# Patient Record
Sex: FEMALE | Race: WHITE | NOT HISPANIC OR LATINO | Employment: OTHER | URBAN - METROPOLITAN AREA
[De-identification: names, ages, dates, MRNs, and addresses within clinical notes are randomized per-mention and may not be internally consistent; named-entity substitution may affect disease eponyms.]

---

## 2017-01-30 NOTE — TELEPHONE ENCOUNTER
Was the patient seen in the last year in this department? Yes   12/5/16    Does patient have an active prescription for medications requested? No     Received Request Via: Pharmacy

## 2017-02-02 RX ORDER — LEVOTHYROXINE SODIUM 0.07 MG/1
TABLET ORAL
Refills: 3 | Status: CANCELLED | OUTPATIENT
Start: 2017-02-02

## 2017-02-10 DIAGNOSIS — E07.9 THYROID CONDITION: ICD-10-CM

## 2017-02-10 RX ORDER — LEVOTHYROXINE SODIUM 0.07 MG/1
75 TABLET ORAL
Qty: 90 TAB | Refills: 0 | Status: SHIPPED | OUTPATIENT
Start: 2017-02-10 | End: 2017-09-02 | Stop reason: SDUPTHER

## 2017-02-24 ENCOUNTER — PATIENT MESSAGE (OUTPATIENT)
Dept: MEDICAL GROUP | Facility: MEDICAL CENTER | Age: 77
End: 2017-02-24

## 2017-02-24 NOTE — TELEPHONE ENCOUNTER
From: Jessica Plaza  To: Anant Hills M.D.  Sent: 2/24/2017 12:09 PM PST  Subject: Prescription Question    Good morning, I need to get a refill for Estazolam 2 MG tablet. The last perscription was from Dr. Soliman with 11 refills the pharmacy says no way would they give 11 refills for a controled substance and he should have known better however he is no longer there and it does not matter, however i do need a refill please.    Thank You Jessica Plaza Pharmacy is [facility] [phone]

## 2017-02-26 DIAGNOSIS — F51.01 PRIMARY INSOMNIA: ICD-10-CM

## 2017-02-27 DIAGNOSIS — F51.01 PRIMARY INSOMNIA: ICD-10-CM

## 2017-02-27 DIAGNOSIS — F51.04 CHRONIC INSOMNIA: ICD-10-CM

## 2017-02-27 RX ORDER — ESTAZOLAM 1 MG
1 TABLET ORAL
Qty: 30 TAB | Refills: 0 | Status: SHIPPED | OUTPATIENT
Start: 2017-02-27 | End: 2017-06-06

## 2017-03-06 ENCOUNTER — OFFICE VISIT (OUTPATIENT)
Dept: MEDICAL GROUP | Facility: MEDICAL CENTER | Age: 77
End: 2017-03-06
Payer: MEDICARE

## 2017-03-06 VITALS
SYSTOLIC BLOOD PRESSURE: 144 MMHG | OXYGEN SATURATION: 93 % | TEMPERATURE: 98.4 F | HEART RATE: 76 BPM | HEIGHT: 68 IN | BODY MASS INDEX: 26.64 KG/M2 | DIASTOLIC BLOOD PRESSURE: 78 MMHG | RESPIRATION RATE: 14 BRPM | WEIGHT: 175.8 LBS

## 2017-03-06 DIAGNOSIS — F51.04 CHRONIC INSOMNIA: ICD-10-CM

## 2017-03-06 DIAGNOSIS — E78.5 DYSLIPIDEMIA: Chronic | ICD-10-CM

## 2017-03-06 DIAGNOSIS — E11.9 DIABETES MELLITUS TYPE 2 IN NONOBESE (HCC): ICD-10-CM

## 2017-03-06 DIAGNOSIS — J44.9 CHRONIC OBSTRUCTIVE PULMONARY DISEASE, UNSPECIFIED COPD TYPE (HCC): Chronic | ICD-10-CM

## 2017-03-06 DIAGNOSIS — K22.70 BARRETT'S ESOPHAGUS WITHOUT DYSPLASIA: ICD-10-CM

## 2017-03-06 DIAGNOSIS — I10 ESSENTIAL HYPERTENSION: Chronic | ICD-10-CM

## 2017-03-06 PROCEDURE — G8482 FLU IMMUNIZE ORDER/ADMIN: HCPCS | Performed by: FAMILY MEDICINE

## 2017-03-06 PROCEDURE — 3017F COLORECTAL CA SCREEN DOC REV: CPT | Performed by: FAMILY MEDICINE

## 2017-03-06 PROCEDURE — 3014F SCREEN MAMMO DOC REV: CPT | Mod: 1P | Performed by: FAMILY MEDICINE

## 2017-03-06 PROCEDURE — 1101F PT FALLS ASSESS-DOCD LE1/YR: CPT | Performed by: FAMILY MEDICINE

## 2017-03-06 PROCEDURE — 99214 OFFICE O/P EST MOD 30 MIN: CPT | Mod: 25 | Performed by: FAMILY MEDICINE

## 2017-03-06 PROCEDURE — 1036F TOBACCO NON-USER: CPT | Performed by: FAMILY MEDICINE

## 2017-03-06 PROCEDURE — G8420 CALC BMI NORM PARAMETERS: HCPCS | Performed by: FAMILY MEDICINE

## 2017-03-06 PROCEDURE — 4040F PNEUMOC VAC/ADMIN/RCVD: CPT | Performed by: FAMILY MEDICINE

## 2017-03-06 PROCEDURE — 3044F HG A1C LEVEL LT 7.0%: CPT | Performed by: FAMILY MEDICINE

## 2017-03-06 ASSESSMENT — PATIENT HEALTH QUESTIONNAIRE - PHQ9: CLINICAL INTERPRETATION OF PHQ2 SCORE: 0

## 2017-03-06 NOTE — MR AVS SNAPSHOT
"        Jessica Plaza   3/6/2017 1:00 PM   Office Visit   MRN: [id number]    Department:  [address] [place] [address]   Dept Phone:  [phone]    Description:  Female : 1940   Provider:  Anant Hills M.D.           Reason for Visit     Follow-Up 3 month check up     GI Problem States has a Hx of GI bleeds and Strokes. Using aspirin for stroke, but GI Dr states to DC aspirin      Allergies as of 3/6/2017     Allergen Noted Reactions    Bactrim 2013   Hives    Demerol 2013   Shortness of Breath, Swelling    Pcn [Penicillins] 2013   Hives    Sulfa Drugs 2013   Hives    Ferrous Sulfate 2014   Rash    itching    Tramadol 2016   Itching    rash    Latex 2013   Contact Dermatitis      You were diagnosed with     Diabetes mellitus type 2 in nonobese (CMS-[facility])   [845893]       Essential hypertension   [1486363]       Chronic obstructive pulmonary disease, unspecified COPD type (CMS-HCC)   [3535329]       Dyslipidemia   [189024]       Castro's esophagus without dysplasia   [472825]         Vital Signs     Blood Pressure Pulse Temperature Respirations Height Weight    144/78 mmHg 76 36.9 °C (98.4 °F) 14 1.727 m (5' 7.99\") 79.742 kg (175 lb 12.8 oz)    Body Mass Index Oxygen Saturation Smoking Status             26.74 kg/m2 93% Former Smoker         Basic Information     Date Of Birth Sex Race Ethnicity Preferred Language    1940 Female White Non- English      Your appointments     May 01, 2017  2:00 PM   ANNUAL WELLNESS with Anant Hills M.D., [facility]    [facility] [address]    [address] [facility] [address]  [facility] [address] [phone]              Problem List              ICD-10-CM Priority Class Noted - Resolved    History of CVA (cerebrovascular accident) Z86.73   2013 - Present    GI bleed K92.2   2014 - Present    Type 2 diabetes mellitus (CMS-HCC) (Chronic) E11.9   2014 - Present   " HTN (hypertension) (Chronic) I10   4/30/2014 - Present    COPD (chronic obstructive pulmonary disease) (CMS-HCC) (Chronic) J44.9   4/30/2014 - Present    Dyslipidemia (Chronic) E78.5   4/30/2014 - Present    Exertional dyspnea R06.09   4/30/2014 - Present    Fatigue R53.83   6/9/2015 - Present    Lung nodules R91.8   6/9/2015 - Present    Hypothyroidism due to acquired atrophy of thyroid E03.4   11/29/2015 - Present    Left shoulder pain M25.512   7/1/2016 - Present    Primary insomnia F51.01   7/29/2016 - Present    Castro's esophagus without dysplasia K22.70   8/31/2016 - Present    Iron deficiency anemia, unspecified D50.9   9/14/2016 - Present      Health Maintenance        Date Due Completion Dates    IMM DTaP/Tdap/Td Vaccine (1 - Tdap) 5/15/1961 ---    PAP SMEAR 5/15/1963 ---    IMM ZOSTER VACCINE 5/15/2002 ---    BONE DENSITY 5/15/2007 ---    IMM PNEUMOCOCCAL 65+ (ADULT) LOW/MEDIUM RISK SERIES (2 of 2 - PCV13) 1/14/2015 1/14/2014, 6/1/2009    MAMMOGRAM 7/28/2016 7/28/2015 (Declined), 5/2/2011, 5/2/2011 (N/S)    Override on 7/28/2015: Patient Declined    Override on 5/2/2011: (N/S)    DIABETES MONOFILAMENT / LE EXAM 7/28/2016 7/28/2015 (Done), 4/3/2014 (Prv Comp)    Override on 7/28/2015: Done    Override on 4/3/2014: Previously completed    RETINAL SCREENING 4/13/2017 4/13/2016, 5/21/2015 (Done), 4/1/2015 (Prv Comp)    Override on 5/21/2015: Done    Override on 4/1/2015: Previously completed    A1C SCREENING 6/5/2017 12/5/2016, 7/26/2016, 3/22/2016, 11/18/2015, 11/18/2015, 7/24/2015, 3/27/2015, 3/27/2015 (N/S), 1/13/2014, 6/1/2013, 6/1/2013    Override on 3/27/2015: (N/S)    FASTING LIPID PROFILE 7/26/2017 7/26/2016, 3/22/2016, 11/18/2015, 11/18/2015, 7/24/2015, 3/27/2015, 1/13/2014, 6/2/2013    URINE ACR / MICROALBUMIN 7/26/2017 7/26/2016, 7/24/2015, 4/3/2013 (Prv Comp)    Override on 4/3/2013: Previously completed    SERUM CREATININE 10/6/2017 10/6/2016, 7/26/2016, 3/22/2016, 2/12/2016, 11/18/2015,  11/18/2015, 7/24/2015, 3/27/2015, 1/22/2015, 4/29/2014, 1/13/2014, 6/3/2013, 6/1/2013, 2/14/2013    COLONOSCOPY 8/2/2024 8/2/2014 (Prv Comp)    Override on 8/2/2014: Previously completed            Current Immunizations     Influenza TIV (IM) 10/1/2013, 10/1/2012    Influenza Vaccine Adult HD 10/31/2016, 9/4/2015    Influenza Vaccine Quad Inj (Preserved) 9/28/2011    Pneumococcal polysaccharide vaccine (PPSV-23) 1/14/2014  3:48 PM, 6/1/2009      Below and/or attached are the medications your provider expects you to take. Review all of your home medications and newly ordered medications with your provider and/or pharmacist. Follow medication instructions as directed by your provider and/or pharmacist. Please keep your medication list with you and share with your provider. Update the information when medications are discontinued, doses are changed, or new medications (including over-the-counter products) are added; and carry medication information at all times in the event of emergency situations     Allergies:  BACTRIM - Hives     DEMEROL - Shortness of Breath,Swelling     PCN - Hives     SULFA DRUGS - Hives     FERROUS SULFATE - Rash     TRAMADOL - Itching     LATEX - Contact Dermatitis               Medications  Valid as of: March 06, 2017 -  1:42 PM    Generic Name Brand Name Tablet Size Instructions for use    Albuterol Sulfate (Aero Soln) albuterol 108 (90 BASE) MCG/ACT Inhale 1 Puff by mouth every 6 hours as needed for Shortness of Breath.        Aspirin (Tab)  MG Take 325 mg by mouth every day.        CycloSPORINE   2 Drops by Ophthalmic route 2 Times a Day.        Estazolam (Tab) Estazolam 2 MG Take 1 Tab by mouth every bedtime.        Estazolam (Tab) PROSOM 1 MG Take 1 Tab by mouth every bedtime.        Hydrocodone-Acetaminophen (Tab) NORCO 5-325 MG Take 1-2 Tabs by mouth every 6 hours as needed.        HydrOXYzine Pamoate (Cap) VISTARIL 50 MG Take 1 Cap by mouth 3 times a day as needed for Itching.           Levothyroxine Sodium (Tab) SYNTHROID 75 MCG Take 1 Tab by mouth Every morning on an empty stomach.        Liraglutide (Solution Pen-injector) VICTOZA 18 MG/3ML Inject 0.1 mL as instructed every day.        Lisinopril (Tab) PRINIVIL 10 MG Take 10 mg by mouth every day.        MetFORMIN HCl (Tab) GLUCOPHAGE 1000 MG TAKE 1 TABLET BY MOUTH 2 TIMES A DAY, WITH MEALS.        Omeprazole (CAPSULE DELAYED RELEASE) PRILOSEC 40 MG Take 40 mg by mouth every day.        Simvastatin (Tab) ZOCOR 40 MG Take 40 mg by mouth every day.        .                 Medicines prescribed today were sent to:     [id number] - [place], [place] - [address]    [address] [place] [address]    Phone: [phone] Fax: [phone]    Open 24 Hours?: No      Medication refill instructions:       If your prescription bottle indicates you have medication refills left, it is not necessary to call your provider’s office. Please contact your pharmacy and they will refill your medication.    If your prescription bottle indicates you do not have any refills left, you may request refills at any time through one of the following ways: The online [employer] / [employer] system (except Urgent Care), by calling your provider’s office, or by asking your pharmacy to contact your provider’s office with a refill request. Medication refills are processed only during regular business hours and may not be available until the next business day. Your provider may request additional information or to have a follow-up visit with you prior to refilling your medication.   *Please Note: Medication refills are assigned a new Rx number when refilled electronically. Your pharmacy may indicate that no refills were authorized even though a new prescription for the same medication is available at the pharmacy. Please request the medicine by name with the pharmacy before contacting your provider for a refill.        Your To Do List     Future Labs/Procedures Complete By  Expires    CBC WITH DIFFERENTIAL  As directed 3/6/2018    TSH  As directed 3/7/2018         MyChart Access Code: Activation code not generated  Current MyChart Status: Active

## 2017-03-06 NOTE — PROGRESS NOTES
CC: Follow up/ intermittent epigastric pain    HPI:   Jessica presents today to discuss the following medical issues:    Diabetes type 2, has been adequately controlled. Her last A1C was 6.1.Denies polyuria, polydipsia, and hypoglycemic episodes.She has been on Victoza 0.1 ml daily, and metformin 1000 mg bid.No side effects.    Essential hypertension, BP has been adequately controlled on current medication. Denies headache, chest pain, and SOB.She is currently on Lisinopril 10 mg daily.No side effects.    Dyslipidemia, he has been tolerating the statin. Denies muscle pain LFTs has been normal, he is on Simvastatin 40 mg daily.    Castro's esophagus without dysplasia, patient has been stable, and asymptomatic.Patient has been following up with GI, has been doing periodic EGD.     Discuss the risk of taking Estazolam for insomnia, advised to start either trazodone, or mirtazapine and stop the estazolam gradually.    Patient Active Problem List    Diagnosis Date Noted   • Iron deficiency anemia, unspecified 09/14/2016   • Castro's esophagus without dysplasia 08/31/2016   • Primary insomnia 07/29/2016   • Left shoulder pain 07/01/2016   • Hypothyroidism due to acquired atrophy of thyroid 11/29/2015   • Fatigue 06/09/2015   • Lung nodules 06/09/2015   • Type 2 diabetes mellitus (CMS-HCC) 04/30/2014   • HTN (hypertension) 04/30/2014   • COPD (chronic obstructive pulmonary disease) (CMS-HCC) 04/30/2014   • Dyslipidemia 04/30/2014   • Exertional dyspnea 04/30/2014   • GI bleed 04/29/2014   • History of CVA (cerebrovascular accident) 06/02/2013       Current Outpatient Prescriptions   Medication Sig Dispense Refill   • levothyroxine (SYNTHROID) 75 MCG Tab Take 1 Tab by mouth Every morning on an empty stomach. 90 Tab 0   • liraglutide (VICTOZA) 18 MG/3ML Solution Pen-injector injection Inject 0.1 mL as instructed every day. 6 mL 11   • albuterol 108 (90 BASE) MCG/ACT Aero Soln inhalation aerosol Inhale 1 Puff by mouth every 6  "hours as needed for Shortness of Breath.     • simvastatin (ZOCOR) 40 MG Tab Take 40 mg by mouth every day.     • Estazolam 2 MG Tab Take 1 Tab by mouth every bedtime. 30 Tab 11   • metformin (GLUCOPHAGE) 1000 MG tablet TAKE 1 TABLET BY MOUTH 2 TIMES A DAY, WITH MEALS. 60 Tab 11   • lisinopril (PRINIVIL) 10 MG Tab Take 10 mg by mouth every day.     • aspirin (ASA) 325 MG TABS Take 325 mg by mouth every day.     • CycloSPORINE (RESTASIS OP) 2 Drops by Ophthalmic route 2 Times a Day.     • omeprazole (PRILOSEC) 40 MG capsule Take 40 mg by mouth every day.     • estazolam (PROSOM) 1 MG tablet Take 1 Tab by mouth every bedtime. 30 Tab 0   • hydrocodone-acetaminophen (NORCO) 5-325 MG Tab per tablet Take 1-2 Tabs by mouth every 6 hours as needed. 40 Tab 0   • hydrOXYzine (VISTARIL) 50 MG Cap Take 1 Cap by mouth 3 times a day as needed for Itching. 20 Cap 0     No current facility-administered medications for this visit.         Allergies as of 03/06/2017 - Gumaro as Reviewed 03/06/2017   Allergen Reaction Noted   • Bactrim Hives 02/14/2013   • Demerol Shortness of Breath and Swelling 02/14/2013   • Pcn [penicillins] Hives 02/14/2013   • Sulfa drugs Hives 02/14/2013   • Ferrous sulfate Rash 09/08/2014   • Tramadol Itching 02/11/2016   • Latex Contact Dermatitis 02/14/2013        ROS: Denies any chest pain, Shortness of breath, Changes bowel or bladder, Lower extremity edema.    Physical Exam:  /78 mmHg  Pulse 76  Temp(Src) 36.9 °C (98.4 °F)  Resp 14  Ht 1.727 m (5' 7.99\")  Wt 79.742 kg (175 lb 12.8 oz)  BMI 26.74 kg/m2  SpO2 93%  Gen.: Well-developed, well-nourished, no apparent distress,pleasant and cooperative with the examination  Skin:  Warm and dry with good turgor. No rashes or suspicious lesions in visible areas  HEENT:Sinuses nontender with palpation, TMs clear, nares patent with pink mucosa and clear rhinorrhea,no septal deviation ,polyps or lesions. lips without lesions, oropharynx clear.  Neck: " Trachea midline,no masses or adenopathy. No JVD.  Cor: Regular rate and rhythm without murmur, gallop or rub.  Lungs: Respirations unlabored.Clear to auscultation with equal breath sounds bilaterally. No wheezes, rhonchi.  Extremities: No cyanosis, clubbing or edema.        Monofilament testing with a 10 gram force: sensation intact: 6/6  Visual Inspection: Feet w/omaceration, ulcers, fissures.  Pedal pulses: intact.    Assessment and Plan.   76 y.o. female     1. Diabetes mellitus type 2 in nonobese (CMS-HCC)  Has been adequately controlled. Her last A1C was 6.1.  Continue on Victoza 0.1 ml daily, and metformin 1000 mg bid.No side effects.  Monofilament foot exam showed no sign of neuropathy.    - Diabetic Monofilament Lower Extremity Exam    2. Essential hypertension  BP has been adequately controlled on current medication. Denies headache, chest pain, and SOB.  Continue on Lisinopril 10 mg daily.No side effects.    3. Chronic obstructive pulmonary disease, unspecified COPD type (CMS-HCC)  Stable, mostly asymptomatic.  Has been on albuterol as needed.    4. Dyslipidemia  he has been tolerating the statin. Denies muscle pain LFTs has been normal,   Continue on Simvastatin 40 mg daily.    5. Castro's esophagus without dysplasia  Stable, and asymptomatic.  Continue on up with GI, has been doing periodic EGD.     6. Chronic insomnia  Discuss the risk of taking Estazolam for insomnia,   Will start her on either trazodone, or mirtazapine and stop the estazolam gradually.

## 2017-03-15 LAB
BASOPHILS # BLD AUTO: 0 X10E3/UL (ref 0–0.2)
BASOPHILS NFR BLD AUTO: 0 %
CHOLEST SERPL-MCNC: 181 MG/DL (ref 100–199)
COMMENT 011824: NORMAL
EOSINOPHIL # BLD AUTO: 0.2 X10E3/UL (ref 0–0.4)
EOSINOPHIL NFR BLD AUTO: 2 %
ERYTHROCYTE [DISTWIDTH] IN BLOOD BY AUTOMATED COUNT: 14.7 % (ref 12.3–15.4)
HCT VFR BLD AUTO: 46.4 % (ref 34–46.6)
HDLC SERPL-MCNC: 60 MG/DL
HGB BLD-MCNC: 15.5 G/DL (ref 11.1–15.9)
IMM GRANULOCYTES # BLD: 0 X10E3/UL (ref 0–0.1)
IMM GRANULOCYTES NFR BLD: 0 %
IMMATURE CELLS  115398: ABNORMAL
LDLC SERPL CALC-MCNC: 93 MG/DL (ref 0–99)
LYMPHOCYTES # BLD AUTO: 1.3 X10E3/UL (ref 0.7–3.1)
LYMPHOCYTES NFR BLD AUTO: 16 %
MCH RBC QN AUTO: 33.3 PG (ref 26.6–33)
MCHC RBC AUTO-ENTMCNC: 33.4 G/DL (ref 31.5–35.7)
MCV RBC AUTO: 100 FL (ref 79–97)
MONOCYTES # BLD AUTO: 0.7 X10E3/UL (ref 0.1–0.9)
MONOCYTES NFR BLD AUTO: 9 %
MORPHOLOGY BLD-IMP: ABNORMAL
NEUTROPHILS # BLD AUTO: 6.2 X10E3/UL (ref 1.4–7)
NEUTROPHILS NFR BLD AUTO: 73 %
NRBC BLD AUTO-RTO: ABNORMAL %
PLATELET # BLD AUTO: 225 X10E3/UL (ref 150–379)
RBC # BLD AUTO: 4.65 X10E6/UL (ref 3.77–5.28)
TRIGL SERPL-MCNC: 138 MG/DL (ref 0–149)
TSH SERPL DL<=0.005 MIU/L-ACNC: 1.4 UIU/ML (ref 0.45–4.5)
VLDLC SERPL CALC-MCNC: 28 MG/DL (ref 5–40)
WBC # BLD AUTO: 8.5 X10E3/UL (ref 3.4–10.8)

## 2017-03-31 DIAGNOSIS — G47.00 INSOMNIA, UNSPECIFIED TYPE: ICD-10-CM

## 2017-03-31 DIAGNOSIS — F51.01 PRIMARY INSOMNIA: ICD-10-CM

## 2017-03-31 RX ORDER — ESTAZOLAM 1 MG
1 TABLET ORAL DAILY
Qty: 30 TAB | Refills: 0 | Status: SHIPPED
Start: 2017-03-31 | End: 2017-06-06

## 2017-03-31 RX ORDER — SIMVASTATIN 40 MG
40 TABLET ORAL DAILY
Qty: 30 TAB | Refills: 0 | Status: SHIPPED | OUTPATIENT
Start: 2017-03-31 | End: 2017-07-07 | Stop reason: SDUPTHER

## 2017-03-31 RX ORDER — LISINOPRIL 10 MG/1
10 TABLET ORAL DAILY
Qty: 30 TAB | Refills: 0 | Status: SHIPPED | OUTPATIENT
Start: 2017-03-31 | End: 2017-05-02 | Stop reason: SDUPTHER

## 2017-04-27 ENCOUNTER — TELEPHONE (OUTPATIENT)
Dept: MEDICAL GROUP | Facility: MEDICAL CENTER | Age: 77
End: 2017-04-27

## 2017-04-27 RX ORDER — ASPIRIN 81 MG/1
81 TABLET, CHEWABLE ORAL EVERY MORNING
COMMUNITY
End: 2019-05-15

## 2017-04-27 RX ORDER — LISINOPRIL 10 MG/1
TABLET ORAL
Refills: 8 | OUTPATIENT
Start: 2017-04-27

## 2017-04-27 NOTE — TELEPHONE ENCOUNTER
Future Appointments      Provider Department Center   5/1/2017 2:00 PM Anant Hills M.D.; [facility] [address]      ANNUAL WELLNESS VISIT PRE-VISIT PLANNING     1.  Reviewed last PCP office visit assessment and plan notes: Yes  2.  If any orders were placed last visit do we have Results/Consult Notes?        •  Labs? Yes 03/14/17       •  Imaging? No        •  Referrals? No    3.   Updated immunizations by using [employer]?: yes  · Is patient due for Tdap/Shingles? Yes.  If yes, has patient been explained their pharmacy coverage? yes   · Has the patient had the flu vaccine this season? Yes.  · Has patient had the pneumococcal vaccine? Yes. If yes which vaccine was administered? 23 When was it administered? 01/14/14    4.   Last office visit 03/06/17          Patient is due for these Health Maintenance Topics:   Health Maintenance Due   Topic Date Due   • Annual Wellness Visit  Scheduled 5/01/2017   • IMM DTaP/Tdap/Td Vaccine (1 - Tdap)   05/15/1961   • IMM ZOSTER VACCINE  05/15/2002   • IMM PREVNAR 13 01/14/2015   • PFT / SPIROMETRY  ANNUALLY  DUE   • MAMMOGRAM  DUE   • BONE DENSITY  DUE   • RETINAL SCREENING  Requesting records       5. Reviewed/Updated the following with patient:       •   Preferred Pharmacy? YES       •   Preferred Lab? YES       •   Medications? YES. Was Abstract Encounter opened and chart updated? YES       •   Social History? YES. Was Abstract Encounter opened and chart updated? YES       •   Family History? YES. Was Abstract Encounter opened and chart updated? YES    6.    Updated Care Team with [employer] and all specialists?        •   Gait devices, O2, CPAP, etc: no        •   Other specialists (GYN, cardiology, endo, etc): yes        •   Eye professional: yes When was last eye exam? 1 year scheduled for 1month        •   MAYELA letter will be faxed to:  Eye Dr for Retinal Exam records    7. DPA/Advanced Directive:  Patient does not have an Advanced Directive.  A  packet and workshop information was given on Advanced Directives.    8. Patient has:     COPD  1. Is patient under the care of a pulmonologist? no    2. Has patient ever completed a PFT or spirometry? yes       a. If yes, when? 4 years    3.  If applicable, was CareTeam updated with oxygen/CPAP supplier? no    4. Has patient ever had instruction on inhaler technique by health care professional? No    5. Does patient have an action plan for when they have trouble breathing? no    6. Is patient interested in a referral to respiratory therapy for more information on COPD, inhaler technique, and/or information on establishing an action plan?  no    DIABETES  1. Records requested for overdue HM topics specifically for diabetes? yes      a. If yes, specifically requested: retinal exam    2. Has patient ever had diabetes education? Yes      a. If so, when? 10 years      b. If not, is patient interested? Yes diabetic nurse visit    9.  Is patient in need of any refills prior to office visit? No    10. Patient was informed to arrive 15 min prior to their scheduled appointment and bring in their medication bottles? yes    11. Patient was advised: “This is a free wellness visit. The provider will screen for medical conditions to help you stay healthy. If you have other concerns to address you may be asked to discuss these at a separate visit or there may be an additional fee.”  Yes

## 2017-05-01 ENCOUNTER — OFFICE VISIT (OUTPATIENT)
Dept: MEDICAL GROUP | Facility: MEDICAL CENTER | Age: 77
End: 2017-05-01
Payer: MEDICARE

## 2017-05-01 VITALS
RESPIRATION RATE: 16 BRPM | BODY MASS INDEX: 26.07 KG/M2 | WEIGHT: 172 LBS | TEMPERATURE: 97.8 F | HEIGHT: 68 IN | HEART RATE: 73 BPM | SYSTOLIC BLOOD PRESSURE: 182 MMHG | OXYGEN SATURATION: 92 % | DIASTOLIC BLOOD PRESSURE: 80 MMHG

## 2017-05-01 DIAGNOSIS — E03.4 HYPOTHYROIDISM DUE TO ACQUIRED ATROPHY OF THYROID: ICD-10-CM

## 2017-05-01 DIAGNOSIS — M25.512 CHRONIC LEFT SHOULDER PAIN: ICD-10-CM

## 2017-05-01 DIAGNOSIS — E78.5 DYSLIPIDEMIA: Chronic | ICD-10-CM

## 2017-05-01 DIAGNOSIS — I10 ESSENTIAL HYPERTENSION: Chronic | ICD-10-CM

## 2017-05-01 DIAGNOSIS — Z86.73 HISTORY OF CVA (CEREBROVASCULAR ACCIDENT): ICD-10-CM

## 2017-05-01 DIAGNOSIS — G89.29 CHRONIC LEFT SHOULDER PAIN: ICD-10-CM

## 2017-05-01 DIAGNOSIS — K22.70 BARRETT'S ESOPHAGUS WITHOUT DYSPLASIA: ICD-10-CM

## 2017-05-01 DIAGNOSIS — E11.9 DIABETES MELLITUS TYPE 2 IN NONOBESE (HCC): ICD-10-CM

## 2017-05-01 DIAGNOSIS — J44.9 CHRONIC OBSTRUCTIVE PULMONARY DISEASE, UNSPECIFIED COPD TYPE (HCC): Chronic | ICD-10-CM

## 2017-05-01 DIAGNOSIS — Z23 NEED FOR PNEUMOCOCCAL VACCINATION: ICD-10-CM

## 2017-05-01 DIAGNOSIS — F51.01 PRIMARY INSOMNIA: ICD-10-CM

## 2017-05-01 PROCEDURE — 3046F HEMOGLOBIN A1C LEVEL >9.0%: CPT | Mod: 8P | Performed by: FAMILY MEDICINE

## 2017-05-01 PROCEDURE — G8417 CALC BMI ABV UP PARAM F/U: HCPCS | Performed by: FAMILY MEDICINE

## 2017-05-01 PROCEDURE — G0009 ADMIN PNEUMOCOCCAL VACCINE: HCPCS | Performed by: FAMILY MEDICINE

## 2017-05-01 PROCEDURE — 1101F PT FALLS ASSESS-DOCD LE1/YR: CPT | Performed by: FAMILY MEDICINE

## 2017-05-01 PROCEDURE — 90670 PCV13 VACCINE IM: CPT | Performed by: FAMILY MEDICINE

## 2017-05-01 PROCEDURE — G0439 PPPS, SUBSEQ VISIT: HCPCS | Mod: 25 | Performed by: FAMILY MEDICINE

## 2017-05-01 PROCEDURE — 3017F COLORECTAL CA SCREEN DOC REV: CPT | Performed by: FAMILY MEDICINE

## 2017-05-01 PROCEDURE — 99213 OFFICE O/P EST LOW 20 MIN: CPT | Mod: 25 | Performed by: FAMILY MEDICINE

## 2017-05-01 PROCEDURE — G8432 DEP SCR NOT DOC, RNG: HCPCS | Performed by: FAMILY MEDICINE

## 2017-05-01 PROCEDURE — 4040F PNEUMOC VAC/ADMIN/RCVD: CPT | Performed by: FAMILY MEDICINE

## 2017-05-01 PROCEDURE — 3014F SCREEN MAMMO DOC REV: CPT | Mod: 8P | Performed by: FAMILY MEDICINE

## 2017-05-01 PROCEDURE — 1036F TOBACCO NON-USER: CPT | Performed by: FAMILY MEDICINE

## 2017-05-01 RX ORDER — ESTAZOLAM 1 MG
0.5 TABLET ORAL
Qty: 15 TAB | Refills: 0 | Status: SHIPPED | OUTPATIENT
Start: 2017-05-01 | End: 2017-06-06

## 2017-05-01 ASSESSMENT — PATIENT HEALTH QUESTIONNAIRE - PHQ9
SUM OF ALL RESPONSES TO PHQ QUESTIONS 1-9: 17
CLINICAL INTERPRETATION OF PHQ2 SCORE: 6
5. POOR APPETITE OR OVEREATING: 3 - NEARLY EVERY DAY

## 2017-05-01 ASSESSMENT — PAIN SCALES - GENERAL: PAINLEVEL: 8=MODERATE-SEVERE PAIN

## 2017-05-01 NOTE — PROGRESS NOTES
Chief Complaint   Patient presents with   • Annual Wellness Visit         HPI:  Jessica Plaza is a 76 y.o. female here for Medicare Annual Wellness Visit    Elevated blood pressure, denies headache, chest pain, and SOB, has been on lisinopril 10 mg daily.    Has been having chronic left shoulder pain, underwent surgery in the pain, the pain has not gone away, and lately has been getting worse. Wants a referral to orthopedics.      Patient Active Problem List    Diagnosis Date Noted   • Iron deficiency anemia, unspecified 09/14/2016   • Castro's esophagus without dysplasia 08/31/2016   • Primary insomnia 07/29/2016   • Left shoulder pain 07/01/2016   • Hypothyroidism due to acquired atrophy of thyroid 11/29/2015   • Fatigue 06/09/2015   • Lung nodules 06/09/2015   • Type 2 diabetes mellitus (CMS-HCC) 04/30/2014   • HTN (hypertension) 04/30/2014   • COPD (chronic obstructive pulmonary disease) (CMS-HCC) 04/30/2014   • Dyslipidemia 04/30/2014   • Exertional dyspnea 04/30/2014   • GI bleed 04/29/2014   • History of CVA (cerebrovascular accident) 06/02/2013       Current Outpatient Prescriptions   Medication Sig Dispense Refill   • aspirin (ASA) 81 MG Chew Tab chewable tablet Take 81 mg by mouth every day.     • lisinopril (PRINIVIL) 10 MG Tab Take 1 Tab by mouth every day. 30 Tab 0   • simvastatin (ZOCOR) 40 MG Tab Take 1 Tab by mouth every day. 30 Tab 0   • estazolam (PROSOM) 1 MG tablet Take 1 Tab by mouth every bedtime. 30 Tab 0   • levothyroxine (SYNTHROID) 75 MCG Tab Take 1 Tab by mouth Every morning on an empty stomach. 90 Tab 0   • liraglutide (VICTOZA) 18 MG/3ML Solution Pen-injector injection Inject 0.1 mL as instructed every day. 6 mL 11   • albuterol 108 (90 BASE) MCG/ACT Aero Soln inhalation aerosol Inhale 1 Puff by mouth every 6 hours as needed for Shortness of Breath.     • metformin (GLUCOPHAGE) 1000 MG tablet TAKE 1 TABLET BY MOUTH 2 TIMES A DAY, WITH MEALS. 60 Tab 11   • CycloSPORINE (RESTASIS  OP) 2 Drops by Ophthalmic route 2 Times a Day.     • omeprazole (PRILOSEC) 40 MG capsule Take 40 mg by mouth every day.     • estazolam (PROSOM) 1 MG tablet Take 1 Tab by mouth every day. 30 Tab 0   • hydrocodone-acetaminophen (NORCO) 5-325 MG Tab per tablet Take 1-2 Tabs by mouth every 6 hours as needed. (Patient not taking: Reported on 4/27/2017) 40 Tab 0   • hydrOXYzine (VISTARIL) 50 MG Cap Take 1 Cap by mouth 3 times a day as needed for Itching. (Patient not taking: Reported on 4/27/2017) 20 Cap 0   • Estazolam 2 MG Tab Take 1 Tab by mouth every bedtime. (Patient not taking: Reported on 4/27/2017) 30 Tab 11   • aspirin (ASA) 325 MG TABS Take 325 mg by mouth every day.       No current facility-administered medications for this visit.        Patient is taking medications as noted in medication list.  Current supplements as per medication list.   Chronic narcotic pain medicines: no    Allergies: Bactrim; Demerol; Pcn; Sulfa drugs; Ferrous sulfate; Tramadol; and Latex    Current social contact/activities: visit with family and friends     Is patient current with immunizations?  No, due for PREVNAR (PCV13) , TDAP and ZOSTAVAX (Shingles). Patient is interested in receiving PREVNAR (PCV13)  today.     She  reports that she quit smoking about 4 years ago. Her smoking use included Cigarettes. She has a 26 pack-year smoking history. She has never used smokeless tobacco. She reports that she drinks about 8.4 oz of alcohol per week. She reports that she does not use illicit drugs.  Counseling given: Yes        DPA/Advanced Directive:  Patient does not have an Advanced Directive.  A packet and workshop information was given on Advanced Directives.    ROS:    Gait: Uses no assistive device   Ostomy: no   Other tubes: no   Amputations: no   Chronic oxygen use: no   Last eye exam: 03/2017   Wears hearing aids: no   : Denies incontinence.      Screening:    COPD  1. Is patient under the care of a pulmonologist? no    2. Has  patient ever completed a PFT or spirometry? yes       a. If yes, when? 4 years    3.  If applicable, was CareTeam updated with oxygen/CPAP supplier? no    4. Has patient ever had instruction on inhaler technique by health care professional? No    5. Does patient have an action plan for when they have trouble breathing? no    6. Is patient interested in a referral to respiratory therapy for more information on COPD, inhaler technique, and/or information on establishing an action plan?  no    DIABETES  1. Records requested for overdue  topics specifically for diabetes? yes      a. If yes, specifically requested: retinal exam    2. Has patient ever had diabetes education? Yes      a. If so, when? 10 years      b. If not, is patient interested? Yes diabetic nurse visit     Depression Screening    Little interest or pleasure in doing things?  3 - nearly every day  Feeling down, depressed , or hopeless? 3 - nearly every day  Trouble falling or staying asleep, or sleeping too much?  3 - nearly every day  Feeling tired or having little energy?  3 - nearly every day  Poor appetite or overeating?  3 - nearly every day  Feeling bad about yourself - or that you are a failure or have let yourself or your family down? 0 - not at all  Trouble concentrating on things, such as reading the newspaper or watching television? 0 - not at all  Moving or speaking so slowly that other people could have noticed.  Or the opposite - being so fidgety or restless that you have been moving around a lot more than usual?  2 - more than half the days  Thoughts that you would be better off dead, or of hurting yourself?  0 - not at all  Patient Health Questionnaire Score: 17  If depressive symptoms identified deferred to follow up visit unless specifically addressed in assessment and plan.    Screening for Cognitive Impairment    Three Minute Recall (banana, sunrise, fence)  3/3    Draw clock face with all 12 numbers set to the hand to show 10  minutes past 11 o'clock  1 5/5  If cognitive concerns identified deferred to follow up visit unless specifically addressed in assessment and plan.    Fall Risk Assessment    Has the patient had two or more falls in the last year or any fall with injury in the last year?  No  If Fall Risk identified deferred to follow up visit unless specifically addressed in assessment and plan.    Safety Assessment    Throw rugs on floor.  No  Handrails on all stairs.  Yes  Good lighting in all hallways.  Yes  Difficulty hearing.  No  Patient counseled about all safety risks that were identified.    Functional Assessment ADLs    Are there any barriers preventing you from cooking for yourself or meeting nutritional needs?  No.    Are there any barriers preventing you from driving safely or obtaining transportation?  No.    Are there any barriers preventing you from using a telephone or calling for help?  No.    Are there any barriers preventing you from shopping?  No.    Are there any barriers preventing you from taking care of your own finances?  No.    Are there any barriers preventing you from managing your medications?  No.    Are currently engaging any exercise or physical activity?  Yes.       Health Maintenance Summary                IMM DTaP/Tdap/Td Vaccine Overdue 5/15/1961     IMM ZOSTER VACCINE Overdue 5/15/2002     BONE DENSITY Overdue 5/15/2007     PFT SCREENING-FEV1 AND FEV/FVC RATIO / SPIROMETRY SHOULD BE PERFORMED ANNUALLY Overdue 3/7/2014      Done 3/7/2013 PFT DICTATED RESULTS    IMM PNEUMOCOCCAL 65+ (ADULT) LOW/MEDIUM RISK SERIES Overdue 1/14/2015      Done 1/14/2014 Imm Admin: Pneumococcal polysaccharide vaccine (PPSV-23)     Patient has more history with this topic...    MAMMOGRAM Overdue 7/28/2016      Patient Declined 7/28/2015      Patient has more history with this topic...    Annual Wellness Visit Overdue 7/28/2016      Done 7/28/2015 Visit Dx: Medicare annual wellness visit, subsequent     Patient has more  history with this topic...    RETINAL SCREENING Overdue 4/13/2017      Done 4/13/2016 AMB REFERRAL FOR RETINAL SCREENING EXAM     Patient has more history with this topic...    A1C SCREENING Next Due 6/5/2017      Done 12/5/2016 POCT A1C     Patient has more history with this topic...    URINE ACR / MICROALBUMIN Next Due 7/26/2017      Done 7/26/2016 MICROALB/CREAT RATIO RAND. UR     Patient has more history with this topic...    SERUM CREATININE Next Due 10/6/2017      Done 10/6/2016 BASIC METABOLIC PANEL     Patient has more history with this topic...    DIABETES MONOFILAMENT / LE EXAM Next Due 3/6/2018      Done 3/6/2017 AMB DIABETIC MONOFILAMENT LOWER EXTREMITY EXAM     Patient has more history with this topic...    FASTING LIPID PROFILE Next Due 3/14/2018      Done 3/14/2017 LIPID PANEL     Patient has more history with this topic...    COLONOSCOPY Next Due 8/2/2024      Previously completed 8/2/2014           Patient Care Team:  Anant Hills M.D. as PCP - General (Geriatrics)  Carlo Sanchez D.O. as Consulting Physician (Gastroenterology)  HUDSON Cook as Consulting Physician (Urology)  Carlo Li M.D. as Consulting Physician (Ophthalmology)      Social History   Substance Use Topics   • Smoking status: Former Smoker -- 0.50 packs/day for 52 years     Types: Cigarettes     Quit date: 01/01/2013   • Smokeless tobacco: Never Used      Comment: started smoking at age 21   • Alcohol Use: 8.4 oz/week     7 Shots of liquor, 7 Standard drinks or equivalent per week      Comment: 1/day     Family History   Problem Relation Age of Onset   • Cancer Mother      breast   • Lung Disease Mother      COPD/Emphysema/Smoker   • Stroke Father    • Hypertension Father    • Hyperlipidemia Father    • Heart Attack Father    • Cancer Sister      Pancriatic   • Arrythmia Sister    • Lung Disease Sister      COPD/Emphysema/Smoker   • Bipolar disorder Son    • Bipolar disorder Son    • Bipolar disorder Son  "   • Cancer Maternal Aunt      stomach     She  has a past medical history of High cholesterol; Hypertension; GERD (gastroesophageal reflux disease); Castro esophagus; UTI (urinary tract infection); COPD; Bronchitis; Arthritis; Anemia; Indigestion; Dental disorder; Stroke (CMS-[facility]) (); Disorder of thyroid; Heart burn; Pain; COPD (chronic obstructive pulmonary disease) (CMS-HCC); Emphysema of lung (CMS-HCC); Diabetes; Pneumonia (.); and Hemorrhagic disorder. She also has no past medical history of Breast cancer (CMS-HCC).   Past Surgical History   Procedure Laterality Date   • Gastroscopy-endo  2014     Performed by Yonas Grimes Jr., M.D. at ENDOSCOPY [facility]   • Tonsillectomy     • Pr anesth, delivery only       X 2   • Shoulder decompression arthroscopic Left 2016     Procedure: SHOULDER DECOMPRESSION ARTHROSCOPIC SUBACROMIAL and biceps tenotomy;  Surgeon: Johan Roberts M.D.;  Location: SURGERY [facility];  Service:    • Shoulder decompression arthroscopic Left 10/11/2016     Procedure: SHOULDER DECOMPRESSION ARTHROSCOPIC  with   debridment of the labrum and biceps tendon stump;  Surgeon: Bill Ponce M.D.;  Location: SURGERY [facility];  Service:    • Clavicle resection Left 10/11/2016     Procedure: CLAVICLE RESECTION distal open  ;  Surgeon: Bill Ponce M.D.;  Location: SURGERY [facility];  Service:    • Chondroplasty Left 10/11/2016     Procedure: Arthroscopic CHONDROPLASTY of the joint  ;  Surgeon: Bill Ponce M.D.;  Location: SURGERY [facility];  Service:            Exam:     Blood pressure 182/80, pulse 73, temperature 36.6 °C (97.8 °F), resp. rate 16, height 1.715 m (5' 7.5\"), weight 78.019 kg (172 lb), SpO2 92 %. Body mass index is 26.53 kg/(m^2).    Hearing; good  Dentition good  Alert, oriented in no acute distress.  Eye contact is good, speech goal directed, affect calm      Assessment and Plan. The following treatment and " monitoring plan is recommended:    76 y.o. female     1. Need for pneumococcal vaccination    - Annual Wellness Visit - Includes PPPS Subsequent ()  - Pneumococcal Conjugate Vaccine 13-Valent <6yo IM    2. Diabetes mellitus type 2 in nonobese (CMS-HCC)  Has been adequately controlled. Her last A1C was 6.1.  Continue on Victoza 0.1 ml daily, and metformin 1000 mg bid.No side effects.    - Annual Wellness Visit - Includes PPPS Subsequent ()    3. Essential hypertension  Elevated . Asymptomatic.  Increase Lisinopril from 10 mg to 15 mg daily.    - Annual Wellness Visit - Includes PPPS Subsequent ()    4. Chronic obstructive pulmonary disease, unspecified COPD type (CMS-HCC)  Stable, mostly asymptomatic.  Has been on albuterol as needed    - Annual Wellness Visit - Includes PPPS Subsequent ()    5. Dyslipidemia  he has been tolerating the statin. Denies muscle pain LFTs has been normal,    Continue on Simvastatin 40 mg daily.    - Annual Wellness Visit - Includes PPPS Subsequent ()    6. History of CVA (cerebrovascular accident)  Stable. No residual.  Continue on satin and ASA.    - Annual Wellness Visit - Includes PPPS Subsequent ()    7. Hypothyroidism due to acquired atrophy of thyroid  He has been tolerating Levothyroxine, no palpitation, no cold or heat intolerance  Continue levothyroxine 75 mcg daily.    - Annual Wellness Visit - Includes PPPS Subsequent ()    8. Primary insomnia  Discuss the risk of taking Estazolam for insomnia,    Continue tapering  estazolam gradually.Cirrently tapered to 0.5 mg qhs as needed.    - Annual Wellness Visit - Includes PPPS Subsequent ()    9. Castro's esophagus without dysplasia  Stable, and asymptomatic.  Continue on up with GI, has been doing periodic EGD.     - Annual Wellness Visit - Includes PPPS Subsequent ()    10. Left shoulder Pain  Chronic,   Referred to orthopedic ( as requested by the patient)      Health Care Screening  recommendations as per orders if indicated.  Referrals offered: PT/OT/Nutrition counseling/Behavioral Health/Smoking cessation as per orders if indicated.    Discussion today about general wellness and lifestyle habits:    · Prevent falls and reduce trip hazards; Cautioned about securing or removing rugs.  · Have a working fire alarm and carbon monoxide detector;   · Engage in regular physical activity and social activities

## 2017-05-01 NOTE — MR AVS SNAPSHOT
"Jessica Plaza   2017 2:00 PM   Office Visit   MRN: [id number]    Department:  [address] [place] [address]   Dept Phone:  [phone]    Description:  Female : 1940   Provider:  Anant Hills M.D.; HEALTH            Reason for Visit     Annual Wellness Visit           Allergies as of 2017     Allergen Noted Reactions    Bactrim 2013   Hives    Demerol 2013   Shortness of Breath, Swelling    Pcn [Penicillins] 2013   Hives    Sulfa Drugs 2013   Hives    Ferrous Sulfate 2014   Rash    itching    Tramadol 2016   Itching    rash    Latex 2013   Contact Dermatitis      You were diagnosed with     Need for pneumococcal vaccination   [684670]       Diabetes mellitus type 2 in nonobese (CMS-[facility])   [923795]       Essential hypertension   [9846214]       Chronic obstructive pulmonary disease, unspecified COPD type (CMS-HCC)   [2664521]       Dyslipidemia   [317833]       History of CVA (cerebrovascular accident)   [856270]       Hypothyroidism due to acquired atrophy of thyroid   [1279844]       Primary insomnia   [557813]       Castro's esophagus without dysplasia   [919366]       Chronic left shoulder pain   [844420]         Vital Signs     Blood Pressure Pulse Temperature Respirations Height Weight    182/80 mmHg 73 36.6 °C (97.8 °F) 16 1.715 m (5' 7.5\") 78.019 kg (172 lb)    Body Mass Index Oxygen Saturation Smoking Status             26.53 kg/m2 92% Former Smoker         Basic Information     Date Of Birth Sex Race Ethnicity Preferred Language    1940 Female White Non- English      Problem List              ICD-10-CM Priority Class Noted - Resolved    History of CVA (cerebrovascular accident) Z86.73   2013 - Present    GI bleed K92.2   2014 - Present    Type 2 diabetes mellitus (CMS-HCC) (Chronic) E11.9   2014 - Present    HTN (hypertension) (Chronic) I10   2014 - Present    COPD (chronic obstructive pulmonary " disease) (CMS-HCC) (Chronic) J44.9   4/30/2014 - Present    Dyslipidemia (Chronic) E78.5   4/30/2014 - Present    Exertional dyspnea R06.09   4/30/2014 - Present    Fatigue R53.83   6/9/2015 - Present    Lung nodules R91.8   6/9/2015 - Present    Hypothyroidism due to acquired atrophy of thyroid E03.4   11/29/2015 - Present    Left shoulder pain M25.512   7/1/2016 - Present    Primary insomnia F51.01   7/29/2016 - Present    Castro's esophagus without dysplasia K22.70   8/31/2016 - Present    Iron deficiency anemia, unspecified D50.9   9/14/2016 - Present      Health Maintenance        Date Due Completion Dates    IMM DTaP/Tdap/Td Vaccine (1 - Tdap) 5/15/1961 ---    IMM ZOSTER VACCINE 5/15/2002 ---    BONE DENSITY 5/15/2007 ---    IMM PNEUMOCOCCAL 65+ (ADULT) LOW/MEDIUM RISK SERIES (2 of 2 - PCV13) 1/14/2015 1/14/2014, 6/1/2009    MAMMOGRAM 7/28/2016 7/28/2015 (Declined), 5/2/2011, 5/2/2011 (N/S)    Override on 7/28/2015: Patient Declined    Override on 5/2/2011: (N/S)    RETINAL SCREENING 4/13/2017 4/13/2016, 5/21/2015 (Done), 4/1/2015 (Prv Comp)    Override on 5/21/2015: Done    Override on 4/1/2015: Previously completed    A1C SCREENING 6/5/2017 12/5/2016, 7/26/2016, 3/22/2016, 11/18/2015, 11/18/2015, 7/24/2015, 3/27/2015, 3/27/2015 (N/S), 1/13/2014, 6/1/2013, 6/1/2013    Override on 3/27/2015: (N/S)    URINE ACR / MICROALBUMIN 7/26/2017 7/26/2016, 7/24/2015, 4/3/2013 (Prv Comp)    Override on 4/3/2013: Previously completed    SERUM CREATININE 10/6/2017 10/6/2016, 7/26/2016, 3/22/2016, 2/12/2016, 11/18/2015, 11/18/2015, 7/24/2015, 3/27/2015, 1/22/2015, 4/29/2014, 1/13/2014, 6/3/2013, 6/1/2013, 2/14/2013    DIABETES MONOFILAMENT / LE EXAM 3/6/2018 3/6/2017, 7/28/2015 (Done), 4/3/2014 (Prv Comp)    Override on 7/28/2015: Done    Override on 4/3/2014: Previously completed    FASTING LIPID PROFILE 3/14/2018 3/14/2017, 7/26/2016, 3/22/2016, 11/18/2015, 11/18/2015, 7/24/2015, 3/27/2015, 1/13/2014, 6/2/2013     COLONOSCOPY 8/2/2024 8/2/2014 (Prv Comp)    Override on 8/2/2014: Previously completed            Current Immunizations     13-VALENT PCV PREVNAR  Incomplete    Influenza Vaccine Adult HD 10/31/2016, 9/4/2015    Influenza Vaccine Quad Inj (Pf) 9/24/2013, 9/21/2012, 9/28/2011    Pneumococcal polysaccharide vaccine (PPSV-23) 1/14/2014  3:48 PM, 6/1/2009      Below and/or attached are the medications your provider expects you to take. Review all of your home medications and newly ordered medications with your provider and/or pharmacist. Follow medication instructions as directed by your provider and/or pharmacist. Please keep your medication list with you and share with your provider. Update the information when medications are discontinued, doses are changed, or new medications (including over-the-counter products) are added; and carry medication information at all times in the event of emergency situations     Allergies:  BACTRIM - Hives     DEMEROL - Shortness of Breath,Swelling     PCN - Hives     SULFA DRUGS - Hives     FERROUS SULFATE - Rash     TRAMADOL - Itching     LATEX - Contact Dermatitis               Medications  Valid as of: May 01, 2017 -  3:11 PM    Generic Name Brand Name Tablet Size Instructions for use    Albuterol Sulfate (Aero Soln) albuterol 108 (90 BASE) MCG/ACT Inhale 1 Puff by mouth every 6 hours as needed for Shortness of Breath.        Aspirin (Tab)  MG Take 325 mg by mouth every day.        Aspirin (Chew Tab) ASA 81 MG Take 81 mg by mouth every day.        CycloSPORINE   2 Drops by Ophthalmic route 2 Times a Day.        Estazolam (Tab) Estazolam 2 MG Take 1 Tab by mouth every bedtime.        Estazolam (Tab) PROSOM 1 MG Take 1 Tab by mouth every bedtime.        Estazolam (Tab) PROSOM 1 MG Take 1 Tab by mouth every day.        Estazolam (Tab) PROSOM 1 MG Take 0.5 Tabs by mouth at bedtime as needed.        Fluticasone Furoate-Vilanterol (AEROSOL POWDER, BREATH ACTIVATED) Fluticasone  Furoate-Vilanterol 200-25 MCG/INH Inhale 1 Puff by mouth every day.        Hydrocodone-Acetaminophen (Tab) NORCO 5-325 MG Take 1-2 Tabs by mouth every 6 hours as needed.        HydrOXYzine Pamoate (Cap) VISTARIL 50 MG Take 1 Cap by mouth 3 times a day as needed for Itching.        Levothyroxine Sodium (Tab) SYNTHROID 75 MCG Take 1 Tab by mouth Every morning on an empty stomach.        Liraglutide (Solution Pen-injector) VICTOZA 18 MG/3ML Inject 0.1 mL as instructed every day.        Lisinopril (Tab) PRINIVIL 10 MG Take 1 Tab by mouth every day.        MetFORMIN HCl (Tab) GLUCOPHAGE 1000 MG TAKE 1 TABLET BY MOUTH 2 TIMES A DAY, WITH MEALS.        Omeprazole (CAPSULE DELAYED RELEASE) PRILOSEC 40 MG Take 40 mg by mouth every day.        Simvastatin (Tab) ZOCOR 40 MG Take 1 Tab by mouth every day.        .                 Medicines prescribed today were sent to:     [id number] - [place] - [address]    [address] [place] [address]    Phone: [phone] Fax: [phone]    Open 24 Hours?: No      Medication refill instructions:       If your prescription bottle indicates you have medication refills left, it is not necessary to call your provider’s office. Please contact your pharmacy and they will refill your medication.    If your prescription bottle indicates you do not have any refills left, you may request refills at any time through one of the following ways: The online [employer] system (except Urgent Care), by calling your provider’s office, or by asking your pharmacy to contact your provider’s office with a refill request. Medication refills are processed only during regular business hours and may not be available until the next business day. Your provider may request additional information or to have a follow-up visit with you prior to refilling your medication.   *Please Note: Medication refills are assigned a new Rx number when refilled electronically. Your pharmacy may indicate that no  refills were authorized even though a new prescription for the same medication is available at the pharmacy. Please request the medicine by name with the pharmacy before contacting your provider for a refill.        Referral     A referral request has been sent to our patient care coordination department. Please allow 3-5 business days for us to process this request and contact you either by phone or mail. If you do not hear from us by the 5th business day, please call us at [phone].           Personal On Dem[employer] Access Code: Activation code not generated  Current Personal On Demand Status: Active

## 2017-05-02 RX ORDER — LISINOPRIL 10 MG/1
10 TABLET ORAL DAILY
Qty: 90 TAB | Refills: 1 | Status: SHIPPED | OUTPATIENT
Start: 2017-05-02 | End: 2017-10-18 | Stop reason: SDUPTHER

## 2017-05-06 ENCOUNTER — PATIENT OUTREACH (OUTPATIENT)
Dept: HEALTH INFORMATION MANAGEMENT | Facility: OTHER | Age: 77
End: 2017-05-06

## 2017-05-06 NOTE — PROGRESS NOTES
Attempt #:1    WebIZ Checked & Epic Updated: yes  HealthConnect Verified: no  Verify PCP: yes    Communication Preference Obtained: yes     Review Care Team: yes    Annual Wellness Visit Scheduling  1. Scheduling Status:Not Scheduled. Patient states they see PCP regularly          Care Gap Scheduling (Attempt to Schedule EACH Overdue Care Gap!)     Health Maintenance Due   Topic Date Due   • IMM DTaP/Tdap/Td Vaccine (1 - Tdap) 05/15/1961   • IMM ZOSTER VACCINE  05/15/2002   • BONE DENSITY  05/15/2007   • PFT SCREENING-FEV1 AND FEV/FVC RATIO / SPIROMETRY SHOULD BE PERFORMED ANNUALLY  03/07/2014   • MAMMOGRAM  07/28/2016   • Annual Wellness Visit  07/28/2016   • RETINAL SCREENING  04/13/2017         Cubby Activation: already active  Cubby Delaney: no  Virtual Visits: no  Opt In to Text Messages: no

## 2017-05-08 ENCOUNTER — OFFICE VISIT (OUTPATIENT)
Dept: MEDICAL GROUP | Facility: MEDICAL CENTER | Age: 77
End: 2017-05-08
Payer: MEDICARE

## 2017-05-08 VITALS
BODY MASS INDEX: 26.53 KG/M2 | WEIGHT: 169 LBS | OXYGEN SATURATION: 94 % | TEMPERATURE: 97.5 F | SYSTOLIC BLOOD PRESSURE: 136 MMHG | HEIGHT: 67 IN | DIASTOLIC BLOOD PRESSURE: 72 MMHG | HEART RATE: 80 BPM | RESPIRATION RATE: 16 BRPM

## 2017-05-08 DIAGNOSIS — J44.1 COPD EXACERBATION (HCC): ICD-10-CM

## 2017-05-08 DIAGNOSIS — I10 ESSENTIAL HYPERTENSION: Chronic | ICD-10-CM

## 2017-05-08 PROCEDURE — G8432 DEP SCR NOT DOC, RNG: HCPCS | Performed by: NURSE PRACTITIONER

## 2017-05-08 PROCEDURE — 4040F PNEUMOC VAC/ADMIN/RCVD: CPT | Performed by: NURSE PRACTITIONER

## 2017-05-08 PROCEDURE — 99214 OFFICE O/P EST MOD 30 MIN: CPT | Performed by: NURSE PRACTITIONER

## 2017-05-08 PROCEDURE — G8417 CALC BMI ABV UP PARAM F/U: HCPCS | Performed by: NURSE PRACTITIONER

## 2017-05-08 PROCEDURE — 1101F PT FALLS ASSESS-DOCD LE1/YR: CPT | Performed by: NURSE PRACTITIONER

## 2017-05-08 PROCEDURE — 3014F SCREEN MAMMO DOC REV: CPT | Mod: 8P | Performed by: NURSE PRACTITIONER

## 2017-05-08 PROCEDURE — 1036F TOBACCO NON-USER: CPT | Performed by: NURSE PRACTITIONER

## 2017-05-08 PROCEDURE — 3017F COLORECTAL CA SCREEN DOC REV: CPT | Performed by: NURSE PRACTITIONER

## 2017-05-08 RX ORDER — DOXYCYCLINE HYCLATE 100 MG
100 TABLET ORAL 2 TIMES DAILY
Qty: 20 TAB | Refills: 0 | Status: SHIPPED | OUTPATIENT
Start: 2017-05-08 | End: 2017-05-11

## 2017-05-08 RX ORDER — PROMETHAZINE HYDROCHLORIDE AND CODEINE PHOSPHATE 6.25; 1 MG/5ML; MG/5ML
5 SYRUP ORAL 4 TIMES DAILY PRN
Qty: 120 ML | Refills: 0 | Status: SHIPPED | OUTPATIENT
Start: 2017-05-08 | End: 2017-05-11 | Stop reason: SDUPTHER

## 2017-05-08 ASSESSMENT — ENCOUNTER SYMPTOMS
HEADACHES: 1
COUGH: 1
SORE THROAT: 1

## 2017-05-08 NOTE — MR AVS SNAPSHOT
"Jessica Plaza   2017 10:20 AM   Office Visit   MRN: [id number]    Department:  [address] [place] [address]   Dept Phone:  [phone]    Description:  Female : 1940   Provider:  HUMERA Roth           Reason for Visit     Cough x 5 days       Allergies as of 2017     Allergen Noted Reactions    Bactrim 2013   Hives    Demerol 2013   Shortness of Breath, Swelling    Pcn [Penicillins] 2013   Hives    Sulfa Drugs 2013   Hives    Ferrous Sulfate 2014   Rash    itching    Latex 2013   Contact Dermatitis      You were diagnosed with     COPD exacerbation (CMS-HCC)   [046205]         Vital Signs     Blood Pressure Pulse Temperature Respirations Height Weight    136/72 mmHg 80 36.4 °C (97.5 °F) 16 1.702 m (5' 7\") 76.658 kg (169 lb)    Body Mass Index Oxygen Saturation Smoking Status             26.46 kg/m2 94% Former Smoker         Basic Information     Date Of Birth Sex Race Ethnicity Preferred Language    1940 Female White Non- English      Problem List              ICD-10-CM Priority Class Noted - Resolved    History of CVA (cerebrovascular accident) Z86.73   2013 - Present    GI bleed K92.2   2014 - Present    Type 2 diabetes mellitus (CMS-HCC) (Chronic) E11.9   2014 - Present    HTN (hypertension) (Chronic) I10   2014 - Present    COPD (chronic obstructive pulmonary disease) (CMS-HCC) (Chronic) J44.9   2014 - Present    Dyslipidemia (Chronic) E78.5   2014 - Present    Exertional dyspnea R06.09   2014 - Present    Fatigue R53.83   2015 - Present    Lung nodules R91.8   2015 - Present    Hypothyroidism due to acquired atrophy of thyroid E03.4   2015 - Present    Left shoulder pain M25.512   2016 - Present    Primary insomnia F51.01   2016 - Present    Castro's esophagus without dysplasia K22.70   2016 - Present    Iron deficiency anemia, unspecified D50.9   2016 - Present   "      Health Maintenance        Date Due Completion Dates    IMM DTaP/Tdap/Td Vaccine (1 - Tdap) 5/15/1961 ---    IMM ZOSTER VACCINE 5/15/2002 ---    BONE DENSITY 5/15/2007 ---    MAMMOGRAM 7/28/2016 7/28/2015 (Declined), 5/2/2011, 5/2/2011 (N/S)    Override on 7/28/2015: Patient Declined    Override on 5/2/2011: (N/S)    RETINAL SCREENING 4/13/2017 4/13/2016, 5/21/2015 (Done), 4/1/2015 (Prv Comp)    Override on 5/21/2015: Done    Override on 4/1/2015: Previously completed    A1C SCREENING 6/5/2017 12/5/2016, 7/26/2016, 3/22/2016, 11/18/2015, 11/18/2015, 7/24/2015, 3/27/2015, 3/27/2015 (N/S), 1/13/2014, 6/1/2013, 6/1/2013    Override on 3/27/2015: (N/S)    URINE ACR / MICROALBUMIN 7/26/2017 7/26/2016, 7/24/2015, 4/3/2013 (Prv Comp)    Override on 4/3/2013: Previously completed    SERUM CREATININE 10/6/2017 10/6/2016, 7/26/2016, 3/22/2016, 2/12/2016, 11/18/2015, 11/18/2015, 7/24/2015, 3/27/2015, 1/22/2015, 4/29/2014, 1/13/2014, 6/3/2013, 6/1/2013, 2/14/2013    DIABETES MONOFILAMENT / LE EXAM 3/6/2018 3/6/2017, 7/28/2015 (Done), 4/3/2014 (Prv Comp)    Override on 7/28/2015: Done    Override on 4/3/2014: Previously completed    FASTING LIPID PROFILE 3/14/2018 3/14/2017, 7/26/2016, 3/22/2016, 11/18/2015, 11/18/2015, 7/24/2015, 3/27/2015, 1/13/2014, 6/2/2013    COLONOSCOPY 8/2/2024 8/2/2014 (Prv Comp)    Override on 8/2/2014: Previously completed            Current Immunizations     13-VALENT PCV PREVNAR 5/1/2017    Influenza Vaccine Adult HD 10/31/2016, 9/4/2015    Influenza Vaccine Quad Inj (Pf) 9/24/2013, 9/21/2012, 9/28/2011    Pneumococcal polysaccharide vaccine (PPSV-23) 1/14/2014  3:48 PM, 6/1/2009      Below and/or attached are the medications your provider expects you to take. Review all of your home medications and newly ordered medications with your provider and/or pharmacist. Follow medication instructions as directed by your provider and/or pharmacist. Please keep your medication list with you and share with  your provider. Update the information when medications are discontinued, doses are changed, or new medications (including over-the-counter products) are added; and carry medication information at all times in the event of emergency situations     Allergies:  BACTRIM - Hives     DEMEROL - Shortness of Breath,Swelling     PCN - Hives     SULFA DRUGS - Hives     FERROUS SULFATE - Rash     LATEX - Contact Dermatitis               Medications  Valid as of: May 08, 2017 - 10:37 AM    Generic Name Brand Name Tablet Size Instructions for use    Albuterol Sulfate (Aero Soln) albuterol 108 (90 BASE) MCG/ACT Inhale 1 Puff by mouth every 6 hours as needed for Shortness of Breath.        Aspirin (Tab)  MG Take 325 mg by mouth every day.        Aspirin (Chew Tab) ASA 81 MG Take 81 mg by mouth every day.        CycloSPORINE   2 Drops by Ophthalmic route 2 Times a Day.        Doxycycline Hyclate (Tab) VIBRAMYCIN 100 MG Take 1 Tab by mouth 2 times a day for 10 days.        Estazolam (Tab) Estazolam 2 MG Take 1 Tab by mouth every bedtime.        Estazolam (Tab) PROSOM 1 MG Take 1 Tab by mouth every bedtime.        Estazolam (Tab) PROSOM 1 MG Take 1 Tab by mouth every day.        Estazolam (Tab) PROSOM 1 MG Take 0.5 Tabs by mouth at bedtime as needed.        Fluticasone Furoate-Vilanterol (AEROSOL POWDER, BREATH ACTIVATED) Fluticasone Furoate-Vilanterol 200-25 MCG/INH Inhale 1 Puff by mouth every day.        Hydrocodone-Acetaminophen (Tab) NORCO 5-325 MG Take 1-2 Tabs by mouth every 6 hours as needed.        HydrOXYzine Pamoate (Cap) VISTARIL 50 MG Take 1 Cap by mouth 3 times a day as needed for Itching.        Levothyroxine Sodium (Tab) SYNTHROID 75 MCG Take 1 Tab by mouth Every morning on an empty stomach.        Liraglutide (Solution Pen-injector) VICTOZA 18 MG/3ML Inject 0.1 mL as instructed every day.        Lisinopril (Tab) PRINIVIL 10 MG Take 1 Tab by mouth every day.        MetFORMIN HCl (Tab) GLUCOPHAGE 1000 MG TAKE 1  TABLET BY MOUTH 2 TIMES A DAY, WITH MEALS.        Omeprazole (CAPSULE DELAYED RELEASE) PRILOSEC 40 MG Take 40 mg by mouth every day.        Promethazine-Codeine (Syrup) PHENERGAN-CODEINE 6.25-10 MG/5ML Take 5 mL by mouth 4 times a day as needed.        Simvastatin (Tab) ZOCOR 40 MG Take 1 Tab by mouth every day.        .                 Medicines prescribed today were sent to:     [facility]/PHARMACY #[id number] - [place], [place] - [address]    [address] [place] [address]    Phone: [phone] Fax: [phone]    Open 24 Hours?: No      Medication refill instructions:       If your prescription bottle indicates you have medication refills left, it is not necessary to call your provider’s office. Please contact your pharmacy and they will refill your medication.    If your prescription bottle indicates you do not have any refills left, you may request refills at any time through one of the following ways: The online Instantis system (except Urgent Care), by calling your provider’s office, or by asking your pharmacy to contact your provider’s office with a refill request. Medication refills are processed only during regular business hours and may not be available until the next business day. Your provider may request additional information or to have a follow-up visit with you prior to refilling your medication.   *Please Note: Medication refills are assigned a new Rx number when refilled electronically. Your pharmacy may indicate that no refills were authorized even though a new prescription for the same medication is available at the pharmacy. Please request the medicine by name with the pharmacy before contacting your provider for a refill.           Instantis Access Code: Activation code not generated  Current Instantis Status: Active

## 2017-05-08 NOTE — PROGRESS NOTES
Subjective:      Jessica Plaza is a 76 y.o. female who presents with Cough            Cough  Associated symptoms include headaches and a sore throat.   Jessica Plaza is a patient of  here same-day visit for cough and congestion.    Patient reports that both she and her  4 days ago became ill. Her symptoms include frequent moist cough, sore throat, headache and myalgias. Nothing has made her feel better or worse. She denies fever, dizziness, chest pain or shortness of breath. She does have history of COPD for which she takes a preventative inhaler daily and states even when she is feeling well, she uses her albuterol about 3 times a day. Her  is also being seen today at this office for his illness. She states she did get a Kenalog injection in her shoulder through orthopedics 3 days ago as well. Patient reports she is taking her antihypertensive medicine as advised with her last reading elevated a week ago.        Current Outpatient Prescriptions   Medication Sig Dispense Refill   • doxycycline (VIBRAMYCIN) 100 MG Tab Take 1 Tab by mouth 2 times a day for 10 days. 20 Tab 0   • promethazine-codeine (PHENERGAN-CODEINE) 6.25-10 MG/5ML Syrup Take 5 mL by mouth 4 times a day as needed. 120 mL 0   • lisinopril (PRINIVIL) 10 MG Tab Take 1 Tab by mouth every day. 90 Tab 1   • estazolam (PROSOM) 1 MG tablet Take 0.5 Tabs by mouth at bedtime as needed. 15 Tab 0   • Fluticasone Furoate-Vilanterol (BREO ELLIPTA) 200-25 MCG/INH AEROSOL POWDER, BREATH ACTIVATED Inhale 1 Puff by mouth every day. 1 Each 3   • aspirin (ASA) 81 MG Chew Tab chewable tablet Take 81 mg by mouth every day.     • simvastatin (ZOCOR) 40 MG Tab Take 1 Tab by mouth every day. 30 Tab 0   • estazolam (PROSOM) 1 MG tablet Take 1 Tab by mouth every bedtime. 30 Tab 0   • levothyroxine (SYNTHROID) 75 MCG Tab Take 1 Tab by mouth Every morning on an empty stomach. 90 Tab 0   • liraglutide (VICTOZA) 18 MG/3ML Solution  Pen-injector injection Inject 0.1 mL as instructed every day. 6 mL 11   • albuterol 108 (90 BASE) MCG/ACT Aero Soln inhalation aerosol Inhale 1 Puff by mouth every 6 hours as needed for Shortness of Breath.     • metformin (GLUCOPHAGE) 1000 MG tablet TAKE 1 TABLET BY MOUTH 2 TIMES A DAY, WITH MEALS. 60 Tab 11   • CycloSPORINE (RESTASIS OP) 2 Drops by Ophthalmic route 2 Times a Day.     • omeprazole (PRILOSEC) 40 MG capsule Take 40 mg by mouth every day.     • estazolam (PROSOM) 1 MG tablet Take 1 Tab by mouth every day. 30 Tab 0   • hydrocodone-acetaminophen (NORCO) 5-325 MG Tab per tablet Take 1-2 Tabs by mouth every 6 hours as needed. (Patient not taking: Reported on 4/27/2017) 40 Tab 0   • hydrOXYzine (VISTARIL) 50 MG Cap Take 1 Cap by mouth 3 times a day as needed for Itching. (Patient not taking: Reported on 4/27/2017) 20 Cap 0   • Estazolam 2 MG Tab Take 1 Tab by mouth every bedtime. (Patient not taking: Reported on 4/27/2017) 30 Tab 11   • aspirin (ASA) 325 MG TABS Take 325 mg by mouth every day.       No current facility-administered medications for this visit.     Social History   Substance Use Topics   • Smoking status: Former Smoker -- 0.50 packs/day for 52 years     Types: Cigarettes     Quit date: 01/01/2013   • Smokeless tobacco: Never Used      Comment: started smoking at age 21   • Alcohol Use: 8.4 oz/week     7 Shots of liquor, 7 Standard drinks or equivalent per week      Comment: 1/day     Family History   Problem Relation Age of Onset   • Cancer Mother      breast   • Lung Disease Mother      COPD/Emphysema/Smoker   • Stroke Father    • Hypertension Father    • Hyperlipidemia Father    • Heart Attack Father    • Cancer Sister      Pancriatic   • Arrythmia Sister    • Lung Disease Sister      COPD/Emphysema/Smoker   • Bipolar disorder Son    • Bipolar disorder Son    • Bipolar disorder Son    • Cancer Maternal Aunt      stomach     Past Medical History   Diagnosis Date   • High cholesterol    •  "Hypertension    • GERD (gastroesophageal reflux disease)    • Castro esophagus    • UTI (urinary tract infection)    • COPD    • Bronchitis    • Arthritis    • Anemia    • Indigestion    • Dental disorder      dentures upper and lower   • Stroke (CMS-HCC) 2013   • Disorder of thyroid    • Heart burn    • Pain      left shoulder   • COPD (chronic obstructive pulmonary disease) (CMS-HCC)    • Emphysema of lung (CMS-HCC)    • Diabetes      oral meds   • Pneumonia 2013.   • Hemorrhagic disorder      hx internal bleeding       Review of Systems   HENT: Positive for congestion and sore throat.    Respiratory: Positive for cough.    Neurological: Positive for headaches.   All other systems reviewed and are negative.         Objective:     /72 mmHg  Pulse 80  Temp(Src) 36.4 °C (97.5 °F)  Resp 16  Ht 1.702 m (5' 7\")  Wt 76.658 kg (169 lb)  BMI 26.46 kg/m2  SpO2 94%     Physical Exam   Constitutional: She is oriented to person, place, and time. She appears well-developed and well-nourished. No distress.   HENT:   Head: Normocephalic and atraumatic.   Right Ear: External ear normal.   Left Ear: External ear normal.   Nose: Nose normal.   Eyes: Right eye exhibits no discharge. Left eye exhibits no discharge.   Neck: Normal range of motion. Neck supple. No thyromegaly present.   Cardiovascular: Normal rate, regular rhythm and normal heart sounds.  Exam reveals no gallop and no friction rub.    No murmur heard.  Pulmonary/Chest: Effort normal. She has wheezes. She has no rales.   Musculoskeletal: She exhibits no edema or tenderness.   Neurological: She is alert and oriented to person, place, and time. She displays normal reflexes.   Skin: Skin is warm and dry. No rash noted. She is not diaphoretic.   Psychiatric: She has a normal mood and affect. Her behavior is normal. Judgment and thought content normal.   Nursing note and vitals reviewed.              Assessment/Plan:   1. COPD exacerbation (CMS-HCC)  The " patient's vital signs are normal but she does have a moist cough and is not improved despite receiving a Kenalog injection from orthopedics a few days ago. I therefore will not start her on any oral steroids but will place her on doxycycline. She was reminded to use her albuterol as needed. She requested and received a prescription for cough syrup but I advised her only to use this at night when she does not need to be alert and to try to encourage expectoration of phlegm.  - doxycycline (VIBRAMYCIN) 100 MG Tab; Take 1 Tab by mouth 2 times a day for 10 days.  Dispense: 20 Tab; Refill: 0  - promethazine-codeine (PHENERGAN-CODEINE) 6.25-10 MG/5ML Syrup; Take 5 mL by mouth 4 times a day as needed.  Dispense: 120 mL; Refill: 0    2. Essential hypertension  Blood pressure appears improved since she was in the office last visit.

## 2017-05-10 ENCOUNTER — TELEPHONE (OUTPATIENT)
Dept: MEDICAL GROUP | Facility: MEDICAL CENTER | Age: 77
End: 2017-05-10

## 2017-05-10 NOTE — TELEPHONE ENCOUNTER
1. Caller Name: Jessica Plaza                      Call Back Number: [phone] (home)         Patient approves a detailed voicemail message: yes    2. What are the patient's symptoms (location & severity)? Tingling in hands feet and eye hurt bad vomited last night PT is concerned that she is having an allergic reaction.    3. Is this a new symptom Yes    4. When did it start? 05/09    5. Action taken per Active Symptom Guide: Same day appointment scheduled PT Declined ER or Urgent care  Future Appointments      Provider Department Center   5/11/2017 8:20 AM HUMERA Roth [facility] [address]     6. Patient agrees to recommended action per active symptom guide

## 2017-05-10 NOTE — TELEPHONE ENCOUNTER
Doxycycline or Phenergan With Codeine which she was prescribed can cause stomach upset. She should stop the cough syrup and doxycycline. She is having trouble breathing, continued vomiting or any other symptoms she should go to her.

## 2017-05-11 ENCOUNTER — HOSPITAL ENCOUNTER (OUTPATIENT)
Dept: RADIOLOGY | Facility: MEDICAL CENTER | Age: 77
End: 2017-05-11
Attending: NURSE PRACTITIONER
Payer: MEDICARE

## 2017-05-11 ENCOUNTER — OFFICE VISIT (OUTPATIENT)
Dept: MEDICAL GROUP | Facility: MEDICAL CENTER | Age: 77
End: 2017-05-11
Payer: MEDICARE

## 2017-05-11 VITALS
HEART RATE: 77 BPM | BODY MASS INDEX: 26.68 KG/M2 | SYSTOLIC BLOOD PRESSURE: 138 MMHG | RESPIRATION RATE: 16 BRPM | WEIGHT: 170 LBS | HEIGHT: 67 IN | DIASTOLIC BLOOD PRESSURE: 72 MMHG | OXYGEN SATURATION: 91 % | TEMPERATURE: 97.3 F

## 2017-05-11 DIAGNOSIS — J44.1 COPD EXACERBATION (HCC): ICD-10-CM

## 2017-05-11 PROCEDURE — 99214 OFFICE O/P EST MOD 30 MIN: CPT | Performed by: NURSE PRACTITIONER

## 2017-05-11 PROCEDURE — 1036F TOBACCO NON-USER: CPT | Performed by: NURSE PRACTITIONER

## 2017-05-11 PROCEDURE — 1101F PT FALLS ASSESS-DOCD LE1/YR: CPT | Performed by: NURSE PRACTITIONER

## 2017-05-11 PROCEDURE — G8432 DEP SCR NOT DOC, RNG: HCPCS | Performed by: NURSE PRACTITIONER

## 2017-05-11 PROCEDURE — 3017F COLORECTAL CA SCREEN DOC REV: CPT | Performed by: NURSE PRACTITIONER

## 2017-05-11 PROCEDURE — G8417 CALC BMI ABV UP PARAM F/U: HCPCS | Performed by: NURSE PRACTITIONER

## 2017-05-11 PROCEDURE — 3014F SCREEN MAMMO DOC REV: CPT | Mod: 8P | Performed by: NURSE PRACTITIONER

## 2017-05-11 PROCEDURE — 71020 DX-CHEST-2 VIEWS: CPT

## 2017-05-11 PROCEDURE — 4040F PNEUMOC VAC/ADMIN/RCVD: CPT | Performed by: NURSE PRACTITIONER

## 2017-05-11 RX ORDER — PROMETHAZINE HYDROCHLORIDE AND CODEINE PHOSPHATE 6.25; 1 MG/5ML; MG/5ML
5 SYRUP ORAL 4 TIMES DAILY PRN
Qty: 120 ML | Refills: 0 | Status: SHIPPED | OUTPATIENT
Start: 2017-05-11 | End: 2017-06-06

## 2017-05-11 RX ORDER — AZITHROMYCIN 250 MG/1
TABLET, FILM COATED ORAL
Qty: 1 QUANTITY SUFFICIENT | Refills: 0 | Status: SHIPPED | OUTPATIENT
Start: 2017-05-11 | End: 2017-06-06

## 2017-05-11 ASSESSMENT — ENCOUNTER SYMPTOMS
SPUTUM PRODUCTION: 1
WHEEZING: 1
NAUSEA: 1
COUGH: 1
NERVOUS/ANXIOUS: 1

## 2017-05-11 NOTE — MR AVS SNAPSHOT
"        Jessica Chanrosina   2017 8:20 AM   Office Visit   MRN: [id number]    Department:  [address] [place] [address]   Dept Phone:  [phone]    Description:  Female : 1940   Provider:  HUMERA Roth           Reason for Visit     Cough body ache    Medication Reaction           Allergies as of 2017     Allergen Noted Reactions    Bactrim 2013   Hives    Demerol 2013   Shortness of Breath, Swelling    Pcn [Penicillins] 2013   Hives    Sulfa Drugs 2013   Hives    Ferrous Sulfate 2014   Rash    itching    Latex 2013   Contact Dermatitis      You were diagnosed with     COPD exacerbation (CMS-HCC)   [349800]         Vital Signs     Blood Pressure Pulse Temperature Respirations Height Weight    138/72 mmHg 77 36.3 °C (97.3 °F) 16 1.702 m (5' 7.01\") 77.111 kg (170 lb)    Body Mass Index Oxygen Saturation Smoking Status             26.62 kg/m2 91% Former Smoker         Basic Information     Date Of Birth Sex Race Ethnicity Preferred Language    1940 Female White Non- English      Problem List              ICD-10-CM Priority Class Noted - Resolved    History of CVA (cerebrovascular accident) Z86.73   2013 - Present    GI bleed K92.2   2014 - Present    Type 2 diabetes mellitus (CMS-HCC) (Chronic) E11.9   2014 - Present    HTN (hypertension) (Chronic) I10   2014 - Present    COPD (chronic obstructive pulmonary disease) (CMS-HCC) (Chronic) J44.9   2014 - Present    Dyslipidemia (Chronic) E78.5   2014 - Present    Exertional dyspnea R06.09   2014 - Present    Fatigue R53.83   2015 - Present    Lung nodules R91.8   2015 - Present    Hypothyroidism due to acquired atrophy of thyroid E03.4   2015 - Present    Left shoulder pain M25.512   2016 - Present    Primary insomnia F51.01   2016 - Present    Castro's esophagus without dysplasia K22.70   2016 - Present    Iron deficiency anemia, " unspecified D50.9   9/14/2016 - Present      Health Maintenance        Date Due Completion Dates    IMM DTaP/Tdap/Td Vaccine (1 - Tdap) 5/15/1961 ---    IMM ZOSTER VACCINE 5/15/2002 ---    BONE DENSITY 5/15/2007 ---    MAMMOGRAM 7/28/2016 7/28/2015 (Declined), 5/2/2011, 5/2/2011 (N/S)    Override on 7/28/2015: Patient Declined    Override on 5/2/2011: (N/S)    RETINAL SCREENING 4/13/2017 4/13/2016, 5/21/2015 (Done), 4/1/2015 (Prv Comp)    Override on 5/21/2015: Done    Override on 4/1/2015: Previously completed    A1C SCREENING 6/5/2017 12/5/2016, 7/26/2016, 3/22/2016, 11/18/2015, 11/18/2015, 7/24/2015, 3/27/2015, 3/27/2015 (N/S), 1/13/2014, 6/1/2013, 6/1/2013    Override on 3/27/2015: (N/S)    URINE ACR / MICROALBUMIN 7/26/2017 7/26/2016, 7/24/2015, 4/3/2013 (Prv Comp)    Override on 4/3/2013: Previously completed    SERUM CREATININE 10/6/2017 10/6/2016, 7/26/2016, 3/22/2016, 2/12/2016, 11/18/2015, 11/18/2015, 7/24/2015, 3/27/2015, 1/22/2015, 4/29/2014, 1/13/2014, 6/3/2013, 6/1/2013, 2/14/2013    DIABETES MONOFILAMENT / LE EXAM 3/6/2018 3/6/2017, 7/28/2015 (Done), 4/3/2014 (Prv Comp)    Override on 7/28/2015: Done    Override on 4/3/2014: Previously completed    FASTING LIPID PROFILE 3/14/2018 3/14/2017, 7/26/2016, 3/22/2016, 11/18/2015, 11/18/2015, 7/24/2015, 3/27/2015, 1/13/2014, 6/2/2013    COLONOSCOPY 8/2/2024 8/2/2014 (Prv Comp)    Override on 8/2/2014: Previously completed            Current Immunizations     13-VALENT PCV PREVNAR 5/1/2017    Influenza Vaccine Adult HD 10/31/2016, 9/4/2015    Influenza Vaccine Quad Inj (Pf) 9/24/2013, 9/21/2012, 9/28/2011    Pneumococcal polysaccharide vaccine (PPSV-23) 1/14/2014  3:48 PM, 6/1/2009      Below and/or attached are the medications your provider expects you to take. Review all of your home medications and newly ordered medications with your provider and/or pharmacist. Follow medication instructions as directed by your provider and/or pharmacist. Please keep your  medication list with you and share with your provider. Update the information when medications are discontinued, doses are changed, or new medications (including over-the-counter products) are added; and carry medication information at all times in the event of emergency situations     Allergies:  BACTRIM - Hives     DEMEROL - Shortness of Breath,Swelling     PCN - Hives     SULFA DRUGS - Hives     FERROUS SULFATE - Rash     LATEX - Contact Dermatitis               Medications  Valid as of: May 11, 2017 -  9:18 AM    Generic Name Brand Name Tablet Size Instructions for use    Albuterol Sulfate (Aero Soln) albuterol 108 (90 BASE) MCG/ACT Inhale 1 Puff by mouth every 6 hours as needed for Shortness of Breath.        Aspirin (Tab)  MG Take 325 mg by mouth every day.        Aspirin (Chew Tab) ASA 81 MG Take 81 mg by mouth every day.        CycloSPORINE   2 Drops by Ophthalmic route 2 Times a Day.        Estazolam (Tab) Estazolam 2 MG Take 1 Tab by mouth every bedtime.        Estazolam (Tab) PROSOM 1 MG Take 1 Tab by mouth every bedtime.        Estazolam (Tab) PROSOM 1 MG Take 1 Tab by mouth every day.        Estazolam (Tab) PROSOM 1 MG Take 0.5 Tabs by mouth at bedtime as needed.        Fluticasone Furoate-Vilanterol (AEROSOL POWDER, BREATH ACTIVATED) Fluticasone Furoate-Vilanterol 200-25 MCG/INH Inhale 1 Puff by mouth every day.        Hydrocodone-Acetaminophen (Tab) NORCO 5-325 MG Take 1-2 Tabs by mouth every 6 hours as needed.        HydrOXYzine Pamoate (Cap) VISTARIL 50 MG Take 1 Cap by mouth 3 times a day as needed for Itching.        Levothyroxine Sodium (Tab) SYNTHROID 75 MCG Take 1 Tab by mouth Every morning on an empty stomach.        Liraglutide (Solution Pen-injector) VICTOZA 18 MG/3ML Inject 0.1 mL as instructed every day.        Lisinopril (Tab) PRINIVIL 10 MG Take 1 Tab by mouth every day.        MetFORMIN HCl (Tab) GLUCOPHAGE 1000 MG TAKE 1 TABLET BY MOUTH 2 TIMES A DAY, WITH MEALS.         Omeprazole (CAPSULE DELAYED RELEASE) PRILOSEC 40 MG Take 40 mg by mouth every day.        Promethazine-Codeine (Syrup) PHENERGAN-CODEINE 6.25-10 MG/5ML Take 5 mL by mouth 4 times a day as needed.        Simvastatin (Tab) ZOCOR 40 MG Take 1 Tab by mouth every day.        .                 Medicines prescribed today were sent to:     [facility]ARMACY #[id number] - [place], [place] - [address]    [address] [place] [address]    Phone: [phone] Fax: [phone]    Open 24 Hours?: No      Medication refill instructions:       If your prescription bottle indicates you have medication refills left, it is not necessary to call your provider’s office. Please contact your pharmacy and they will refill your medication.    If your prescription bottle indicates you do not have any refills left, you may request refills at any time through one of the following ways: The online Built In system (except Urgent Care), by calling your provider’s office, or by asking your pharmacy to contact your provider’s office with a refill request. Medication refills are processed only during regular business hours and may not be available until the next business day. Your provider may request additional information or to have a follow-up visit with you prior to refilling your medication.   *Please Note: Medication refills are assigned a new Rx number when refilled electronically. Your pharmacy may indicate that no refills were authorized even though a new prescription for the same medication is available at the pharmacy. Please request the medicine by name with the pharmacy before contacting your provider for a refill.        Your To Do List     Future Labs/Procedures Complete By Expires    DX-CHEST-2 VIEWS  As directed 11/11/2017         Built In Access Code: Activation code not generated  Current Built In Status: Active

## 2017-05-11 NOTE — PROGRESS NOTES
Subjective:      Jessica Plaza is a 76 y.o. female who presents with Cough and Medication Reaction            Cough  Associated symptoms include wheezing.   Medication Reaction  Associated symptoms include coughing and nausea.   Jessica Plaza is here today accompanied by her  for concerns that she may have had a drug reaction as well as persistent cough.    I saw patient 3 days ago for complaints of cough and chest congestion. Her vital signs were normal but she did have history of COPD and was wheezing so placed on doxycycline and advised to continue on her preventative and rescue inhalers. We agreed not to use a Medrol Dosepak because she had just received a steroid injection from her orthopedist a few days prior.    Patient apparently developed some nausea and vomiting on the medication. However, she also has been taking Phenergan with codeine. She stopped the medicine thinking it might be a drug reaction but she had no trouble with swallowing or increased trouble with breathing or rash. She already has an allergy to sulfa and penicillin. She is wondering why she is not better after 3 days of treatment and is requesting refills on her cough syrup.          Current Outpatient Prescriptions   Medication Sig Dispense Refill   • promethazine-codeine (PHENERGAN-CODEINE) 6.25-10 MG/5ML Syrup Take 5 mL by mouth 4 times a day as needed. 120 mL 0   • azithromycin (ZITHROMAX Z-ANTONETTE) 250 MG Tab One Pack 1 Quantity Sufficient 0   • lisinopril (PRINIVIL) 10 MG Tab Take 1 Tab by mouth every day. 90 Tab 1   • estazolam (PROSOM) 1 MG tablet Take 0.5 Tabs by mouth at bedtime as needed. 15 Tab 0   • Fluticasone Furoate-Vilanterol (BREO ELLIPTA) 200-25 MCG/INH AEROSOL POWDER, BREATH ACTIVATED Inhale 1 Puff by mouth every day. 1 Each 3   • aspirin (ASA) 81 MG Chew Tab chewable tablet Take 81 mg by mouth every day.     • simvastatin (ZOCOR) 40 MG Tab Take 1 Tab by mouth every day. 30 Tab 0   • estazolam (PROSOM)  1 MG tablet Take 1 Tab by mouth every bedtime. 30 Tab 0   • levothyroxine (SYNTHROID) 75 MCG Tab Take 1 Tab by mouth Every morning on an empty stomach. 90 Tab 0   • liraglutide (VICTOZA) 18 MG/3ML Solution Pen-injector injection Inject 0.1 mL as instructed every day. 6 mL 11   • albuterol 108 (90 BASE) MCG/ACT Aero Soln inhalation aerosol Inhale 1 Puff by mouth every 6 hours as needed for Shortness of Breath.     • metformin (GLUCOPHAGE) 1000 MG tablet TAKE 1 TABLET BY MOUTH 2 TIMES A DAY, WITH MEALS. 60 Tab 11   • CycloSPORINE (RESTASIS OP) 2 Drops by Ophthalmic route 2 Times a Day.     • omeprazole (PRILOSEC) 40 MG capsule Take 40 mg by mouth every day.     • estazolam (PROSOM) 1 MG tablet Take 1 Tab by mouth every day. 30 Tab 0   • hydrocodone-acetaminophen (NORCO) 5-325 MG Tab per tablet Take 1-2 Tabs by mouth every 6 hours as needed. (Patient not taking: Reported on 4/27/2017) 40 Tab 0   • hydrOXYzine (VISTARIL) 50 MG Cap Take 1 Cap by mouth 3 times a day as needed for Itching. (Patient not taking: Reported on 4/27/2017) 20 Cap 0   • Estazolam 2 MG Tab Take 1 Tab by mouth every bedtime. (Patient not taking: Reported on 4/27/2017) 30 Tab 11   • aspirin (ASA) 325 MG TABS Take 325 mg by mouth every day.       No current facility-administered medications for this visit.     Social History   Substance Use Topics   • Smoking status: Former Smoker -- 0.50 packs/day for 52 years     Types: Cigarettes     Quit date: 01/01/2013   • Smokeless tobacco: Never Used      Comment: started smoking at age 21   • Alcohol Use: 8.4 oz/week     7 Shots of liquor, 7 Standard drinks or equivalent per week      Comment: 1/day     Family History   Problem Relation Age of Onset   • Cancer Mother      breast   • Lung Disease Mother      COPD/Emphysema/Smoker   • Stroke Father    • Hypertension Father    • Hyperlipidemia Father    • Heart Attack Father    • Cancer Sister      Pancriatic   • Arrythmia Sister    • Lung Disease Sister       "COPD/Emphysema/Smoker   • Bipolar disorder Son    • Bipolar disorder Son    • Bipolar disorder Son    • Cancer Maternal Aunt      stomach     Past Medical History   Diagnosis Date   • High cholesterol    • Hypertension    • GERD (gastroesophageal reflux disease)    • Castro esophagus    • UTI (urinary tract infection)    • COPD    • Bronchitis    • Arthritis    • Anemia    • Indigestion    • Dental disorder      dentures upper and lower   • Stroke (CMS-HCC) 2013   • Disorder of thyroid    • Heart burn    • Pain      left shoulder   • COPD (chronic obstructive pulmonary disease) (CMS-HCC)    • Emphysema of lung (CMS-HCC)    • Diabetes      oral meds   • Pneumonia 2013.   • Hemorrhagic disorder      hx internal bleeding       Review of Systems   Respiratory: Positive for cough, sputum production and wheezing.    Gastrointestinal: Positive for nausea.   Psychiatric/Behavioral: The patient is nervous/anxious.    All other systems reviewed and are negative.         Objective:     /72 mmHg  Pulse 77  Temp(Src) 36.3 °C (97.3 °F)  Resp 16  Ht 1.702 m (5' 7.01\")  Wt 77.111 kg (170 lb)  BMI 26.62 kg/m2  SpO2 91%     Physical Exam   Constitutional: She is oriented to person, place, and time. She appears well-developed and well-nourished. No distress.   HENT:   Head: Normocephalic and atraumatic.   Right Ear: External ear normal.   Left Ear: External ear normal.   Nose: Nose normal.   Eyes: Right eye exhibits no discharge. Left eye exhibits no discharge.   Neck: Normal range of motion. Neck supple. No thyromegaly present.   Cardiovascular: Normal rate, regular rhythm and normal heart sounds.  Exam reveals no gallop and no friction rub.    No murmur heard.  Pulmonary/Chest: Effort normal. She has wheezes. She has no rhonchi. She has no rales.   Musculoskeletal: She exhibits no edema or tenderness.   Neurological: She is alert and oriented to person, place, and time. She displays normal reflexes.   Skin: Skin is " warm and dry. No rash noted. She is not diaphoretic.   Psychiatric: Her behavior is normal. Judgment and thought content normal. Her mood appears anxious.   Nursing note and vitals reviewed.         Impression        No pulmonary consolidation.         Reading Provider Reading Date     Nathaniel Greenfield M.D. May 11, 2017              Assessment/Plan:     1. COPD exacerbation (CMS-[facility])  Patient's lungs sound clear in the office and chest x-ray did come back later showing normal. She has no chest pain or lower extremity edema or PND to suggest heart failure. Patient was given the option and decided to go on a Z-Antonette. She also requested and received a refill on her Phenergan with codeine.    I advised patient does not sound like she had an allergic reaction to doxycycline but rather intolerance. I also explained it could be related to the codeine and the cough syrup and advised against taking more of it but she insisted feeling it helped with symptoms. If she does not improve I advised she follow back with her PCP or go to the emergency room. She will continue with her regular inhalers. We again decided not to use a 5 day steroid pack since she recently received a steroid injection and also because of her diabetes which she states typically goes up high with steroids.  - promethazine-codeine (PHENERGAN-CODEINE) 6.25-10 MG/5ML Syrup; Take 5 mL by mouth 4 times a day as needed.  Dispense: 120 mL; Refill: 0  - DX-CHEST-2 VIEWS; Future  - azithromycin (ZITHROMAX Z-ANTONETTE) 250 MG Tab; One Pack  Dispense: 1 Quantity Sufficient; Refill: 0

## 2017-05-25 ENCOUNTER — PATIENT MESSAGE (OUTPATIENT)
Dept: MEDICAL GROUP | Facility: MEDICAL CENTER | Age: 77
End: 2017-05-25

## 2017-05-25 DIAGNOSIS — F51.04 CHRONIC INSOMNIA: ICD-10-CM

## 2017-05-25 RX ORDER — MIRTAZAPINE 15 MG/1
15 TABLET, FILM COATED ORAL
Qty: 30 TAB | Refills: 1 | Status: SHIPPED | OUTPATIENT
Start: 2017-05-25 | End: 2017-05-31 | Stop reason: SINTOL

## 2017-05-25 NOTE — TELEPHONE ENCOUNTER
From: Jessica Plaza  To: Anant Hills M.D.  Sent: 5/25/2017 1:41 PM PDT  Subject: Prescription Question    , The half of 1 mg Prosom is not helping me sleep, could you please fill the perscription for new medication i believe is Mirtazapine. Hopefully it will work.    Thank You   Mrs. Plaza

## 2017-05-30 ENCOUNTER — TELEPHONE (OUTPATIENT)
Dept: MEDICAL GROUP | Facility: MEDICAL CENTER | Age: 77
End: 2017-05-30

## 2017-05-30 ENCOUNTER — PATIENT MESSAGE (OUTPATIENT)
Dept: MEDICAL GROUP | Facility: MEDICAL CENTER | Age: 77
End: 2017-05-30

## 2017-05-30 DIAGNOSIS — G47.00 INSOMNIA, UNSPECIFIED TYPE: ICD-10-CM

## 2017-05-30 NOTE — TELEPHONE ENCOUNTER
1. Caller Name: Jessica                      Call Back Number: [phone] (home)       2. Message: Patient wants to speak with you regarding the mirtazapine, she is having some side effects and wants to discuss the medication with you.    3. Patient approves office to leave a detailed voicemail/MyChart message: N\A

## 2017-05-31 ENCOUNTER — TELEPHONE (OUTPATIENT)
Dept: MEDICAL GROUP | Facility: MEDICAL CENTER | Age: 77
End: 2017-05-31

## 2017-05-31 ENCOUNTER — PATIENT MESSAGE (OUTPATIENT)
Dept: MEDICAL GROUP | Facility: MEDICAL CENTER | Age: 77
End: 2017-05-31

## 2017-05-31 DIAGNOSIS — F51.04 CHRONIC INSOMNIA: ICD-10-CM

## 2017-05-31 RX ORDER — TRAZODONE HYDROCHLORIDE 50 MG/1
50 TABLET ORAL
Qty: 30 TAB | Refills: 0 | Status: SHIPPED | OUTPATIENT
Start: 2017-05-31 | End: 2017-06-15 | Stop reason: SINTOL

## 2017-05-31 RX ORDER — TRAZODONE HYDROCHLORIDE 50 MG/1
50 TABLET ORAL
Qty: 30 TAB | Refills: 0 | Status: SHIPPED | OUTPATIENT
Start: 2017-05-31 | End: 2017-08-11 | Stop reason: SDUPTHER

## 2017-05-31 NOTE — TELEPHONE ENCOUNTER
From: Jessica Plaza  To: Anant Hills M.D.  Sent: 5/30/2017 2:28 PM PDT  Subject: Prescription Question    I tried my first Mirtazapine 15mg tablet last night, today is the worst day ever sick to my stomach, shakey like i had to much coffee my feet are numb and i can't breath i called your office this morning and left a message with Arnaldo as he wasn't in the office. Want to know what to do should i try cutting the medication in half? Actually feels like an allergic reaction like when i used Bactrim.    Thank you  Mrs Plaza

## 2017-06-01 ENCOUNTER — PATIENT MESSAGE (OUTPATIENT)
Dept: MEDICAL GROUP | Facility: MEDICAL CENTER | Age: 77
End: 2017-06-01

## 2017-06-02 DIAGNOSIS — E11.9 DIABETES MELLITUS TYPE 2 IN NONOBESE (HCC): ICD-10-CM

## 2017-06-02 NOTE — TELEPHONE ENCOUNTER
From: Jessica Plaza  To: Anant Hills M.D.  Sent: 6/1/2017 5:08 PM PDT  Subject: Prescription Question    I need a perscription refill for BD ULTRA-FINE PEN NDL [id number] for use with Victoza. Last refill was over 100 days ago by Dr. Soliman, I contacted [facility] at [address] [place] [address]. their phone number [phone] last week for a refill, they are now this morning telling me they haven't refilled it because they were trying to send it to Dr. Mclean office everything else for me goes to Dr. Hills's office i guess except that one.   Thank You  Jessica Plaza  [phone] [address]  [id number]

## 2017-06-06 ENCOUNTER — APPOINTMENT (OUTPATIENT)
Dept: RADIOLOGY | Facility: MEDICAL CENTER | Age: 77
End: 2017-06-06
Attending: HOSPITALIST
Payer: MEDICARE

## 2017-06-06 ENCOUNTER — RESOLUTE PROFESSIONAL BILLING HOSPITAL PROF FEE (OUTPATIENT)
Dept: HOSPITALIST | Facility: MEDICAL CENTER | Age: 77
End: 2017-06-06
Payer: MEDICARE

## 2017-06-06 ENCOUNTER — HOSPITAL ENCOUNTER (OUTPATIENT)
Facility: MEDICAL CENTER | Age: 77
End: 2017-06-07
Attending: EMERGENCY MEDICINE | Admitting: HOSPITALIST
Payer: MEDICARE

## 2017-06-06 ENCOUNTER — APPOINTMENT (OUTPATIENT)
Dept: RADIOLOGY | Facility: MEDICAL CENTER | Age: 77
End: 2017-06-06
Attending: EMERGENCY MEDICINE
Payer: MEDICARE

## 2017-06-06 DIAGNOSIS — I63.9 CEREBROVASCULAR ACCIDENT (CVA), UNSPECIFIED MECHANISM (HCC): ICD-10-CM

## 2017-06-06 DIAGNOSIS — R29.898 WEAKNESS OF RIGHT FOOT: ICD-10-CM

## 2017-06-06 DIAGNOSIS — R20.0 SENSORY LOSS: ICD-10-CM

## 2017-06-06 LAB
ALBUMIN SERPL BCP-MCNC: 4 G/DL (ref 3.2–4.9)
ALBUMIN/GLOB SERPL: 1.5 G/DL
ALP SERPL-CCNC: 50 U/L (ref 30–99)
ALT SERPL-CCNC: 15 U/L (ref 2–50)
ANION GAP SERPL CALC-SCNC: 9 MMOL/L (ref 0–11.9)
APTT PPP: 22.9 SEC (ref 24.7–36)
AST SERPL-CCNC: 17 U/L (ref 12–45)
BASOPHILS # BLD AUTO: 0.5 % (ref 0–1.8)
BASOPHILS # BLD: 0.04 K/UL (ref 0–0.12)
BILIRUB SERPL-MCNC: 0.9 MG/DL (ref 0.1–1.5)
BUN SERPL-MCNC: 16 MG/DL (ref 8–22)
CALCIUM SERPL-MCNC: 9.8 MG/DL (ref 8.5–10.5)
CHLORIDE SERPL-SCNC: 102 MMOL/L (ref 96–112)
CO2 SERPL-SCNC: 24 MMOL/L (ref 20–33)
CREAT SERPL-MCNC: 0.86 MG/DL (ref 0.5–1.4)
EOSINOPHIL # BLD AUTO: 0.12 K/UL (ref 0–0.51)
EOSINOPHIL NFR BLD: 1.5 % (ref 0–6.9)
ERYTHROCYTE [DISTWIDTH] IN BLOOD BY AUTOMATED COUNT: 46.9 FL (ref 35.9–50)
EST. AVERAGE GLUCOSE BLD GHB EST-MCNC: 134 MG/DL
GFR SERPL CREATININE-BSD FRML MDRD: >60 ML/MIN/1.73 M 2
GLOBULIN SER CALC-MCNC: 2.7 G/DL (ref 1.9–3.5)
GLUCOSE BLD-MCNC: 162 MG/DL (ref 65–99)
GLUCOSE SERPL-MCNC: 97 MG/DL (ref 65–99)
HBA1C MFR BLD: 6.3 % (ref 0–5.6)
HCT VFR BLD AUTO: 46.6 % (ref 37–47)
HGB BLD-MCNC: 15.7 G/DL (ref 12–16)
IMM GRANULOCYTES # BLD AUTO: 0.04 K/UL (ref 0–0.11)
IMM GRANULOCYTES NFR BLD AUTO: 0.5 % (ref 0–0.9)
INR PPP: 0.92 (ref 0.87–1.13)
LYMPHOCYTES # BLD AUTO: 1.46 K/UL (ref 1–4.8)
LYMPHOCYTES NFR BLD: 18.2 % (ref 22–41)
MAGNESIUM SERPL-MCNC: 1.5 MG/DL (ref 1.5–2.5)
MCH RBC QN AUTO: 32.6 PG (ref 27–33)
MCHC RBC AUTO-ENTMCNC: 33.7 G/DL (ref 33.6–35)
MCV RBC AUTO: 96.7 FL (ref 81.4–97.8)
MONOCYTES # BLD AUTO: 0.54 K/UL (ref 0–0.85)
MONOCYTES NFR BLD AUTO: 6.7 % (ref 0–13.4)
NEUTROPHILS # BLD AUTO: 5.83 K/UL (ref 2–7.15)
NEUTROPHILS NFR BLD: 72.6 % (ref 44–72)
NRBC # BLD AUTO: 0 K/UL
NRBC BLD AUTO-RTO: 0 /100 WBC
PLATELET # BLD AUTO: 184 K/UL (ref 164–446)
PMV BLD AUTO: 10.9 FL (ref 9–12.9)
POTASSIUM SERPL-SCNC: 4.1 MMOL/L (ref 3.6–5.5)
PROT SERPL-MCNC: 6.7 G/DL (ref 6–8.2)
PROTHROMBIN TIME: 12.6 SEC (ref 12–14.6)
RBC # BLD AUTO: 4.82 M/UL (ref 4.2–5.4)
SODIUM SERPL-SCNC: 135 MMOL/L (ref 135–145)
TROPONIN I SERPL-MCNC: 0.01 NG/ML (ref 0–0.04)
TSH SERPL DL<=0.005 MIU/L-ACNC: 1.68 UIU/ML (ref 0.3–3.7)
WBC # BLD AUTO: 8 K/UL (ref 4.8–10.8)

## 2017-06-06 PROCEDURE — 700102 HCHG RX REV CODE 250 W/ 637 OVERRIDE(OP): Performed by: HOSPITALIST

## 2017-06-06 PROCEDURE — 83036 HEMOGLOBIN GLYCOSYLATED A1C: CPT

## 2017-06-06 PROCEDURE — 99285 EMERGENCY DEPT VISIT HI MDM: CPT

## 2017-06-06 PROCEDURE — 94760 N-INVAS EAR/PLS OXIMETRY 1: CPT

## 2017-06-06 PROCEDURE — 36415 COLL VENOUS BLD VENIPUNCTURE: CPT

## 2017-06-06 PROCEDURE — 70450 CT HEAD/BRAIN W/O DYE: CPT

## 2017-06-06 PROCEDURE — 85610 PROTHROMBIN TIME: CPT

## 2017-06-06 PROCEDURE — 84443 ASSAY THYROID STIM HORMONE: CPT

## 2017-06-06 PROCEDURE — 700117 HCHG RX CONTRAST REV CODE 255: Performed by: PSYCHIATRY & NEUROLOGY

## 2017-06-06 PROCEDURE — 700105 HCHG RX REV CODE 258: Performed by: EMERGENCY MEDICINE

## 2017-06-06 PROCEDURE — 70551 MRI BRAIN STEM W/O DYE: CPT

## 2017-06-06 PROCEDURE — 83735 ASSAY OF MAGNESIUM: CPT

## 2017-06-06 PROCEDURE — 93010 ELECTROCARDIOGRAM REPORT: CPT | Performed by: INTERNAL MEDICINE

## 2017-06-06 PROCEDURE — G0378 HOSPITAL OBSERVATION PER HR: HCPCS

## 2017-06-06 PROCEDURE — 51798 US URINE CAPACITY MEASURE: CPT

## 2017-06-06 PROCEDURE — 85025 COMPLETE CBC W/AUTO DIFF WBC: CPT

## 2017-06-06 PROCEDURE — 700111 HCHG RX REV CODE 636 W/ 250 OVERRIDE (IP): Performed by: HOSPITALIST

## 2017-06-06 PROCEDURE — 80053 COMPREHEN METABOLIC PANEL: CPT

## 2017-06-06 PROCEDURE — 82962 GLUCOSE BLOOD TEST: CPT

## 2017-06-06 PROCEDURE — 70496 CT ANGIOGRAPHY HEAD: CPT

## 2017-06-06 PROCEDURE — 85730 THROMBOPLASTIN TIME PARTIAL: CPT

## 2017-06-06 PROCEDURE — 84484 ASSAY OF TROPONIN QUANT: CPT

## 2017-06-06 PROCEDURE — 93005 ELECTROCARDIOGRAM TRACING: CPT | Performed by: HOSPITALIST

## 2017-06-06 PROCEDURE — A9270 NON-COVERED ITEM OR SERVICE: HCPCS | Performed by: HOSPITALIST

## 2017-06-06 PROCEDURE — 99220 PR INITIAL OBSERVATION CARE,LEVL III: CPT | Performed by: HOSPITALIST

## 2017-06-06 PROCEDURE — 96360 HYDRATION IV INFUSION INIT: CPT | Mod: XU

## 2017-06-06 RX ORDER — OXYCODONE HYDROCHLORIDE 5 MG/1
2.5 TABLET ORAL
Status: DISCONTINUED | OUTPATIENT
Start: 2017-06-06 | End: 2017-06-07 | Stop reason: HOSPADM

## 2017-06-06 RX ORDER — LORAZEPAM 2 MG/ML
0.5 INJECTION INTRAMUSCULAR EVERY 6 HOURS PRN
Status: DISCONTINUED | OUTPATIENT
Start: 2017-06-06 | End: 2017-06-07 | Stop reason: HOSPADM

## 2017-06-06 RX ORDER — TRAZODONE HYDROCHLORIDE 50 MG/1
50 TABLET ORAL
Status: DISCONTINUED | OUTPATIENT
Start: 2017-06-06 | End: 2017-06-07 | Stop reason: HOSPADM

## 2017-06-06 RX ORDER — SIMVASTATIN 40 MG
40 TABLET ORAL DAILY
Status: DISCONTINUED | OUTPATIENT
Start: 2017-06-06 | End: 2017-06-06

## 2017-06-06 RX ORDER — DEXTROSE MONOHYDRATE 25 G/50ML
25 INJECTION, SOLUTION INTRAVENOUS
Status: DISCONTINUED | OUTPATIENT
Start: 2017-06-06 | End: 2017-06-07 | Stop reason: HOSPADM

## 2017-06-06 RX ORDER — OXYCODONE HYDROCHLORIDE 5 MG/1
5 TABLET ORAL
Status: DISCONTINUED | OUTPATIENT
Start: 2017-06-06 | End: 2017-06-07 | Stop reason: HOSPADM

## 2017-06-06 RX ORDER — AMOXICILLIN 250 MG
2 CAPSULE ORAL 2 TIMES DAILY
Status: DISCONTINUED | OUTPATIENT
Start: 2017-06-06 | End: 2017-06-07 | Stop reason: HOSPADM

## 2017-06-06 RX ORDER — ONDANSETRON 4 MG/1
4 TABLET, ORALLY DISINTEGRATING ORAL EVERY 4 HOURS PRN
Status: DISCONTINUED | OUTPATIENT
Start: 2017-06-06 | End: 2017-06-07 | Stop reason: HOSPADM

## 2017-06-06 RX ORDER — ASPIRIN 300 MG/1
300 SUPPOSITORY RECTAL DAILY
Status: DISCONTINUED | OUTPATIENT
Start: 2017-06-06 | End: 2017-06-07 | Stop reason: HOSPADM

## 2017-06-06 RX ORDER — ACETAMINOPHEN 325 MG/1
650 TABLET ORAL EVERY 6 HOURS PRN
Status: DISCONTINUED | OUTPATIENT
Start: 2017-06-06 | End: 2017-06-07 | Stop reason: HOSPADM

## 2017-06-06 RX ORDER — CYCLOSPORINE 0.5 MG/ML
1 EMULSION OPHTHALMIC 2 TIMES DAILY
COMMUNITY
End: 2019-04-29

## 2017-06-06 RX ORDER — BISACODYL 10 MG
10 SUPPOSITORY, RECTAL RECTAL
Status: DISCONTINUED | OUTPATIENT
Start: 2017-06-06 | End: 2017-06-07 | Stop reason: HOSPADM

## 2017-06-06 RX ORDER — MAGNESIUM SULFATE HEPTAHYDRATE 40 MG/ML
4 INJECTION, SOLUTION INTRAVENOUS ONCE
Status: COMPLETED | OUTPATIENT
Start: 2017-06-06 | End: 2017-06-07

## 2017-06-06 RX ORDER — POLYETHYLENE GLYCOL 3350 17 G/17G
1 POWDER, FOR SOLUTION ORAL
Status: DISCONTINUED | OUTPATIENT
Start: 2017-06-06 | End: 2017-06-07 | Stop reason: HOSPADM

## 2017-06-06 RX ORDER — LISINOPRIL 10 MG/1
10 TABLET ORAL DAILY
Status: DISCONTINUED | OUTPATIENT
Start: 2017-06-06 | End: 2017-06-07 | Stop reason: HOSPADM

## 2017-06-06 RX ORDER — SODIUM CHLORIDE 9 MG/ML
1000 INJECTION, SOLUTION INTRAVENOUS ONCE
Status: COMPLETED | OUTPATIENT
Start: 2017-06-06 | End: 2017-06-06

## 2017-06-06 RX ORDER — OMEPRAZOLE 20 MG/1
40 CAPSULE, DELAYED RELEASE ORAL DAILY
Status: DISCONTINUED | OUTPATIENT
Start: 2017-06-06 | End: 2017-06-07 | Stop reason: HOSPADM

## 2017-06-06 RX ORDER — LORAZEPAM 1 MG/1
2 TABLET ORAL EVERY 8 HOURS PRN
Status: DISCONTINUED | OUTPATIENT
Start: 2017-06-06 | End: 2017-06-07 | Stop reason: HOSPADM

## 2017-06-06 RX ORDER — SIMVASTATIN 20 MG
40 TABLET ORAL EVERY EVENING
Status: DISCONTINUED | OUTPATIENT
Start: 2017-06-06 | End: 2017-06-07 | Stop reason: HOSPADM

## 2017-06-06 RX ORDER — ASPIRIN 81 MG/1
324 TABLET, CHEWABLE ORAL DAILY
Status: DISCONTINUED | OUTPATIENT
Start: 2017-06-06 | End: 2017-06-07 | Stop reason: HOSPADM

## 2017-06-06 RX ORDER — CYCLOSPORINE 0.5 MG/ML
1 EMULSION OPHTHALMIC 2 TIMES DAILY
Status: DISCONTINUED | OUTPATIENT
Start: 2017-06-06 | End: 2017-06-06

## 2017-06-06 RX ORDER — ONDANSETRON 2 MG/ML
4 INJECTION INTRAMUSCULAR; INTRAVENOUS EVERY 4 HOURS PRN
Status: DISCONTINUED | OUTPATIENT
Start: 2017-06-06 | End: 2017-06-07 | Stop reason: HOSPADM

## 2017-06-06 RX ORDER — LEVOTHYROXINE SODIUM 0.07 MG/1
75 TABLET ORAL
Status: DISCONTINUED | OUTPATIENT
Start: 2017-06-06 | End: 2017-06-07 | Stop reason: HOSPADM

## 2017-06-06 RX ORDER — HEPARIN SODIUM 5000 [USP'U]/ML
5000 INJECTION, SOLUTION INTRAVENOUS; SUBCUTANEOUS EVERY 8 HOURS
Status: DISCONTINUED | OUTPATIENT
Start: 2017-06-06 | End: 2017-06-07 | Stop reason: HOSPADM

## 2017-06-06 RX ORDER — ASPIRIN 325 MG
325 TABLET ORAL DAILY
Status: DISCONTINUED | OUTPATIENT
Start: 2017-06-06 | End: 2017-06-07 | Stop reason: HOSPADM

## 2017-06-06 RX ORDER — POLYVINYL ALCOHOL 14 MG/ML
1-2 SOLUTION/ DROPS OPHTHALMIC
Status: DISCONTINUED | OUTPATIENT
Start: 2017-06-06 | End: 2017-06-07 | Stop reason: HOSPADM

## 2017-06-06 RX ADMIN — IOHEXOL 100 ML: 350 INJECTION, SOLUTION INTRAVENOUS at 20:30

## 2017-06-06 RX ADMIN — MAGNESIUM SULFATE HEPTAHYDRATE 4 G: 40 INJECTION, SOLUTION INTRAVENOUS at 22:19

## 2017-06-06 RX ADMIN — LORAZEPAM 2 MG: 1 TABLET ORAL at 20:13

## 2017-06-06 RX ADMIN — TRAZODONE HYDROCHLORIDE 50 MG: 50 TABLET ORAL at 22:22

## 2017-06-06 RX ADMIN — SODIUM CHLORIDE 1000 ML: 9 INJECTION, SOLUTION INTRAVENOUS at 15:51

## 2017-06-06 RX ADMIN — SIMVASTATIN 40 MG: 20 TABLET, FILM COATED ORAL at 20:14

## 2017-06-06 RX ADMIN — ASPIRIN 325 MG: 325 TABLET, COATED ORAL at 18:13

## 2017-06-06 RX ADMIN — HEPARIN SODIUM 5000 UNITS: 5000 INJECTION, SOLUTION INTRAVENOUS; SUBCUTANEOUS at 18:13

## 2017-06-06 RX ADMIN — INSULIN LISPRO 2 UNITS: 100 INJECTION, SOLUTION INTRAVENOUS; SUBCUTANEOUS at 20:22

## 2017-06-06 ASSESSMENT — COPD QUESTIONNAIRES
HAVE YOU SMOKED AT LEAST 100 CIGARETTES IN YOUR ENTIRE LIFE: YES
DO YOU EVER COUGH UP ANY MUCUS OR PHLEGM?: NO/ONLY WITH OCCASIONAL COLDS OR INFECTIONS
COPD SCREENING SCORE: 4
DURING THE PAST 4 WEEKS HOW MUCH DID YOU FEEL SHORT OF BREATH: NONE/LITTLE OF THE TIME

## 2017-06-06 ASSESSMENT — LIFESTYLE VARIABLES
EVER_SMOKED: YES
ON A TYPICAL DAY WHEN YOU DRINK ALCOHOL HOW MANY DRINKS DO YOU HAVE: 1
EVER FELT BAD OR GUILTY ABOUT YOUR DRINKING: NO
TOTAL SCORE: 0
AVERAGE NUMBER OF DAYS PER WEEK YOU HAVE A DRINK CONTAINING ALCOHOL: 3
HOW MANY TIMES IN THE PAST YEAR HAVE YOU HAD 5 OR MORE DRINKS IN A DAY: 0
HAVE YOU EVER FELT YOU SHOULD CUT DOWN ON YOUR DRINKING: NO
CONSUMPTION TOTAL: NEGATIVE
DO YOU DRINK ALCOHOL: YES
HAVE PEOPLE ANNOYED YOU BY CRITICIZING YOUR DRINKING: NO
EVER HAD A DRINK FIRST THING IN THE MORNING TO STEADY YOUR NERVES TO GET RID OF A HANGOVER: NO

## 2017-06-06 ASSESSMENT — PAIN SCALES - GENERAL: PAINLEVEL_OUTOF10: 0

## 2017-06-06 NOTE — ED NOTES
76 y/o female ambulatory to triage with stroke symptoms that began 4 days ago. Pt states she has numbness in her right leg, arm, face and mouth. NIHSS of 2.

## 2017-06-06 NOTE — IP AVS SNAPSHOT
Home Care Instructions                                                                                                                  Name:Jessica Plaza  Medical Record Number:[id number]  CSN: [id number]    YOB: 1940   Age: 77 y.o.  Sex: female  HT:  WT: 79.6 kg (175 lb 7.8 oz)          Admit Date: 6/6/2017     Discharge Date:   Today's Date: 6/7/2017  Attending Doctor:  Kassandra Rabago M.D.                  Allergies:  Bactrim; Demerol; Pcn; Sulfa drugs; Ferrous sulfate; and Latex            Discharge Instructions       Discharge Instructions        Activity: As tolerated.   Diet: cardiac   Followup:   -PCP 1 week   -Home health for continued PT/OT   Instructions:   -Given instructions to return to the ER if any new or worsening symptoms, worsening condition, or failure to improve   -Call PCP for followup   -No smoking, no alcohol, no caffeine   -Encourage risk factor reduction with tobacco and alcohol abstinence, diet changes, weight loss, and exercise.    Discharged to home by car with relative. Discharged via walking, hospital escort: Refused.  Special equipment needed: Not Applicable    Be sure to schedule a follow-up appointment with your primary care doctor or any specialists as instructed.     Discharge Plan:   Influenza Vaccine Indication: Not indicated: Previously immunized this influenza season and > 8 years of age    I understand that a diet low in cholesterol, fat, and sodium is recommended for good health. Unless I have been given specific instructions below for another diet, I accept this instruction as my diet prescription.   Other diet: Diabetic diet    Special Instructions:     Stroke/CVA/TIA/Hemorrhagic Ischemia Discharge Instructions  You have had a stroke. Your risk factors have been identified as follows:  Age - Over 55  High blood pressure  Diabetes  It is important that you reduce your risk factors to avoid another stroke in the future. Here are some general guidelines to  follow:  · Eat healthy - avoid food high in fat.  · Get regular exercise.  · Maintain a healthy weight.  · Avoid smoking.  · Avoid alcohol and illegal drug use.  · Take your medications as directed.  For more information regarding risk factors, refer to pages 17-19 in your Stroke Patient Education Guide. Stroke Education Guide was given to patient.    Warning signs of a stroke include (which can also be found on page 3 of your Stroke Patient Education Guide):  · Sudden numbness of weakness of the face, arm or leg (especially on one side of the body).  · Sudden confusion, trouble speaking or understanding.  · Sudden trouble seeing in one or both eyes.  · Sudden trouble walking, dizziness, loss of balance or coordination.  · Sudden severe headache with no known cause.  It is very important to get treatment quickly when a stroke occurs. If you experience any of the above warning signs, call [phone] immediately.     Some patients who have had a stroke will be going home on a blood thinner medication called Warfarin (Coumadin).  This medication requires very close monitoring and follow up.  This follow up can be provided by either your Primary Care Physician or by [facility]'s Outpatient Anticoagulation Service.  The Outpatient Anticoagulation Service is located at the [facility] ([facility]).  If you do not know when your follow up appointment is scheduled, call [phone] to verify your appointment time.      · Is patient discharged on Warfarin / Coumadin?   No     · Is patient Post Blood Transfusion?  No          Follow-up Information     1. Follow up with Anant Hills M.D.. Schedule an appointment as soon as possible for a visit in 1 week.    Specialty:  Geriatrics    Contact information    [address]  [facility] [address]  [place] [address] [phone]           Discharge Medication Instructions:    Below are the medications your physician expects  you to take upon discharge:    Review all your home medications and newly ordered medications with your doctor and/or pharmacist. Follow medication instructions as directed by your doctor and/or pharmacist.    Please keep your medication list with you and share with your physician.               Medication List      CONTINUE taking these medications        Instructions    Morning Afternoon Evening Bedtime    albuterol 108 (90 BASE) MCG/ACT Aers inhalation aerosol        Inhale 1 Puff by mouth every 6 hours as needed for Shortness of Breath.   Dose:  1 Puff                        aspirin 81 MG Chew chewable tablet   Commonly known as:  ASA        Take 81 mg by mouth every day.   Dose:  81 mg                        levothyroxine 75 MCG Tabs   Last time this was given:  75 mcg on 6/7/2017  6:15 AM   Commonly known as:  SYNTHROID        Take 1 Tab by mouth Every morning on an empty stomach.   Dose:  75 mcg                        liraglutide 18 MG/3ML Sopn injection   Commonly known as:  VICTOZA        Inject 0.1 mL as instructed every day.   Dose:  0.6 mg                        lisinopril 10 MG Tabs   Commonly known as:  PRINIVIL        Take 1 Tab by mouth every day.   Dose:  10 mg                        metformin 1000 MG tablet   Commonly known as:  GLUCOPHAGE        TAKE 1 TABLET BY MOUTH 2 TIMES A DAY, WITH MEALS.                        omeprazole 40 MG delayed-release capsule   Last time this was given:  40 mg on 6/7/2017  7:52 AM   Commonly known as:  PRILOSEC        Take 40 mg by mouth every day.   Dose:  40 mg                        RESTASIS 0.05 % ophthalmic emulsion   Generic drug:  cyclosporin        Place 1 Drop in both eyes 2 times a day.   Dose:  1 Drop                        simvastatin 40 MG Tabs   Last time this was given:  40 mg on 6/6/2017  8:14 PM   Commonly known as:  ZOCOR        Take 1 Tab by mouth every day.   Dose:  40 mg                        trazodone 50 MG Tabs   Last time this was given:  50  mg on 6/6/2017 10:22 PM   Commonly known as:  DESYREL        Take 1 Tab by mouth every bedtime.   Dose:  50 mg                                Orders for after discharge     REFERRAL TO HOME HEALTH    Complete by:  As directed    Home health will create and establish a plan of care             Instructions           Diet / Nutrition:    Follow any diet instructions given to you by your doctor or the dietician, including how much salt (sodium) you are allowed each day.    If you are overweight, talk to your doctor about a weight reduction plan.    Activity:    Remain physically active following your doctor's instructions about exercise and activity.    Rest often.     Any time you become even a little tired or short of breath, SIT DOWN and rest.    Worsening Symptoms:    Report any of the following signs and symptoms to the doctor's office immediately:    *Pain of jaw, arm, or neck  *Chest pain not relieved by medication                               *Dizziness or loss of consciousness  *Difficulty breathing even when at rest   *More tired than usual                                       *Bleeding drainage or swelling of surgical site  *Swelling of feet, ankles, legs or stomach                 *Fever (>100ºF)  *Pink or blood tinged sputum  *Weight gain (3lbs/day or 5lbs /week)           *Shock from internal defibrillator (if applicable)  *Palpitations or irregular heartbeats                *Cool and/or numb extremities    Stroke Awareness    Common Risk Factors for Stroke include:    Age  Atrial Fibrillation  Carotid Artery Stenosis  Diabetes Mellitus  Excessive alcohol consumption  High blood pressure  Overweight   Physical inactivity  Smoking    Warning signs and symptoms of a stroke include:    *Sudden numbness or weakness of the face, arm or leg (especially on one side of the body).  *Sudden confusion, trouble speaking or understanding.  *Sudden trouble seeing in one or both eyes.  *Sudden trouble walking,  dizziness, loss of balance or coordination.Sudden severe headache with no known cause.    It is very important to get treatment quickly when a stroke occurs. If you experience any of the above warning signs, call 911 immediately.                   Disclaimer         Quit Smoking / Tobacco Use:    I understand the use of any tobacco products increases my chance of suffering from future heart disease or stroke and could cause other illnesses which may shorten my life. Quitting the use of tobacco products is the single most important thing I can do to improve my health. For further information on smoking / tobacco cessation call a Toll Free Quit Line at 1-387.370.9573 (*[employer] [place]) or 1-560.111.8449 ([employer]) or you can access the web based program at www.[employer] [employer].org.    [place] Tobacco Users Help Line:  (529) 512-9215       Toll Free: 1-276.414.1683  Quit Tobacco Program [facility] (300)352-4794    Crisis Hotline:    [place] Crisis Hotline:  6-787-JXDYLWL or 1-773.540.2725    [place] Crisis Hotline:    1-473.571.1761 or 920-920-9706    Discharge Survey:   Thank you for choosing [facility]. We hope we did everything we could to make your hospital stay a pleasant one. You may be receiving a phone survey and we would appreciate your time and participation in answering the questions. Your input is very valuable to us in our efforts to improve our service to our patients and their families.        My signature on this form indicates that:    1. I have reviewed and understand the above information.  2. My questions regarding this information have been answered to my satisfaction.  3. I have formulated a plan with my discharge nurse to obtain my prescribed medications for home.                  Disclaimer         __________________________________                     __________       ________                       Patient Signature                                                  Date                    Time

## 2017-06-06 NOTE — ED PROVIDER NOTES
ED Provider Note    Scribed for Lionel Cervantes M.D. by Raffy Espana. 2017  2:22 PM    Primary care provider: Anant Hills M.D.  Means of arrival: walk in  History obtained from: Patient  History limited by: None    CHIEF COMPLAINT  Chief Complaint   Patient presents with   • Possible Stroke     symptoms began 4 days ago   • Numbness     right sided       HPI  Jessica Plaza is a 77 y.o. female who presents to the Emergency Department right hand, foot and leg numbness starting 2 days ago. Patient has associated intermittent bitemporal headache for 3 days, nausea, right face numbness, and epigastric tenderness.  reports associated expressive aphasia. Patient was abruptly taken off her chrnoic benzodiazepine 4 days ago by her primary doctor and placed on Trazodone. She noted that she started to feel nauseated. Patient has a past medical history of TIA in . She denies weakness, vomiting.     REVIEW OF SYSTEMS  Pertinent negatives include no weakness, vomiting. As above, all other systems reviewed and are negative.   See HPI for further details.   C.    PAST MEDICAL HISTORY   has a past medical history of High cholesterol; Hypertension; GERD (gastroesophageal reflux disease); Castro esophagus; UTI (urinary tract infection); COPD; Bronchitis; Arthritis; Anemia; Indigestion; Dental disorder; Stroke (CMS-[facility]) (); Disorder of thyroid; Heart burn; Pain; COPD (chronic obstructive pulmonary disease) (CMS-HCC); Emphysema of lung (CMS-HCC); Diabetes; Pneumonia (.); and Hemorrhagic disorder.    SURGICAL HISTORY   has past surgical history that includes gastroscopy-endo (2014); tonsillectomy (); anesth, delivery only; shoulder decompression arthroscopic (Left, 2016); shoulder decompression arthroscopic (Left, 10/11/2016); clavicle resection (Left, 10/11/2016); and chondroplasty (Left, 10/11/2016).    SOCIAL HISTORY  Social History   Substance Use Topics   • Smoking  status: Former Smoker -- 0.50 packs/day for 52 years     Types: Cigarettes     Quit date: 01/01/2013   • Smokeless tobacco: Never Used      Comment: started smoking at age 21   • Alcohol Use: 8.4 oz/week     7 Shots of liquor, 7 Standard drinks or equivalent per week      Comment: 1/day      History   Drug Use No       FAMILY HISTORY  Family History   Problem Relation Age of Onset   • Cancer Mother      breast   • Lung Disease Mother      COPD/Emphysema/Smoker   • Stroke Father    • Hypertension Father    • Hyperlipidemia Father    • Heart Attack Father    • Cancer Sister      Pancriatic   • Arrythmia Sister    • Lung Disease Sister      COPD/Emphysema/Smoker   • Bipolar disorder Son    • Bipolar disorder Son    • Bipolar disorder Son    • Cancer Maternal Aunt      stomach       CURRENT MEDICATIONS    Current facility-administered medications:   •  NS infusion 1,000 mL, 1,000 mL, Intravenous, Once, Lionel Cervantes M.D., Stopped at 06/06/17 4535    Current outpatient prescriptions:   •  Insulin Pen Needle (BD PEN NEEDLE DAKOTA U/F) 32G X 4 MM Misc, Use with victoza to inject daily as directed, Disp: 100 Each, Rfl: 3  •  trazodone (DESYREL) 50 MG Tab, Take 1 Tab by mouth every bedtime., Disp: 30 Tab, Rfl: 0  •  promethazine-codeine (PHENERGAN-CODEINE) 6.25-10 MG/5ML Syrup, Take 5 mL by mouth 4 times a day as needed., Disp: 120 mL, Rfl: 0  •  lisinopril (PRINIVIL) 10 MG Tab, Take 1 Tab by mouth every day., Disp: 90 Tab, Rfl: 1  •  estazolam (PROSOM) 1 MG tablet, Take 0.5 Tabs by mouth at bedtime as needed., Disp: 15 Tab, Rfl: 0  •  Fluticasone Furoate-Vilanterol (BREO ELLIPTA) 200-25 MCG/INH AEROSOL POWDER, BREATH ACTIVATED, Inhale 1 Puff by mouth every day., Disp: 1 Each, Rfl: 3  •  aspirin (ASA) 81 MG Chew Tab chewable tablet, Take 81 mg by mouth every day., Disp: , Rfl:   •  simvastatin (ZOCOR) 40 MG Tab, Take 1 Tab by mouth every day., Disp: 30 Tab, Rfl: 0  •  estazolam (PROSOM) 1 MG tablet, Take 1 Tab by mouth every  day., Disp: 30 Tab, Rfl: 0  •  estazolam (PROSOM) 1 MG tablet, Take 1 Tab by mouth every bedtime., Disp: 30 Tab, Rfl: 0  •  levothyroxine (SYNTHROID) 75 MCG Tab, Take 1 Tab by mouth Every morning on an empty stomach., Disp: 90 Tab, Rfl: 0  •  liraglutide (VICTOZA) 18 MG/3ML Solution Pen-injector injection, Inject 0.1 mL as instructed every day., Disp: 6 mL, Rfl: 11  •  albuterol 108 (90 BASE) MCG/ACT Aero Soln inhalation aerosol, Inhale 1 Puff by mouth every 6 hours as needed for Shortness of Breath., Disp: , Rfl:   •  hydrocodone-acetaminophen (NORCO) 5-325 MG Tab per tablet, Take 1-2 Tabs by mouth every 6 hours as needed. (Patient not taking: Reported on 4/27/2017), Disp: 40 Tab, Rfl: 0  •  hydrOXYzine (VISTARIL) 50 MG Cap, Take 1 Cap by mouth 3 times a day as needed for Itching. (Patient not taking: Reported on 4/27/2017), Disp: 20 Cap, Rfl: 0  •  Estazolam 2 MG Tab, Take 1 Tab by mouth every bedtime. (Patient not taking: Reported on 4/27/2017), Disp: 30 Tab, Rfl: 11  •  metformin (GLUCOPHAGE) 1000 MG tablet, TAKE 1 TABLET BY MOUTH 2 TIMES A DAY, WITH MEALS., Disp: 60 Tab, Rfl: 11  •  aspirin (ASA) 325 MG TABS, Take 325 mg by mouth every day., Disp: , Rfl:   •  CycloSPORINE (RESTASIS OP), 2 Drops by Ophthalmic route 2 Times a Day., Disp: , Rfl:   •  omeprazole (PRILOSEC) 40 MG capsule, Take 40 mg by mouth every day., Disp: , Rfl:     ALLERGIES  Allergies   Allergen Reactions   • Bactrim Hives   • Demerol Shortness of Breath and Swelling   • Pcn [Penicillins] Hives   • Sulfa Drugs Hives   • Ferrous Sulfate Rash     itching   • Latex Contact Dermatitis       PHYSICAL EXAM  VITAL SIGNS: /81 mmHg  Pulse 89  Temp(Src) 37 °C (98.6 °F)  Resp 17  Wt 77 kg (169 lb 12.1 oz)  SpO2 94%    Constitutional: Well developed, Well nourished, No acute distress, Non-toxic appearance.   HENT: Normocephalic, Atraumatic, Bilateral external ears normal, Oropharynx is clear mucous membranes are moist. No oral exudates or nasal  discharge. No facial droop.   Eyes: Pupils are equal round and reactive, EOMI, Conjunctiva normal, No discharge.   Neck: Normal range of motion, No tenderness, Supple, No stridor. No meningismus.  Lymphatic: No lymphadenopathy noted.   Cardiovascular: Regular rate and rhythm without murmur rub or gallop.  Thorax & Lungs: Clear breath sounds bilaterally without wheezes, rhonchi or rales. There is no chest wall tenderness.   Abdomen: Soft non-tender non-distended. There is no rebound or guarding. No organomegaly is appreciated. Bowel sounds are normal.  Skin: Normal without rash.   Back: No CVA or spinal tenderness.   Extremities: Intact distal pulses, No edema, No tenderness, No cyanosis, No clubbing. Capillary refill is less than 2 seconds.  Musculoskeletal: Good range of motion in all major joints. No tenderness to palpation or major deformities noted.   Neurologic: Alert & oriented x 3, No focal deficits noted. Reflexes are normal. Diminished sensation right upper extremity and right lower extremity, but no loss of strength. Difficulty with dorsiflexion.   Psychiatric: Affect normal, Judgment normal, Mood normal. There is no suicidal ideation or patient reported hallucinations.     DIAGNOSTIC STUDIES / PROCEDURES    LABS  Labs Reviewed   CBC WITH DIFFERENTIAL - Abnormal; Notable for the following:     Neutrophils-Polys 72.60 (*)     Lymphocytes 18.20 (*)     All other components within normal limits    Narrative:     Indicate which anticoagulants the patient is on:->NONE   APTT - Abnormal; Notable for the following:     APTT 22.9 (*)     All other components within normal limits    Narrative:     Indicate which anticoagulants the patient is on:->NONE   COMP METABOLIC PANEL    Narrative:     Indicate which anticoagulants the patient is on:->NONE   TROPONIN    Narrative:     Indicate which anticoagulants the patient is on:->NONE   PROTHROMBIN TIME    Narrative:     Indicate which anticoagulants the patient is  on:->NONE   ESTIMATED GFR    Narrative:     Indicate which anticoagulants the patient is on:->NONE   All labs reviewed by me.    RADIOLOGY  CT-HEAD W/O   Final Result      No acute intracranial abnormality is identified.      Focal hypodensities in the basal ganglia on the right are likely related to prior lacunar infarcts or prominent Virchow-José Antonio spaces.      MRI would be more sensitive for further evaluation.      The radiologist's interpretation of all radiological studies have been reviewed by me.    COURSE & MEDICAL DECISION MAKING  Nursing notes, VS, PMSFHx reviewed in chart.    2:22 PM Patient seen and examined at bedside. Informed the patient that she likely had a sesory CVA. Discussed admission to the hospital. Patient and  verbalizes understanding and agree. Ordered for CT head and Labs to evaluate. Patient was treated with NS 1000 ml for fluid resuscitation for her symptoms. Differential diagnoses include, but are not limited to, sensory CVA, TIA    She is not a candidate for Alteplace as she presents to days after symptoms with a stroke that is completed.    Laboratory evaluation reveals a normal troponin, normal serum electrolytes, normal CBC. CT of the head shows focal hypodensities in the basal ganglia looking like prior lacunar infarcts. She did have a history of past TIA. I think today she has a sensory CVA with a small motor component in the right foot.    4:18 PM - I discussed the patient's case and the above findings with Dr. Marinelli (hospitalist) who agrees to admit the patient. Patient is up-to-date on this plan of care and understands her need to be in the hospital.    DISPOSITION:  Patient will be admitted to hospital in guarded condition. She is admitted to the neuro floor and was kept nothing by mouth pending swallow evaluation    FINAL IMPRESSION  1. Cerebrovascular accident (CVA), unspecified mechanism (CMS-[facility])          Raffy HUNT (Scribe), am scribing for, and in the  presence of, Lionel Cervantes M.D..    Electronically signed by: Raffy Espana (Scribe), 6/6/2017    I, Lionel Cervantes M.D. personally performed the services described in this documentation, as scribed by Raffy Espana in my presence, and it is both accurate and complete.    The note accurately reflects work and decisions made by me.  Lionel Cervantes  6/6/2017  4:39 PM

## 2017-06-06 NOTE — IP AVS SNAPSHOT
Storrz Access Code: Activation code not generated  Current Storrz Status: Active    Medical Compression Systemshart  A secure, online tool to manage your health information     [employer]’s Storrz® is a secure, online tool that connects you to your personalized health information from the privacy of your home -- day or night - making it very easy for you to manage your healthcare. Once the activation process is completed, you can even access your medical information using the Storrz delaney, which is available for free in the Apple Delaney store or Google Play store.     Storrz provides the following levels of access (as shown below):   My Chart Features   [facility] Primary Care Doctor [facility]  Specialists [facility]  Urgent  Care Non-[facility]  Primary Care  Doctor   Email your healthcare team securely and privately 24/7 X X X X   Manage appointments: schedule your next appointment; view details of past/upcoming appointments X      Request prescription refills. X      View recent personal medical records, including lab and immunizations X X X X   View health record, including health history, allergies, medications X X X X   Read reports about your outpatient visits, procedures, consult and ER notes X X X X   See your discharge summary, which is a recap of your hospital and/or ER visit that includes your diagnosis, lab results, and care plan. X X       How to register for Storrz:  1. Go to  [email or web address].  2. Click on the Sign Up Now box, which takes you to the New Member Sign Up page. You will need to provide the following information:  a. Enter your Storrz Access Code exactly as it appears at the top of this page. (You will not need to use this code after you’ve completed the sign-up process. If you do not sign up before the expiration date, you must request a new code.)   b. Enter your date of birth.   c. Enter your home email address.   d. Click Submit, and follow the next screen’s instructions.  3. Create a Storrz ID. This will  be your virocyt login ID and cannot be changed, so think of one that is secure and easy to remember.  4. Create a virocyt password. You can change your password at any time.  5. Enter your Password Reset Question and Answer. This can be used at a later time if you forget your password.   6. Enter your e-mail address. This allows you to receive e-mail notifications when new information is available in virocyt.  7. Click Sign Up. You can now view your health information.    For assistance activating your virocyt account, call [phone]

## 2017-06-06 NOTE — IP AVS SNAPSHOT
" <p align=\"LEFT\"><IMG SRC=\"//[email or web address]\" alt=\"Image\" WIDTH=\"50%\" HEIGHT=\"200\" BORDER=\"\"></p>                   Name:Jessica Plaza  Medical Record Number:[id number]  CSN: [id number]    YOB: 1940   Age: 77 y.o.  Sex: female  HT:  WT: 79.6 kg (175 lb 7.8 oz)          Admit Date: 6/6/2017     Discharge Date:   Today's Date: 6/7/2017  Attending Doctor:  Kassandra Rabago M.D.                  Allergies:  Bactrim; Demerol; Pcn; Sulfa drugs; Ferrous sulfate; and Latex          Follow-up Information     1. Follow up with Anant Hills M.D.. Schedule an appointment as soon as possible for a visit in 1 week.    Specialty:  Geriatrics    Contact information    [address]  [facility] [place] [address] [phone]           Medication List      Take these Medications        Instructions    albuterol 108 (90 BASE) MCG/ACT Aers inhalation aerosol    Inhale 1 Puff by mouth every 6 hours as needed for Shortness of Breath.   Dose:  1 Puff       aspirin 81 MG Chew chewable tablet   Commonly known as:  ASA    Take 81 mg by mouth every day.   Dose:  81 mg       levothyroxine 75 MCG Tabs   Commonly known as:  SYNTHROID    Take 1 Tab by mouth Every morning on an empty stomach.   Dose:  75 mcg       liraglutide 18 MG/3ML Sopn injection   Commonly known as:  VICTOZA    Inject 0.1 mL as instructed every day.   Dose:  0.6 mg       lisinopril 10 MG Tabs   Commonly known as:  PRINIVIL    Take 1 Tab by mouth every day.   Dose:  10 mg       metformin 1000 MG tablet   Commonly known as:  GLUCOPHAGE    TAKE 1 TABLET BY MOUTH 2 TIMES A DAY, WITH MEALS.       omeprazole 40 MG delayed-release capsule   Commonly known as:  PRILOSEC    Take 40 mg by mouth every day.   Dose:  40 mg       RESTASIS 0.05 % ophthalmic emulsion   Generic drug:  cyclosporin    Place 1 Drop in both eyes 2 times a day.   Dose:  1 Drop       simvastatin 40 MG Tabs   Commonly known as:  ZOCOR    Take 1 Tab by mouth every day.   Dose:  " 40 mg       trazodone 50 MG Tabs   Commonly known as:  DESYREL    Take 1 Tab by mouth every bedtime.   Dose:  50 mg

## 2017-06-06 NOTE — IP AVS SNAPSHOT
6/7/2017    Jessica Plaza  [address] [place] [address]    Dear Jessica:    [facility] wants to ensure your discharge home is safe and you or your loved ones have had all of your questions answered regarding your care after you leave the hospital.    Below is a list of resources and contact information should you have any questions regarding your hospital stay, follow-up instructions, or active medical symptoms.    Questions or Concerns Regarding… Contact   Medical Questions Related to Your Discharge  (7 days a week, 8am-5pm) Contact a Nurse Care Coordinator   [phone]   Medical Questions Not Related to Your Discharge  (24 hours a day / 7 days a week)  Contact the Nurse Health Line   [phone]    Medications or Discharge Instructions Refer to your discharge packet   or contact your [facility] Primary Care Provider   [phone]   Follow-up Appointment(s) Schedule your appointment via Caperfly   or contact Scheduling [phone]   Billing Review your statement via Caperfly  or contact Billing [phone]   Medical Records Review your records via Caperfly   or contact Medical Records [phone]     You may receive a telephone call within two days of discharge. This call is to make certain you understand your discharge instructions and have the opportunity to have any questions answered. You can also easily access your medical information, test results and upcoming appointments via the Caperfly free online health management tool. You can learn more and sign up at [employer]/Caperfly. For assistance setting up your Caperfly account, please call [phone].    Once again, we want to ensure your discharge home is safe and that you have a clear understanding of any next steps in your care. If you have any questions or concerns, please do not hesitate to contact us, we are here for you. Thank you for choosing [facility] for your healthcare needs.    Sincerely,    Your [facility] Healthcare Team

## 2017-06-06 NOTE — ED NOTES
Med rec updated and complete.   allergies reviewed.  Met with pt at bedside and dicussed current   Medications and last doses taken.  Pt denies taking any antibiotics in last 30 days.

## 2017-06-07 VITALS
SYSTOLIC BLOOD PRESSURE: 145 MMHG | DIASTOLIC BLOOD PRESSURE: 62 MMHG | RESPIRATION RATE: 18 BRPM | OXYGEN SATURATION: 91 % | BODY MASS INDEX: 27.48 KG/M2 | TEMPERATURE: 98.6 F | WEIGHT: 175.49 LBS | HEART RATE: 59 BPM

## 2017-06-07 PROBLEM — E83.42 HYPOMAGNESEMIA: Status: ACTIVE | Noted: 2017-06-07

## 2017-06-07 PROBLEM — I63.9 CVA (CEREBRAL VASCULAR ACCIDENT) (HCC): Status: RESOLVED | Noted: 2017-06-06 | Resolved: 2017-06-07

## 2017-06-07 PROBLEM — E83.42 HYPOMAGNESEMIA: Status: RESOLVED | Noted: 2017-06-07 | Resolved: 2017-06-07

## 2017-06-07 LAB
ANION GAP SERPL CALC-SCNC: 7 MMOL/L (ref 0–11.9)
BUN SERPL-MCNC: 20 MG/DL (ref 8–22)
CALCIUM SERPL-MCNC: 8.5 MG/DL (ref 8.5–10.5)
CHLORIDE SERPL-SCNC: 105 MMOL/L (ref 96–112)
CHOLEST SERPL-MCNC: 135 MG/DL (ref 100–199)
CO2 SERPL-SCNC: 24 MMOL/L (ref 20–33)
CREAT SERPL-MCNC: 1.1 MG/DL (ref 0.5–1.4)
EKG IMPRESSION: NORMAL
GFR SERPL CREATININE-BSD FRML MDRD: 48 ML/MIN/1.73 M 2
GLUCOSE BLD-MCNC: 153 MG/DL (ref 65–99)
GLUCOSE BLD-MCNC: 183 MG/DL (ref 65–99)
GLUCOSE SERPL-MCNC: 207 MG/DL (ref 65–99)
HDLC SERPL-MCNC: 53 MG/DL
LDLC SERPL CALC-MCNC: 53 MG/DL
LV EJECT FRACT  99904: 65
LV EJECT FRACT MOD 2C 99903: 71.38
LV EJECT FRACT MOD 4C 99902: 65.66
LV EJECT FRACT MOD BP 99901: 67.96
MAGNESIUM SERPL-MCNC: 2.7 MG/DL (ref 1.5–2.5)
POTASSIUM SERPL-SCNC: 3.6 MMOL/L (ref 3.6–5.5)
SODIUM SERPL-SCNC: 136 MMOL/L (ref 135–145)
TRIGL SERPL-MCNC: 146 MG/DL (ref 0–149)

## 2017-06-07 PROCEDURE — 80061 LIPID PANEL: CPT

## 2017-06-07 PROCEDURE — 97166 OT EVAL MOD COMPLEX 45 MIN: CPT | Mod: XE

## 2017-06-07 PROCEDURE — G8978 MOBILITY CURRENT STATUS: HCPCS | Mod: CJ

## 2017-06-07 PROCEDURE — G8987 SELF CARE CURRENT STATUS: HCPCS | Mod: CJ

## 2017-06-07 PROCEDURE — 80048 BASIC METABOLIC PNL TOTAL CA: CPT

## 2017-06-07 PROCEDURE — A9270 NON-COVERED ITEM OR SERVICE: HCPCS | Performed by: HOSPITALIST

## 2017-06-07 PROCEDURE — 93306 TTE W/DOPPLER COMPLETE: CPT | Mod: 26 | Performed by: INTERNAL MEDICINE

## 2017-06-07 PROCEDURE — 700111 HCHG RX REV CODE 636 W/ 250 OVERRIDE (IP): Performed by: HOSPITALIST

## 2017-06-07 PROCEDURE — G8988 SELF CARE GOAL STATUS: HCPCS | Mod: CI

## 2017-06-07 PROCEDURE — 82962 GLUCOSE BLOOD TEST: CPT

## 2017-06-07 PROCEDURE — 93306 TTE W/DOPPLER COMPLETE: CPT

## 2017-06-07 PROCEDURE — 93880 EXTRACRANIAL BILAT STUDY: CPT

## 2017-06-07 PROCEDURE — 83735 ASSAY OF MAGNESIUM: CPT

## 2017-06-07 PROCEDURE — 99217 PR OBSERVATION CARE DISCHARGE: CPT | Performed by: INTERNAL MEDICINE

## 2017-06-07 PROCEDURE — G8979 MOBILITY GOAL STATUS: HCPCS | Mod: CI

## 2017-06-07 PROCEDURE — 97162 PT EVAL MOD COMPLEX 30 MIN: CPT

## 2017-06-07 PROCEDURE — G0378 HOSPITAL OBSERVATION PER HR: HCPCS

## 2017-06-07 PROCEDURE — 36415 COLL VENOUS BLD VENIPUNCTURE: CPT

## 2017-06-07 PROCEDURE — 700102 HCHG RX REV CODE 250 W/ 637 OVERRIDE(OP): Performed by: HOSPITALIST

## 2017-06-07 RX ADMIN — INSULIN LISPRO 2 UNITS: 100 INJECTION, SOLUTION INTRAVENOUS; SUBCUTANEOUS at 11:53

## 2017-06-07 RX ADMIN — LEVOTHYROXINE SODIUM 75 MCG: 75 TABLET ORAL at 06:15

## 2017-06-07 RX ADMIN — ASPIRIN 325 MG: 325 TABLET, COATED ORAL at 07:52

## 2017-06-07 RX ADMIN — ACETAMINOPHEN 650 MG: 325 TABLET, FILM COATED ORAL at 10:48

## 2017-06-07 RX ADMIN — OMEPRAZOLE 40 MG: 20 CAPSULE, DELAYED RELEASE ORAL at 07:52

## 2017-06-07 RX ADMIN — INSULIN LISPRO 2 UNITS: 100 INJECTION, SOLUTION INTRAVENOUS; SUBCUTANEOUS at 06:17

## 2017-06-07 RX ADMIN — HEPARIN SODIUM 5000 UNITS: 5000 INJECTION, SOLUTION INTRAVENOUS; SUBCUTANEOUS at 06:17

## 2017-06-07 ASSESSMENT — COGNITIVE AND FUNCTIONAL STATUS - GENERAL
DRESSING REGULAR LOWER BODY CLOTHING: A LITTLE
PERSONAL GROOMING: A LITTLE
CLIMB 3 TO 5 STEPS WITH RAILING: A LITTLE
WALKING IN HOSPITAL ROOM: A LITTLE
MOVING FROM LYING ON BACK TO SITTING ON SIDE OF FLAT BED: A LITTLE
EATING MEALS: A LITTLE
DAILY ACTIVITIY SCORE: 19
SUGGESTED CMS G CODE MODIFIER MOBILITY: CJ
STANDING UP FROM CHAIR USING ARMS: A LITTLE
TOILETING: A LITTLE
SUGGESTED CMS G CODE MODIFIER DAILY ACTIVITY: CK
MOBILITY SCORE: 20
HELP NEEDED FOR BATHING: A LITTLE

## 2017-06-07 ASSESSMENT — PAIN SCALES - GENERAL: PAINLEVEL_OUTOF10: 5

## 2017-06-07 ASSESSMENT — GAIT ASSESSMENTS
DEVIATION: INCREASED BASE OF SUPPORT
DISTANCE (FEET): 150
ASSISTIVE DEVICE: FRONT WHEEL WALKER
GAIT LEVEL OF ASSIST: CONTACT GUARD ASSIST

## 2017-06-07 ASSESSMENT — ACTIVITIES OF DAILY LIVING (ADL): TOILETING: INDEPENDENT

## 2017-06-07 NOTE — CARE PLAN
Problem: Communication  Goal: The ability to communicate needs accurately and effectively will improve  Intervention: [place] patient and significant other/support system to call light to alert staff of needs  A/Ox4, able to make needs known       Problem: Venous Thromboembolism (VTW)/Deep Vein Thrombosis (DVT) Prevention:  Goal: Patient will participate in Venous Thrombosis (VTE)/Deep Vein Thrombosis (DVT)Prevention Measures  Intervention: Assess and monitor for anticoagulation complications  No complications noted from heparin injections       Problem: Pain  Goal: Alleviation of Pain or a reduction in pain to the patient’s comfort goal  Intervention: Pain Management--Medications  Complained of headache, medicated per MAR with good results

## 2017-06-07 NOTE — PROGRESS NOTES
Monitor Summary: SB-SR 58-71, IN .20, QRS .08, QT .42 with rare PVC and rare couplet per strip from monitor room

## 2017-06-07 NOTE — PROGRESS NOTES
Consent for contrast signed, in chart. Patient cannot take metformin for 48 hours after CAT scan with contrast.

## 2017-06-07 NOTE — DISCHARGE INSTRUCTIONS
Discharge Instructions        Activity: As tolerated.   Diet: cardiac   Followup:   -PCP 1 week   -Home health for continued PT/OT   Instructions:   -Given instructions to return to the ER if any new or worsening symptoms, worsening condition, or failure to improve   -Call PCP for followup   -No smoking, no alcohol, no caffeine   -Encourage risk factor reduction with tobacco and alcohol abstinence, diet changes, weight loss, and exercise.    Discharged to home by car with relative. Discharged via walking, hospital escort: Refused.  Special equipment needed: Not Applicable    Be sure to schedule a follow-up appointment with your primary care doctor or any specialists as instructed.     Discharge Plan:   Influenza Vaccine Indication: Not indicated: Previously immunized this influenza season and > 8 years of age    I understand that a diet low in cholesterol, fat, and sodium is recommended for good health. Unless I have been given specific instructions below for another diet, I accept this instruction as my diet prescription.   Other diet: Diabetic diet    Special Instructions:     Stroke/CVA/TIA/Hemorrhagic Ischemia Discharge Instructions  You have had a stroke. Your risk factors have been identified as follows:  Age - Over 55  High blood pressure  Diabetes  It is important that you reduce your risk factors to avoid another stroke in the future. Here are some general guidelines to follow:  · Eat healthy - avoid food high in fat.  · Get regular exercise.  · Maintain a healthy weight.  · Avoid smoking.  · Avoid alcohol and illegal drug use.  · Take your medications as directed.  For more information regarding risk factors, refer to pages 17-19 in your Stroke Patient Education Guide. Stroke Education Guide was given to patient.    Warning signs of a stroke include (which can also be found on page 3 of your Stroke Patient Education Guide):  · Sudden numbness of weakness of the face, arm or leg (especially on one side of  the body).  · Sudden confusion, trouble speaking or understanding.  · Sudden trouble seeing in one or both eyes.  · Sudden trouble walking, dizziness, loss of balance or coordination.  · Sudden severe headache with no known cause.  It is very important to get treatment quickly when a stroke occurs. If you experience any of the above warning signs, call 911 immediately.     Some patients who have had a stroke will be going home on a blood thinner medication called Warfarin (Coumadin).  This medication requires very close monitoring and follow up.  This follow up can be provided by either your Primary Care Physician or by [facility]'s Outpatient Anticoagulation Service.  The Outpatient Anticoagulation Service is located at the [place] [facility] at [facility]  If you do not know when your follow up appointment is scheduled, call [phone] to verify your appointment time.      · Is patient discharged on Warfarin / Coumadin?   No     · Is patient Post Blood Transfusion?  No

## 2017-06-07 NOTE — FACE TO FACE
Face to Face Supporting Documentation - Home Health    The encounter with this patient was in whole or in part the primary reason for home health admission.    Date of encounter:   Patient:                    MRN:                       YOB: 2015  Jessica Plaza  [id number]  1940     Home health to see patient for:  Skilled Nursing care for assessment, interventions & education, Physical Therapy evaluation and treatment and Occupational therapy evaluation and treatment    Skilled need for:  New Onset Medical Diagnosis numbness and tingling, weakness of right upper and lower extremity    Skilled nursing interventions to include:  Comment: assessment and education    Homebound status evidenced by:  Need the aid of supportive devices such as crutches, canes, wheelchairs or walkers. Leaving home requires a considerable and taxing effort. There is a normal inability to leave the home.    Community Physician to provide follow up care: Anant Hills M.D.     Optional Interventions? No      I certify the face to face encounter for this home health care referral meets the CMS requirements and the encounter/clinical assessment with the patient was, in whole, or in part, for the medical condition(s) listed above, which is the primary reason for home health care. Based on my clinical findings: the service(s) are medically necessary, support the need for home health care, and the homebound criteria are met.  I certify that this patient has had a face to face encounter by myself.  ALEK Zuñiga - NPI: [id number]

## 2017-06-07 NOTE — THERAPY
"Occupational Therapy Evaluation completed.   Functional Status:  spv w/bed mobility, spv w/sit>stand c/o dizziness w/any changes in position, c/o blurred vision in R eye that is constant, SBA w/LB dressing (donned shoes), SBA walking w/fww to bathroom, CBA w/grooming standing at sink ~5 min c/o increasing fatigue in RLE, declined to stay up in chair, refused bed alarm; during assessment of UE coordination, pt demonstrated delay w/finger opposition and rapid alternation, pt was unable to fully complete finger to nose d/t impaired ROM in BUE at baseline, pt reports hx of total shoulder repair of LUE w/ongoing pain, pt appears to have impaired sensation of RUE hand to light touch and pressure, pt also presented w/occ word finding difficulty and memory (per pt report). Concern for high fall risk and limited ability to complete self care tasks safely and independently pt reports she has a supportive spouse who currently works full time. At present pt may benefit from post acute prior to d/c home    Plan of Care: Will benefit from Occupational Therapy 5 times per week  Discharge Recommendations:  Equipment: Will Continue to Assess for Equipment Needs. Post-acute therapy Discharge after home w/HH      77 yr old female admitted d/t R hand numbness and RUE/LE weakness, CT does indicate evidence of previous MI's per MD notes, pt has a hx of hypothyroidism, alcohol use, DM-2, hypertension, and dyslipidemia, pt currently presents w/decreased sensation to R-side body, c/o blurred vision, decreased activity tolerance and strength and coordination w/RUE, all impacting pts ability to complete basic ADL's and mobility, pt reports having supportive spouse who works during the day of which pt is normally home alone, concern for high fall risk and injury currently recommend home w/spv and HH     See \"Rehab Therapy-Acute\" Patient Summary Report for complete documentation.    "

## 2017-06-07 NOTE — THERAPY
Physical Therapy Contact Note    PT chevy attempted x 3, getting US and ECHO; will attempt back when able;    Ania Camara, PT, DPT Pager: [phone]

## 2017-06-07 NOTE — PROGRESS NOTES
Assumed care of pt. Tele monitor in place. A/Ox4. Dysphagia screening passed, diet ordered. Bed alarm in place. Call light in reach, bed in low position, will continue hourly rounding.

## 2017-06-07 NOTE — PROGRESS NOTES
Pt refused bed alarm, despite education and risks. Steady gait. Instructed to call for assistance.

## 2017-06-07 NOTE — DISCHARGE PLANNING
Received order for Home Health. Met with the pt at the bedside to discuss Home Health choices. Per pt her  is at home with her and she feels that she does not want or require any other assistance. Pt currently refusing HH.

## 2017-06-07 NOTE — DIETARY
Nutrition Services: WT loss consult received. Attempted interview, staff in working with pt and pt unavailable. Diet= Diabetic. Per MD notes: pt with right-sided weakness and hemianesthesia, presentation is most consistent  with acute CVA with hx of possible ETOH abuse, Type II DM, COPD, GERD, HTN, Dyslipidemia, and GIB.     Pertinent Labs: am Glu 207, FSBS: 153-183 (6/6-6/7). No HbA1c to assess.   Pertinent Meds: SSI, Metformin, Prilosec, Zofran PRN, Bowel protocol.   Wt is 79.6 kg/ 175 lbs and BMI of 27.48.   Skin: No breakdown documented.     Plan/Rec:  Nutrition Representative to see pt daily for snacks/meal preferences as appropriate with diet order. Adjust insulin PRN for tight glucose control. RD to monitor PO intake per department policy and follow for interview PRN.

## 2017-06-07 NOTE — CARE PLAN
"Problem: Emergency Care of the Stroke Patient  Goal: Nutrition  Outcome: PROGRESSING AS EXPECTED  Pt passed swallow eval and placed on diet    Dysphagia Screening Tool  Identifying Dysphagia: Admitted with Neurological Event (Stroke, TIA, Seizure, Dementia, TBI, Spinal Cord Injury)  Maintains Adequate Arousal & Attention for Feeding? : Yes  Has Slurred Speech, Absent Speech, or Facial Weakness? : No  Able to Manage Oral Secretions: Yes  Check for Strength of Cough: Strong  Give 5mL of Tap Water 3x Via Spoon. Ask Patient to Say \"Aahh\" After Each Swallow: No Problems Detected  Give 90 mls of Water; Instruct Patient to Take Consecutive Swallows: No Problems Detected  Passed Dysphagia Screening, Do the following:: Passed Screening: Notify MD of Screening Results and Obtain Diet Order        Problem: Elimination  Goal: Regular urinary elimination  Outcome: PROGRESSING AS EXPECTED  Pt able to void per BR        "

## 2017-06-07 NOTE — PROGRESS NOTES
Pt AAOx4, TALAMANTES. PT c/o of right sided numbness and tingling. Some weakness in right leg.  equal bilateral. Ambulates with SBA. Tele monitor in use. Call light in reach.

## 2017-06-07 NOTE — PROGRESS NOTES
Assumed care of pt. Able to make needs known. A/Ox4. Diabetic diet, accu checks ACHS. Tele monitor in place. PIV in left AC 20g, saline locked and flushing well. Some numbness on right side, right side weaker then left. Cannot hold right leg for more than 4 seconds. Right side  is weaker. ECHO, carotid, PT/OT evals pending. Call light in reach, bed in low position, will continue hourly rounding.

## 2017-06-07 NOTE — DISCHARGE SUMMARY
"Hospital Medicine Discharge Note     Patient ID:  Jessica Plaza  [id number]  77 y.o.female  1940    Admit Date:  6/6/2017       Discharge Date:   6/7/2017    Primary Care Provider: Anant Hills M.D.    Admitting Physician: Bossman Darden D.O.  Discharging Physician: Kassandra Rabago MD    Chief Complaint: Right upper and lower extremity numbness    Discharge Diagnoses:     Right upper and lower extremity numbness    Hypomagnesemia- resolved    Chronic Medical Problems:  Past Medical History   Diagnosis Date   • High cholesterol    • Hypertension    • GERD (gastroesophageal reflux disease)    • Castro esophagus    • UTI (urinary tract infection)    • COPD    • Bronchitis    • Arthritis    • Anemia    • Indigestion    • Dental disorder      dentures upper and lower   • Stroke (CMS-HCC) 2013   • Disorder of thyroid    • Heart burn    • Pain      left shoulder   • COPD (chronic obstructive pulmonary disease) (CMS-HCC)    • Emphysema of lung (CMS-HCC)    • Diabetes      oral meds   • Pneumonia 2013.   • Hemorrhagic disorder      hx internal bleeding        Code Status: Full Code    Hospital Summary:       Please refer to H&P by Dr Marinelli on 6/6/2017 for full details.      In brief, Jessica Plaza is a 77 y.o. female who was admitted 6/6/2017 for right hand, and right leg weakness and numbness for one week. She feels that her right leg is weaker than her left. She was seen and evaluated in the ER, and placed under observation status for a CVA workup.    The patient underwent workup as below. She has had a benign workup, with a negative MRI. We have discussed that the etiology of her symptoms is unclear, however given her continued numbness of one weeks duration a TIA would be unlikely. She denied difficulty with speech, swallow, facial droop, ambulation, or loss of function of extremities. She does feel as though her right foot occasionally is \"lazy\" and \"gives out on her\". She was " evaluted by PT/OT who has recommended placement prior to returning home for continued PT and OT therapies which she has refused. She was then offered home health for PT/OT but refused this as well. She does not wish for PT/OT services OP. She has been recommended to use a walker at home, she states her  has one and she does not want us to provide one. She is on ASA and statin therapy for home regimen, which will be continued for discharge for CVA prevention.     Upon examination she is non-focal. Her strength is equal bilaterally. Her sensation is reportedly intact to her upper and lower extremities. She does have continued complaints of numbness and tingling of her right upper and lower extremities. We have discussed that she may need further studies by her PCP such as nerve conduction studies. We have discussed that she may be experiencing neuropathy from her diabetes. She should see her PCP for further evaluation.    Therefore, she is discharged in good and stable condition with close outpatient follow-up.    Consultants:      None    Studies:    Imaging/ Testing:      MR-BRAIN-W/O   Final Result      1.  No evidence of acute territorial infarct, intracranial hemorrhage or mass lesion.   2.  Mild diffuse cerebral and cerebellar substance loss.   3.  Mild microangiopathic ischemic change versus demyelination or gliosis.      CT-CTA HEAD WITH & W/O-POST PROCESS   Final Result      1.  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   2.  No focal stenosis or abrupt vessel cut off.   3.  No evidence for intracranial hemorrhage.      CT-HEAD W/O   Final Result      No acute intracranial abnormality is identified.      Focal hypodensities in the basal ganglia on the right are likely related to prior lacunar infarcts or prominent Virchow-José Antonio spaces.      MRI would be more sensitive for further evaluation.      Echocardiogram Comp W/O cont         CONCLUSIONS  Normal left ventricular systolic function.  Left ventricular ejection fraction is visually estimated to be 65%.  Normal diastolic function.  Normal right ventricular systolic function.  No significant valve disease or flow abnormalities.      CAROTID DUPLEX         Right carotid.    Plaque of the bifurcation extending into the internal carotid. Velocities    are consistent with 50-69% stenosis of the internal carotid artery.    External carotid stenosis with elevated velocities consistent with stenosis    > 50%.      Left carotid.    Plaque of the bifurcation extending into the internal carotid. Velocities    are consistent with < 50% stenosis of the internal carotid artery.      Bilateral subclavian and vertebral artery waveforms are antegrade and    waveforms are normal in character and velocity.           Laboratory:   Recent Labs      06/06/17   1512   WBC  8.0   RBC  4.82   HEMOGLOBIN  15.7   HEMATOCRIT  46.6   MCV  96.7   MCH  32.6   MCHC  33.7   RDW  46.9   PLATELETCT  184   MPV  10.9       Recent Labs      06/06/17   1512  06/07/17   0300   SODIUM  135  136   POTASSIUM  4.1  3.6   CHLORIDE  102  105   CO2  24  24   GLUCOSE  97  207*   BUN  16  20   CREATININE  0.86  1.10   CALCIUM  9.8  8.5       Recent Labs      06/06/17   1512  06/07/17   0300   ALTSGPT  15   --    ASTSGOT  17   --    ALKPHOSPHAT  50   --    TBILIRUBIN  0.9   --    GLUCOSE  97  207*              Recent Labs      06/07/17   0300   TRIGLYCERIDE  146   HDL  53   LDL  53       Recent Labs      06/06/17   1512   TROPONINI  0.01       Recent Labs      06/06/17   1512   TSHULTRASEN  1.680     Mg 2.7    Procedures/Surgeries:        None    Disposition:  Discharge home    Condition:  Stable    Instructions:   Activity: As tolerated.  Diet: cardiac  Followup:   -PCP 1 week  Instructions:  -Given instructions to return to the ER if any new or worsening symptoms, worsening condition, or failure to improve  -Call PCP for followup  -No  smoking, no alcohol, no caffeine  -Encourage risk factor reduction with tobacco and alcohol abstinence, diet changes, weight loss, and exercise.       Discharge Medications:           Medication List      CONTINUE taking these medications       Instructions    albuterol 108 (90 BASE) MCG/ACT Aers inhalation aerosol    Inhale 1 Puff by mouth every 6 hours as needed for Shortness of Breath.   Dose:  1 Puff       aspirin 81 MG Chew chewable tablet   Commonly known as:  ASA    Take 81 mg by mouth every day.   Dose:  81 mg       levothyroxine 75 MCG Tabs   Last time this was given:  75 mcg on 6/7/2017  6:15 AM   Commonly known as:  SYNTHROID    Take 1 Tab by mouth Every morning on an empty stomach.   Dose:  75 mcg       liraglutide 18 MG/3ML Sopn injection   Commonly known as:  VICTOZA    Inject 0.1 mL as instructed every day.   Dose:  0.6 mg       lisinopril 10 MG Tabs   Commonly known as:  PRINIVIL    Take 1 Tab by mouth every day.   Dose:  10 mg       metformin 1000 MG tablet   Commonly known as:  GLUCOPHAGE    TAKE 1 TABLET BY MOUTH 2 TIMES A DAY, WITH MEALS.       omeprazole 40 MG delayed-release capsule   Last time this was given:  40 mg on 6/7/2017  7:52 AM   Commonly known as:  PRILOSEC    Take 40 mg by mouth every day.   Dose:  40 mg       RESTASIS 0.05 % ophthalmic emulsion   Generic drug:  cyclosporin    Place 1 Drop in both eyes 2 times a day.   Dose:  1 Drop       simvastatin 40 MG Tabs   Last time this was given:  40 mg on 6/6/2017  8:14 PM   Commonly known as:  ZOCOR    Take 1 Tab by mouth every day.   Dose:  40 mg       trazodone 50 MG Tabs   Last time this was given:  50 mg on 6/6/2017 10:22 PM   Commonly known as:  DESYREL    Take 1 Tab by mouth every bedtime.   Dose:  50 mg           Please CC the above physicians    SANDOVAL [email or web address]  6/7/2017  1:30 PM

## 2017-06-07 NOTE — H&P
"CHIEF COMPLAINT:  Right hand, leg and foot weakness and numbness.    HISTORY OF PRESENT ILLNESS:  This is a 77-year-old female who reports symptoms   that began about 4 days ago.  She states that her hand, leg and foot seemed   \"numb.\"  She also noted some weakness, and her leg has given out on a few   occasions, which has actually gone.  She reports she did not come in 4 days   ago because at that time she thought it was because she had been taken off of   her Xanax.  She has been on Xanax for quite some time multiple years and has   been taking it at bedtime.  Her new primary care doctor felt this was not a   good idea and had stopped it.  She denies any chest pain, any headache, any   nausea or vomiting and has otherwise been feeling in her normal state of   health.  Her  does note that she seems to have difficulty finding words   on occasion, and this is unusual for her.    REVIEW OF SYSTEMS:  Positive as noted above.  Patient also notes problems with   insomnia ever after having stopped her Xanax.    PREVIOUS MEDICAL HISTORY:  1.  Dyslipidemia.  2.  Hypertension.  3.  GERD.  4.  COPD.  5.  Type 2 diabetes.  6.  Hypothyroidism.  7.  Distant history of peptic ulcer disease with GI bleed.  Patient reports   she had an EGD, which was negative.    MEDICATIONS:  1.  P.r.n. albuterol.  2.  Aspirin 81 mg p.o. daily.  3.  Restasis.  4.  Synthroid 75 mcg p.o. daily.  5.  Victoza 0.1 mL as instructed injected in the a.m.  6.  Lisinopril 10 mg p.o. daily.  7.  Metformin 1 g p.o. b.i.d.  8.  Omeprazole 40 mg p.o. daily.  9.  Simvastatin 40 mg p.o. daily.  10.  Trazodone 50 mg at bedtime.    ALLERGIES:  1.  BACTRIM.  2.  DEMEROL.  3.  PENICILLIN.  4.  SULFA.  5.  FERROUS SULFATE.  6.  LATEX.    SOCIAL HISTORY:  Patient quit smoking in 2013.  She has approximately   30-pack-year exposure.  She does admit to drinking alcohol, she reports 1-2   per day.    PAST SURGICAL HISTORY:  1.  EGD.  2.  Clavicle resection.  3.  " Chondroplasty.  4.  Shoulder decompression.  5.   section.  6.  Tonsillectomy and adenoidectomy.    FAMILY HISTORY:  Reviewed but not relevant to presentation.    PHYSICAL EXAMINATION:  VITAL SIGNS:  Temperature 37.3, heart rate 69, respiratory rate 19, /69,   satting in the mid 90s.  GENERAL:  The patient is awake and alert.  She is in no acute distress.  HEENT:  Head is normocephalic and atraumatic.  Mucous membranes are moist.    Sclerae and conjunctivae are benign.  NECK:  Trachea is in the midline.  No JVD.  No bruits.  RESPIRATORY:  Clear to auscultation bilaterally with faintly audible wheezing.  CARDIAC:  Regular rate and rhythm.  No murmurs, rubs or clicks.  ABDOMEN:  Soft.  No guarding or rebound, hepatosplenomegaly or masses.    Nontender to palpation.  SKIN:  Warm and dry.  No rashes are appreciated.  NEUROLOGIC:  Patient is alert and oriented.  Her speech is clear and content   is logical.  There is no evidence of dysarthria, word finding difficulties or   comprehensive difficulties during my examination.  Sensation is subjectively   decreased on the right lower extremity, however, grossly present to light   touch.  Strength appears symmetric bilaterally in the upper and lower   extremities.  Cranial nerves II through XII are intact.  PSYCHIATRIC:  Mood and affect are appropriate.  Hygiene neat and clean.    LABORATORY DATA:  White count 8.0, hemoglobin 15.7, platelet count 184.    Sodium 135, potassium 4.1, BUN 16, creatinine 0.86.  LFTs are within normal   limits.  Mag is 1.5.  Troponin I 0.01.  INR 0.92.  TSH 1.680.    IMAGING STUDIES:  CT of the head done tonight without contrast shows some   hypodensity in the basal ganglia on the right, likely related to prior lacunar   infarct and a prominent Virchow-José Antonio spaces noted.    ASSESSMENT AND PLAN:  This is a 77-year-old female who presents with problem   list as follows:  1.  Right-sided weakness and hemianesthesia:  Presentation is  most consistent   with acute CVA.  CT tonight demonstrates evidence of previous MIs.  We will   admit the patient to neurologic unit, we will complete her workup with an MRI   of the brain as well as CTA of the head.  We will check carotid ultrasounds   and cardiac echo.  We will continue to monitor on telemetry.  She does not   have any evidence of dysarthria or cranial nerve impact, so we will allow her   to eat.  We will consult PT and OT.  She is maintained on baby aspirin, we   will progress this to full strength aspirin.  2.  Question of excessive alcohol intake:  Patient admits to 1-2 a day.  Watch   for signs of withdrawal.  3.  Hypothyroidism:  TSH is within normal limits.  Continue replacement.  4.  Type 2 diabetes:  Continue outpatient regimen, check A1c and cover with   sliding scale, make adjustments as needed.  5.  Chronic obstructive pulmonary disease:  Placed on O2 and respiratory   protocols.  6.  History of gastroesophageal reflux disease:  Continue PPI.  7.  Hypertension:  We will allow some degree of permissive hypertension while   we complete her stroke workup.  Titrate medications as needed.  8.  Dyslipidemia:  Continue statin, check a lipid panel, likely will need to   be discharged on high dose statin.  9.  Distant history of gastrointestinal bleed:  We will need to keep this in   mind in the presence of her aspirin; however, she has tolerated baby aspirin   without any issues.       ____________________________________     DO OLENA Vega / JANEY    DD:  06/06/2017 21:28:38  DT:  06/06/2017 22:57:18    D#:  [id number]  Job#:  [id number]

## 2017-06-08 ENCOUNTER — PATIENT OUTREACH (OUTPATIENT)
Dept: HEALTH INFORMATION MANAGEMENT | Facility: OTHER | Age: 77
End: 2017-06-08

## 2017-06-08 NOTE — PROGRESS NOTES
Stephani Aleman notified of ECHO and Carotid results, also notified about home health refusal; ok for discharge. Pt received instructions, went home with spouse.

## 2017-06-08 NOTE — THERAPY
"Physical Therapy Evaluation completed.   Bed Mobility:  Supine to Sit: Supervised (raised HOB/railing)  Transfers: Sit to Stand: Stand by Assist  Gait: Level Of Assist: Contact Guard Assist with Front-Wheel Walker       Plan of Care: Will benefit from Physical Therapy 5 times per week  Discharge Recommendations: Equipment: No Equipment Needed, questionable, reports having a FWW 'in the rafters'  Pt presents with impaired dynamic balance, safety awarness and right LE motor control associated with current medical status. Pt is most limited by dynamic balance and refusal to use a FWW to correct deficits. Poor internalization of fall risk but also quite worried that her symptoms will 'happen again' though appears they have not resolved. reporting right LE/UE sensation deficits/motor control issues but none noted until gait. Odd affect and ability to internalize education. Anticipate will remain a fall risk in setting of non adherence to advice of use of a walker and refusal of home health. Will continue to follow while in house; would benefit from placement prior to return home, however anticipate personality to be greatest risk for readmission and will not be amenable to placement. Observation status a further barrier to placement needs.   See \"Rehab Therapy-Acute\" Patient Summary Report for complete documentation.     "

## 2017-06-08 NOTE — PROGRESS NOTES
Monitor summary: SR 62-78, OR 0.20, QRS 0.08, QT 0.44, with rare PVCs U waves, per strip from monitor room.

## 2017-06-15 ENCOUNTER — PATIENT MESSAGE (OUTPATIENT)
Dept: MEDICAL GROUP | Facility: MEDICAL CENTER | Age: 77
End: 2017-06-15

## 2017-06-15 ENCOUNTER — TELEPHONE (OUTPATIENT)
Dept: MEDICAL GROUP | Facility: MEDICAL CENTER | Age: 77
End: 2017-06-15

## 2017-06-15 DIAGNOSIS — F51.04 CHRONIC INSOMNIA: ICD-10-CM

## 2017-06-15 RX ORDER — MIRTAZAPINE 15 MG/1
15 TABLET, FILM COATED ORAL
Qty: 30 TAB | Refills: 3 | Status: SHIPPED | OUTPATIENT
Start: 2017-06-15 | End: 2017-06-20

## 2017-06-15 NOTE — TELEPHONE ENCOUNTER
Future Appointments       Provider Department Center    6/20/2017 2:00 PM HUMERA Roth [facility] [address]        ESTABLISHED PATIENT PRE-VISIT PLANNING     Note: Patient will not be contacted if there is no indication to call.     1.  Reviewed note from last office visit with PCP and/or other med group provider: Yes    2.  If any orders were placed at last visit, do we have Results/Consult Notes?        •  Labs - Labs were not ordered at last office visit.       •  Imaging - Imaging was not ordered at last office visit.       •  Referrals - No referrals were ordered at last office visit.    3.  Immunizations were updated in [employer] using WebIZ?: Yes       •  Web Iz Recommendations: HEPATITIS A  TDAP ZOSTAVAX (Shingles)    4.  Patient is due for the following Health Maintenance Topics:   Health Maintenance Due   Topic Date Due   • IMM DTaP/Tdap/Td Vaccine (1 - Tdap) 05/15/1959   • IMM ZOSTER VACCINE  05/15/2002   • BONE DENSITY  05/15/2007   • PFT SCREENING-FEV1 AND FEV/FVC RATIO / SPIROMETRY SHOULD BE PERFORMED ANNUALLY  03/07/2014   • MAMMOGRAM  07/28/2016   • Annual Wellness Visit  07/28/2016   • RETINAL SCREENING  04/13/2017           5.  Patient was not informed to arrive 15 min prior to their scheduled appointment and bring in their medication bottles.

## 2017-06-15 NOTE — TELEPHONE ENCOUNTER
From: Jessica Plaza  To: Anant Hills M.D.  Sent: 6/15/2017 10:19 AM PDT  Subject: Prescription Question    Dr Herrera, hopefully there is some other sleep medication i can take i do believe Trazodone 50 is causing an allergic reaction for me and i am not sleeping more than 4 hours at night. I was in [facility] for what i thought was another TIA on 6-6 6-7 seems it was probably not, when i came home i looked on line and all the symptoms for an allergic reaction to Trazodone were there. I am also having a hard time breathing. Please tell me what i should do?  Thank You  Jessica Plaza

## 2017-06-15 NOTE — TELEPHONE ENCOUNTER
1. Caller Name: Jessica Plaza                                           Call Back Number: [phone] (home)         Patient approves a detailed voicemail message: N\A    I called and spoke to the patient because I saw in her My Chart email she stated that it was hard to breath, Patient said she felt like she had an allergic reaction to the medication Trazodone,  I asked the patient if she would like to make an appointment to be seen and suggested that the patient that if she did not want to be seen here at our office she could go to [facility]  or to the ER but the patient declined and stated that she was not struggling to breath that she believes that it is more like allergies and wanted to know what else she could take ? Please advise

## 2017-06-15 NOTE — TELEPHONE ENCOUNTER
I already communicate with the patient, I changed trazodone, to Mirtazapine. Currently she is asymptomatic.

## 2017-06-20 ENCOUNTER — OFFICE VISIT (OUTPATIENT)
Dept: MEDICAL GROUP | Facility: MEDICAL CENTER | Age: 77
End: 2017-06-20
Payer: MEDICARE

## 2017-06-20 VITALS
BODY MASS INDEX: 26.37 KG/M2 | WEIGHT: 168 LBS | DIASTOLIC BLOOD PRESSURE: 72 MMHG | SYSTOLIC BLOOD PRESSURE: 146 MMHG | HEART RATE: 89 BPM | RESPIRATION RATE: 16 BRPM | HEIGHT: 67 IN | OXYGEN SATURATION: 95 %

## 2017-06-20 DIAGNOSIS — R20.2 PARESTHESIA: ICD-10-CM

## 2017-06-20 DIAGNOSIS — F41.9 ANXIETY: ICD-10-CM

## 2017-06-20 DIAGNOSIS — F13.939 WITHDRAWAL FROM SEDATIVE, HYPNOTIC, OR ANXIOLYTIC DRUG (HCC): ICD-10-CM

## 2017-06-20 PROCEDURE — 99214 OFFICE O/P EST MOD 30 MIN: CPT | Performed by: NURSE PRACTITIONER

## 2017-06-20 RX ORDER — TRAZODONE HYDROCHLORIDE 50 MG/1
50 TABLET ORAL
Qty: 30 TAB | Refills: 3 | Status: SHIPPED | OUTPATIENT
Start: 2017-06-20 | End: 2018-05-21

## 2017-06-20 RX ORDER — ALPRAZOLAM 0.25 MG/1
0.25 TABLET ORAL NIGHTLY PRN
Qty: 30 TAB | Refills: 1 | Status: SHIPPED | OUTPATIENT
Start: 2017-06-20 | End: 2017-09-11 | Stop reason: SDUPTHER

## 2017-06-20 ASSESSMENT — ENCOUNTER SYMPTOMS
TINGLING: 1
NERVOUS/ANXIOUS: 1
INSOMNIA: 1

## 2017-06-20 NOTE — MR AVS SNAPSHOT
"Jessica Plaza   2017 2:00 PM   Office Visit   MRN: [id number]    Department:  [address] [place] [address]   Dept Phone:  [phone]    Description:  Female : 1940   Provider:  HUMERA Roth           Reason for Visit     Medication Management shaking and itching from RX that she is trying to wing off of       Allergies as of 2017     Allergen Noted Reactions    Bactrim 2013   Hives    Demerol 2013   Shortness of Breath, Swelling    Pcn [Penicillins] 2013   Hives    Sulfa Drugs 2013   Hives    Ferrous Sulfate 2014   Rash    itching    Latex 2013   Contact Dermatitis      You were diagnosed with     Anxiety   [996709]       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, hypnotic, or anxiolytic drug   [5469246]       Paresthesia   [781971]         Vital Signs     Blood Pressure Pulse Respirations Height Weight Body Mass Index    146/72 mmHg 89 16 1.702 m (5' 7\") 76.204 kg (168 lb) 26.31 kg/m2    Oxygen Saturation Smoking Status                95% Former Smoker          Basic Information     Date Of Birth Sex Race Ethnicity Preferred Language    1940 Female White Non- English      Problem List              ICD-10-CM Priority Class Noted - Resolved    History of CVA (cerebrovascular accident) Z86.73   2013 - Present    GI bleed K92.2   2014 - Present    Type 2 diabetes mellitus (CMS-HCC) (Chronic) E11.9   2014 - Present    HTN (hypertension) (Chronic) I10   2014 - Present    COPD (chronic obstructive pulmonary disease) (CMS-HCC) (Chronic) J44.9   2014 - Present    Dyslipidemia (Chronic) E78.5   2014 - Present    Exertional dyspnea R06.09   2014 - Present    Fatigue R53.83   2015 - Present    Lung nodules R91.8   2015 - Present    Hypothyroidism due to acquired atrophy of thyroid E03.4   2015 - Present    Left shoulder pain M25.512   2016 - Present    Primary insomnia F51.01   2016 - Present    Castro's " esophagus without dysplasia K22.70   8/31/2016 - Present    Iron deficiency anemia, unspecified D50.9   9/14/2016 - Present      Health Maintenance        Date Due Completion Dates    IMM DTaP/Tdap/Td Vaccine (1 - Tdap) 5/15/1961 ---    IMM ZOSTER VACCINE 5/15/2002 ---    BONE DENSITY 5/15/2007 ---    MAMMOGRAM 7/28/2016 7/28/2015 (Declined), 5/2/2011, 5/2/2011 (N/S)    Override on 7/28/2015: Patient Declined    Override on 5/2/2011: (N/S)    RETINAL SCREENING 4/13/2017 4/13/2016, 5/21/2015 (Done), 4/1/2015 (Prv Comp)    Override on 5/21/2015: Done    Override on 4/1/2015: Previously completed    URINE ACR / MICROALBUMIN 7/26/2017 7/26/2016, 7/24/2015, 4/3/2013 (Prv Comp)    Override on 4/3/2013: Previously completed    A1C SCREENING 12/6/2017 6/6/2017, 12/5/2016, 7/26/2016, 3/22/2016, 11/18/2015, 11/18/2015, 7/24/2015, 3/27/2015, 3/27/2015 (N/S), 1/13/2014, 6/1/2013, 6/1/2013    Override on 3/27/2015: (N/S)    DIABETES MONOFILAMENT / LE EXAM 3/6/2018 3/6/2017, 7/28/2015 (Done), 4/3/2014 (Prv Comp)    Override on 7/28/2015: Done    Override on 4/3/2014: Previously completed    FASTING LIPID PROFILE 6/7/2018 6/7/2017, 3/14/2017, 7/26/2016, 3/22/2016, 11/18/2015, 11/18/2015, 7/24/2015, 3/27/2015, 1/13/2014, 6/2/2013    SERUM CREATININE 6/7/2018 6/7/2017, 6/6/2017, 10/6/2016, 7/26/2016, 3/22/2016, 2/12/2016, 11/18/2015, 11/18/2015, 7/24/2015, 3/27/2015, 1/22/2015, 4/29/2014, 1/13/2014, 6/3/2013, 6/1/2013, 2/14/2013    COLONOSCOPY 8/2/2024 8/2/2014 (Prv Comp)    Override on 8/2/2014: Previously completed            Current Immunizations     13-VALENT PCV PREVNAR 5/1/2017    Influenza Vaccine Adult HD 10/31/2016, 9/4/2015    Influenza Vaccine Quad Inj (Pf) 9/24/2013, 9/21/2012, 9/28/2011    Pneumococcal polysaccharide vaccine (PPSV-23) 1/14/2014  3:48 PM, 6/1/2009      Below and/or attached are the medications your provider expects you to take. Review all of your home medications and newly ordered medications with your  provider and/or pharmacist. Follow medication instructions as directed by your provider and/or pharmacist. Please keep your medication list with you and share with your provider. Update the information when medications are discontinued, doses are changed, or new medications (including over-the-counter products) are added; and carry medication information at all times in the event of emergency situations     Allergies:  BACTRIM - Hives     DEMEROL - Shortness of Breath,Swelling     PCN - Hives     SULFA DRUGS - Hives     FERROUS SULFATE - Rash     LATEX - Contact Dermatitis               Medications  Valid as of: June 20, 2017 -  2:32 PM    Generic Name Brand Name Tablet Size Instructions for use    Albuterol Sulfate (Aero Soln) albuterol 108 (90 BASE) MCG/ACT Inhale 1 Puff by mouth every 6 hours as needed for Shortness of Breath.        ALPRAZolam (Tab) XANAX 0.25 MG Take 1 Tab by mouth at bedtime as needed for Sleep.        Aspirin (Chew Tab) ASA 81 MG Take 81 mg by mouth every day.        CycloSPORINE (Emulsion) RESTASIS 0.05 % Place 1 Drop in both eyes 2 times a day.        Levothyroxine Sodium (Tab) SYNTHROID 75 MCG Take 1 Tab by mouth Every morning on an empty stomach.        Liraglutide (Solution Pen-injector) VICTOZA 18 MG/3ML Inject 0.1 mL as instructed every day.        Lisinopril (Tab) PRINIVIL 10 MG Take 1 Tab by mouth every day.        MetFORMIN HCl (Tab) GLUCOPHAGE 1000 MG TAKE 1 TABLET BY MOUTH 2 TIMES A DAY, WITH MEALS.        Omeprazole (CAPSULE DELAYED RELEASE) PRILOSEC 40 MG Take 40 mg by mouth every day.        Simvastatin (Tab) ZOCOR 40 MG Take 1 Tab by mouth every day.        TraZODone HCl (Tab) DESYREL 50 MG Take 1 Tab by mouth every bedtime.        .                 Medicines prescribed today were sent to:     [facility] #[id number] - [place] - [address]    [address] [address]    Phone: [phone] Fax: [phone]    Open 24 Hours?: No      Medication refill  instructions:       If your prescription bottle indicates you have medication refills left, it is not necessary to call your provider’s office. Please contact your pharmacy and they will refill your medication.    If your prescription bottle indicates you do not have any refills left, you may request refills at any time through one of the following ways: The online Bycler system (except Urgent Care), by calling your provider’s office, or by asking your pharmacy to contact your provider’s office with a refill request. Medication refills are processed only during regular business hours and may not be available until the next business day. Your provider may request additional information or to have a follow-up visit with you prior to refilling your medication.   *Please Note: Medication refills are assigned a new Rx number when refilled electronically. Your pharmacy may indicate that no refills were authorized even though a new prescription for the same medication is available at the pharmacy. Please request the medicine by name with the pharmacy before contacting your provider for a refill.           Bycler Access Code: Activation code not generated  Current Bycler Status: Active

## 2017-06-20 NOTE — PROGRESS NOTES
Subjective:      Jessica Plaza is a 77 y.o. female who presents with Medication Management            HPI Jessica Plaza is here today for anxiety and withdrawal concerns. She is also here for hospital follow-up.        1. Anxiety  Patient was seen at the emergency room on 6/6/17. She came in with complaints of numbness and weakness of the extremities. Initially was felt she might have had a CVA but workup was negative. Her symptoms were too long lasting for TIA. It was felt that her symptoms may have been related to anxiety and concern over having recently stopped prosom which she had been on for many years. She states today she is still having severe anxiety and that Seroquel did not help. Trazodone does help somewhat and she took half of a Xanax, dose unknown, of her sisters and this seemed to help with symptoms. She would like something to help her through the next few weeks.    2. 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 The medicine was appropriately stopped due to her age and long term usage. Despite weaning off the medicine, she felt it was stopped too abruptly. She agrees she does not want to go back on the medicine but would like to have something such as Xanax over the next few weeks to help her through the anxiety she is currently experiencing.    3. Paresthesia  Patient was having paresthesia complaints in the hospital and states it is still present in her feet and hands. She does have diabetes and the possibility of neuropathy was suggested. Her anxiety may also be part of this. She denies pain or burning.    Social History   Substance Use Topics   • Smoking status: Former Smoker -- 0.50 packs/day for 52 years     Types: Cigarettes     Quit date: 01/01/2013   • Smokeless tobacco: Never Used      Comment: started smoking at age 21   • Alcohol Use: 8.4 oz/week     7 Shots of liquor, 7 Standard drinks or equivalent  per week      Comment: 1/day     Current Outpatient Prescriptions   Medication Sig Dispense Refill   • trazodone (DESYREL) 50 MG Tab Take 1 Tab by mouth every bedtime. 30 Tab 3   • alprazolam (XANAX) 0.25 MG Tab Take 1 Tab by mouth at bedtime as needed for Sleep. 30 Tab 1   • cyclosporin (RESTASIS) 0.05 % ophthalmic emulsion Place 1 Drop in both eyes 2 times a day.     • lisinopril (PRINIVIL) 10 MG Tab Take 1 Tab by mouth every day. 90 Tab 1   • aspirin (ASA) 81 MG Chew Tab chewable tablet Take 81 mg by mouth every day.     • simvastatin (ZOCOR) 40 MG Tab Take 1 Tab by mouth every day. 30 Tab 0   • levothyroxine (SYNTHROID) 75 MCG Tab Take 1 Tab by mouth Every morning on an empty stomach. 90 Tab 0   • liraglutide (VICTOZA) 18 MG/3ML Solution Pen-injector injection Inject 0.1 mL as instructed every day. 6 mL 11   • albuterol 108 (90 BASE) MCG/ACT Aero Soln inhalation aerosol Inhale 1 Puff by mouth every 6 hours as needed for Shortness of Breath.     • metformin (GLUCOPHAGE) 1000 MG tablet TAKE 1 TABLET BY MOUTH 2 TIMES A DAY, WITH MEALS. 60 Tab 11   • omeprazole (PRILOSEC) 40 MG capsule Take 40 mg by mouth every day.       No current facility-administered medications for this visit.     Family History   Problem Relation Age of Onset   • Cancer Mother      breast   • Lung Disease Mother      COPD/Emphysema/Smoker   • Stroke Father    • Hypertension Father    • Hyperlipidemia Father    • Heart Attack Father    • Cancer Sister      Pancriatic   • Arrythmia Sister    • Lung Disease Sister      COPD/Emphysema/Smoker   • Bipolar disorder Son    • Bipolar disorder Son    • Bipolar disorder Son    • Cancer Maternal Aunt      stomach     Past Medical History   Diagnosis Date   • High cholesterol    • Hypertension    • GERD (gastroesophageal reflux disease)    • Castro esophagus    • UTI (urinary tract infection)    • COPD    • Bronchitis    • Arthritis    • Anemia    • Indigestion    • Dental disorder      dentures upper and  "lower   • Stroke (CMS-HCC) 2013   • Disorder of thyroid    • Heart burn    • Pain      left shoulder   • COPD (chronic obstructive pulmonary disease) (CMS-HCC)    • Emphysema of lung (CMS-HCC)    • Diabetes      oral meds   • Pneumonia 2013.   • Hemorrhagic disorder      hx internal bleeding       Review of Systems   Neurological: Positive for tingling.   Psychiatric/Behavioral: The patient is nervous/anxious and has insomnia.    All other systems reviewed and are negative.         Objective:     /72 mmHg  Pulse 89  Resp 16  Ht 1.702 m (5' 7\")  Wt 76.204 kg (168 lb)  BMI 26.31 kg/m2  SpO2 95%     Physical Exam   Constitutional: She is oriented to person, place, and time. She appears well-developed and well-nourished. No distress.   HENT:   Head: Normocephalic and atraumatic.   Right Ear: External ear normal.   Left Ear: External ear normal.   Nose: Nose normal.   Eyes: Right eye exhibits no discharge. Left eye exhibits no discharge.   Neck: Normal range of motion. Neck supple. No thyromegaly present.   Cardiovascular: Normal rate, regular rhythm and normal heart sounds.  Exam reveals no gallop and no friction rub.    No murmur heard.  Pulmonary/Chest: Effort normal and breath sounds normal. She has no wheezes. She has no rales.   Musculoskeletal: She exhibits no edema or tenderness.   Neurological: She is alert and oriented to person, place, and time. She displays normal reflexes.   Skin: Skin is warm and dry. No rash noted. She is not diaphoretic.   Psychiatric: Her behavior is normal. Judgment and thought content normal. Her mood appears anxious.   Nursing note and vitals reviewed.              Assessment/Plan:     1. Anxiety  Patient will continue with trazodone seems to be helping but she has stopped Seroquel because it was not. She would like to go on Xanax and I explained about the risk of habituation with daily use of this medicine as well. We therefore worked out a schedule where she will wean " down off the Xanax over a period of 3 weeks. She does not wish counseling. I told her to consider Prozac or Zoloft in the future.  - trazodone (DESYREL) 50 MG Tab; Take 1 Tab by mouth every bedtime.  Dispense: 30 Tab; Refill: 3  - alprazolam (XANAX) 0.25 MG Tab; Take 1 Tab by mouth at bedtime as needed for Sleep.  Dispense: 30 Tab; Refill: 1    2. 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 I explained that I would not expect her to still be having withdrawal symptoms from her ProSom having been off it for a while but she feels this is the case.    3. Paresthesia  Possibly anxiety related but I explained that if symptoms continue she should consider EMG studies which she currently does not wish to go for. She also did not want to start on gabapentin or Lyrica.

## 2017-07-07 RX ORDER — SIMVASTATIN 40 MG
40 TABLET ORAL DAILY
Qty: 30 TAB | Refills: 11 | Status: SHIPPED | OUTPATIENT
Start: 2017-07-07 | End: 2018-05-04 | Stop reason: SDUPTHER

## 2017-07-07 NOTE — TELEPHONE ENCOUNTER
From: Jessica Plaza  To: Anant Hills M.D.  Sent: 7/7/2017 2:18 PM PDT  Subject: Medication Renewal Request    Original authorizing provider: MARVA Buchanan would like a refill of the following medications:  simvastatin (ZOCOR) 40 MG Tab [Anant Hills M.D.]    Preferred pharmacy: [facility]/PHARMACY #[id number] [place], [place] - [address]    Comment:

## 2017-07-13 DIAGNOSIS — E11.9 TYPE 2 DIABETES MELLITUS WITHOUT COMPLICATION, UNSPECIFIED LONG TERM INSULIN USE STATUS: ICD-10-CM

## 2017-08-11 RX ORDER — TRAZODONE HYDROCHLORIDE 50 MG/1
TABLET ORAL
Qty: 90 TAB | Refills: 3 | Status: SHIPPED | OUTPATIENT
Start: 2017-08-11 | End: 2018-05-21

## 2017-09-02 DIAGNOSIS — E07.9 THYROID CONDITION: ICD-10-CM

## 2017-09-05 RX ORDER — LEVOTHYROXINE SODIUM 0.07 MG/1
75 TABLET ORAL
Qty: 90 TAB | Refills: 0 | Status: SHIPPED | OUTPATIENT
Start: 2017-09-05 | End: 2017-10-24 | Stop reason: SDUPTHER

## 2017-09-05 NOTE — TELEPHONE ENCOUNTER
From: Jessica Plaza  Sent: 9/2/2017 8:07 AM PDT  Subject: Medication Renewal Request    Jessica Plaza would like a refill of the following medications:  levothyroxine (SYNTHROID) 75 MCG Tab [Anant Hills M.D.]    Preferred pharmacy: [facility]/PH[facility]ACY #[id number] - [place], [place] - [address]    Comment:

## 2017-09-11 ENCOUNTER — PATIENT MESSAGE (OUTPATIENT)
Dept: MEDICAL GROUP | Facility: MEDICAL CENTER | Age: 77
End: 2017-09-11

## 2017-09-11 DIAGNOSIS — F41.9 ANXIETY: ICD-10-CM

## 2017-09-11 RX ORDER — ALPRAZOLAM 0.25 MG/1
0.25 TABLET ORAL NIGHTLY PRN
Qty: 30 TAB | Refills: 0 | Status: SHIPPED
Start: 2017-09-11 | End: 2017-10-17 | Stop reason: SDUPTHER

## 2017-09-11 NOTE — TELEPHONE ENCOUNTER
From: Jessica Plaza  To: Anant Hills M.D.  Sent: 9/11/2017 10:59 AM PDT  Subject: Prescription Question    I need a refill for Alprazolam 0.25mg. last refill was on 7/29 there are no refills left.  Thank You  Jessica Plaza    pharmacy is [place] [id number] [facility].  [phone]

## 2017-10-17 DIAGNOSIS — F41.9 ANXIETY: ICD-10-CM

## 2017-10-17 RX ORDER — ALPRAZOLAM 0.25 MG/1
0.25 TABLET ORAL NIGHTLY PRN
Qty: 30 TAB | Refills: 0 | Status: SHIPPED | OUTPATIENT
Start: 2017-10-17 | End: 2017-11-27 | Stop reason: SDUPTHER

## 2017-10-18 RX ORDER — LISINOPRIL 10 MG/1
10 TABLET ORAL DAILY
Qty: 90 TAB | Refills: 1 | Status: SHIPPED | OUTPATIENT
Start: 2017-10-18 | End: 2018-07-15 | Stop reason: SDUPTHER

## 2017-10-24 DIAGNOSIS — E07.9 THYROID CONDITION: ICD-10-CM

## 2017-10-24 DIAGNOSIS — F41.9 ANXIETY: ICD-10-CM

## 2017-10-24 RX ORDER — ALPRAZOLAM 0.25 MG/1
0.25 TABLET ORAL NIGHTLY PRN
Qty: 30 TAB | Refills: 0 | OUTPATIENT
Start: 2017-10-24

## 2017-10-24 RX ORDER — LEVOTHYROXINE SODIUM 0.07 MG/1
75 TABLET ORAL
Qty: 90 TAB | Refills: 0 | Status: SHIPPED | OUTPATIENT
Start: 2017-10-24 | End: 2017-11-27 | Stop reason: SDUPTHER

## 2017-10-24 NOTE — TELEPHONE ENCOUNTER
Was the patient seen in the last year in this department? Yes     Does patient have an active prescription for medications requested? Yes     Received Request Via: Pharmacy     PHARMACY DID NOT GET THE RX FOR ALPRAZOLAM ON 10/17/18  CONFIRMED. PLEASE ADVISE.

## 2017-11-27 DIAGNOSIS — F41.9 ANXIETY: ICD-10-CM

## 2017-11-27 DIAGNOSIS — E07.9 THYROID CONDITION: ICD-10-CM

## 2017-11-27 RX ORDER — LEVOTHYROXINE SODIUM 0.07 MG/1
75 TABLET ORAL
Qty: 90 TAB | Refills: 0 | Status: SHIPPED | OUTPATIENT
Start: 2017-11-27 | End: 2018-03-02 | Stop reason: SDUPTHER

## 2017-11-27 RX ORDER — ALPRAZOLAM 0.25 MG/1
0.25 TABLET ORAL NIGHTLY PRN
Qty: 30 TAB | Refills: 0 | Status: SHIPPED | OUTPATIENT
Start: 2017-11-27 | End: 2018-02-05 | Stop reason: SDUPTHER

## 2017-11-27 NOTE — TELEPHONE ENCOUNTER
From: Jessica Plaza  Sent: 11/27/2017 1:57 PM PST  Subject: Medication Renewal Request    Jessica Plaza would like a refill of the following medications:     alprazolam (XANAX) 0.25 MG Tab [Anant Hills M.D.]     levothyroxine (SYNTHROID) 75 MCG Tab [Anant Hills M.D.]    Preferred pharmacy: [facility]/PHARMACY #[id number] - [place], [place] - [address]    

## 2017-11-28 RX ORDER — ALPRAZOLAM 0.25 MG/1
TABLET ORAL
Qty: 30 TAB | Refills: 0 | Status: SHIPPED | OUTPATIENT
Start: 2017-11-28 | End: 2017-12-26 | Stop reason: SDUPTHER

## 2017-12-26 DIAGNOSIS — F41.9 ANXIETY: ICD-10-CM

## 2017-12-26 RX ORDER — ALPRAZOLAM 0.25 MG/1
0.25 TABLET ORAL NIGHTLY PRN
Qty: 30 TAB | Refills: 0 | Status: SHIPPED | OUTPATIENT
Start: 2017-12-26 | End: 2018-01-25

## 2018-02-05 ENCOUNTER — OFFICE VISIT (OUTPATIENT)
Dept: MEDICAL GROUP | Facility: MEDICAL CENTER | Age: 78
End: 2018-02-05
Payer: MEDICARE

## 2018-02-05 VITALS
TEMPERATURE: 97.8 F | WEIGHT: 172 LBS | OXYGEN SATURATION: 94 % | SYSTOLIC BLOOD PRESSURE: 168 MMHG | HEART RATE: 86 BPM | BODY MASS INDEX: 27 KG/M2 | HEIGHT: 67 IN | DIASTOLIC BLOOD PRESSURE: 92 MMHG | RESPIRATION RATE: 16 BRPM

## 2018-02-05 DIAGNOSIS — Z76.0 ENCOUNTER FOR MEDICATION REFILL: ICD-10-CM

## 2018-02-05 DIAGNOSIS — E03.4 HYPOTHYROIDISM DUE TO ACQUIRED ATROPHY OF THYROID: ICD-10-CM

## 2018-02-05 DIAGNOSIS — E78.5 DYSLIPIDEMIA: Chronic | ICD-10-CM

## 2018-02-05 DIAGNOSIS — E11.9 TYPE 2 DIABETES MELLITUS WITHOUT COMPLICATION, WITHOUT LONG-TERM CURRENT USE OF INSULIN (HCC): ICD-10-CM

## 2018-02-05 DIAGNOSIS — K22.70 BARRETT'S ESOPHAGUS WITHOUT DYSPLASIA: ICD-10-CM

## 2018-02-05 DIAGNOSIS — F41.9 ANXIETY: ICD-10-CM

## 2018-02-05 DIAGNOSIS — I10 ESSENTIAL HYPERTENSION: Chronic | ICD-10-CM

## 2018-02-05 DIAGNOSIS — J44.9 CHRONIC OBSTRUCTIVE PULMONARY DISEASE, UNSPECIFIED COPD TYPE (HCC): Chronic | ICD-10-CM

## 2018-02-05 DIAGNOSIS — F51.01 PRIMARY INSOMNIA: ICD-10-CM

## 2018-02-05 DIAGNOSIS — K26.4 GASTROINTESTINAL HEMORRHAGE ASSOCIATED WITH DUODENAL ULCER: ICD-10-CM

## 2018-02-05 PROCEDURE — 99214 OFFICE O/P EST MOD 30 MIN: CPT | Performed by: INTERNAL MEDICINE

## 2018-02-05 RX ORDER — ALPRAZOLAM 0.25 MG/1
0.25 TABLET ORAL NIGHTLY PRN
Qty: 30 TAB | Refills: 0 | Status: SHIPPED | OUTPATIENT
Start: 2018-02-05 | End: 2018-03-02 | Stop reason: SDUPTHER

## 2018-02-06 NOTE — ASSESSMENT & PLAN NOTE
She has significant GI bleed about 2 years ago. Exact source was never found. She does have Castro's esophagus. She is followed for this by Dr. Sanchez.

## 2018-02-06 NOTE — ASSESSMENT & PLAN NOTE
She has problems with insomnia for which she is on Xanax 0.25 mg daily. She ran out of this medication and wants a refill.

## 2018-02-06 NOTE — ASSESSMENT & PLAN NOTE
She has a history of Castro's esophagus for which she is on Prilosec and is followed by Dr. Sanchez. She had an upper GI bleed, the exact source was never found apparently.

## 2018-02-06 NOTE — PROGRESS NOTES
Subjective:     Chief Complaint   Patient presents with   • Medication Refill     xanax     Jessica Plaza is a 77 y.o. female here today for follow-up of especially hypertension and to get a refill on Xanax.    HTN (hypertension)  She has hypertension that is under fairly poor control. She continues lisinopril without difficulty. She denies lightheadedness. She is not careful about avoiding salt. Blood pressure today is elevated.    COPD (chronic obstructive pulmonary disease) (CMS-HCC)  She has chronic lung disease which she reports limits her ability to exercise. She does not smoke.    Primary insomnia  She has problems with insomnia for which she is on Xanax 0.25 mg daily. She ran out of this medication and wants a refill.    Castro's esophagus without dysplasia  She has a history of Castro's esophagus for which she is on Prilosec and is followed by Dr. Sanchez. She had an upper GI bleed, the exact source was never found apparently.    GI bleed  She has significant GI bleed about 2 years ago. Exact source was never found. She does have Castro's esophagus. She is followed for this by Dr. Sanchez.       Diagnoses of Anxiety, Primary insomnia, Hypothyroidism due to acquired atrophy of thyroid, Dyslipidemia, Essential hypertension, Type 2 diabetes mellitus without complication, without long-term current use of insulin (CMS-HCC), Castro's esophagus without dysplasia, Chronic obstructive pulmonary disease, unspecified COPD type (CMS-HCC), Encounter for medication refill, and Gastrointestinal hemorrhage associated with duodenal ulcer were pertinent to this visit.    Allergies: Bactrim; Demerol; Pcn [penicillins]; Sulfa drugs; Ferrous sulfate; and Latex  Current medicines (including changes today)  Current Outpatient Prescriptions   Medication Sig Dispense Refill   • ALPRAZolam (XANAX) 0.25 MG Tab Take 1 Tab by mouth at bedtime as needed for Sleep for up to 30 days. 30 Tab 0   • levothyroxine  (SYNTHROID) 75 MCG Tab Take 1 Tab by mouth Every morning on an empty stomach. 90 Tab 0   • lisinopril (PRINIVIL) 10 MG Tab TAKE 1 TAB BY MOUTH EVERY DAY. 90 Tab 1   • metformin (GLUCOPHAGE) 1000 MG tablet TAKE 1 TABLET BY MOUTH 2 TIMES A DAY, WITH MEALS. 180 Tab 3   • simvastatin (ZOCOR) 40 MG Tab Take 1 Tab by mouth every day. 30 Tab 11   • trazodone (DESYREL) 50 MG Tab Take 1 Tab by mouth every bedtime. 30 Tab 3   • cyclosporin (RESTASIS) 0.05 % ophthalmic emulsion Place 1 Drop in both eyes 2 times a day.     • aspirin (ASA) 81 MG Chew Tab chewable tablet Take 81 mg by mouth every day.     • liraglutide (VICTOZA) 18 MG/3ML Solution Pen-injector injection Inject 0.1 mL as instructed every day. 6 mL 11   • albuterol 108 (90 BASE) MCG/ACT Aero Soln inhalation aerosol Inhale 1 Puff by mouth every 6 hours as needed for Shortness of Breath.     • omeprazole (PRILOSEC) 40 MG capsule Take 40 mg by mouth every day.     • trazodone (DESYREL) 50 MG Tab TAKE 1 TAB BY MOUTH AT BEDTIME AS NEEDED FOR SLEEP. 90 Tab 3     No current facility-administered medications for this visit.        She  has a past medical history of Anemia; Arthritis; Castro esophagus; Bronchitis; COPD; COPD (chronic obstructive pulmonary disease) (CMS-HCC); Dental disorder; Diabetes; Disorder of thyroid; Emphysema of lung (CMS-HCC); Encounter for medication refill (2/5/2018); GERD (gastroesophageal reflux disease); Heart burn; Hemorrhagic disorder; High cholesterol; Hypertension; Indigestion; Pain; Pneumonia (2013.); Stroke (CMS-HCC) (2013); and UTI (urinary tract infection). She also has no past medical history of Breast cancer (CMS-HCC).    ROS    Patient denies significant change in strength, weight or appetite.  No significant lightheadedness or headaches.  No change in vision, hearing, or swallowing.  No new dyspnea, coughing, chest pain, or palpitations.  No indigestion, abdominal pain, or change in bowel habits.  No change in urinating.  No new  "ankle swelling.       Objective:     PE:  BP (!) 168/92   Pulse 86   Temp 36.6 °C (97.8 °F)   Resp 16   Ht 1.702 m (5' 7\")   Wt 78 kg (172 lb)   SpO2 94%   BMI 26.94 kg/m²    Neck is supple without significant lymphadenopathy or masses.  Lungs are clear with normal breath sounds without wheezes or rales .  Cardiovascular: peripheral circulation is satisfactory, heart sounds are unchanged and unremarkable.  Abdomen is soft, without masses or tenderness, with normal bowel sounds.  Extremities are without significant edema, cyanosis or deformity.      Assessment and Plan:   The following treatment plan was discussed  1. Anxiety      She was encouraged an exercise program that she could tolerate.   2. Primary insomnia  ALPRAZolam (XANAX) 0.25 MG Tab    Continue Xanax 0.25 mg at bedtime. We reviewed potential side effects.   3. Hypothyroidism due to acquired atrophy of thyroid  TSH    Continue levothyroxine, check TSH at least yearly.   4. Dyslipidemia  LIPID PROFILE    Check a lipid panel in the not-too-distant future. Long discussion regarding appropriate diet.   5. Essential hypertension  BASIC METABOLIC PANEL    We reviewed low-salt diet. Continue lisinopril. We will check her pressures at home and report them to us.   6. Type 2 diabetes mellitus without complication, without long-term current use of insulin (CMS-HCC)  HEMOGLOBIN A1C    MICROALBUMIN CREAT RATIO URINE    Brief review of her medications and appropriate diet and importance of exercise.   7. Castro's esophagus without dysplasia      Continue Prilosec.   8. Chronic obstructive pulmonary disease, unspecified COPD type (CMS-HCC)      We briefly reviewed exercises that she could tolerate.   9. Encounter for medication refill      I gave her a 30 tablet refill of Xanax.   10. Gastrointestinal hemorrhage associated with duodenal ulcer      Follow-up with Dr. Sanchez as indicated.       Followup: She has no appointment with Dr. Moreno in the " not-too-distant future and will keep that.

## 2018-02-06 NOTE — ASSESSMENT & PLAN NOTE
She has hypertension that is under fairly poor control. She continues lisinopril without difficulty. She denies lightheadedness. She is not careful about avoiding salt. Blood pressure today is elevated.

## 2018-02-06 NOTE — ASSESSMENT & PLAN NOTE
She has chronic lung disease which she reports limits her ability to exercise. She does not smoke.

## 2018-02-07 RX ORDER — LIRAGLUTIDE 6 MG/ML
0.6 INJECTION SUBCUTANEOUS DAILY
Qty: 6 PEN | Refills: 5 | Status: SHIPPED | OUTPATIENT
Start: 2018-02-07 | End: 2019-02-21 | Stop reason: SDUPTHER

## 2018-02-28 LAB
ALBUMIN/CREAT UR: 14.1 MG/G CREAT (ref 0–30)
BUN SERPL-MCNC: 16 MG/DL (ref 8–27)
BUN/CREAT SERPL: 16 (ref 12–28)
CALCIUM SERPL-MCNC: 9.6 MG/DL (ref 8.7–10.3)
CHLORIDE SERPL-SCNC: 99 MMOL/L (ref 96–106)
CHOLEST SERPL-MCNC: 165 MG/DL (ref 100–199)
CO2 SERPL-SCNC: 26 MMOL/L (ref 18–29)
CREAT SERPL-MCNC: 1.03 MG/DL (ref 0.57–1)
CREAT UR-MCNC: 60.2 MG/DL
GFR SERPLBLD CREATININE-BSD FMLA CKD-EPI: 53 ML/MIN/1.73
GFR SERPLBLD CREATININE-BSD FMLA CKD-EPI: 61 ML/MIN/1.73
GLUCOSE SERPL-MCNC: 137 MG/DL (ref 65–99)
HBA1C MFR BLD: 6.2 % (ref 4.8–5.6)
HDLC SERPL-MCNC: 86 MG/DL
LABORATORY COMMENT REPORT: NORMAL
LDLC SERPL CALC-MCNC: 62 MG/DL (ref 0–99)
MICROALBUMIN UR-MCNC: 8.5 UG/ML
POTASSIUM SERPL-SCNC: 4.3 MMOL/L (ref 3.5–5.2)
SODIUM SERPL-SCNC: 140 MMOL/L (ref 134–144)
TRIGL SERPL-MCNC: 85 MG/DL (ref 0–149)
TSH SERPL DL<=0.005 MIU/L-ACNC: 1.87 UIU/ML (ref 0.45–4.5)
VLDLC SERPL CALC-MCNC: 17 MG/DL (ref 5–40)

## 2018-03-02 DIAGNOSIS — E07.9 THYROID CONDITION: ICD-10-CM

## 2018-03-02 DIAGNOSIS — F51.01 PRIMARY INSOMNIA: ICD-10-CM

## 2018-03-02 RX ORDER — ALPRAZOLAM 0.25 MG/1
0.25 TABLET ORAL NIGHTLY PRN
Qty: 30 TAB | Refills: 4 | Status: SHIPPED
Start: 2018-03-02 | End: 2018-04-01

## 2018-03-02 RX ORDER — LEVOTHYROXINE SODIUM 0.07 MG/1
75 TABLET ORAL
Qty: 90 TAB | Refills: 3 | Status: SHIPPED | OUTPATIENT
Start: 2018-03-02 | End: 2019-04-25 | Stop reason: SDUPTHER

## 2018-04-17 RX ORDER — LIRAGLUTIDE 6 MG/ML
0.6 INJECTION SUBCUTANEOUS DAILY
Qty: 6 PEN | Refills: 1 | Status: SHIPPED | OUTPATIENT
Start: 2018-04-17 | End: 2018-05-21

## 2018-05-04 RX ORDER — SIMVASTATIN 40 MG
40 TABLET ORAL DAILY
Qty: 30 TAB | Refills: 5 | Status: SHIPPED | OUTPATIENT
Start: 2018-05-04 | End: 2020-01-06

## 2018-05-14 RX ORDER — SIMVASTATIN 40 MG
40 TABLET ORAL DAILY
Qty: 30 TAB | Refills: 5 | Status: SHIPPED | OUTPATIENT
Start: 2018-05-14 | End: 2018-05-21

## 2018-05-21 ENCOUNTER — OFFICE VISIT (OUTPATIENT)
Dept: MEDICAL GROUP | Facility: MEDICAL CENTER | Age: 78
End: 2018-05-21
Payer: MEDICARE

## 2018-05-21 VITALS
OXYGEN SATURATION: 89 % | SYSTOLIC BLOOD PRESSURE: 122 MMHG | TEMPERATURE: 98.6 F | WEIGHT: 177 LBS | BODY MASS INDEX: 27.78 KG/M2 | HEIGHT: 67 IN | HEART RATE: 74 BPM | RESPIRATION RATE: 16 BRPM | DIASTOLIC BLOOD PRESSURE: 66 MMHG

## 2018-05-21 DIAGNOSIS — E11.9 TYPE 2 DIABETES MELLITUS WITHOUT COMPLICATION, WITHOUT LONG-TERM CURRENT USE OF INSULIN (HCC): Chronic | ICD-10-CM

## 2018-05-21 DIAGNOSIS — G89.29 CHRONIC RIGHT-SIDED LOW BACK PAIN WITH RIGHT-SIDED SCIATICA: ICD-10-CM

## 2018-05-21 DIAGNOSIS — Z12.83 SKIN CANCER SCREENING: ICD-10-CM

## 2018-05-21 DIAGNOSIS — M54.41 CHRONIC RIGHT-SIDED LOW BACK PAIN WITH RIGHT-SIDED SCIATICA: ICD-10-CM

## 2018-05-21 DIAGNOSIS — F41.9 ANXIETY: ICD-10-CM

## 2018-05-21 DIAGNOSIS — F51.01 PRIMARY INSOMNIA: ICD-10-CM

## 2018-05-21 PROCEDURE — 99214 OFFICE O/P EST MOD 30 MIN: CPT | Performed by: INTERNAL MEDICINE

## 2018-05-21 RX ORDER — GABAPENTIN 300 MG/1
300 CAPSULE ORAL DAILY
COMMUNITY
End: 2019-04-29

## 2018-05-21 RX ORDER — ALPRAZOLAM 0.25 MG/1
TABLET ORAL
COMMUNITY
Start: 2018-05-20 | End: 2019-04-29

## 2018-05-21 RX ORDER — GABAPENTIN 300 MG/1
300 CAPSULE ORAL 2 TIMES DAILY
Qty: 60 CAP | Refills: 6 | Status: SHIPPED | OUTPATIENT
Start: 2018-05-21 | End: 2019-02-21

## 2018-05-21 RX ORDER — PANTOPRAZOLE SODIUM 40 MG/1
TABLET, DELAYED RELEASE ORAL
COMMUNITY
Start: 2018-05-18 | End: 2018-05-21

## 2018-05-21 RX ORDER — TRAZODONE HYDROCHLORIDE 100 MG/1
100 TABLET ORAL
Qty: 30 TAB | Refills: 3 | Status: SHIPPED | OUTPATIENT
Start: 2018-05-21 | End: 2018-11-18 | Stop reason: SDUPTHER

## 2018-05-30 ENCOUNTER — HOSPITAL ENCOUNTER (OUTPATIENT)
Dept: RADIOLOGY | Facility: MEDICAL CENTER | Age: 78
End: 2018-05-30
Attending: INTERNAL MEDICINE
Payer: MEDICARE

## 2018-05-30 DIAGNOSIS — G89.29 CHRONIC RIGHT-SIDED LOW BACK PAIN WITH RIGHT-SIDED SCIATICA: ICD-10-CM

## 2018-05-30 DIAGNOSIS — M54.41 CHRONIC RIGHT-SIDED LOW BACK PAIN WITH RIGHT-SIDED SCIATICA: ICD-10-CM

## 2018-05-30 PROCEDURE — 72100 X-RAY EXAM L-S SPINE 2/3 VWS: CPT

## 2018-06-04 ENCOUNTER — TELEPHONE (OUTPATIENT)
Dept: MEDICAL GROUP | Facility: MEDICAL CENTER | Age: 78
End: 2018-06-04

## 2018-06-04 DIAGNOSIS — G89.29 CHRONIC LOW BACK PAIN, UNSPECIFIED BACK PAIN LATERALITY, WITH SCIATICA PRESENCE UNSPECIFIED: ICD-10-CM

## 2018-06-04 DIAGNOSIS — M54.5 CHRONIC LOW BACK PAIN, UNSPECIFIED BACK PAIN LATERALITY, WITH SCIATICA PRESENCE UNSPECIFIED: ICD-10-CM

## 2018-06-04 PROBLEM — Z76.0 ENCOUNTER FOR MEDICATION REFILL: Status: RESOLVED | Noted: 2018-02-05 | Resolved: 2018-06-04

## 2018-06-04 RX ORDER — PEN NEEDLE, DIABETIC 32GX 5/32"
NEEDLE, DISPOSABLE MISCELLANEOUS
Qty: 100 EACH | Refills: 2 | Status: SHIPPED | OUTPATIENT
Start: 2018-06-04 | End: 2019-04-29

## 2018-06-04 NOTE — TELEPHONE ENCOUNTER
VOICEMAIL  1. Caller Name: Jessica                      Call Back Number: [phone]    2. Message: Patient reviewed her x-ray results on [facility] and was wondering if she can get a referral to see someone concerning the results. She is still having pain and would like a referral to Dr. Grupo Cabrales. Please advise.    3. Patient approves office to leave a detailed voicemail/[employer] message: N\A

## 2018-06-22 ENCOUNTER — TELEPHONE (OUTPATIENT)
Dept: MEDICAL GROUP | Facility: MEDICAL CENTER | Age: 78
End: 2018-06-22

## 2018-06-22 RX ORDER — CIPROFLOXACIN 250 MG/1
250 TABLET, FILM COATED ORAL 2 TIMES DAILY
Qty: 6 TAB | Refills: 0 | Status: SHIPPED | OUTPATIENT
Start: 2018-06-22 | End: 2018-06-25

## 2018-06-22 NOTE — TELEPHONE ENCOUNTER
Have sent abx to pharmacy.  Please let her know to call us on day 1 of symptoms as opposed to day 3 so we can do the urine test.  This is not the gabapentin.

## 2018-06-22 NOTE — TELEPHONE ENCOUNTER
1. Caller Name: Jessica                                         Call Back Number: [phone]      Patient approves a detailed voicemail message: N\A    Patient called and has been having burning and frequent urination x 3 days. She has been taking her gabapentin consistently for 4 days and was wondering if that can cause a UTI. Patient would like an rx sent for cipro to the pharmacy. She has left over cipro from last year and stated it seems to be helping. I let her know she may need to go to the lab and have test done. She does not want to do this since the weekend is coming up. Please advise.

## 2018-07-22 DIAGNOSIS — E11.9 TYPE 2 DIABETES MELLITUS WITHOUT COMPLICATION (HCC): ICD-10-CM

## 2018-07-23 NOTE — TELEPHONE ENCOUNTER
Was the patient seen in the last year in this department?   Yes   Does patient have an active prescription for medications requested? Yes     Received Request Via: Pharmacy

## 2018-08-06 RX ORDER — ALBUTEROL SULFATE 90 UG/1
1 AEROSOL, METERED RESPIRATORY (INHALATION) EVERY 6 HOURS PRN
Qty: 8.5 G | Refills: 5 | Status: SHIPPED | OUTPATIENT
Start: 2018-08-06 | End: 2019-09-23 | Stop reason: SDUPTHER

## 2018-08-07 ENCOUNTER — APPOINTMENT (RX ONLY)
Dept: URBAN - METROPOLITAN AREA CLINIC 4 | Facility: CLINIC | Age: 78
Setting detail: DERMATOLOGY
End: 2018-08-07

## 2018-08-07 DIAGNOSIS — L82.0 INFLAMED SEBORRHEIC KERATOSIS: ICD-10-CM

## 2018-08-07 DIAGNOSIS — L82.1 OTHER SEBORRHEIC KERATOSIS: ICD-10-CM

## 2018-08-07 DIAGNOSIS — L81.4 OTHER MELANIN HYPERPIGMENTATION: ICD-10-CM

## 2018-08-07 PROBLEM — J44.9 CHRONIC OBSTRUCTIVE PULMONARY DISEASE, UNSPECIFIED: Status: ACTIVE | Noted: 2018-08-07

## 2018-08-07 PROBLEM — E03.9 HYPOTHYROIDISM, UNSPECIFIED: Status: ACTIVE | Noted: 2018-08-07

## 2018-08-07 PROBLEM — E13.9 OTHER SPECIFIED DIABETES MELLITUS WITHOUT COMPLICATIONS: Status: ACTIVE | Noted: 2018-08-07

## 2018-08-07 PROBLEM — I10 ESSENTIAL (PRIMARY) HYPERTENSION: Status: ACTIVE | Noted: 2018-08-07

## 2018-08-07 PROBLEM — D23.11 OTHER BENIGN NEOPLASM OF SKIN OF RIGHT EYELID, INCLUDING CANTHUS: Status: ACTIVE | Noted: 2018-08-07

## 2018-08-07 PROBLEM — D23.12 OTHER BENIGN NEOPLASM OF SKIN OF LEFT EYELID, INCLUDING CANTHUS: Status: ACTIVE | Noted: 2018-08-07

## 2018-08-07 PROBLEM — E78.5 HYPERLIPIDEMIA, UNSPECIFIED: Status: ACTIVE | Noted: 2018-08-07

## 2018-08-07 PROCEDURE — ? COUNSELING

## 2018-08-07 PROCEDURE — 99202 OFFICE O/P NEW SF 15 MIN: CPT | Mod: 25

## 2018-08-07 PROCEDURE — 17110 DESTRUCTION B9 LES UP TO 14: CPT

## 2018-08-07 PROCEDURE — ? LIQUID NITROGEN

## 2018-08-07 ASSESSMENT — LOCATION ZONE DERM
LOCATION ZONE: HAND
LOCATION ZONE: LEG
LOCATION ZONE: ARM
LOCATION ZONE: TRUNK

## 2018-08-07 ASSESSMENT — LOCATION DETAILED DESCRIPTION DERM
LOCATION DETAILED: LEFT ANTECUBITAL SKIN
LOCATION DETAILED: SUPERIOR THORACIC SPINE
LOCATION DETAILED: RIGHT SUPERIOR MEDIAL UPPER BACK
LOCATION DETAILED: LEFT LATERAL DORSAL WRIST
LOCATION DETAILED: LEFT ULNAR DORSAL HAND
LOCATION DETAILED: LEFT DISTAL PRETIBIAL REGION

## 2018-08-07 ASSESSMENT — LOCATION SIMPLE DESCRIPTION DERM
LOCATION SIMPLE: LEFT WRIST
LOCATION SIMPLE: LEFT PRETIBIAL REGION
LOCATION SIMPLE: LEFT HAND
LOCATION SIMPLE: RIGHT UPPER BACK
LOCATION SIMPLE: UPPER BACK
LOCATION SIMPLE: LEFT UPPER ARM

## 2018-08-07 NOTE — PROCEDURE: LIQUID NITROGEN
Detail Level: Detailed
Medical Necessity Clause: This procedure was medically necessary because the lesions that were treated were:
Include Z78.9 (Other Specified Conditions Influencing Health Status) As An Associated Diagnosis?: No
Medical Necessity Information: It is in your best interest to select a reason for this procedure from the list below. All of these items fulfill various CMS LCD requirements except the new and changing color options.
Consent: The patient's consent was obtained including but not limited to risks of crusting, scabbing, blistering, scarring, darker or lighter pigmentary change, recurrence, incomplete removal and infection.
Post-Care Instructions: I reviewed with the patient in detail post-care instructions. Patient is to wear sunprotection, and avoid picking at any of the treated lesions. Pt may apply Vaseline to crusted or scabbing areas.

## 2018-08-20 ENCOUNTER — PATIENT OUTREACH (OUTPATIENT)
Dept: HEALTH INFORMATION MANAGEMENT | Facility: OTHER | Age: 78
End: 2018-08-20

## 2018-08-20 NOTE — PROGRESS NOTES
1. Attempt #:final    2. WebIZ Checked & Epic Updated: Yes  3. HealthConnect Verified: no  4. Verify PCP: yes    5. Communication Preference Obtained: yes    6. Diabetes Visit Scheduling  Scheduling Status:Scheduled/already scheduled       7. Care Gap Scheduling (Attempt to Schedule EACH Overdue Care Gap!)    Health Maintenance Due   Topic Date Due   • IMM ZOSTER VACCINES (1 of 2) 05/15/1992   • BONE DENSITY  05/15/2007   • PFT SCREENING-FEV1 AND FEV/FVC RATIO / SPIROMETRY SHOULD BE PERFORMED ANNUALLY  03/07/2014   • DIABETES MONOFILAMENT / LE EXAM  03/06/2018   • RETINAL SCREENING  04/27/2018   • Annual Wellness Visit  05/02/2018   • IMM INFLUENZA (1) 09/01/2018        8. Patient was directed to Health and Wellness Website: no/pt was not interested     - Patient has completed [facility] Exam and HMR Letter(s) faxed to: Dr. Li    9. Screened for [employer] Prescription? yes  10. [employer] Activation: already active  11. [employer] Delaney: no  12. Virtual Visits: no  13. Opt In to Text Messages: no

## 2018-08-21 ENCOUNTER — OFFICE VISIT (OUTPATIENT)
Dept: MEDICAL GROUP | Facility: MEDICAL CENTER | Age: 78
End: 2018-08-21
Payer: MEDICARE

## 2018-08-21 VITALS
OXYGEN SATURATION: 92 % | RESPIRATION RATE: 16 BRPM | DIASTOLIC BLOOD PRESSURE: 66 MMHG | BODY MASS INDEX: 27.31 KG/M2 | WEIGHT: 174 LBS | HEIGHT: 67 IN | HEART RATE: 58 BPM | SYSTOLIC BLOOD PRESSURE: 122 MMHG

## 2018-08-21 DIAGNOSIS — Z86.73 HISTORY OF CVA (CEREBROVASCULAR ACCIDENT): ICD-10-CM

## 2018-08-21 DIAGNOSIS — E78.5 DYSLIPIDEMIA: Chronic | ICD-10-CM

## 2018-08-21 DIAGNOSIS — J44.9 CHRONIC OBSTRUCTIVE PULMONARY DISEASE, UNSPECIFIED COPD TYPE (HCC): Chronic | ICD-10-CM

## 2018-08-21 DIAGNOSIS — Z00.00 MEDICARE ANNUAL WELLNESS VISIT, SUBSEQUENT: ICD-10-CM

## 2018-08-21 DIAGNOSIS — F51.01 PRIMARY INSOMNIA: ICD-10-CM

## 2018-08-21 DIAGNOSIS — E11.9 TYPE 2 DIABETES MELLITUS WITHOUT COMPLICATION, WITHOUT LONG-TERM CURRENT USE OF INSULIN (HCC): Chronic | ICD-10-CM

## 2018-08-21 DIAGNOSIS — E03.4 HYPOTHYROIDISM DUE TO ACQUIRED ATROPHY OF THYROID: ICD-10-CM

## 2018-08-21 DIAGNOSIS — K22.70 BARRETT'S ESOPHAGUS WITHOUT DYSPLASIA: ICD-10-CM

## 2018-08-21 DIAGNOSIS — I10 ESSENTIAL HYPERTENSION: Chronic | ICD-10-CM

## 2018-08-21 LAB
HBA1C MFR BLD: 6.2 % (ref ?–5.8)
INT CON NEG: NEGATIVE
INT CON POS: POSITIVE

## 2018-08-21 PROCEDURE — G0439 PPPS, SUBSEQ VISIT: HCPCS | Performed by: INTERNAL MEDICINE

## 2018-08-21 PROCEDURE — 99213 OFFICE O/P EST LOW 20 MIN: CPT | Mod: 25 | Performed by: INTERNAL MEDICINE

## 2018-08-21 PROCEDURE — 83036 HEMOGLOBIN GLYCOSYLATED A1C: CPT | Performed by: INTERNAL MEDICINE

## 2018-08-21 RX ORDER — BUDESONIDE AND FORMOTEROL FUMARATE DIHYDRATE 160; 4.5 UG/1; UG/1
1 AEROSOL RESPIRATORY (INHALATION) 2 TIMES DAILY
Qty: 3 INHALER | Refills: 3 | Status: SHIPPED | OUTPATIENT
Start: 2018-08-21 | End: 2019-02-21

## 2018-08-21 RX ORDER — METHYLPREDNISOLONE 4 MG/1
TABLET ORAL
Qty: 21 TAB | Refills: 0 | Status: SHIPPED | OUTPATIENT
Start: 2018-08-21 | End: 2019-02-21

## 2018-08-21 ASSESSMENT — ENCOUNTER SYMPTOMS: GENERAL WELL-BEING: FAIR

## 2018-08-21 ASSESSMENT — ACTIVITIES OF DAILY LIVING (ADL): BATHING_REQUIRES_ASSISTANCE: 0

## 2018-08-21 ASSESSMENT — PATIENT HEALTH QUESTIONNAIRE - PHQ9: CLINICAL INTERPRETATION OF PHQ2 SCORE: 0
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
## 2018-11-18 DIAGNOSIS — F51.01 PRIMARY INSOMNIA: ICD-10-CM

## 2018-11-18 RX ORDER — TRAZODONE HYDROCHLORIDE 100 MG/1
TABLET ORAL
Qty: 90 TAB | Refills: 3 | Status: ON HOLD | OUTPATIENT
Start: 2018-11-18 | End: 2019-08-28

## 2018-12-14 ENCOUNTER — HOSPITAL ENCOUNTER (OUTPATIENT)
Dept: LAB | Facility: MEDICAL CENTER | Age: 78
End: 2018-12-14
Attending: INTERNAL MEDICINE
Payer: MEDICARE

## 2018-12-14 DIAGNOSIS — R30.0 DYSURIA: ICD-10-CM

## 2018-12-14 LAB
APPEARANCE UR: ABNORMAL
BACTERIA #/AREA URNS HPF: ABNORMAL /HPF
BILIRUB UR QL STRIP.AUTO: ABNORMAL
COLOR UR: ABNORMAL
EPI CELLS #/AREA URNS HPF: ABNORMAL /HPF
GLUCOSE UR STRIP.AUTO-MCNC: ABNORMAL MG/DL
HYALINE CASTS #/AREA URNS LPF: ABNORMAL /LPF
KETONES UR STRIP.AUTO-MCNC: ABNORMAL MG/DL
LEUKOCYTE ESTERASE UR QL STRIP.AUTO: ABNORMAL
MICRO URNS: ABNORMAL
NITRITE UR QL STRIP.AUTO: ABNORMAL
PH UR STRIP.AUTO: ABNORMAL [PH]
PROT UR QL STRIP: ABNORMAL MG/DL
RBC # URNS HPF: ABNORMAL /HPF
RBC UR QL AUTO: ABNORMAL
RENAL EPI CELLS #/AREA URNS HPF: ABNORMAL /HPF
SP GR UR STRIP.AUTO: ABNORMAL
TRANS CELLS #/AREA URNS HPF: ABNORMAL /HPF
UROBILINOGEN UR STRIP.AUTO-MCNC: ABNORMAL MG/DL
WBC #/AREA URNS HPF: ABNORMAL /HPF

## 2018-12-14 PROCEDURE — 81001 URINALYSIS AUTO W/SCOPE: CPT

## 2018-12-14 PROCEDURE — 87077 CULTURE AEROBIC IDENTIFY: CPT

## 2018-12-14 PROCEDURE — 87186 SC STD MICRODIL/AGAR DIL: CPT

## 2018-12-14 PROCEDURE — 87086 URINE CULTURE/COLONY COUNT: CPT

## 2018-12-16 LAB
BACTERIA UR CULT: ABNORMAL
BACTERIA UR CULT: ABNORMAL
SIGNIFICANT IND 70042: ABNORMAL
SITE SITE: ABNORMAL
SOURCE SOURCE: ABNORMAL

## 2018-12-18 DIAGNOSIS — E11.9 TYPE 2 DIABETES MELLITUS WITHOUT COMPLICATION, UNSPECIFIED WHETHER LONG TERM INSULIN USE (HCC): ICD-10-CM

## 2018-12-26 ENCOUNTER — APPOINTMENT (RX ONLY)
Dept: URBAN - METROPOLITAN AREA CLINIC 4 | Facility: CLINIC | Age: 78
Setting detail: DERMATOLOGY
End: 2018-12-26

## 2018-12-26 DIAGNOSIS — L85.8 OTHER SPECIFIED EPIDERMAL THICKENING: ICD-10-CM

## 2018-12-26 PROCEDURE — 99212 OFFICE O/P EST SF 10 MIN: CPT

## 2018-12-26 PROCEDURE — ? COUNSELING

## 2018-12-26 ASSESSMENT — LOCATION DETAILED DESCRIPTION DERM: LOCATION DETAILED: LEFT DISTAL POSTERIOR UPPER ARM

## 2018-12-26 ASSESSMENT — LOCATION SIMPLE DESCRIPTION DERM: LOCATION SIMPLE: LEFT POSTERIOR UPPER ARM

## 2018-12-26 ASSESSMENT — LOCATION ZONE DERM: LOCATION ZONE: ARM

## 2018-12-26 NOTE — PROCEDURE: COUNSELING
Patient Specific Counseling (Will Not Stick From Patient To Patient): Urea spelling was written down for pt so she can order for herself.
Detail Level: Simple

## 2019-02-16 LAB
ALBUMIN SERPL-MCNC: 4.6 G/DL (ref 3.5–4.8)
ALBUMIN/CREAT UR: 15.9 MG/G CREAT (ref 0–30)
ALBUMIN/GLOB SERPL: 1.8 {RATIO} (ref 1.2–2.2)
ALP SERPL-CCNC: 72 IU/L (ref 39–117)
ALT SERPL-CCNC: 26 IU/L (ref 0–32)
AST SERPL-CCNC: 37 IU/L (ref 0–40)
BILIRUB SERPL-MCNC: 0.3 MG/DL (ref 0–1.2)
BUN SERPL-MCNC: 18 MG/DL (ref 8–27)
BUN/CREAT SERPL: 17 (ref 12–28)
CALCIUM SERPL-MCNC: 10.2 MG/DL (ref 8.7–10.3)
CHLORIDE SERPL-SCNC: 100 MMOL/L (ref 96–106)
CHOLEST SERPL-MCNC: 205 MG/DL (ref 100–199)
CO2 SERPL-SCNC: 25 MMOL/L (ref 20–29)
CREAT SERPL-MCNC: 1.07 MG/DL (ref 0.57–1)
CREAT UR-MCNC: 69.8 MG/DL
GLOBULIN SER CALC-MCNC: 2.6 G/DL (ref 1.5–4.5)
GLUCOSE SERPL-MCNC: 148 MG/DL (ref 65–99)
HBA1C MFR BLD: 6.5 % (ref 4.8–5.6)
HDLC SERPL-MCNC: 109 MG/DL
LABORATORY COMMENT REPORT: ABNORMAL
LDLC SERPL CALC-MCNC: 81 MG/DL (ref 0–99)
MICROALBUMIN UR-MCNC: 11.1 UG/ML
POTASSIUM SERPL-SCNC: 4.5 MMOL/L (ref 3.5–5.2)
PROT SERPL-MCNC: 7.2 G/DL (ref 6–8.5)
SODIUM SERPL-SCNC: 140 MMOL/L (ref 134–144)
TRIGL SERPL-MCNC: 75 MG/DL (ref 0–149)
VLDLC SERPL CALC-MCNC: 15 MG/DL (ref 5–40)

## 2019-02-21 ENCOUNTER — OFFICE VISIT (OUTPATIENT)
Dept: MEDICAL GROUP | Facility: MEDICAL CENTER | Age: 79
End: 2019-02-21
Payer: MEDICARE

## 2019-02-21 VITALS
TEMPERATURE: 97.9 F | SYSTOLIC BLOOD PRESSURE: 120 MMHG | WEIGHT: 190 LBS | HEART RATE: 78 BPM | HEIGHT: 67 IN | BODY MASS INDEX: 29.82 KG/M2 | DIASTOLIC BLOOD PRESSURE: 60 MMHG | OXYGEN SATURATION: 92 %

## 2019-02-21 DIAGNOSIS — E11.9 TYPE 2 DIABETES MELLITUS WITHOUT COMPLICATION, WITHOUT LONG-TERM CURRENT USE OF INSULIN (HCC): Chronic | ICD-10-CM

## 2019-02-21 DIAGNOSIS — J44.9 CHRONIC OBSTRUCTIVE PULMONARY DISEASE, UNSPECIFIED COPD TYPE (HCC): Chronic | ICD-10-CM

## 2019-02-21 DIAGNOSIS — I10 ESSENTIAL HYPERTENSION: Chronic | ICD-10-CM

## 2019-02-21 PROCEDURE — 99214 OFFICE O/P EST MOD 30 MIN: CPT | Performed by: INTERNAL MEDICINE

## 2019-02-21 NOTE — PROGRESS NOTES
CC: Follow-up hypertension, diabetes, COPD.    HPI:   Jessica presents today with the following.    1. Essential hypertension  Presents maintain on medication denying any dizziness reports good blood pressure control at home.    2. Chronic obstructive pulmonary disease, unspecified COPD type ([facility])  History of COPD she is maintained on maintenance inhaler denying any advancing shortness of breath no hemoptysis or productive cough.    3. Type 2 diabetes mellitus without complication, without long-term current use of insulin ([facility])  A1c recently checked has gone up slightly to 6.5.  She is compliant with current medications denying any side effects.  No hypoglycemia no changes to vision or feet.      Patient Active Problem List    Diagnosis Date Noted   • Iron deficiency anemia, unspecified 09/14/2016   • Castro's esophagus without dysplasia 08/31/2016   • Primary insomnia 07/29/2016   • Left shoulder pain 07/01/2016   • Hypothyroidism due to acquired atrophy of thyroid 11/29/2015   • Lung nodules 06/09/2015   • Type 2 diabetes mellitus without complication, without long-term current use of insulin ([facility]) 04/30/2014   • Essential hypertension 04/30/2014   • Chronic obstructive pulmonary disease ([facility]) 04/30/2014   • Dyslipidemia 04/30/2014   • History of CVA (cerebrovascular accident) 06/02/2013       Current Outpatient Prescriptions   Medication Sig Dispense Refill   • liraglutide (VICTOZA) 18 MG/3ML Solution Pen-injector injection Inject 0.3 mL as instructed every day. 9 PEN 5   • metformin (GLUCOPHAGE) 1000 MG tablet Take 1 Tab by mouth 2 times a day, with meals. 180 Tab 2   • traZODone (DESYREL) 100 MG Tab TAKE 1 TABLET BY MOUTH EVERYDAY AT BEDTIME 90 Tab 3   • albuterol 108 (90 Base) MCG/ACT Aero Soln inhalation aerosol Inhale 1 Puff by mouth every 6 hours as needed for Shortness of Breath. 8.5 g 5   • lisinopril (PRINIVIL) 10 MG Tab TAKE 1 TAB BY MOUTH EVERY DAY. 90 Tab 3   • BD PEN NEEDLE DAKOTA U/F 32G X 4 MM Misc USE  "WITH VICTOZA TO INJECT DAILY AS DIRECTED 100 Each 2   • ALPRAZolam (XANAX) 0.25 MG Tab      • gabapentin (NEURONTIN) 300 MG Cap Take 300 mg by mouth every day.     • simvastatin (ZOCOR) 40 MG Tab TAKE 1 TAB BY MOUTH EVERY DAY. 30 Tab 5   • levothyroxine (SYNTHROID) 75 MCG Tab Take 1 Tab by mouth Every morning on an empty stomach. 90 Tab 3   • cyclosporin (RESTASIS) 0.05 % ophthalmic emulsion Place 1 Drop in both eyes 2 times a day.     • aspirin (ASA) 81 MG Chew Tab chewable tablet Take 81 mg by mouth every day.     • omeprazole (PRILOSEC) 40 MG capsule Take 40 mg by mouth every day.       No current facility-administered medications for this visit.          Allergies as of 02/21/2019 - Reviewed 02/21/2019   Allergen Reaction Noted   • Bactrim Hives 02/14/2013   • Demerol Shortness of Breath and Swelling 02/14/2013   • Pcn [penicillins] Hives 02/14/2013   • Sulfa drugs Hives 02/14/2013   • Ferrous sulfate Rash 09/08/2014   • Latex Contact Dermatitis 02/14/2013        ROS: Denies Chest pain, SOB, LE edema.    /60 (BP Location: Right arm, Patient Position: Sitting)   Pulse 78   Temp 36.6 °C (97.9 °F)   Ht 1.702 m (5' 7\")   Wt 86.2 kg (190 lb)   SpO2 92%   BMI 29.76 kg/m²     Physical Exam:  Gen:         Alert and oriented, No apparent distress.  Neck:        No Lymphadenopathy or Bruits.  Lungs:     Clear to auscultation bilaterally  CV:          Regular rate and rhythm. No murmurs, rubs or gallops.               Ext:          No clubbing, cyanosis, edema.      Assessment and Plan.   78 y.o. female with the following issues.    1. Essential hypertension  Currently well controlled, Discuss diet, exercise and salt restriction.  No change to medication therapy.    2. Chronic obstructive pulmonary disease, unspecified COPD type (HCC)  Stable continue current therapy    3. Type 2 diabetes mellitus without complication, without long-term current use of insulin (HCC)  Currently well controlled no changes. " Discussed diet and exercise recheck A1c in 6 months. Reminded about yearly eye exam.

## 2019-04-02 DIAGNOSIS — F41.9 ANXIETY: ICD-10-CM

## 2019-04-02 RX ORDER — GABAPENTIN 300 MG/1
CAPSULE ORAL
Qty: 180 CAP | Refills: 3 | Status: ON HOLD | OUTPATIENT
Start: 2019-04-02 | End: 2019-08-28

## 2019-04-25 DIAGNOSIS — E07.9 THYROID CONDITION: ICD-10-CM

## 2019-04-25 RX ORDER — LEVOTHYROXINE SODIUM 0.07 MG/1
75 TABLET ORAL
Qty: 90 TAB | Refills: 3 | Status: SHIPPED | OUTPATIENT
Start: 2019-04-25 | End: 2020-01-30 | Stop reason: SDUPTHER

## 2019-04-29 ENCOUNTER — APPOINTMENT (OUTPATIENT)
Dept: RADIOLOGY | Facility: MEDICAL CENTER | Age: 79
DRG: 308 | End: 2019-04-29
Attending: EMERGENCY MEDICINE
Payer: MEDICARE

## 2019-04-29 ENCOUNTER — OFFICE VISIT (OUTPATIENT)
Dept: MEDICAL GROUP | Facility: MEDICAL CENTER | Age: 79
End: 2019-04-29
Payer: MEDICARE

## 2019-04-29 ENCOUNTER — HOSPITAL ENCOUNTER (INPATIENT)
Facility: MEDICAL CENTER | Age: 79
LOS: 4 days | DRG: 308 | End: 2019-05-03
Attending: EMERGENCY MEDICINE | Admitting: HOSPITALIST
Payer: MEDICARE

## 2019-04-29 ENCOUNTER — APPOINTMENT (OUTPATIENT)
Dept: RADIOLOGY | Facility: MEDICAL CENTER | Age: 79
DRG: 308 | End: 2019-04-29
Payer: MEDICARE

## 2019-04-29 VITALS
SYSTOLIC BLOOD PRESSURE: 136 MMHG | HEART RATE: 120 BPM | TEMPERATURE: 97.2 F | OXYGEN SATURATION: 97 % | BODY MASS INDEX: 29.35 KG/M2 | DIASTOLIC BLOOD PRESSURE: 84 MMHG | HEIGHT: 67 IN | WEIGHT: 187 LBS

## 2019-04-29 DIAGNOSIS — J44.1 ACUTE EXACERBATION OF CHRONIC OBSTRUCTIVE PULMONARY DISEASE (COPD) (HCC): ICD-10-CM

## 2019-04-29 DIAGNOSIS — J40 BRONCHITIS: ICD-10-CM

## 2019-04-29 DIAGNOSIS — I48.92 ATRIAL FLUTTER, UNSPECIFIED TYPE (HCC): ICD-10-CM

## 2019-04-29 DIAGNOSIS — R00.2 PALPITATIONS: ICD-10-CM

## 2019-04-29 DIAGNOSIS — J44.9 CHRONIC OBSTRUCTIVE PULMONARY DISEASE, UNSPECIFIED COPD TYPE (HCC): Chronic | ICD-10-CM

## 2019-04-29 DIAGNOSIS — I48.3 TYPICAL ATRIAL FLUTTER (HCC): ICD-10-CM

## 2019-04-29 PROBLEM — J96.01 ACUTE HYPOXEMIC RESPIRATORY FAILURE (HCC): Status: ACTIVE | Noted: 2019-04-29

## 2019-04-29 PROBLEM — R91.1 PULMONARY NODULE: Status: ACTIVE | Noted: 2019-04-29

## 2019-04-29 PROBLEM — Z79.01 CHRONIC ANTICOAGULATION: Chronic | Status: ACTIVE | Noted: 2019-04-29

## 2019-04-29 PROBLEM — R79.9 ELEVATED BUN: Status: ACTIVE | Noted: 2019-04-29

## 2019-04-29 PROBLEM — I27.20 PULMONARY HTN (HCC): Status: ACTIVE | Noted: 2019-04-29

## 2019-04-29 PROBLEM — I48.91 AF (ATRIAL FIBRILLATION) (HCC): Status: ACTIVE | Noted: 2019-04-29

## 2019-04-29 LAB
ALBUMIN SERPL BCP-MCNC: 4.2 G/DL (ref 3.2–4.9)
ALBUMIN/GLOB SERPL: 1.4 G/DL
ALP SERPL-CCNC: 82 U/L (ref 30–99)
ALT SERPL-CCNC: 51 U/L (ref 2–50)
ANION GAP SERPL CALC-SCNC: 9 MMOL/L (ref 0–11.9)
APTT PPP: 26.1 SEC (ref 24.7–36)
AST SERPL-CCNC: 38 U/L (ref 12–45)
BASOPHILS # BLD AUTO: 0.4 % (ref 0–1.8)
BASOPHILS # BLD: 0.03 K/UL (ref 0–0.12)
BILIRUB SERPL-MCNC: 0.6 MG/DL (ref 0.1–1.5)
BNP SERPL-MCNC: 111 PG/ML (ref 0–100)
BUN SERPL-MCNC: 23 MG/DL (ref 8–22)
CALCIUM SERPL-MCNC: 9.7 MG/DL (ref 8.5–10.5)
CHLORIDE SERPL-SCNC: 102 MMOL/L (ref 96–112)
CO2 SERPL-SCNC: 26 MMOL/L (ref 20–33)
CREAT SERPL-MCNC: 1.3 MG/DL (ref 0.5–1.4)
D DIMER PPP IA.FEU-MCNC: 1.16 UG/ML (FEU) (ref 0–0.5)
EKG IMPRESSION: NORMAL
EOSINOPHIL # BLD AUTO: 0.15 K/UL (ref 0–0.51)
EOSINOPHIL NFR BLD: 2.1 % (ref 0–6.9)
ERYTHROCYTE [DISTWIDTH] IN BLOOD BY AUTOMATED COUNT: 49.7 FL (ref 35.9–50)
GLOBULIN SER CALC-MCNC: 3.1 G/DL (ref 1.9–3.5)
GLUCOSE SERPL-MCNC: 115 MG/DL (ref 65–99)
HCT VFR BLD AUTO: 43.6 % (ref 37–47)
HGB BLD-MCNC: 13.8 G/DL (ref 12–16)
IMM GRANULOCYTES # BLD AUTO: 0.03 K/UL (ref 0–0.11)
IMM GRANULOCYTES NFR BLD AUTO: 0.4 % (ref 0–0.9)
INR PPP: 1.01 (ref 0.87–1.13)
LACTATE BLD-SCNC: 1.4 MMOL/L (ref 0.5–2)
LYMPHOCYTES # BLD AUTO: 1.1 K/UL (ref 1–4.8)
LYMPHOCYTES NFR BLD: 15.7 % (ref 22–41)
MCH RBC QN AUTO: 30.2 PG (ref 27–33)
MCHC RBC AUTO-ENTMCNC: 31.7 G/DL (ref 33.6–35)
MCV RBC AUTO: 95.4 FL (ref 81.4–97.8)
MONOCYTES # BLD AUTO: 0.48 K/UL (ref 0–0.85)
MONOCYTES NFR BLD AUTO: 6.8 % (ref 0–13.4)
NEUTROPHILS # BLD AUTO: 5.22 K/UL (ref 2–7.15)
NEUTROPHILS NFR BLD: 74.6 % (ref 44–72)
NRBC # BLD AUTO: 0 K/UL
NRBC BLD-RTO: 0 /100 WBC
PLATELET # BLD AUTO: 233 K/UL (ref 164–446)
PMV BLD AUTO: 10.3 FL (ref 9–12.9)
POTASSIUM SERPL-SCNC: 4.3 MMOL/L (ref 3.6–5.5)
PROT SERPL-MCNC: 7.3 G/DL (ref 6–8.2)
PROTHROMBIN TIME: 13.4 SEC (ref 12–14.6)
RBC # BLD AUTO: 4.57 M/UL (ref 4.2–5.4)
SODIUM SERPL-SCNC: 137 MMOL/L (ref 135–145)
TROPONIN I SERPL-MCNC: 0.01 NG/ML (ref 0–0.04)
TSH SERPL DL<=0.005 MIU/L-ACNC: 2.73 UIU/ML (ref 0.38–5.33)
WBC # BLD AUTO: 7 K/UL (ref 4.8–10.8)

## 2019-04-29 PROCEDURE — 94640 AIRWAY INHALATION TREATMENT: CPT

## 2019-04-29 PROCEDURE — 84484 ASSAY OF TROPONIN QUANT: CPT

## 2019-04-29 PROCEDURE — 85025 COMPLETE CBC W/AUTO DIFF WBC: CPT

## 2019-04-29 PROCEDURE — 96374 THER/PROPH/DIAG INJ IV PUSH: CPT

## 2019-04-29 PROCEDURE — 71275 CT ANGIOGRAPHY CHEST: CPT

## 2019-04-29 PROCEDURE — 93005 ELECTROCARDIOGRAM TRACING: CPT

## 2019-04-29 PROCEDURE — 96375 TX/PRO/DX INJ NEW DRUG ADDON: CPT

## 2019-04-29 PROCEDURE — 80053 COMPREHEN METABOLIC PANEL: CPT

## 2019-04-29 PROCEDURE — 99222 1ST HOSP IP/OBS MODERATE 55: CPT | Performed by: INTERNAL MEDICINE

## 2019-04-29 PROCEDURE — 83605 ASSAY OF LACTIC ACID: CPT

## 2019-04-29 PROCEDURE — 87040 BLOOD CULTURE FOR BACTERIA: CPT

## 2019-04-29 PROCEDURE — 85610 PROTHROMBIN TIME: CPT

## 2019-04-29 PROCEDURE — 85379 FIBRIN DEGRADATION QUANT: CPT

## 2019-04-29 PROCEDURE — 700111 HCHG RX REV CODE 636 W/ 250 OVERRIDE (IP): Performed by: EMERGENCY MEDICINE

## 2019-04-29 PROCEDURE — 99214 OFFICE O/P EST MOD 30 MIN: CPT | Performed by: INTERNAL MEDICINE

## 2019-04-29 PROCEDURE — 85730 THROMBOPLASTIN TIME PARTIAL: CPT

## 2019-04-29 PROCEDURE — 99285 EMERGENCY DEPT VISIT HI MDM: CPT

## 2019-04-29 PROCEDURE — 84443 ASSAY THYROID STIM HORMONE: CPT

## 2019-04-29 PROCEDURE — A9270 NON-COVERED ITEM OR SERVICE: HCPCS | Performed by: EMERGENCY MEDICINE

## 2019-04-29 PROCEDURE — 93000 ELECTROCARDIOGRAM COMPLETE: CPT | Performed by: INTERNAL MEDICINE

## 2019-04-29 PROCEDURE — 700102 HCHG RX REV CODE 250 W/ 637 OVERRIDE(OP): Performed by: EMERGENCY MEDICINE

## 2019-04-29 PROCEDURE — 700101 HCHG RX REV CODE 250: Performed by: HOSPITALIST

## 2019-04-29 PROCEDURE — 700117 HCHG RX CONTRAST REV CODE 255: Performed by: EMERGENCY MEDICINE

## 2019-04-29 PROCEDURE — 700101 HCHG RX REV CODE 250: Performed by: EMERGENCY MEDICINE

## 2019-04-29 PROCEDURE — 700111 HCHG RX REV CODE 636 W/ 250 OVERRIDE (IP)

## 2019-04-29 PROCEDURE — 94760 N-INVAS EAR/PLS OXIMETRY 1: CPT

## 2019-04-29 PROCEDURE — 770020 HCHG ROOM/CARE - TELE (206)

## 2019-04-29 PROCEDURE — 99223 1ST HOSP IP/OBS HIGH 75: CPT | Mod: AI | Performed by: HOSPITALIST

## 2019-04-29 PROCEDURE — 93005 ELECTROCARDIOGRAM TRACING: CPT | Performed by: EMERGENCY MEDICINE

## 2019-04-29 PROCEDURE — 71045 X-RAY EXAM CHEST 1 VIEW: CPT

## 2019-04-29 PROCEDURE — 83880 ASSAY OF NATRIURETIC PEPTIDE: CPT

## 2019-04-29 PROCEDURE — 700105 HCHG RX REV CODE 258: Performed by: HOSPITALIST

## 2019-04-29 RX ORDER — SODIUM CHLORIDE 9 MG/ML
INJECTION, SOLUTION INTRAVENOUS CONTINUOUS
Status: DISCONTINUED | OUTPATIENT
Start: 2019-04-29 | End: 2019-04-30

## 2019-04-29 RX ORDER — LORAZEPAM 2 MG/ML
INJECTION INTRAMUSCULAR
Status: COMPLETED
Start: 2019-04-29 | End: 2019-04-29

## 2019-04-29 RX ORDER — METHYLPREDNISOLONE SODIUM SUCCINATE 125 MG/2ML
125 INJECTION, POWDER, LYOPHILIZED, FOR SOLUTION INTRAMUSCULAR; INTRAVENOUS ONCE
Status: COMPLETED | OUTPATIENT
Start: 2019-04-29 | End: 2019-04-29

## 2019-04-29 RX ORDER — OMEPRAZOLE 20 MG/1
40 CAPSULE, DELAYED RELEASE ORAL DAILY
Status: DISCONTINUED | OUTPATIENT
Start: 2019-04-30 | End: 2019-05-03 | Stop reason: HOSPADM

## 2019-04-29 RX ORDER — AZITHROMYCIN 250 MG/1
250 TABLET, FILM COATED ORAL DAILY
Qty: 6 TAB | Refills: 0 | Status: SHIPPED | OUTPATIENT
Start: 2019-04-29 | End: 2019-04-29

## 2019-04-29 RX ORDER — LEVALBUTEROL INHALATION SOLUTION 1.25 MG/3ML
1.25 SOLUTION RESPIRATORY (INHALATION) ONCE
Status: COMPLETED | OUTPATIENT
Start: 2019-04-29 | End: 2019-04-29

## 2019-04-29 RX ORDER — TRAZODONE HYDROCHLORIDE 50 MG/1
100 TABLET ORAL
Status: DISCONTINUED | OUTPATIENT
Start: 2019-04-29 | End: 2019-05-03 | Stop reason: HOSPADM

## 2019-04-29 RX ORDER — POLYETHYLENE GLYCOL 3350 17 G/17G
1 POWDER, FOR SOLUTION ORAL
Status: DISCONTINUED | OUTPATIENT
Start: 2019-04-29 | End: 2019-05-03 | Stop reason: HOSPADM

## 2019-04-29 RX ORDER — METOPROLOL TARTRATE 50 MG/1
50 TABLET, FILM COATED ORAL TWICE DAILY
Status: DISCONTINUED | OUTPATIENT
Start: 2019-04-30 | End: 2019-05-02

## 2019-04-29 RX ORDER — BISACODYL 10 MG
10 SUPPOSITORY, RECTAL RECTAL
Status: DISCONTINUED | OUTPATIENT
Start: 2019-04-29 | End: 2019-05-03 | Stop reason: HOSPADM

## 2019-04-29 RX ORDER — METHYLPREDNISOLONE 4 MG/1
TABLET ORAL
Qty: 21 TAB | Refills: 0 | Status: SHIPPED | OUTPATIENT
Start: 2019-04-29 | End: 2019-04-29

## 2019-04-29 RX ORDER — ASPIRIN 325 MG
325 TABLET ORAL ONCE
Status: COMPLETED | OUTPATIENT
Start: 2019-04-29 | End: 2019-04-29

## 2019-04-29 RX ORDER — BUDESONIDE AND FORMOTEROL FUMARATE DIHYDRATE 80; 4.5 UG/1; UG/1
2 AEROSOL RESPIRATORY (INHALATION)
Status: DISCONTINUED | OUTPATIENT
Start: 2019-04-30 | End: 2019-04-29

## 2019-04-29 RX ORDER — IPRATROPIUM BROMIDE AND ALBUTEROL SULFATE 2.5; .5 MG/3ML; MG/3ML
3 SOLUTION RESPIRATORY (INHALATION)
Status: DISCONTINUED | OUTPATIENT
Start: 2019-04-29 | End: 2019-05-03 | Stop reason: HOSPADM

## 2019-04-29 RX ORDER — ONDANSETRON 2 MG/ML
4 INJECTION INTRAMUSCULAR; INTRAVENOUS EVERY 4 HOURS PRN
Status: DISCONTINUED | OUTPATIENT
Start: 2019-04-29 | End: 2019-05-03 | Stop reason: HOSPADM

## 2019-04-29 RX ORDER — METOPROLOL TARTRATE 1 MG/ML
5 INJECTION, SOLUTION INTRAVENOUS
Status: DISCONTINUED | OUTPATIENT
Start: 2019-04-29 | End: 2019-05-03 | Stop reason: HOSPADM

## 2019-04-29 RX ORDER — LEVOTHYROXINE SODIUM 0.07 MG/1
75 TABLET ORAL
Status: DISCONTINUED | OUTPATIENT
Start: 2019-04-30 | End: 2019-05-03 | Stop reason: HOSPADM

## 2019-04-29 RX ORDER — BENZONATATE 100 MG/1
100 CAPSULE ORAL 3 TIMES DAILY PRN
Qty: 30 CAP | Refills: 0 | Status: SHIPPED | OUTPATIENT
Start: 2019-04-29 | End: 2019-04-29

## 2019-04-29 RX ORDER — AMOXICILLIN 250 MG
2 CAPSULE ORAL 2 TIMES DAILY
Status: DISCONTINUED | OUTPATIENT
Start: 2019-04-29 | End: 2019-05-03 | Stop reason: HOSPADM

## 2019-04-29 RX ORDER — LORAZEPAM 2 MG/ML
0.5 INJECTION INTRAMUSCULAR ONCE
Status: COMPLETED | OUTPATIENT
Start: 2019-04-29 | End: 2019-04-29

## 2019-04-29 RX ORDER — GABAPENTIN 300 MG/1
300 CAPSULE ORAL 3 TIMES DAILY
Status: DISCONTINUED | OUTPATIENT
Start: 2019-04-30 | End: 2019-05-03 | Stop reason: HOSPADM

## 2019-04-29 RX ORDER — BUDESONIDE AND FORMOTEROL FUMARATE DIHYDRATE 80; 4.5 UG/1; UG/1
2 AEROSOL RESPIRATORY (INHALATION) 2 TIMES DAILY
Status: DISCONTINUED | OUTPATIENT
Start: 2019-04-30 | End: 2019-05-03 | Stop reason: HOSPADM

## 2019-04-29 RX ORDER — METOPROLOL TARTRATE 1 MG/ML
2.5 INJECTION, SOLUTION INTRAVENOUS ONCE
Status: DISCONTINUED | OUTPATIENT
Start: 2019-04-29 | End: 2019-04-29

## 2019-04-29 RX ORDER — ONDANSETRON 4 MG/1
4 TABLET, ORALLY DISINTEGRATING ORAL EVERY 4 HOURS PRN
Status: DISCONTINUED | OUTPATIENT
Start: 2019-04-29 | End: 2019-05-03 | Stop reason: HOSPADM

## 2019-04-29 RX ORDER — SIMVASTATIN 40 MG
40 TABLET ORAL DAILY
Status: DISCONTINUED | OUTPATIENT
Start: 2019-04-30 | End: 2019-05-03 | Stop reason: HOSPADM

## 2019-04-29 RX ADMIN — SODIUM CHLORIDE: 9 INJECTION, SOLUTION INTRAVENOUS at 23:56

## 2019-04-29 RX ADMIN — LEVALBUTEROL 1.25 MG: 1.25 SOLUTION RESPIRATORY (INHALATION) at 18:47

## 2019-04-29 RX ADMIN — ASPIRIN 325 MG: 325 TABLET, FILM COATED ORAL at 22:04

## 2019-04-29 RX ADMIN — LORAZEPAM 0.5 MG: 2 INJECTION INTRAMUSCULAR at 21:45

## 2019-04-29 RX ADMIN — METOPROLOL TARTRATE 5 MG: 1 INJECTION, SOLUTION INTRAVENOUS at 22:52

## 2019-04-29 RX ADMIN — LORAZEPAM 0.5 MG: 2 INJECTION INTRAMUSCULAR; INTRAVENOUS at 21:45

## 2019-04-29 RX ADMIN — METHYLPREDNISOLONE SODIUM SUCCINATE 125 MG: 125 INJECTION, POWDER, FOR SOLUTION INTRAMUSCULAR; INTRAVENOUS at 17:56

## 2019-04-29 RX ADMIN — IOHEXOL 100 ML: 350 INJECTION, SOLUTION INTRAVENOUS at 21:47

## 2019-04-29 ASSESSMENT — CHA2DS2 SCORE
CHA2DS2 VASC SCORE: 6
PRIOR STROKE OR TIA OR THROMBOEMBOLISM: YES
AGE 65 TO 74: NO
VASCULAR DISEASE: NO
DIABETES: NO
SEX: FEMALE
CHF OR LEFT VENTRICULAR DYSFUNCTION: NO
HYPERTENSION: YES
AGE 75 OR GREATER: YES

## 2019-04-29 ASSESSMENT — ENCOUNTER SYMPTOMS
BLURRED VISION: 0
FOCAL WEAKNESS: 0
DOUBLE VISION: 0
DEPRESSION: 0
FLANK PAIN: 0
SPEECH CHANGE: 0
MYALGIAS: 0
CHILLS: 0
HEMOPTYSIS: 0
FEVER: 0
WEAKNESS: 0
COUGH: 1
HALLUCINATIONS: 0
NAUSEA: 0
ABDOMINAL PAIN: 0
HEARTBURN: 0
SENSORY CHANGE: 0
PALPITATIONS: 1
VOMITING: 0
SHORTNESS OF BREATH: 1
DIZZINESS: 0
BRUISES/BLEEDS EASILY: 0
EYE DISCHARGE: 0

## 2019-04-29 ASSESSMENT — LIFESTYLE VARIABLES
SUBSTANCE_ABUSE: 0
EVER_SMOKED: YES

## 2019-04-29 ASSESSMENT — COPD QUESTIONNAIRES
DO YOU EVER COUGH UP ANY MUCUS OR PHLEGM?: YES, A FEW DAYS A WEEK OR MONTH
COPD SCREENING SCORE: 7
HAVE YOU SMOKED AT LEAST 100 CIGARETTES IN YOUR ENTIRE LIFE: YES
DURING THE PAST 4 WEEKS HOW MUCH DID YOU FEEL SHORT OF BREATH: SOME OF THE TIME

## 2019-04-29 NOTE — PROGRESS NOTES
CC: Cough, rapid heart rate    HPI:   Jessica presents today with the following.    1.  Cough  Initially was scheduled for preop evaluation and clearance.  States however that she has had a cough for the last 12 days.  She does have underlying COPD maintain on albuterol.  She has no fevers or chills cough is productive of yellowish sputum.  Mild sore throat and sinus congestion but no earache.    2.  Tachycardia  During vitals she was noted to be tachycardic with a rate of 120.  She denies any chest pain or dizziness.  She does have a history of stroke but never had any diagnosed arrhythmia.    Patient Active Problem List    Diagnosis Date Noted   • Iron deficiency anemia, unspecified 09/14/2016   • Castro's esophagus without dysplasia 08/31/2016   • Primary insomnia 07/29/2016   • Left shoulder pain 07/01/2016   • Hypothyroidism due to acquired atrophy of thyroid 11/29/2015   • Lung nodules 06/09/2015   • Type 2 diabetes mellitus without complication, without long-term current use of insulin (HCC) 04/30/2014   • Essential hypertension 04/30/2014   • Chronic obstructive pulmonary disease (HCC) 04/30/2014   • Dyslipidemia 04/30/2014   • History of CVA (cerebrovascular accident) 06/02/2013       Current Outpatient Prescriptions   Medication Sig Dispense Refill   • MethylPREDNISolone (MEDROL DOSEPAK) 4 MG Tablet Therapy Pack Per package insert. 21 Tab 0   • azithromycin (ZITHROMAX Z-ANTONETTE) 250 MG Tab Take 1 Tab by mouth every day for 5 days. Take 2 tabs on day 1 6 Tab 0   • benzonatate (TESSALON) 100 MG Cap Take 1 Cap by mouth 3 times a day as needed for Cough. 30 Cap 0   • levothyroxine (SYNTHROID) 75 MCG Tab TAKE 1 TAB BY MOUTH EVERY MORNING ON AN EMPTY STOMACH. 90 Tab 3   • gabapentin (NEURONTIN) 300 MG Cap TAKE 1 CAPSULE BY MOUTH TWICE A  Cap 3   • liraglutide (VICTOZA) 18 MG/3ML Solution Pen-injector injection Inject 0.3 mL as instructed every day. 9 PEN 5   • metformin (GLUCOPHAGE) 1000 MG tablet Take 1 Tab by  "mouth 2 times a day, with meals. 180 Tab 2   • traZODone (DESYREL) 100 MG Tab TAKE 1 TABLET BY MOUTH EVERYDAY AT BEDTIME 90 Tab 3   • albuterol 108 (90 Base) MCG/ACT Aero Soln inhalation aerosol Inhale 1 Puff by mouth every 6 hours as needed for Shortness of Breath. 8.5 g 5   • lisinopril (PRINIVIL) 10 MG Tab TAKE 1 TAB BY MOUTH EVERY DAY. 90 Tab 3   • BD PEN NEEDLE DAKOTA U/F 32G X 4 MM Misc USE WITH VICTOZA TO INJECT DAILY AS DIRECTED 100 Each 2   • ALPRAZolam (XANAX) 0.25 MG Tab      • gabapentin (NEURONTIN) 300 MG Cap Take 300 mg by mouth every day.     • simvastatin (ZOCOR) 40 MG Tab TAKE 1 TAB BY MOUTH EVERY DAY. 30 Tab 5   • cyclosporin (RESTASIS) 0.05 % ophthalmic emulsion Place 1 Drop in both eyes 2 times a day.     • aspirin (ASA) 81 MG Chew Tab chewable tablet Take 81 mg by mouth every day.     • omeprazole (PRILOSEC) 40 MG capsule Take 40 mg by mouth every day.       No current facility-administered medications for this visit.          Allergies as of 04/29/2019 - Reviewed 04/29/2019   Allergen Reaction Noted   • Bactrim Hives 02/14/2013   • Demerol Shortness of Breath and Swelling 02/14/2013   • Pcn [penicillins] Hives 02/14/2013   • Sulfa drugs Hives 02/14/2013   • Ferrous sulfate Rash 09/08/2014   • Latex Contact Dermatitis 02/14/2013        ROS: Denies Chest pain, changes to urination, LE edema.    /84 (BP Location: Right arm, Patient Position: Sitting)   Pulse 88   Temp 36.2 °C (97.2 °F)   Ht 1.702 m (5' 7\")   Wt 84.8 kg (187 lb)   SpO2 97%   BMI 29.29 kg/m²     Physical Exam:  Gen:         Alert and oriented, No apparent distress.  Neck:        No Lymphadenopathy or Bruits.  Lungs:     Bilateral rhonchi.  CV:          Irregularly irregular rhythm            Ext:          No clubbing, cyanosis, edema.    EKG   atrial flutter with a rate of 118.    Assessment and Plan.   78 y.o. female with the following issues.    1. Chronic obstructive pulmonary disease, with exacerbation  Symptoms most " consistent with COPD exacerbation initially work is going to place on steroids and antibiotic and get a chest x-ray.  Given diagnosis #2 however she will go to the ER for chest x-ray and further evaluation.    2. Atrial flutter, unspecified type (HCC)  New onset atrial flutter fairly symptomatic.  Given the comorbidities sending to ER for evaluation.  Checkout was given to the  of the emergency department.    - EKG - Clinic Performed

## 2019-04-29 NOTE — ED TRIAGE NOTES
Pt amb to triage after EKG.  Chief Complaint   Patient presents with   • Abnormal EKG   • Shortness of Breath   • Difficulty Sleeping   • Cough     Pt reports she was at Dr. Moerno's office for per op appt. Was told she had an abn EKG and needed to come to ED.

## 2019-04-30 ENCOUNTER — APPOINTMENT (OUTPATIENT)
Dept: CARDIOLOGY | Facility: MEDICAL CENTER | Age: 79
DRG: 308 | End: 2019-04-30
Attending: HOSPITALIST
Payer: MEDICARE

## 2019-04-30 LAB
EKG IMPRESSION: NORMAL
GLUCOSE BLD-MCNC: 135 MG/DL (ref 65–99)
GLUCOSE BLD-MCNC: 137 MG/DL (ref 65–99)
GLUCOSE BLD-MCNC: 180 MG/DL (ref 65–99)
GLUCOSE BLD-MCNC: 200 MG/DL (ref 65–99)
LV EJECT FRACT  99904: 45
LV EJECT FRACT MOD 2C 99903: 59.85
LV EJECT FRACT MOD 4C 99902: 48.33
LV EJECT FRACT MOD BP 99901: 55.27
TROPONIN I SERPL-MCNC: 0.01 NG/ML (ref 0–0.04)

## 2019-04-30 PROCEDURE — 93010 ELECTROCARDIOGRAM REPORT: CPT | Performed by: INTERNAL MEDICINE

## 2019-04-30 PROCEDURE — 36415 COLL VENOUS BLD VENIPUNCTURE: CPT

## 2019-04-30 PROCEDURE — 770020 HCHG ROOM/CARE - TELE (206)

## 2019-04-30 PROCEDURE — 84484 ASSAY OF TROPONIN QUANT: CPT

## 2019-04-30 PROCEDURE — 99233 SBSQ HOSP IP/OBS HIGH 50: CPT | Performed by: HOSPITALIST

## 2019-04-30 PROCEDURE — 700102 HCHG RX REV CODE 250 W/ 637 OVERRIDE(OP): Performed by: HOSPITALIST

## 2019-04-30 PROCEDURE — 93005 ELECTROCARDIOGRAM TRACING: CPT | Performed by: INTERNAL MEDICINE

## 2019-04-30 PROCEDURE — 700111 HCHG RX REV CODE 636 W/ 250 OVERRIDE (IP): Performed by: INTERNAL MEDICINE

## 2019-04-30 PROCEDURE — 700102 HCHG RX REV CODE 250 W/ 637 OVERRIDE(OP): Performed by: INTERNAL MEDICINE

## 2019-04-30 PROCEDURE — 82962 GLUCOSE BLOOD TEST: CPT | Mod: 91

## 2019-04-30 PROCEDURE — A9270 NON-COVERED ITEM OR SERVICE: HCPCS | Performed by: HOSPITALIST

## 2019-04-30 PROCEDURE — 700111 HCHG RX REV CODE 636 W/ 250 OVERRIDE (IP): Performed by: NURSE PRACTITIONER

## 2019-04-30 PROCEDURE — 93306 TTE W/DOPPLER COMPLETE: CPT

## 2019-04-30 PROCEDURE — 99232 SBSQ HOSP IP/OBS MODERATE 35: CPT | Performed by: INTERNAL MEDICINE

## 2019-04-30 PROCEDURE — 93306 TTE W/DOPPLER COMPLETE: CPT | Mod: 26 | Performed by: INTERNAL MEDICINE

## 2019-04-30 PROCEDURE — A9270 NON-COVERED ITEM OR SERVICE: HCPCS | Performed by: INTERNAL MEDICINE

## 2019-04-30 RX ORDER — DIGOXIN 0.25 MG/ML
500 INJECTION INTRAMUSCULAR; INTRAVENOUS ONCE
Status: COMPLETED | OUTPATIENT
Start: 2019-04-30 | End: 2019-04-30

## 2019-04-30 RX ORDER — SODIUM CHLORIDE, SODIUM LACTATE, POTASSIUM CHLORIDE, CALCIUM CHLORIDE 600; 310; 30; 20 MG/100ML; MG/100ML; MG/100ML; MG/100ML
INJECTION, SOLUTION INTRAVENOUS CONTINUOUS
Status: DISCONTINUED | OUTPATIENT
Start: 2019-04-30 | End: 2019-04-30

## 2019-04-30 RX ORDER — BENZONATATE 100 MG/1
100 CAPSULE ORAL 3 TIMES DAILY PRN
Status: DISCONTINUED | OUTPATIENT
Start: 2019-04-30 | End: 2019-05-03 | Stop reason: HOSPADM

## 2019-04-30 RX ORDER — FUROSEMIDE 10 MG/ML
20 INJECTION INTRAMUSCULAR; INTRAVENOUS ONCE
Status: COMPLETED | OUTPATIENT
Start: 2019-04-30 | End: 2019-04-30

## 2019-04-30 RX ORDER — DIGOXIN 125 MCG
500 TABLET ORAL ONCE
Status: DISCONTINUED | OUTPATIENT
Start: 2019-04-30 | End: 2019-04-30

## 2019-04-30 RX ADMIN — DIGOXIN 500 MCG: 0.25 INJECTION INTRAMUSCULAR; INTRAVENOUS at 09:53

## 2019-04-30 RX ADMIN — FUROSEMIDE 20 MG: 10 INJECTION, SOLUTION INTRAMUSCULAR; INTRAVENOUS at 11:59

## 2019-04-30 RX ADMIN — GABAPENTIN 300 MG: 300 CAPSULE ORAL at 05:10

## 2019-04-30 RX ADMIN — METOPROLOL TARTRATE 50 MG: 50 TABLET ORAL at 16:49

## 2019-04-30 RX ADMIN — OMEPRAZOLE 40 MG: 20 CAPSULE, DELAYED RELEASE ORAL at 05:10

## 2019-04-30 RX ADMIN — INSULIN HUMAN 1 UNITS: 100 INJECTION, SOLUTION PARENTERAL at 16:53

## 2019-04-30 RX ADMIN — GABAPENTIN 300 MG: 300 CAPSULE ORAL at 11:59

## 2019-04-30 RX ADMIN — RIVAROXABAN 15 MG: 15 TABLET, FILM COATED ORAL at 16:49

## 2019-04-30 RX ADMIN — METOPROLOL TARTRATE 50 MG: 50 TABLET ORAL at 05:10

## 2019-04-30 RX ADMIN — BENZONATATE 100 MG: 100 CAPSULE ORAL at 16:49

## 2019-04-30 RX ADMIN — TRAZODONE HYDROCHLORIDE 100 MG: 50 TABLET ORAL at 00:01

## 2019-04-30 RX ADMIN — TRAZODONE HYDROCHLORIDE 100 MG: 50 TABLET ORAL at 20:33

## 2019-04-30 RX ADMIN — LEVOTHYROXINE SODIUM 75 MCG: 75 TABLET ORAL at 05:11

## 2019-04-30 RX ADMIN — BUDESONIDE AND FORMOTEROL FUMARATE DIHYDRATE 2 PUFF: 80; 4.5 AEROSOL RESPIRATORY (INHALATION) at 16:50

## 2019-04-30 RX ADMIN — INSULIN HUMAN 1 UNITS: 100 INJECTION, SOLUTION PARENTERAL at 06:24

## 2019-04-30 RX ADMIN — GABAPENTIN 300 MG: 300 CAPSULE ORAL at 16:49

## 2019-04-30 RX ADMIN — SIMVASTATIN 40 MG: 40 TABLET, FILM COATED ORAL at 05:10

## 2019-04-30 RX ADMIN — BUDESONIDE AND FORMOTEROL FUMARATE DIHYDRATE 2 PUFF: 80; 4.5 AEROSOL RESPIRATORY (INHALATION) at 05:12

## 2019-04-30 ASSESSMENT — ENCOUNTER SYMPTOMS
PALPITATIONS: 1
STRIDOR: 0
EYE DISCHARGE: 0
LOSS OF CONSCIOUSNESS: 0
HALLUCINATIONS: 0
MYALGIAS: 0
SHORTNESS OF BREATH: 1
SPEECH CHANGE: 0
FLANK PAIN: 0
BLOOD IN STOOL: 0
SORE THROAT: 0
VOMITING: 0
FOCAL WEAKNESS: 0
FEVER: 0
WHEEZING: 1
CHEST TIGHTNESS: 0
PALPITATIONS: 0
BRUISES/BLEEDS EASILY: 0
NERVOUS/ANXIOUS: 0
DIARRHEA: 0
SPUTUM PRODUCTION: 0
COUGH: 1
EYE PAIN: 0
EYE REDNESS: 0
SEIZURES: 0
HEMOPTYSIS: 0
ABDOMINAL PAIN: 0
DIZZINESS: 0
CHILLS: 0

## 2019-04-30 ASSESSMENT — PATIENT HEALTH QUESTIONNAIRE - PHQ9
6. FEELING BAD ABOUT YOURSELF - OR THAT YOU ARE A FAILURE OR HAVE LET YOURSELF OR YOUR FAMILY DOWN: NOT AL ALL
SUM OF ALL RESPONSES TO PHQ9 QUESTIONS 1 AND 2: 2
4. FEELING TIRED OR HAVING LITTLE ENERGY: MORE THAN HALF THE DAYS
8. MOVING OR SPEAKING SO SLOWLY THAT OTHER PEOPLE COULD HAVE NOTICED. OR THE OPPOSITE, BEING SO FIGETY OR RESTLESS THAT YOU HAVE BEEN MOVING AROUND A LOT MORE THAN USUAL: NOT AT ALL
2. FEELING DOWN, DEPRESSED, IRRITABLE, OR HOPELESS: MORE THAN HALF THE DAYS
1. LITTLE INTEREST OR PLEASURE IN DOING THINGS: NOT AT ALL
SUM OF ALL RESPONSES TO PHQ QUESTIONS 1-9: 10
5. POOR APPETITE OR OVEREATING: NOT AT ALL
3. TROUBLE FALLING OR STAYING ASLEEP OR SLEEPING TOO MUCH: NEARLY EVERY DAY
9. THOUGHTS THAT YOU WOULD BE BETTER OFF DEAD, OR OF HURTING YOURSELF: NOT AT ALL
7. TROUBLE CONCENTRATING ON THINGS, SUCH AS READING THE NEWSPAPER OR WATCHING TELEVISION: NEARLY EVERY DAY

## 2019-04-30 ASSESSMENT — LIFESTYLE VARIABLES
HOW MANY TIMES IN THE PAST YEAR HAVE YOU HAD 5 OR MORE DRINKS IN A DAY: 0
ON A TYPICAL DAY WHEN YOU DRINK ALCOHOL HOW MANY DRINKS DO YOU HAVE: 3
TOTAL SCORE: 0
EVER HAD A DRINK FIRST THING IN THE MORNING TO STEADY YOUR NERVES TO GET RID OF A HANGOVER: NO
ALCOHOL_USE: YES
AVERAGE NUMBER OF DAYS PER WEEK YOU HAVE A DRINK CONTAINING ALCOHOL: 4
CONSUMPTION TOTAL: POSITIVE
HAVE PEOPLE ANNOYED YOU BY CRITICIZING YOUR DRINKING: NO
TOTAL SCORE: 0
HAVE YOU EVER FELT YOU SHOULD CUT DOWN ON YOUR DRINKING: NO
EVER FELT BAD OR GUILTY ABOUT YOUR DRINKING: NO
TOTAL SCORE: 0

## 2019-04-30 ASSESSMENT — COGNITIVE AND FUNCTIONAL STATUS - GENERAL
STANDING UP FROM CHAIR USING ARMS: A LITTLE
SUGGESTED CMS G CODE MODIFIER MOBILITY: CJ
SUGGESTED CMS G CODE MODIFIER DAILY ACTIVITY: CJ
CLIMB 3 TO 5 STEPS WITH RAILING: A LITTLE
HELP NEEDED FOR BATHING: A LITTLE
TOILETING: A LITTLE
DAILY ACTIVITIY SCORE: 21
MOBILITY SCORE: 21
WALKING IN HOSPITAL ROOM: A LITTLE
DRESSING REGULAR LOWER BODY CLOTHING: A LITTLE

## 2019-04-30 NOTE — CARE PLAN
Problem: Communication  Goal: The ability to communicate needs accurately and effectively will improve  Outcome: PROGRESSING AS EXPECTED  Pt is able to communicate needs appropriately at this time, pt encouraged to ask RN questions.     Problem: Safety  Goal: Will remain free from falls  Outcome: PROGRESSING AS EXPECTED  Fall risk assessed, fall precautions in place, pt uses call light appropriately and verbalizes understanding of fall risk.

## 2019-04-30 NOTE — PROGRESS NOTES
Patient arrived to unit-Tele 736-1. Pt able to walk to bed with hand held assist. Received report from ED RN, Pt assessed, A&Ox4, on 1.5 L of O2 via NC SOB noted on exertion. Call light within reach, personal belongings within reach.  Bed is locked and in lowest position, Bed Strip alarm is on.Tele monitor on and monitor room notified. Hourly rounding in place.

## 2019-04-30 NOTE — ASSESSMENT & PLAN NOTE
With hyperglycemia  Glycated hemoglobin 6.5, 2 months ago   Long & short acting insulin  Accu-Checks, hypoglycemia protocol

## 2019-04-30 NOTE — ASSESSMENT & PLAN NOTE
Mostly due to dehydration   S/p Intravenous fluids  Avoid nephrotoxins, monitor inputs and outputs

## 2019-04-30 NOTE — H&P
Hospital Medicine History & Physical Note    Date of Service  4/29/2019    Primary Care Physician  Anurag Moreno M.D.    Consultants  cardiology    Code Status  full    Chief Complaint  Shortness of breath     History of Presenting Illness  78 y.o. female who presented 4/29/2019 with past medical history of COPD, diabetes, emphysema, GERD, hypertension and previous stroke who presents with shortness of breath.  This patient was actually seen as an outpatient for worsening shortness of breath.  She had preoperative evaluation and her EKG was consistent with atrial fibrillation.  She was sent to the emergency department for further evaluation.  She has had shortness of breath and palpitations.  Here in the emergency department she is found to be in with RVR.  She has chronic cough worse at nighttime.  With difficulty sleeping.  She will be admitted to the hospital for further management of her atrial fibrillation.    Review of Systems  Review of Systems   Constitutional: Negative for chills and fever.   HENT: Negative for congestion, hearing loss and tinnitus.    Eyes: Negative for blurred vision, double vision and discharge.   Respiratory: Positive for cough and shortness of breath. Negative for hemoptysis.    Cardiovascular: Positive for palpitations. Negative for chest pain and leg swelling.   Gastrointestinal: Negative for abdominal pain, heartburn, nausea and vomiting.   Genitourinary: Negative for dysuria and flank pain.   Musculoskeletal: Negative for joint pain and myalgias.   Skin: Negative for rash.   Neurological: Negative for dizziness, sensory change, speech change, focal weakness and weakness.   Endo/Heme/Allergies: Negative for environmental allergies. Does not bruise/bleed easily.   Psychiatric/Behavioral: Negative for depression, hallucinations and substance abuse.       Past Medical History   has a past medical history of Anemia; Arthritis; Castro esophagus; Bronchitis; COPD; COPD (chronic  obstructive pulmonary disease) ([facility]); Dental disorder; Diabetes; Disorder of thyroid; Emphysema of lung (HCC); Encounter for medication refill (2018); GERD (gastroesophageal reflux disease); Heart burn; Hemorrhagic disorder; High cholesterol; Hypertension; Indigestion; Pain; Pneumonia (.); Stroke (HCC) (); and UTI (urinary tract infection). She also has no past medical history of Breast cancer ([facility]).    Surgical History   has a past surgical history that includes gastroscopy-endo (2014); tonsillectomy (); pr anesth, delivery only; shoulder decompression arthroscopic (Left, 2016); shoulder decompression arthroscopic (Left, 10/11/2016); clavicle resection (Left, 10/11/2016); and chondroplasty (Left, 10/11/2016).     Family History  family history includes Arrythmia in her sister; Bipolar disorder in her son, son, and son; Cancer in her maternal aunt, mother, and sister; Heart Attack in her father; Hyperlipidemia in her father; Hypertension in her father; Lung Disease in her mother and sister; Stroke in her father.     Social History   reports that she quit smoking about 6 years ago. Her smoking use included Cigarettes. She has a 26.00 pack-year smoking history. She has never used smokeless tobacco. She reports that she drinks about 8.4 oz of alcohol per week . She reports that she does not use drugs.    Allergies  Allergies   Allergen Reactions   • Bactrim Hives   • Demerol Shortness of Breath and Swelling   • Pcn [Penicillins] Hives   • Sulfa Drugs Hives   • Ferrous Sulfate Rash     itching   • Latex Contact Dermatitis       Medications  Prior to Admission Medications   Prescriptions Last Dose Informant Patient Reported? Taking?   albuterol 108 (90 Base) MCG/ACT Aero Soln inhalation aerosol 2019 at 1000 Patient No No   Sig: Inhale 1 Puff by mouth every 6 hours as needed for Shortness of Breath.   aspirin (ASA) 81 MG Chew Tab chewable tablet 2019 at 0800 Patient Yes No    Sig: Take 81 mg by mouth every day.   gabapentin (NEURONTIN) 300 MG Cap 4/29/2019 at 0800 Patient No No   Sig: TAKE 1 CAPSULE BY MOUTH TWICE A DAY   levothyroxine (SYNTHROID) 75 MCG Tab 4/29/2019 at 0700 Patient No No   Sig: TAKE 1 TAB BY MOUTH EVERY MORNING ON AN EMPTY STOMACH.   liraglutide (VICTOZA) 18 MG/3ML Solution Pen-injector injection 4/29/2019 at 0800 Patient Yes Yes   Sig: Inject 1.2 mg as instructed every day.   lisinopril (PRINIVIL) 10 MG Tab 4/29/2019 at 0800 Patient No No   Sig: TAKE 1 TAB BY MOUTH EVERY DAY.   metformin (GLUCOPHAGE) 1000 MG tablet 4/29/2019 at 0800 Patient No No   Sig: Take 1 Tab by mouth 2 times a day, with meals.   omeprazole (PRILOSEC) 40 MG capsule 4/29/2019 at 0800 Patient Yes No   Sig: Take 40 mg by mouth every day.   simvastatin (ZOCOR) 40 MG Tab 4/28/2019 at 1700 Patient No No   Sig: TAKE 1 TAB BY MOUTH EVERY DAY.   traZODone (DESYREL) 100 MG Tab 4/28/2019 at 2200 Patient No No   Sig: TAKE 1 TABLET BY MOUTH EVERYDAY AT BEDTIME      Facility-Administered Medications: None       Physical Exam  Temp:  [36.5 °C (97.7 °F)] 36.5 °C (97.7 °F)  Pulse:  [105-120] 117  Resp:  [14-18] 16  BP: (162)/(90) 162/90  SpO2:  [85 %-97 %] 94 %    Physical Exam   Constitutional: She is oriented to person, place, and time. She appears well-developed and well-nourished. She appears distressed.   HENT:   Head: Normocephalic and atraumatic.   Eyes: Pupils are equal, round, and reactive to light. Conjunctivae and EOM are normal.   Neck: Normal range of motion. Neck supple. No JVD present.   Cardiovascular: Regular rhythm, normal heart sounds and intact distal pulses.    No murmur heard.  Tachycardic irregularly irregular   Pulmonary/Chest: Effort normal. No respiratory distress. She has wheezes.   Rales bilaterally    Abdominal: Soft. Bowel sounds are normal. She exhibits no distension. There is no tenderness.   Musculoskeletal: Normal range of motion. She exhibits no edema.   Neurological: She is  alert and oriented to person, place, and time. She exhibits normal muscle tone.   Skin: Skin is warm and dry.   Psychiatric: She has a normal mood and affect. Her behavior is normal. Judgment and thought content normal.   Nursing note and vitals reviewed.      Laboratory:  Recent Labs      04/29/19   1504   WBC  7.0   RBC  4.57   HEMOGLOBIN  13.8   HEMATOCRIT  43.6   MCV  95.4   MCH  30.2   MCHC  31.7*   RDW  49.7   PLATELETCT  233   MPV  10.3     Recent Labs      04/29/19   1504   SODIUM  137   POTASSIUM  4.3   CHLORIDE  102   CO2  26   GLUCOSE  115*   BUN  23*   CREATININE  1.30   CALCIUM  9.7     Recent Labs      04/29/19   1504   ALTSGPT  51*   ASTSGOT  38   ALKPHOSPHAT  82   TBILIRUBIN  0.6   GLUCOSE  115*     Recent Labs      04/29/19   1830   APTT  26.1   INR  1.01     Recent Labs      04/29/19   1830   BNPBTYPENAT  111*         Recent Labs      04/29/19   1504   TROPONINI  0.01       Urinalysis:    No results found     Imaging:  CT-CTA CHEST PULMONARY ARTERY W/ RECONS   Final Result      1.  No CT evidence for pulmonary emboli.   2.  Central pulmonary vessels are prominent suggesting pulmonary hypertension.   3.  Mildly prominent interstitium, concerning for edema.   4.  Nonspecific LEFT lower lobe nodules measuring up to 6 mm.      Low Risk: No routine follow-up      High Risk: Optional CT at 12 months      Comments: Use most suspicious nodule as guide to management. Follow-up intervals may vary according to size and risk.      Low Risk - Minimal or absent history of smoking and of other known risk factors.      High Risk - History of smoking or of other known risk factors.      Note: These recommendations do not apply to lung cancer screening, patients with immunosuppression, or patients with known primary cancer.      Fleischner Society 2017 Guidelines for Management of Incidentally Detected Pulmonary Nodules in Adults      DX-CHEST-PORTABLE (1 VIEW)   Final Result      Enlarged cardia silhouette without  evidence of acute disease.      EC-ECHOCARDIOGRAM COMPLETE W/O CONT    (Results Pending)         Assessment/Plan:  I anticipate this patient will require at least two midnights for appropriate medical management, necessitating inpatient admission.    * Atrial fibrillation with RVR (HCC)   Assessment & Plan    New onset afibb with RVR   Admit with IV metoprolol PRN  Oral metoprolol 25 mg BID started  Start on xarelto  Cardiac monitoring   Tsh, echo ordered  Cardiology consulted for evaluation      Pulmonary nodule   Assessment & Plan    Needs outpatient follow up      Pulmonary HTN (HCC)   Assessment & Plan    Noted on ct scan, echo ordered for eval  May need outpatient sleep study      Elevated BUN   Assessment & Plan    Appears hypovolemic, will give IVF and repeat renal function in the am     Acute hypoxemic respiratory failure (HCC)   Assessment & Plan    This is likely due to atrial fibrillation with RVR   Wean 02 as tolerated  Keep sats 88-92% given history of COPD     Primary insomnia- (present on admission)   Assessment & Plan    Resume home trazadone     Hypothyroidism due to acquired atrophy of thyroid- (present on admission)   Assessment & Plan    Resume home levothyroxine   Check tsh     Dyslipidemia- (present on admission)   Assessment & Plan    Resume statin therapy      Chronic obstructive pulmonary disease (HCC)- (present on admission)   Assessment & Plan    Not in acute exacerbation   resp care protocol ordered     Essential hypertension- (present on admission)   Assessment & Plan    Hold home ace, assess response to BB     Type 2 diabetes mellitus without complication, without long-term current use of insulin (HCC)- (present on admission)   Assessment & Plan    Hold home meds   SSI ordered  accu checks     History of CVA (cerebrovascular accident)- (present on admission)   Assessment & Plan    History of, will need to be on asa and statin therapy          VTE prophylaxis: xarelto

## 2019-04-30 NOTE — PROGRESS NOTES
Cardiology Follow Up Progress Note    Date of Service  4/30/2019    Attending Physician  Weston Levine M.D.    Chief Complaint   SOB and palpitations    Consulted for Atrial Fib/Flutter with RVR    HPI  Jessica Plaza is a 78 y.o. female referred to the emergency room by her PCP for new onset atrial fibrillation with RVR.  Patient was visiting her PCP with complaints of progressive cough and shortness of breath x10 days.    Past medical history significant for COPD, diabetes, emphysema, HLD, GERD, hypertension and previous CVA.    Interim Events  4/30/19: Patient continues to feel poorly.  She still has significant cough, shortness of breath and palpitations. No significant events overnight per nursing.     Monitor: Atrial Flutter 108-110    Review of Systems  Review of Systems   Constitutional: Negative for fever.   HENT: Positive for congestion.    Respiratory: Positive for cough, shortness of breath and wheezing. Negative for chest tightness.    Cardiovascular: Negative for chest pain, palpitations and leg swelling.   Gastrointestinal: Negative for abdominal pain and blood in stool.   Genitourinary: Negative for hematuria.   Musculoskeletal: Negative for gait problem.   Neurological: Negative for dizziness and syncope.   All other systems reviewed and are negative.      Vital signs in last 24 hours  Temp:  [36.2 °C (97.1 °F)-36.6 °C (97.9 °F)] 36.6 °C (97.9 °F)  Pulse:  [] 108  Resp:  [14-28] 16  BP: (122-162)/(58-90) 138/88  SpO2:  [85 %-97 %] 94 %    Physical Exam  Physical Exam   Constitutional: She is oriented to person, place, and time. She appears well-developed and well-nourished. No distress.   Eyes: EOM are normal.   Neck: No JVD present.   Cardiovascular: Regular rhythm, normal heart sounds and intact distal pulses.  Tachycardia present.    Pulmonary/Chest: Effort normal and breath sounds normal.   Diffuse wheezing throughout    Musculoskeletal: Normal range of motion. She exhibits no  edema.   Neurological: She is alert and oriented to person, place, and time.   Skin: Skin is warm and dry.   Psychiatric: She has a normal mood and affect. Her behavior is normal. Thought content normal.   Nursing note and vitals reviewed.      Lab Review  Lab Results   Component Value Date/Time    WBC 7.0 04/29/2019 03:04 PM    RBC 4.57 04/29/2019 03:04 PM    HEMOGLOBIN 13.8 04/29/2019 03:04 PM    HEMATOCRIT 43.6 04/29/2019 03:04 PM    MCV 95.4 04/29/2019 03:04 PM    MCH 30.2 04/29/2019 03:04 PM    MCHC 31.7 (L) 04/29/2019 03:04 PM    MPV 10.3 04/29/2019 03:04 PM      Lab Results   Component Value Date/Time    SODIUM 137 04/29/2019 03:04 PM    POTASSIUM 4.3 04/29/2019 03:04 PM    CHLORIDE 102 04/29/2019 03:04 PM    CO2 26 04/29/2019 03:04 PM    GLUCOSE 115 (H) 04/29/2019 03:04 PM    BUN 23 (H) 04/29/2019 03:04 PM    CREATININE 1.30 04/29/2019 03:04 PM    BUNCREATRAT 17 02/15/2019 07:53 AM      Lab Results   Component Value Date/Time    ASTSGOT 38 04/29/2019 03:04 PM    ALTSGPT 51 (H) 04/29/2019 03:04 PM     Lab Results   Component Value Date/Time    CHOLSTRLTOT 205 (H) 02/15/2019 07:53 AM    CHOLSTRLTOT 135 06/07/2017 03:00 AM    LDL 81 02/15/2019 07:53 AM    LDL 53 06/07/2017 03:00 AM     02/15/2019 07:53 AM    HDL 53 06/07/2017 03:00 AM    TRIGLYCERIDE 75 02/15/2019 07:53 AM    TRIGLYCERIDE 146 06/07/2017 03:00 AM    TROPONINI 0.01 04/30/2019 04:33 AM       Recent Labs      04/29/19   1830   BNPBTYPENAT  111*       Cardiac Imaging and Procedures Review  Echocardiogram: Results Pending     Imaging  Chest X-Ray: 4/29/19  The cardiac silhouette is enlarged.  No pulmonary infiltrates or consolidations are noted.  No pleural abnormalities are noted.    Assessment/Plan  Atrial Fibrillation:  -New diagnosis.  -Rate control strategy at this time due to possible consideration for back surgery within a month.   -Rate elevated, patient is symptomatic.   -AC with Xarelto.   -IV Digoxin 500 mcg given this am, repeat  dose X 1 this evening at 1800.  -Continue Lopressor 50 mg BID.   -NPO at midnight for possible DCCV tomorrow.     HTN:  -Stable.  -Continue lopressor 50 mg BID.     HLD:  -Stable.  -LDL on 2/15/19 was 81.  -Continue Simvastatin 40 mg daily.      Thank you for allowing us to participate in the care of this patient.  We will continue to follow alongside you  in the care of this patient.  Please us if you have any questions or concerns.    Future Appointments  Date Time Provider Department Center   8/21/2019 1:20 PM Anurag Moreno M.D. [facility].   [facility]  [phone]

## 2019-04-30 NOTE — PROGRESS NOTES
Care of patient assumed after report. Patient awake, alert, sitting up in bed. Bed alarm on, call light in reach, fall precautions in place. Discussed plan of care with patient, all questions answered. Will continue to monitor.

## 2019-04-30 NOTE — CARE PLAN
Problem: Safety  Goal: Will remain free from injury  Bed in low/locked position. Treaded slipper socks on. Call light within patient's reach.   Educated patient on importance of calling nurses for assistance in ambulating.     Problem: Venous Thromboembolism (VTW)/Deep Vein Thrombosis (DVT) Prevention:  Goal: Patient will participate in Venous Thrombosis (VTE)/Deep Vein Thrombosis (DVT)Prevention Measures  Patient on xarelto  SCDs ordered, will place on patient.     Problem: Respiratory:  Goal: Respiratory status will improve  Monitoring.  Continuous pulse oximetry on.

## 2019-04-30 NOTE — PROGRESS NOTES
Hospital Medicine Daily Progress Note    Date of Service  4/30/2019    Chief Complaint  Shortness of breath    Hospital Course      78 y.o. female with past medical history of chronic obstructive pulmonary disease, diabetes, hypertension, previous stroke. Admitted 4/29/2019 with shortness of breath and the finding of atrial fibrillation with rapid ventricular response as part of the preoperative evaluation.        Interval Problem Update  Heart rate , improving, blood pressure within normal limits.  Saturating well on 1.5-2 L of oxygen.  Encourage incentive spirometer.  Try to wean off as able.  Metoprolol dose, titrating up to 50 mg twice daily.  Continue cardiac monitoring.  IV metoprolol as needed.  I reviewed echo results, showed reduced ejection fraction LV 45%. Global hypokinesia.  Mild concentric LVH. RVSP 40 mmHg consistent with pulmonary hypertension.  Pulmonary hypertension likely due to COPD, with or without potentially undiagnosed sleep apnea. The patient will need outpatient sleep study.  Troponin 0 0.01, then 0.01, .  Plan for [place] and possible cardioversion  Creatinine 1.3, I am ordering a BMP to follow on renal function.  Renally dose of rivaroxaban.  I am discussing with pharmacy.  I ordered rivaroxaban to check patient's co-pay.   Blood sugars 137-200, will adjust short acting insulin rather than long-acting given she will be n.p.o. for the planned cardioversion tomorrow.  Discussed with patient, patient's nurse and with multidisciplinary team during rounds including , pharmacist and charge nurse.      Consultants/Specialty  Cardiology     Code Status  FULL     Disposition  TBD     Review of Systems  Review of Systems   Constitutional: Negative for chills and fever.   HENT: Negative for congestion and sore throat.    Eyes: Negative for pain, discharge and redness.   Respiratory: Positive for cough and shortness of breath. Negative for hemoptysis, sputum production and  stridor.    Cardiovascular: Positive for palpitations (Improving). Negative for chest pain and leg swelling.   Gastrointestinal: Negative for abdominal pain, blood in stool, diarrhea and vomiting.   Genitourinary: Negative for flank pain, hematuria and urgency.   Musculoskeletal: Negative for myalgias.   Skin: Negative.    Neurological: Negative for speech change, focal weakness, seizures and loss of consciousness.   Endo/Heme/Allergies: Does not bruise/bleed easily.   Psychiatric/Behavioral: Negative for hallucinations and suicidal ideas. The patient is not nervous/anxious.         Physical Exam  Temp:  [36.2 °C (97.1 °F)-37 °C (98.6 °F)] 36.9 °C (98.4 °F)  Pulse:  [] 84  Resp:  [16-28] 16  BP: (122-158)/(58-89) 158/89  SpO2:  [85 %-97 %] 94 %    Physical Exam   Constitutional: She is oriented to person, place, and time. She appears well-developed and well-nourished. No distress.   HENT:   Head: Normocephalic and atraumatic.   Right Ear: External ear normal.   Left Ear: External ear normal.   Eyes: Pupils are equal, round, and reactive to light. Conjunctivae are normal. Right eye exhibits no discharge. Left eye exhibits no discharge.   Neck: Normal range of motion. Neck supple. No JVD present. No tracheal deviation present. No thyromegaly present.   Cardiovascular: Exam reveals no gallop and no friction rub.    No murmur heard.  Tachycardia.  Irregular irregularity.   Pulmonary/Chest: Effort normal. No stridor. No respiratory distress. She has wheezes. She has no rales. She exhibits no tenderness.   Wheezing scattered bilaterally   Abdominal: Soft. Bowel sounds are normal. She exhibits no distension. There is no tenderness. There is no rebound and no guarding.   Musculoskeletal: Normal range of motion. She exhibits no edema, tenderness or deformity.   Neurological: She is alert and oriented to person, place, and time. No cranial nerve deficit. Coordination normal.   Skin: Skin is warm and dry. No rash noted.  She is not diaphoretic. No erythema. No pallor.   Psychiatric: She has a normal mood and affect. Her behavior is normal. Judgment normal.   Nursing note and vitals reviewed.      Fluids    Intake/Output Summary (Last 24 hours) at 04/30/19 1916  Last data filed at 04/30/19 1800   Gross per 24 hour   Intake              500 ml   Output             1000 ml   Net             -500 ml       Laboratory  Recent Labs      04/29/19   1504   WBC  7.0   RBC  4.57   HEMOGLOBIN  13.8   HEMATOCRIT  43.6   MCV  95.4   MCH  30.2   MCHC  31.7*   RDW  49.7   PLATELETCT  233   MPV  10.3     Recent Labs      04/29/19   1504   SODIUM  137   POTASSIUM  4.3   CHLORIDE  102   CO2  26   GLUCOSE  115*   BUN  23*   CREATININE  1.30   CALCIUM  9.7     Recent Labs      04/29/19   1830   APTT  26.1   INR  1.01     Recent Labs      04/29/19   1830   BNPBTYPENAT  111*           Imaging  EC-ECHOCARDIOGRAM COMPLETE W/O CONT   Final Result      CT-CTA CHEST PULMONARY ARTERY W/ RECONS   Final Result      1.  No CT evidence for pulmonary emboli.   2.  Central pulmonary vessels are prominent suggesting pulmonary hypertension.   3.  Mildly prominent interstitium, concerning for edema.   4.  Nonspecific LEFT lower lobe nodules measuring up to 6 mm.      Low Risk: No routine follow-up      High Risk: Optional CT at 12 months      Comments: Use most suspicious nodule as guide to management. Follow-up intervals may vary according to size and risk.      Low Risk - Minimal or absent history of smoking and of other known risk factors.      High Risk - History of smoking or of other known risk factors.      Note: These recommendations do not apply to lung cancer screening, patients with immunosuppression, or patients with known primary cancer.      [employer] Society 2017 Guidelines for Management of Incidentally Detected Pulmonary Nodules in Adults      DX-CHEST-PORTABLE (1 VIEW)   Final Result      Enlarged cardia silhouette without evidence of acute disease.            Assessment/Plan  * Atrial fibrillation with RVR (HCC)   Assessment & Plan    New onset afibb with RVR    Metoprolol, titrating up.  Start on xarelto  Cardiac monitoring   Cardiology consulted   Reduced ejection fraction LV 45%. Global hypokinesia.  Mild concentric LV H. RVSP 40 mmHg consistent with pulmonary hypertension.  Plan for [place], possible cardioversion      Chronic anticoagulation   Assessment & Plan    Started for atrial fibrillation     Pulmonary nodule   Assessment & Plan    Needs outpatient follow up       Pulmonary HTN (HCC)   Assessment & Plan    Noted on CT-scan  Echo Reduced ejection fraction LV 45%. Global hypokinesia.  Mild concentric LVH. RVSP 40 mmHg consistent with pulmonary hypertension.  COPD, +/- obstructive sleep apnea component  May need outpatient sleep study         Elevated BUN   Assessment & Plan    Mostly due to dehydration   S/p Intravenous fluids  Avoid nephrotoxins, monitor inputs and outputs      Acute hypoxemic respiratory failure (HCC)   Assessment & Plan    This is likely due to atrial fibrillation with RVR   Wean 02 as tolerated  Keep sats 88-92% given history of COPD      Primary insomnia- (present on admission)   Assessment & Plan    Resume home trazadone      Hypothyroidism due to acquired atrophy of thyroid- (present on admission)   Assessment & Plan    Resume home levothyroxine   TSH 2.7      Dyslipidemia- (present on admission)   Assessment & Plan    Resume statin therapy       Chronic obstructive pulmonary disease (HCC)- (present on admission)   Assessment & Plan    Not in acute exacerbation   Resp care protocol ordered  Echo Reduced ejection fraction LV 45%. Global hypokinesia.  Mild concentric LVH. RVSP 40 mmHg consistent with pulmonary hypertension.      Essential hypertension- (present on admission)   Assessment & Plan    Hold home ace, assess response to BB      Type 2 diabetes mellitus without complication, without long-term current use of insulin ([facility])-  (present on admission)   Assessment & Plan    With hyperglycemia  Glycated hemoglobin 6.5, 2 months ago   Long & short acting insulin  Accu-Checks, hypoglycemia protocol       History of CVA (cerebrovascular accident)- (present on admission)   Assessment & Plan    History of, will need to be on asa and statin therapy            VTE prophylaxis: Rivaroxaban

## 2019-04-30 NOTE — ED NOTES
Med rec updated and complete.  Allergies reviewed. Pt denies oral antibiotic use in last   30 days. Pt took all morning doses.

## 2019-04-30 NOTE — CONSULTS
Reason for Consult:  Asked by Dr Daphne Mendoza M.D. to see this patient with newly diagnosed atrial fibrillation  Patient's PCP: Anurag Moreno M.D.    CC:   Chief Complaint   Patient presents with   • Abnormal EKG   • Shortness of Breath   • Difficulty Sleeping   • Cough       HPI: This is a 78-year-old woman with a history of TIA dyslipidemia long-term smoking having quit over 5 years ago who presents with 10 days of progressive shortness of breath productive cough and bronchitis to primary care was found to have atrial fibrillation and was recommended to emergency evaluation.    She had initially made her appointment today for preoperative clearance for back surgery she suffers from long-term arthritis which is not improved with Neurontin 6 and was hoping to have back surgery with Dr. Cabrales to be scheduled within the month    Medications / Drug list prior to admission:  No current facility-administered medications on file prior to encounter.      Current Outpatient Prescriptions on File Prior to Encounter   Medication Sig Dispense Refill   • levothyroxine (SYNTHROID) 75 MCG Tab TAKE 1 TAB BY MOUTH EVERY MORNING ON AN EMPTY STOMACH. 90 Tab 3   • gabapentin (NEURONTIN) 300 MG Cap TAKE 1 CAPSULE BY MOUTH TWICE A  Cap 3   • metformin (GLUCOPHAGE) 1000 MG tablet Take 1 Tab by mouth 2 times a day, with meals. 180 Tab 2   • traZODone (DESYREL) 100 MG Tab TAKE 1 TABLET BY MOUTH EVERYDAY AT BEDTIME 90 Tab 3   • albuterol 108 (90 Base) MCG/ACT Aero Soln inhalation aerosol Inhale 1 Puff by mouth every 6 hours as needed for Shortness of Breath. 8.5 g 5   • lisinopril (PRINIVIL) 10 MG Tab TAKE 1 TAB BY MOUTH EVERY DAY. 90 Tab 3   • simvastatin (ZOCOR) 40 MG Tab TAKE 1 TAB BY MOUTH EVERY DAY. 30 Tab 5   • aspirin (ASA) 81 MG Chew Tab chewable tablet Take 81 mg by mouth every day.     • omeprazole (PRILOSEC) 40 MG capsule Take 40 mg by mouth every day.         Current list of administered Medications:    Current  Facility-Administered Medications:   •  senna-docusate (PERICOLACE or SENOKOT S) 8.6-50 MG per tablet 2 Tab, 2 Tab, Oral, BID **AND** polyethylene glycol/lytes (MIRALAX) PACKET 1 Packet, 1 Packet, Oral, QDAY PRN **AND** magnesium hydroxide (MILK OF MAGNESIA) suspension 30 mL, 30 mL, Oral, QDAY PRN **AND** bisacodyl (DULCOLAX) suppository 10 mg, 10 mg, Rectal, QDAY PRN, Merissa Garza M.D.  •  Respiratory Care per Protocol, , Nebulization, Continuous RT, Merissa Garza M.D.  •  ondansetron (ZOFRAN) syringe/vial injection 4 mg, 4 mg, Intravenous, Q4HRS PRN, Merissa Garza M.D.  •  ondansetron (ZOFRAN ODT) dispertab 4 mg, 4 mg, Oral, Q4HRS PRN, Merissa Garza M.D.  •  [START ON 4/30/2019] levothyroxine (SYNTHROID) tablet 75 mcg, 75 mcg, Oral, AM ES, Merissa Garza M.D.  •  [START ON 4/30/2019] omeprazole (PRILOSEC) capsule 40 mg, 40 mg, Oral, DAILY, Merissa Garza M.D.  •  [START ON 4/30/2019] simvastatin (ZOCOR) tablet 40 mg, 40 mg, Oral, DAILY, Merissa Garza M.D.  •  traZODone (DESYREL) tablet 100 mg, 100 mg, Oral, QHS, Merissa Garza M.D.  •  [START ON 4/30/2019] gabapentin (NEURONTIN) capsule 300 mg, 300 mg, Oral, TID, Merissa Garza M.D.  •  Metoprolol Tartrate (LOPRESSOR) injection 5 mg, 5 mg, Intravenous, Q5 MIN PRN, Merissa Garza M.D., 5 mg at 04/29/19 2252  •  metoprolol (LOPRESSOR) tablet 25 mg, 25 mg, Oral, TWICE DAILY, Merissa Garza M.D.  •  [START ON 4/30/2019] rivaroxaban (XARELTO) tablet 20 mg, 20 mg, Oral, PM MEAL, Merissa Garza M.D.  •  [START ON 4/30/2019] insulin regular (HUMULIN R) injection 1-6 Units, 1-6 Units, Subcutaneous, Q6HRS **AND** Accu-Chek Q6 if NPO, , , Q6H **AND** NOTIFY MD and PharmD, , , Once **AND** glucose 4 g chewable tablet 16 g, 16 g, Oral, Q15 MIN PRN **AND** dextrose 50% (D50W) injection 25 mL, 25 mL, Intravenous, Q15 MIN PRN, Merissa Garza M.D.  •  NS infusion, , Intravenous, Continuous, Merissa Garza M.D.    Current Outpatient  Prescriptions:   •  liraglutide (VICTOZA) 18 MG/3ML Solution Pen-injector injection, Inject 1.2 mg as instructed every day., Disp: , Rfl:   •  levothyroxine (SYNTHROID) 75 MCG Tab, TAKE 1 TAB BY MOUTH EVERY MORNING ON AN EMPTY STOMACH., Disp: 90 Tab, Rfl: 3  •  gabapentin (NEURONTIN) 300 MG Cap, TAKE 1 CAPSULE BY MOUTH TWICE A DAY, Disp: 180 Cap, Rfl: 3  •  metformin (GLUCOPHAGE) 1000 MG tablet, Take 1 Tab by mouth 2 times a day, with meals., Disp: 180 Tab, Rfl: 2  •  traZODone (DESYREL) 100 MG Tab, TAKE 1 TABLET BY MOUTH EVERYDAY AT BEDTIME, Disp: 90 Tab, Rfl: 3  •  albuterol 108 (90 Base) MCG/ACT Aero Soln inhalation aerosol, Inhale 1 Puff by mouth every 6 hours as needed for Shortness of Breath., Disp: 8.5 g, Rfl: 5  •  lisinopril (PRINIVIL) 10 MG Tab, TAKE 1 TAB BY MOUTH EVERY DAY., Disp: 90 Tab, Rfl: 3  •  simvastatin (ZOCOR) 40 MG Tab, TAKE 1 TAB BY MOUTH EVERY DAY., Disp: 30 Tab, Rfl: 5  •  aspirin (ASA) 81 MG Chew Tab chewable tablet, Take 81 mg by mouth every day., Disp: , Rfl:   •  omeprazole (PRILOSEC) 40 MG capsule, Take 40 mg by mouth every day., Disp: , Rfl:     Past Medical History:   Diagnosis Date   • Anemia    • Arthritis    • Castro esophagus    • Bronchitis    • COPD    • COPD (chronic obstructive pulmonary disease) (HCC)    • Dental disorder     dentures upper and lower   • Diabetes     oral meds   • Disorder of thyroid    • Emphysema of lung (HCC)    • Encounter for medication refill 2/5/2018   • GERD (gastroesophageal reflux disease)    • Heart burn    • Hemorrhagic disorder     hx internal bleeding   • High cholesterol    • Hypertension    • Indigestion    • Pain     left shoulder   • Pneumonia 2013.   • Stroke (HCC) 2013   • UTI (urinary tract infection)        Past Surgical History:   Procedure Laterality Date   • SHOULDER DECOMPRESSION ARTHROSCOPIC Left 10/11/2016    Procedure: SHOULDER DECOMPRESSION ARTHROSCOPIC  with   debridment of the labrum and biceps tendon stump;  Surgeon: Bill AVILEZ  MARVA Ponce;  Location: SURGERY [facility];  Service:    • CLAVICLE RESECTION Left 10/11/2016    Procedure: CLAVICLE RESECTION distal open  ;  Surgeon: Bill Ponec M.D.;  Location: SURGERY [facility];  Service:    • CHONDROPLASTY Left 10/11/2016    Procedure: Arthroscopic CHONDROPLASTY of the joint  ;  Surgeon: Bill Ponce M.D.;  Location: SURGERY [facility];  Service:    • SHOULDER DECOMPRESSION ARTHROSCOPIC Left 2016    Procedure: SHOULDER DECOMPRESSION ARTHROSCOPIC SUBACROMIAL and biceps tenotomy;  Surgeon: Johan Roberts M.D.;  Location: SURGERY [facility];  Service:    • GASTROSCOPY-ENDO  2014    Performed by Yonas Grimes Jr., M.D. at ENDOSCOPY [facility]   • TONSILLECTOMY     • PB ANESTH, DELIVERY ONLY      X 2       Family History   Problem Relation Age of Onset   • Cancer Mother         breast   • Lung Disease Mother         COPD/Emphysema/Smoker   • Stroke Father    • Hypertension Father    • Hyperlipidemia Father    • Heart Attack Father    • Cancer Sister         Pancriatic   • Arrythmia Sister    • Lung Disease Sister         COPD/Emphysema/Smoker   • Bipolar disorder Son    • Bipolar disorder Son    • Bipolar disorder Son    • Cancer Maternal Aunt         stomach     Patient family history was personally reviewed, no pertinent family history to current presentation    Social History     Social History   • Marital status:      Spouse name: N/A   • Number of children: N/A   • Years of education: N/A     Occupational History   • Not on file.     Social History Main Topics   • Smoking status: Former Smoker     Packs/day: 0.50     Years: 52.00     Types: Cigarettes     Quit date: 2013   • Smokeless tobacco: Never Used      Comment: started smoking at age 21   • Alcohol use 8.4 oz/week     7 Shots of liquor, 7 Standard drinks or equivalent per week      Comment: 1/day   • Drug use: No   • Sexual activity: No     Other Topics Concern  "  • Not on file     Social History Narrative   • No narrative on file       ALLERGIES:  Allergies   Allergen Reactions   • Bactrim Hives   • Demerol Shortness of Breath and Swelling   • Pcn [Penicillins] Hives   • Sulfa Drugs Hives   • Ferrous Sulfate Rash     itching   • Latex Contact Dermatitis       Review of systems:  A complete review of symptoms was reviewed with patient. This is reviewed in H&P and PMH. ALL OTHERS reviewed and negative    Physical exam:  Patient Vitals for the past 24 hrs:   BP Temp Temp src Pulse Resp SpO2 Height Weight   19 2252 - - - (!) 132 - - - -   19 2100 - - - (!) 117 16 94 % - -   19 2000 - - - (!) 120 18 (!) 85 % - -   19 1847 - - - (!) 105 16 97 % - -   19 1438 - - - - - - 1.727 m (5' 8\") 84 kg (185 lb 3 oz)   19 1427 (!) 162/90 36.5 °C (97.7 °F) Temporal (!) 116 14 - - -       General: Mild respiratory increased work of breathing we taken off her supplemental oxygen and her sats did drop into the 85 able to speak three quarters of sentences  EYES: no jaundice  HEENT: OP clear   Neck: No bruits No JVD.   CVS:  Irregular heart rates improved to 100 after metoprolol S1 + S2. No M/R/G  Resp: She has bronchial sounds and some audible wheezing of the upper airways no rales  Abdomen: Soft, NT, ND,  Skin: Grossly nothing acute no obvious rashes  Neurological: Alert, Moves all extremities, no cranial nerve defects on limited exam  Extremities: Pulse 2+ in b/l LE.  no edema. No cyanosis.       Data:  Laboratory studies personally reviewed by me:  Recent Results (from the past 24 hour(s))   EKG (NOW)    Collection Time: 19  2:30 PM   Result Value Ref Range    Report       [facility] Emergency Dept.    Test Date:  2019  Pt Name:    MARILIN MCKEON                   Department: ER  MRN:        [id number]                      Room:       [facility]  Gender:     Female                       Technician: [id number]  :        [id number]           "         Requested By:ER TRIAGE PROTOCOL  Order #:    [id number]                    Reading MD: Daphne Mendoza    Measurements  Intervals                                Axis  Rate:       120                          P:  GA:                                      QRS:        52  QRSD:       102                          T:          83  QT:         336  QTc:        475    Interpretive Statements  ATRIAL FLUTTER, A-RATE 234; vent rate of 120  BORDERLINE LOW VOLTAGE IN FRONTAL LEADS  BORDERLINE REPOLARIZATION ABNORMALITY  BASELINE WANDER IN LEAD(S) V1  Compared to ECG 06/06/2017 23:08:09  Sinus rhythm no longer present  Atrial premature complex(es) no longer present  T-wave abnormality no longer present  No ST elevation  or depression  Normal axis  Electronically Signed On 4- 22:37:27 PDT by Dpahne Mendoza     CBC with Differential    Collection Time: 04/29/19  3:04 PM   Result Value Ref Range    WBC 7.0 4.8 - 10.8 K/uL    RBC 4.57 4.20 - 5.40 M/uL    Hemoglobin 13.8 12.0 - 16.0 g/dL    Hematocrit 43.6 37.0 - 47.0 %    MCV 95.4 81.4 - 97.8 fL    MCH 30.2 27.0 - 33.0 pg    MCHC 31.7 (L) 33.6 - 35.0 g/dL    RDW 49.7 35.9 - 50.0 fL    Platelet Count 233 164 - 446 K/uL    MPV 10.3 9.0 - 12.9 fL    Neutrophils-Polys 74.60 (H) 44.00 - 72.00 %    Lymphocytes 15.70 (L) 22.00 - 41.00 %    Monocytes 6.80 0.00 - 13.40 %    Eosinophils 2.10 0.00 - 6.90 %    Basophils 0.40 0.00 - 1.80 %    Immature Granulocytes 0.40 0.00 - 0.90 %    Nucleated RBC 0.00 /100 WBC    Neutrophils (Absolute) 5.22 2.00 - 7.15 K/uL    Lymphs (Absolute) 1.10 1.00 - 4.80 K/uL    Monos (Absolute) 0.48 0.00 - 0.85 K/uL    Eos (Absolute) 0.15 0.00 - 0.51 K/uL    Baso (Absolute) 0.03 0.00 - 0.12 K/uL    Immature Granulocytes (abs) 0.03 0.00 - 0.11 K/uL    NRBC (Absolute) 0.00 K/uL   Complete Metabolic Panel (CMP)    Collection Time: 04/29/19  3:04 PM   Result Value Ref Range    Sodium 137 135 - 145 mmol/L    Potassium 4.3 3.6 - 5.5 mmol/L    Chloride 102 96 - 112  mmol/L    Co2 26 20 - 33 mmol/L    Anion Gap 9.0 0.0 - 11.9    Glucose 115 (H) 65 - 99 mg/dL    Bun 23 (H) 8 - 22 mg/dL    Creatinine 1.30 0.50 - 1.40 mg/dL    Calcium 9.7 8.5 - 10.5 mg/dL    AST(SGOT) 38 12 - 45 U/L    ALT(SGPT) 51 (H) 2 - 50 U/L    Alkaline Phosphatase 82 30 - 99 U/L    Total Bilirubin 0.6 0.1 - 1.5 mg/dL    Albumin 4.2 3.2 - 4.9 g/dL    Total Protein 7.3 6.0 - 8.2 g/dL    Globulin 3.1 1.9 - 3.5 g/dL    A-G Ratio 1.4 g/dL   Troponin    Collection Time: 04/29/19  3:04 PM   Result Value Ref Range    Troponin I 0.01 0.00 - 0.04 ng/mL   ESTIMATED GFR    Collection Time: 04/29/19  3:04 PM   Result Value Ref Range    GFR If  48 (A) >60 mL/min/1.73 m 2    GFR If Non African American 40 (A) >60 mL/min/1.73 m 2   LACTIC ACID    Collection Time: 04/29/19  6:30 PM   Result Value Ref Range    Lactic Acid 1.4 0.5 - 2.0 mmol/L   BNP    Collection Time: 04/29/19  6:30 PM   Result Value Ref Range    B Natriuretic Peptide 111 (H) 0 - 100 pg/mL   D-DIMER    Collection Time: 04/29/19  6:30 PM   Result Value Ref Range    D-Dimer Screen 1.16 (H) 0.00 - 0.50 ug/mL (FEU)   PT/INR    Collection Time: 04/29/19  6:30 PM   Result Value Ref Range    PT 13.4 12.0 - 14.6 sec    INR 1.01 0.87 - 1.13   APTT    Collection Time: 04/29/19  6:30 PM   Result Value Ref Range    APTT 26.1 24.7 - 36.0 sec       Imaging:  CT-CTA CHEST PULMONARY ARTERY W/ RECONS   Final Result      1.  No CT evidence for pulmonary emboli.   2.  Central pulmonary vessels are prominent suggesting pulmonary hypertension.   3.  Mildly prominent interstitium, concerning for edema.   4.  Nonspecific LEFT lower lobe nodules measuring up to 6 mm.      Low Risk: No routine follow-up      High Risk: Optional CT at 12 months      Comments: Use most suspicious nodule as guide to management. Follow-up intervals may vary according to size and risk.      Low Risk - Minimal or absent history of smoking and of other known risk factors.      High Risk -  History of smoking or of other known risk factors.      Note: These recommendations do not apply to lung cancer screening, patients with immunosuppression, or patients with known primary cancer.      Fleischner Society 2017 Guidelines for Management of Incidentally Detected Pulmonary Nodules in Adults      DX-CHEST-PORTABLE (1 VIEW)   Final Result      Enlarged cardia silhouette without evidence of acute disease.      EC-ECHOCARDIOGRAM COMPLETE W/O CONT    (Results Pending)           EKG : personally reviewed by me atrial fibrillation with rapid ventricular rate somewhat organized but not true typical flutter    All pertinent features of laboratory and imaging reviewed including primary images where applicable      Principal Problem:    Atrial fibrillation with RVR (HCC) POA: Unknown  Active Problems:    History of CVA (cerebrovascular accident) POA: Yes    Type 2 diabetes mellitus without complication, without long-term current use of insulin (HCC) (Chronic) POA: Yes    Essential hypertension (Chronic) POA: Yes    Chronic obstructive pulmonary disease (HCC) (Chronic) POA: Yes    Dyslipidemia (Chronic) POA: Yes    Hypothyroidism due to acquired atrophy of thyroid POA: Yes    Primary insomnia POA: Yes    Acute hypoxemic respiratory failure (HCC) POA: Unknown    Elevated BUN POA: Unknown    Pulmonary HTN (HCC) POA: Unknown    Pulmonary nodule POA: Unknown  Resolved Problems:    * No resolved hospital problems. *      Assessment / Plan:  Newly diagnosed afib with rapid ventricular rate  Heart rates have improved with IV metoprolol we will. give oral metoprolol and see if this is tolerated on the concern that may lead to some wheezing so we may have to switch to calcium channel blockers     given the desire for upcoming back surgery would pursue just a rate control strategy with chronic anticoagulation and then perhaps after back surgery has healed and recovered she could pursue rhythm control strategy with [place]  cardioversion is possible she will revert back to sinus rhythm just on standard rate control agents    Anticoagulation  Xarelto for next month then switch to coumadin after back surgery    DM  We discussed that you should talk with primary care (or endocrinology) about oral medication: empagliflozin (JARDIANCE®  Preferred), dapagliflozin (FARXIGA®), or Canagliflozin (Invokana® [CONCERN FOR FOOT AMPUTATION]); there are cardiovascular benefits but there are also risks.  You may also want to consider liraglutide (Victoza®) which is an injection with cardiovascular benefits but also risks.    Consideration of back surgery  Rate control can have surgery after she heals from her bronchitis no need for further cardiac testing      I personally discussed her case with  Dr Daphne Mendoza M.D. and Dr. Boykin    Future Appointments  Date Time Provider Department Center   8/21/2019 1:20 PM Anurag Moreno M.D. [id number]       It is my pleasure to participate in the care of Ms. Plaza.  Please do not hesitate to contact me with questions or concerns.    Johan Rico MD PhD [facility]  Cardiologist [facility]    4/29/2019    Please note that this dictation was created using voice recognition software. I have worked with consultants from the vendor as well as technical experts from [facility] to optimize the interface. I have made every reasonable attempt to correct obvious errors, but I expect that there are errors of grammar and possibly content I did not discover before finalizing the note.

## 2019-04-30 NOTE — THERAPY
Physical Therapy Contact Note    PT order received and acknowledged. Upon entry, patient up self without AD and steady, also managing O2 line to ambulate to bathroom. Patient reported no concerns regarding mobility and no concerns regarding return home following medical DC. All questions answered. No acute PT indicated at this time. Please re consult for phase 1 cardiac rehab as appropriate.    Bere Hendrickson, PT, DPT  [phone]

## 2019-04-30 NOTE — ASSESSMENT & PLAN NOTE
Noted on CT-scan  Echo Reduced ejection fraction LV 45%. Global hypokinesia.  Mild concentric LVH. RVSP 40 mmHg consistent with pulmonary hypertension.  COPD, +/- obstructive sleep apnea component  May need outpatient sleep study

## 2019-04-30 NOTE — ED PROVIDER NOTES
ED Provider Note    Scribed for Daphne Mendoza M.D. by Dora Spangler. 4/29/2019  5:20 PM    Primary care provider: Anurag Moreno M.D.  Means of arrival: Walk in  History obtained from: Patient  History limited by: None    CHIEF COMPLAINT  Chief Complaint   Patient presents with   • Abnormal EKG   • Shortness of Breath   • Difficulty Sleeping   • Cough       HPI  Jessica Plaza is a 78 y.o. female with an extensive medical history of COPD, diabetes, emphysema, GERD, hypertension, and stroke who presents to this ED for evaluation of new onset of atrial flutter. Patient reports she has been ill for 2 weeks which initially began with onset of body aches. She also states onset of nasal congestion and severe productive cough with clear phlegm. Patient states she has associated left-sided chest pain with coughing. Patient states she has been having difficulty sleeping secondary to the cough. She does not require supplemental oxygen. Patient states she used albuterol breathing treatments at home without much relief. She normally sleeps with her head elevated which has not changed. She reports feeling sensation of chest palpitations since last afternoon. Patient states she has chronic left leg pain and was seen by Dr. Moreno this morning as a pre operative evaluation and was sent to this ED as her EKG showed new onset of atrial flutter. Patient denies any history of atrial flutter or arrhythmia. Denies fever, leg swelling, calf pain, dizziness, lightheadedness, shortness of breath.     REVIEW OF SYSTEMS  See HPI for further details. All other systems are negative.    PAST MEDICAL HISTORY  Past Medical History:   Diagnosis Date   • Anemia    • Arthritis    • Castro esophagus    • Bronchitis    • COPD    • COPD (chronic obstructive pulmonary disease) (HCC)    • Dental disorder     dentures upper and lower   • Diabetes     oral meds   • Disorder of thyroid    • Emphysema of lung ([facility])    • Encounter for medication refill  2/5/2018   • GERD (gastroesophageal reflux disease)    • Heart burn    • Hemorrhagic disorder     hx internal bleeding   • High cholesterol    • Hypertension    • Indigestion    • Pain     left shoulder   • Pneumonia 2013.   • Stroke (HCC) 2013   • UTI (urinary tract infection)        FAMILY HISTORY  Family History   Problem Relation Age of Onset   • Cancer Mother         breast   • Lung Disease Mother         COPD/Emphysema/Smoker   • Stroke Father    • Hypertension Father    • Hyperlipidemia Father    • Heart Attack Father    • Cancer Sister         Pancriatic   • Arrythmia Sister    • Lung Disease Sister         COPD/Emphysema/Smoker   • Bipolar disorder Son    • Bipolar disorder Son    • Bipolar disorder Son    • Cancer Maternal Aunt         stomach       SOCIAL HISTORY  Social History     Social History   • Marital status:      Spouse name: N/A   • Number of children: N/A   • Years of education: N/A     Social History Main Topics   • Smoking status: Former Smoker     Packs/day: 0.50     Years: 52.00     Types: Cigarettes     Quit date: 1/1/2013   • Smokeless tobacco: Never Used      Comment: started smoking at age 21   • Alcohol use 8.4 oz/week     7 Shots of liquor, 7 Standard drinks or equivalent per week      Comment: 1/day   • Drug use: No   • Sexual activity: No     Other Topics Concern   • Not on file     Social History Narrative   • No narrative on file       SURGICAL HISTORY  Past Surgical History:   Procedure Laterality Date   • SHOULDER DECOMPRESSION ARTHROSCOPIC Left 10/11/2016    Procedure: SHOULDER DECOMPRESSION ARTHROSCOPIC  with   debridment of the labrum and biceps tendon stump;  Surgeon: Bill Ponce M.D.;  Location: [facility];  Service:    • CLAVICLE RESECTION Left 10/11/2016    Procedure: CLAVICLE RESECTION distal open  ;  Surgeon: Bill Ponce M.D.;  Location: SURGERY [facility];  Service:    • CHONDROPLASTY Left 10/11/2016    Procedure: Arthroscopic  "CHONDROPLASTY of the joint  ;  Surgeon: Bill Ponce M.D.;  Location: SURGERY [facility];  Service:    • SHOULDER DECOMPRESSION ARTHROSCOPIC Left 2016    Procedure: SHOULDER DECOMPRESSION ARTHROSCOPIC SUBACROMIAL and biceps tenotomy;  Surgeon: Johan Roberts M.D.;  Location: SURGERY [facility];  Service:    • GASTROSCOPY-ENDO  2014    Performed by Yonas Grimes Jr., M.D. at ENDOSCOPY [facility]   • TONSILLECTOMY     • PB ANESTH, DELIVERY ONLY      X 2       CURRENT MEDICATIONS  No current facility-administered medications on file prior to encounter.      Current Outpatient Prescriptions on File Prior to Encounter   Medication Sig Dispense Refill   • levothyroxine (SYNTHROID) 75 MCG Tab TAKE 1 TAB BY MOUTH EVERY MORNING ON AN EMPTY STOMACH. 90 Tab 3   • gabapentin (NEURONTIN) 300 MG Cap TAKE 1 CAPSULE BY MOUTH TWICE A  Cap 3   • metformin (GLUCOPHAGE) 1000 MG tablet Take 1 Tab by mouth 2 times a day, with meals. 180 Tab 2   • traZODone (DESYREL) 100 MG Tab TAKE 1 TABLET BY MOUTH EVERYDAY AT BEDTIME 90 Tab 3   • albuterol 108 (90 Base) MCG/ACT Aero Soln inhalation aerosol Inhale 1 Puff by mouth every 6 hours as needed for Shortness of Breath. 8.5 g 5   • lisinopril (PRINIVIL) 10 MG Tab TAKE 1 TAB BY MOUTH EVERY DAY. 90 Tab 3   • simvastatin (ZOCOR) 40 MG Tab TAKE 1 TAB BY MOUTH EVERY DAY. 30 Tab 5   • aspirin (ASA) 81 MG Chew Tab chewable tablet Take 81 mg by mouth every day.     • omeprazole (PRILOSEC) 40 MG capsule Take 40 mg by mouth every day.       ALLERGIES  Allergies   Allergen Reactions   • Bactrim Hives   • Demerol Shortness of Breath and Swelling   • Pcn [Penicillins] Hives   • Sulfa Drugs Hives   • Ferrous Sulfate Rash     itching   • Latex Contact Dermatitis       PHYSICAL EXAM  VITAL SIGNS: BP (!) 162/90   Pulse (!) 116   Temp 36.5 °C (97.7 °F) (Temporal)   Resp 14   Ht 1.727 m (5' 8\")   Wt 84 kg (185 lb 3 oz)   BMI 28.16 kg/m²  "     Constitutional: Well developed, well nourished; No acute distress; Non-toxic appearance.   HENT: Normocephalic, atraumatic; Bilateral external ears normal; Oropharynx with dry mucous membranes; No erythema or exudates in the posterior oropharynx.   Eyes: PERRL, EOMI, Conjunctiva normal. No discharge.   Neck:  Supple, nontender midline; No stridor; No nuchal rigidity.   Lymphatic: No cervical lymphadenopathy noted.   Cardiovascular: Tachycardic, Irregularly irregular rhythm without murmurs, rubs, or gallop.   Thorax & Lungs: Diffuse expiratory wheezes bilaterally with a frequent junky sounding cough, Diffuse scattered crackles at the bases, No rhonchi. Nontender chest wall. No crepitus or subcutaneous air  Abdomen: Soft, nontender, bowel sounds normal. No obvious masses; No pulsatile masses; no rebound, guarding, or peritoneal signs.   Skin: Good color; warm and dry without rash or petechia.  Back: Nontender, No CVA tenderness.   Extremities: Distal radial, dorsalis pedis, posterior tibial pulses are equal bilaterally; No edema; Nontender calves or saphenous, No cyanosis, No clubbing.   Musculoskeletal: Good range of motion in all major joints. No tenderness to palpation or major deformities noted.   Neurologic: Alert & oriented x 4, clear speech    EKG  12 Lead EKG; Interpreted by me as shown below.     LABS/RADIOLOGY/PROCEDURES  Results for orders placed or performed during the hospital encounter of 04/29/19   CBC with Differential   Result Value Ref Range    WBC 7.0 4.8 - 10.8 K/uL    RBC 4.57 4.20 - 5.40 M/uL    Hemoglobin 13.8 12.0 - 16.0 g/dL    Hematocrit 43.6 37.0 - 47.0 %    MCV 95.4 81.4 - 97.8 fL    MCH 30.2 27.0 - 33.0 pg    MCHC 31.7 (L) 33.6 - 35.0 g/dL    RDW 49.7 35.9 - 50.0 fL    Platelet Count 233 164 - 446 K/uL    MPV 10.3 9.0 - 12.9 fL    Neutrophils-Polys 74.60 (H) 44.00 - 72.00 %    Lymphocytes 15.70 (L) 22.00 - 41.00 %    Monocytes 6.80 0.00 - 13.40 %    Eosinophils 2.10 0.00 - 6.90 %     Basophils 0.40 0.00 - 1.80 %    Immature Granulocytes 0.40 0.00 - 0.90 %    Nucleated RBC 0.00 /100 WBC    Neutrophils (Absolute) 5.22 2.00 - 7.15 K/uL    Lymphs (Absolute) 1.10 1.00 - 4.80 K/uL    Monos (Absolute) 0.48 0.00 - 0.85 K/uL    Eos (Absolute) 0.15 0.00 - 0.51 K/uL    Baso (Absolute) 0.03 0.00 - 0.12 K/uL    Immature Granulocytes (abs) 0.03 0.00 - 0.11 K/uL    NRBC (Absolute) 0.00 K/uL   Complete Metabolic Panel (CMP)   Result Value Ref Range    Sodium 137 135 - 145 mmol/L    Potassium 4.3 3.6 - 5.5 mmol/L    Chloride 102 96 - 112 mmol/L    Co2 26 20 - 33 mmol/L    Anion Gap 9.0 0.0 - 11.9    Glucose 115 (H) 65 - 99 mg/dL    Bun 23 (H) 8 - 22 mg/dL    Creatinine 1.30 0.50 - 1.40 mg/dL    Calcium 9.7 8.5 - 10.5 mg/dL    AST(SGOT) 38 12 - 45 U/L    ALT(SGPT) 51 (H) 2 - 50 U/L    Alkaline Phosphatase 82 30 - 99 U/L    Total Bilirubin 0.6 0.1 - 1.5 mg/dL    Albumin 4.2 3.2 - 4.9 g/dL    Total Protein 7.3 6.0 - 8.2 g/dL    Globulin 3.1 1.9 - 3.5 g/dL    A-G Ratio 1.4 g/dL   Troponin   Result Value Ref Range    Troponin I 0.01 0.00 - 0.04 ng/mL   ESTIMATED GFR   Result Value Ref Range    GFR If  48 (A) >60 mL/min/1.73 m 2    GFR If Non African American 40 (A) >60 mL/min/1.73 m 2   LACTIC ACID   Result Value Ref Range    Lactic Acid 1.4 0.5 - 2.0 mmol/L   BNP   Result Value Ref Range    B Natriuretic Peptide 111 (H) 0 - 100 pg/mL   D-DIMER   Result Value Ref Range    D-Dimer Screen 1.16 (H) 0.00 - 0.50 ug/mL (FEU)   PT/INR   Result Value Ref Range    PT 13.4 12.0 - 14.6 sec    INR 1.01 0.87 - 1.13   APTT   Result Value Ref Range    APTT 26.1 24.7 - 36.0 sec   EKG (NOW)   Result Value Ref Range    Report       [facility] Emergency Dept.    Test Date:  2019  Pt Name:    MARILIN MCKEON                   Department: ER  MRN:        [id number]                      Room:       [facility]  Gender:     Female                       Technician: [id number]  :        [id number]                    Requested By:ER TRIAGE PROTOCOL  Order #:    [id number]                    Reading MD: Daphne Mendoza    Measurements  Intervals                                Axis  Rate:       120                          P:  CT:                                      QRS:        52  QRSD:       102                          T:          83  QT:         336  QTc:        475    Interpretive Statements  ATRIAL FLUTTER, A-RATE 234; vent rate of 120  BORDERLINE LOW VOLTAGE IN FRONTAL LEADS  BORDERLINE REPOLARIZATION ABNORMALITY  BASELINE WANDER IN LEAD(S) V1  Compared to ECG 06/06/2017 23:08:09  Sinus rhythm no longer present  Atrial premature complex(es) no longer present  T-wave abnormality no longer present  No ST elevation  or depression  Normal axis  Electronically Signed On 4- 22:37:27 PDT by Daphne Mendoza     Labs reviewed by me.     CT-CTA CHEST PULMONARY ARTERY W/ RECONS   Final Result      1.  No CT evidence for pulmonary emboli.   2.  Central pulmonary vessels are prominent suggesting pulmonary hypertension.   3.  Mildly prominent interstitium, concerning for edema.   4.  Nonspecific LEFT lower lobe nodules measuring up to 6 mm.      Low Risk: No routine follow-up      High Risk: Optional CT at 12 months      Comments: Use most suspicious nodule as guide to management. Follow-up intervals may vary according to size and risk.      Low Risk - Minimal or absent history of smoking and of other known risk factors.      High Risk - History of smoking or of other known risk factors.      Note: These recommendations do not apply to lung cancer screening, patients with immunosuppression, or patients with known primary cancer.      Fleischner Society 2017 Guidelines for Management of Incidentally Detected Pulmonary Nodules in Adults      DX-CHEST-PORTABLE (1 VIEW)   Final Result      Enlarged cardia silhouette without evidence of acute disease.      EC-ECHOCARDIOGRAM COMPLETE W/O CONT    (Results Pending)   Radiology reviewed by me.      COURSE & MEDICAL DECISION MAKING  Pertinent Labs & Imaging studies reviewed. (See chart for details)    Reviewed patient's old medical records which showed patient was seen by her PCP Dr. Moreno for pre operative evaluation and surgical clearance for back surgery with Dr. Cabrales, Neurosurgery. However, patient was noted to be tachycardic in the 120s and has been coughing for 12 days. She was also found to have new onset of atrial flutter on EKG and was sent to ED for evaluation.     5:20 PM Patient seen and examined at bedside. Discussed plan of care, including imaging studies and lab work. Patient agrees to the plan of care. The patient will be medicated with 125 mg Solu-medrol and 1.25 mg/3 ml levalbuterol. Ordered for DX chest, EKG, D-dimer, Estimated GFR, CBC, CMP, Troponin, and other labs to evaluate her symptoms. Discussed plan of care with patient and family member including admission    6:06 PM Reviewed patient's lab work. Her baseline creatinine is 1.0 and is elevated at 1.3 today. Patient will be treated with 325 mg aspirin.     7:15 PM Reviewed patient's lab results which showed D-dimer level of 1.16. Ordered for CT-CTA chest pulmonary.     10:26 PM Reviewed patient's diagnostic results which was negative for PE. Given the new onset of atrial flutter however, recommended admission for further work up and observation. Paged Dr. Garza, Hospitalist.     10:30 PM Discussed patient's case and diagnostic results with Dr. Garza, Hospitalist, who agreed to admit patient. Patient's care was transferred at this time. He requests for cardiology consult. Paged Dr. Rico, Cardiology. Patient will be treated with 2.5 mg metoprolol as patient is persistently tachycardic.     10:37 PM Consulted Dr. Rico, Cardiology, who will consult patient. He agrees with treatment with metoprolol.     10:40 PM Patient was reevaluated at bedside. She is sitting up in bed. Patient is feeling better after breathing treatment.  "Discussed diagnostic results with patient including admission to telemetry floor and cardiology consult. She understands and agrees to plan.     Patient presents to the ER at the request of her primary care physician for a new onset atrial flutter.  Over the last 12 days she has had some shortness of breath.  She thought she \"caught the bug\" that her  had 3 weeks ago.  Says they both have had the same symptoms.  She describes cough productive of a clear yellow phlegm, nasal congestion, some myalgias.  No fevers.  No hemoptysis.  EKG at her primary care physician's office showed a tachycardia in the 120s.  The rhythm appeared to be atrial flutter.  Here in the ER the patient also had an EKG which showed atrial flutter although there was some irregularity to the EKG so I consider the possibility that this could be a new onset atrial fibrillation.  She was at her doctor's office today to get medically cleared for an upcoming back surgery.  She was not in any significant respiratory distress.  Her O2 sats are running in the low 90s.  She had wheezes throughout.  She got a Xopenex treatment and said that helped her feel better from a breathing standpoint.  Chest x-ray was relatively unrevealing.  D-dimer was high at 1.6.  BNP was a little elevated at 111.  Since the chest x-ray did not reveal any obvious pneumonia, I went ahead and did a CT scan of the chest to rule out PE.  There is no obvious pulmonary embolism.  Troponin is negative.  She complains of left-sided chest pain that has been occurring with cough only.  At this time no evidence for STEMI or non-STEMI.  Patient's heart rate was in the 1 teens to 120s upon arrival.  He came down to 105.  It then started getting faster and upon reevaluation she was back in the 120s to 140s.  I gave her a dose of metoprolol and that seemed to improve her rate.  Patient will need to be admitted to the hospital for new onset atrial fibrillation versus atrial flutter.  I " spoke with Dr. Garza, hospitalist on-call.  He is seen the patient in the emergency department and requested that I contact cardiology.  I spoke with Dr. Rico, who kindly came down and saw the patient in consultation.  He feels this is probably more of an atrial fibrillation.  Dr. Rico said that once further work-up is done they will make the decision about anticoagulation and try to figure out what they are going to do about the timing of her back surgery.  Patient is amenable to hospital admission.  Further evaluation management be done by the hospitalist team and cardiology.      DISPOSITION:  Patient will be [facility] in guarded condition.    FINAL IMPRESSION  1. Acute exacerbation of chronic obstructive pulmonary disease (COPD) (HCC) Acute   2. Bronchitis Acute   3. Typical atrial flutter (HCC) Acute      Dora HUNT (Scribe), am scribing for, and in the presence of, Daphne Mendoza M.D..  Electronically signed by: Dora Spangler (Gordonibe), 4/29/2019  Daphne HUNT M.D. personally performed the services described in this documentation, as scribed by Dora Spangler in my presence, and it is both accurate and complete. C.     The note accurately reflects work and decisions made by me.  Daphne Mendoza  4/29/2019  11:41 PM

## 2019-04-30 NOTE — ASSESSMENT & PLAN NOTE
New onset afibb with RVR    Metoprolol, xarelto  Cardiac monitoring   Cardiology s/p cardioversion

## 2019-04-30 NOTE — ASSESSMENT & PLAN NOTE
Not in acute exacerbation   Resp care protocol ordered  Echo Reduced ejection fraction LV 45%. Global hypokinesia.  Mild concentric LVH. RVSP 40 mmHg consistent with pulmonary hypertension.

## 2019-04-30 NOTE — ASSESSMENT & PLAN NOTE
This is likely due to atrial fibrillation with RVR   Wean 02 as tolerated  Keep sats 88-92% given history of COPD

## 2019-04-30 NOTE — PROGRESS NOTES
2 RN skin check complete with Chloe RN  Devices in place: silicone oxygen tubing  Skin assessed under devices: intact.  Confirmed pressure ulcers found on: N/A  New potential pressure ulcers noted on: N/A  Blanchable redness to sacrum, bilateral heels flaky, pink and blanching. No other skin issues noted.  The following interventions in place: pt is able to turn self, moisturizer at bedside.

## 2019-05-01 ENCOUNTER — APPOINTMENT (OUTPATIENT)
Dept: CARDIOLOGY | Facility: MEDICAL CENTER | Age: 79
DRG: 308 | End: 2019-05-01
Attending: INTERNAL MEDICINE
Payer: MEDICARE

## 2019-05-01 LAB
ALBUMIN SERPL BCP-MCNC: 3.4 G/DL (ref 3.2–4.9)
ALBUMIN/GLOB SERPL: 1.5 G/DL
ALP SERPL-CCNC: 66 U/L (ref 30–99)
ALT SERPL-CCNC: 39 U/L (ref 2–50)
ANION GAP SERPL CALC-SCNC: 5 MMOL/L (ref 0–11.9)
AST SERPL-CCNC: 27 U/L (ref 12–45)
BASOPHILS # BLD AUTO: 0.4 % (ref 0–1.8)
BASOPHILS # BLD: 0.04 K/UL (ref 0–0.12)
BILIRUB SERPL-MCNC: 0.4 MG/DL (ref 0.1–1.5)
BUN SERPL-MCNC: 28 MG/DL (ref 8–22)
CALCIUM SERPL-MCNC: 8.7 MG/DL (ref 8.5–10.5)
CHLORIDE SERPL-SCNC: 100 MMOL/L (ref 96–112)
CO2 SERPL-SCNC: 30 MMOL/L (ref 20–33)
CREAT SERPL-MCNC: 1.02 MG/DL (ref 0.5–1.4)
EKG IMPRESSION: NORMAL
EOSINOPHIL # BLD AUTO: 0.16 K/UL (ref 0–0.51)
EOSINOPHIL NFR BLD: 1.6 % (ref 0–6.9)
ERYTHROCYTE [DISTWIDTH] IN BLOOD BY AUTOMATED COUNT: 50.5 FL (ref 35.9–50)
GLOBULIN SER CALC-MCNC: 2.3 G/DL (ref 1.9–3.5)
GLUCOSE BLD-MCNC: 137 MG/DL (ref 65–99)
GLUCOSE BLD-MCNC: 147 MG/DL (ref 65–99)
GLUCOSE BLD-MCNC: 160 MG/DL (ref 65–99)
GLUCOSE BLD-MCNC: 166 MG/DL (ref 65–99)
GLUCOSE SERPL-MCNC: 178 MG/DL (ref 65–99)
HCT VFR BLD AUTO: 39.1 % (ref 37–47)
HGB BLD-MCNC: 12.2 G/DL (ref 12–16)
IMM GRANULOCYTES # BLD AUTO: 0.04 K/UL (ref 0–0.11)
IMM GRANULOCYTES NFR BLD AUTO: 0.4 % (ref 0–0.9)
LV EJECT FRACT  99904: 55
LYMPHOCYTES # BLD AUTO: 1.66 K/UL (ref 1–4.8)
LYMPHOCYTES NFR BLD: 17 % (ref 22–41)
MCH RBC QN AUTO: 29.8 PG (ref 27–33)
MCHC RBC AUTO-ENTMCNC: 31.2 G/DL (ref 33.6–35)
MCV RBC AUTO: 95.6 FL (ref 81.4–97.8)
MONOCYTES # BLD AUTO: 0.71 K/UL (ref 0–0.85)
MONOCYTES NFR BLD AUTO: 7.3 % (ref 0–13.4)
NEUTROPHILS # BLD AUTO: 7.14 K/UL (ref 2–7.15)
NEUTROPHILS NFR BLD: 73.3 % (ref 44–72)
NRBC # BLD AUTO: 0 K/UL
NRBC BLD-RTO: 0 /100 WBC
PLATELET # BLD AUTO: 228 K/UL (ref 164–446)
PMV BLD AUTO: 10.7 FL (ref 9–12.9)
POTASSIUM SERPL-SCNC: 4.3 MMOL/L (ref 3.6–5.5)
PROT SERPL-MCNC: 5.7 G/DL (ref 6–8.2)
RBC # BLD AUTO: 4.09 M/UL (ref 4.2–5.4)
SODIUM SERPL-SCNC: 135 MMOL/L (ref 135–145)
WBC # BLD AUTO: 9.8 K/UL (ref 4.8–10.8)

## 2019-05-01 PROCEDURE — 92960 CARDIOVERSION ELECTRIC EXT: CPT

## 2019-05-01 PROCEDURE — 93312 ECHO TRANSESOPHAGEAL: CPT | Mod: 26 | Performed by: INTERNAL MEDICINE

## 2019-05-01 PROCEDURE — 92960 CARDIOVERSION ELECTRIC EXT: CPT | Performed by: INTERNAL MEDICINE

## 2019-05-01 PROCEDURE — A9270 NON-COVERED ITEM OR SERVICE: HCPCS | Performed by: HOSPITALIST

## 2019-05-01 PROCEDURE — A9270 NON-COVERED ITEM OR SERVICE: HCPCS | Performed by: INTERNAL MEDICINE

## 2019-05-01 PROCEDURE — 700102 HCHG RX REV CODE 250 W/ 637 OVERRIDE(OP): Performed by: INTERNAL MEDICINE

## 2019-05-01 PROCEDURE — 5A2204Z RESTORATION OF CARDIAC RHYTHM, SINGLE: ICD-10-PCS | Performed by: INTERNAL MEDICINE

## 2019-05-01 PROCEDURE — 770020 HCHG ROOM/CARE - TELE (206)

## 2019-05-01 PROCEDURE — 99232 SBSQ HOSP IP/OBS MODERATE 35: CPT | Performed by: HOSPITALIST

## 2019-05-01 PROCEDURE — 700105 HCHG RX REV CODE 258: Performed by: INTERNAL MEDICINE

## 2019-05-01 PROCEDURE — 700102 HCHG RX REV CODE 250 W/ 637 OVERRIDE(OP): Performed by: HOSPITALIST

## 2019-05-01 PROCEDURE — 36415 COLL VENOUS BLD VENIPUNCTURE: CPT

## 2019-05-01 PROCEDURE — 82962 GLUCOSE BLOOD TEST: CPT

## 2019-05-01 PROCEDURE — 80053 COMPREHEN METABOLIC PANEL: CPT

## 2019-05-01 PROCEDURE — 700102 HCHG RX REV CODE 250 W/ 637 OVERRIDE(OP): Performed by: NURSE PRACTITIONER

## 2019-05-01 PROCEDURE — 93325 DOPPLER ECHO COLOR FLOW MAPG: CPT

## 2019-05-01 PROCEDURE — 93010 ELECTROCARDIOGRAM REPORT: CPT | Performed by: INTERNAL MEDICINE

## 2019-05-01 PROCEDURE — 93005 ELECTROCARDIOGRAM TRACING: CPT | Performed by: INTERNAL MEDICINE

## 2019-05-01 PROCEDURE — 700111 HCHG RX REV CODE 636 W/ 250 OVERRIDE (IP)

## 2019-05-01 PROCEDURE — 160002 HCHG RECOVERY MINUTES (STAT)

## 2019-05-01 PROCEDURE — A9270 NON-COVERED ITEM OR SERVICE: HCPCS | Performed by: NURSE PRACTITIONER

## 2019-05-01 PROCEDURE — 700101 HCHG RX REV CODE 250

## 2019-05-01 PROCEDURE — 85025 COMPLETE CBC W/AUTO DIFF WBC: CPT

## 2019-05-01 PROCEDURE — 700117 HCHG RX CONTRAST REV CODE 255: Performed by: INTERNAL MEDICINE

## 2019-05-01 PROCEDURE — B24BZZ4 ULTRASONOGRAPHY OF HEART WITH AORTA, TRANSESOPHAGEAL: ICD-10-PCS | Performed by: INTERNAL MEDICINE

## 2019-05-01 RX ORDER — GUAIFENESIN 600 MG/1
600 TABLET, EXTENDED RELEASE ORAL EVERY 12 HOURS
Status: DISCONTINUED | OUTPATIENT
Start: 2019-05-01 | End: 2019-05-01

## 2019-05-01 RX ORDER — AMIODARONE HYDROCHLORIDE 200 MG/1
400 TABLET ORAL TWICE DAILY
Status: DISCONTINUED | OUTPATIENT
Start: 2019-05-01 | End: 2019-05-03 | Stop reason: HOSPADM

## 2019-05-01 RX ORDER — GUAIFENESIN 600 MG/1
600 TABLET, EXTENDED RELEASE ORAL EVERY 12 HOURS
Status: DISCONTINUED | OUTPATIENT
Start: 2019-05-01 | End: 2019-05-03 | Stop reason: HOSPADM

## 2019-05-01 RX ORDER — SODIUM CHLORIDE 9 MG/ML
500 INJECTION, SOLUTION INTRAVENOUS ONCE
Status: COMPLETED | OUTPATIENT
Start: 2019-05-01 | End: 2019-05-01

## 2019-05-01 RX ORDER — AMIODARONE HYDROCHLORIDE 150 MG/3ML
150 INJECTION, SOLUTION INTRAVENOUS ONCE
Status: DISCONTINUED | OUTPATIENT
Start: 2019-05-01 | End: 2019-05-01

## 2019-05-01 RX ADMIN — GABAPENTIN 300 MG: 300 CAPSULE ORAL at 05:51

## 2019-05-01 RX ADMIN — BUDESONIDE AND FORMOTEROL FUMARATE DIHYDRATE 2 PUFF: 80; 4.5 AEROSOL RESPIRATORY (INHALATION) at 17:50

## 2019-05-01 RX ADMIN — AMIODARONE HYDROCHLORIDE 400 MG: 200 TABLET ORAL at 12:40

## 2019-05-01 RX ADMIN — TRAZODONE HYDROCHLORIDE 100 MG: 50 TABLET ORAL at 20:59

## 2019-05-01 RX ADMIN — AMIODARONE HYDROCHLORIDE 400 MG: 200 TABLET ORAL at 17:47

## 2019-05-01 RX ADMIN — RIVAROXABAN 15 MG: 15 TABLET, FILM COATED ORAL at 17:47

## 2019-05-01 RX ADMIN — METOPROLOL TARTRATE 50 MG: 50 TABLET ORAL at 05:51

## 2019-05-01 RX ADMIN — BENZONATATE 100 MG: 100 CAPSULE ORAL at 20:59

## 2019-05-01 RX ADMIN — SODIUM CHLORIDE 500 ML: 9 INJECTION, SOLUTION INTRAVENOUS at 22:51

## 2019-05-01 RX ADMIN — GABAPENTIN 300 MG: 300 CAPSULE ORAL at 17:47

## 2019-05-01 RX ADMIN — LEVOTHYROXINE SODIUM 75 MCG: 75 TABLET ORAL at 05:51

## 2019-05-01 RX ADMIN — GUAIFENESIN 600 MG: 600 TABLET, EXTENDED RELEASE ORAL at 15:15

## 2019-05-01 RX ADMIN — SIMVASTATIN 40 MG: 40 TABLET, FILM COATED ORAL at 05:51

## 2019-05-01 RX ADMIN — HUMAN ALBUMIN MICROSPHERES AND PERFLUTREN 3 ML: 10; .22 INJECTION, SOLUTION INTRAVENOUS at 11:30

## 2019-05-01 RX ADMIN — GABAPENTIN 300 MG: 300 CAPSULE ORAL at 12:40

## 2019-05-01 RX ADMIN — BENZONATATE 100 MG: 100 CAPSULE ORAL at 09:19

## 2019-05-01 RX ADMIN — BUDESONIDE AND FORMOTEROL FUMARATE DIHYDRATE 2 PUFF: 80; 4.5 AEROSOL RESPIRATORY (INHALATION) at 05:54

## 2019-05-01 RX ADMIN — OMEPRAZOLE 40 MG: 20 CAPSULE, DELAYED RELEASE ORAL at 05:51

## 2019-05-01 RX ADMIN — METOPROLOL TARTRATE 50 MG: 50 TABLET ORAL at 18:01

## 2019-05-01 ASSESSMENT — ENCOUNTER SYMPTOMS
COUGH: 1
FOCAL WEAKNESS: 0
EYE DISCHARGE: 0
SORE THROAT: 0
DIARRHEA: 0
PALPITATIONS: 0
BLOOD IN STOOL: 0
LOSS OF CONSCIOUSNESS: 0
DIZZINESS: 0
MYALGIAS: 0
HEMOPTYSIS: 0
EYE REDNESS: 0
STRIDOR: 0
NERVOUS/ANXIOUS: 0
FLANK PAIN: 0
SHORTNESS OF BREATH: 1
SPEECH CHANGE: 0
CHEST TIGHTNESS: 0
VOMITING: 0
FEVER: 0
ABDOMINAL PAIN: 0
SPUTUM PRODUCTION: 0
PALPITATIONS: 1
CHILLS: 0
SEIZURES: 0
HALLUCINATIONS: 0
BRUISES/BLEEDS EASILY: 0
EYE PAIN: 0

## 2019-05-01 NOTE — PROGRESS NOTES
Hospital Medicine Daily Progress Note    Date of Service  5/1/2019    Chief Complaint  Shortness of breath    Hospital Course      78 y.o. female with past medical history of chronic obstructive pulmonary disease, diabetes, hypertension, previous stroke. Admitted 4/29/2019 with shortness of breath and the finding of atrial fibrillation with rapid ventricular response as part of the preoperative evaluation.        Interval Problem Update  Heart rate down to 40s-50s post cardioversion.  Now regular.  Saturating well on 2-3 L of oxygen.  Encourage incentive spirometer.  Try to wean off as able.  I am ordering a home O2 evaluation.  Underwent successful LONDON and cardioversion  Renal function improving creatinine 1.02, down from 1.3  Rivaroxaban sent to patient's pharmacy, patient is okay with current co-pay.   Discussed with patient, patient's nurse and with multidisciplinary team during rounds including , pharmacist and charge nurse.      Consultants/Specialty  Cardiology     Code Status  FULL     Disposition  TBD     Review of Systems  Review of Systems   Constitutional: Negative for chills and fever.   HENT: Negative for congestion and sore throat.    Eyes: Negative for pain, discharge and redness.   Respiratory: Positive for cough and shortness of breath. Negative for hemoptysis, sputum production and stridor.    Cardiovascular: Positive for palpitations (Improving). Negative for chest pain and leg swelling.   Gastrointestinal: Negative for abdominal pain, blood in stool, diarrhea and vomiting.   Genitourinary: Negative for flank pain, hematuria and urgency.   Musculoskeletal: Negative for myalgias.   Skin: Negative.    Neurological: Negative for speech change, focal weakness, seizures and loss of consciousness.   Endo/Heme/Allergies: Does not bruise/bleed easily.   Psychiatric/Behavioral: Negative for hallucinations and suicidal ideas. The patient is not nervous/anxious.         Physical Exam  Temp:   [36.1 °C (97 °F)-36.9 °C (98.5 °F)] 36.2 °C (97.2 °F)  Pulse:  [43-77] 52  Resp:  [15-18] 15  BP: ()/(41-81) 119/59  SpO2:  [89 %-99 %] 93 %    Physical Exam   Constitutional: She is oriented to person, place, and time. She appears well-developed and well-nourished. No distress.   HENT:   Head: Normocephalic and atraumatic.   Right Ear: External ear normal.   Left Ear: External ear normal.   Eyes: Pupils are equal, round, and reactive to light. Conjunctivae are normal. Right eye exhibits no discharge. Left eye exhibits no discharge.   Neck: Normal range of motion. Neck supple. No JVD present. No tracheal deviation present. No thyromegaly present.   Cardiovascular: Regular rhythm.  Exam reveals no gallop and no friction rub.    No murmur heard.  Regular manuel    Pulmonary/Chest: Effort normal. No stridor. No respiratory distress. She has wheezes. She has no rales. She exhibits no tenderness.   Wheezing scattered bilaterally   Abdominal: Soft. Bowel sounds are normal. She exhibits no distension. There is no tenderness. There is no rebound and no guarding.   Musculoskeletal: Normal range of motion. She exhibits no edema, tenderness or deformity.   Neurological: She is alert and oriented to person, place, and time. No cranial nerve deficit. Coordination normal.   Skin: Skin is warm and dry. No rash noted. She is not diaphoretic. No erythema. No pallor.   Psychiatric: She has a normal mood and affect. Her behavior is normal. Judgment normal.   Nursing note and vitals reviewed.      Fluids    Intake/Output Summary (Last 24 hours) at 05/01/19 1908  Last data filed at 05/01/19 1240   Gross per 24 hour   Intake               60 ml   Output                0 ml   Net               60 ml       Laboratory  Recent Labs      04/29/19   1504  05/01/19   0216   WBC  7.0  9.8   RBC  4.57  4.09*   HEMOGLOBIN  13.8  12.2   HEMATOCRIT  43.6  39.1   MCV  95.4  95.6   MCH  30.2  29.8   MCHC  31.7*  31.2*   RDW  49.7  50.5*    PLATELETCT  233  228   MPV  10.3  10.7     Recent Labs      04/29/19   1504  05/01/19   0216   SODIUM  137  135   POTASSIUM  4.3  4.3   CHLORIDE  102  100   CO2  26  30   GLUCOSE  115*  178*   BUN  23*  28*   CREATININE  1.30  1.02   CALCIUM  9.7  8.7     Recent Labs      04/29/19   1830   APTT  26.1   INR  1.01     Recent Labs      04/29/19   1830   BNPBTYPENAT  111*           Imaging  EC-LONDON W/ CONT   Final Result      EC-ECHOCARDIOGRAM COMPLETE W/O CONT   Final Result      CT-CTA CHEST PULMONARY ARTERY W/ RECONS   Final Result      1.  No CT evidence for pulmonary emboli.   2.  Central pulmonary vessels are prominent suggesting pulmonary hypertension.   3.  Mildly prominent interstitium, concerning for edema.   4.  Nonspecific LEFT lower lobe nodules measuring up to 6 mm.      Low Risk: No routine follow-up      High Risk: Optional CT at 12 months      Comments: Use most suspicious nodule as guide to management. Follow-up intervals may vary according to size and risk.      Low Risk - Minimal or absent history of smoking and of other known risk factors.      High Risk - History of smoking or of other known risk factors.      Note: These recommendations do not apply to lung cancer screening, patients with immunosuppression, or patients with known primary cancer.      Fleischner Society 2017 Guidelines for Management of Incidentally Detected Pulmonary Nodules in Adults      DX-CHEST-PORTABLE (1 VIEW)   Final Result      Enlarged cardia silhouette without evidence of acute disease.      CL-CARDIOVERSION    (Results Pending)        Assessment/Plan  * Atrial fibrillation with RVR (HCC)   Assessment & Plan    New onset afibb with RVR    Metoprolol, xarelto  Cardiac monitoring   Cardiology s/p cardioversion       Chronic anticoagulation   Assessment & Plan    Started for atrial fibrillation      Pulmonary nodule   Assessment & Plan    Needs outpatient follow up       Pulmonary HTN (HCC)   Assessment & Plan    Noted on  CT-scan  Echo Reduced ejection fraction LV 45%. Global hypokinesia.  Mild concentric LVH. RVSP 40 mmHg consistent with pulmonary hypertension.  COPD, +/- obstructive sleep apnea component  May need outpatient sleep study          Elevated BUN   Assessment & Plan    Mostly due to dehydration   S/p Intravenous fluids  Avoid nephrotoxins, monitor inputs and outputs      Acute hypoxemic respiratory failure (HCC)   Assessment & Plan    This is likely due to atrial fibrillation with RVR   Wean 02 as tolerated  Keep sats 88-92% given history of COPD       Primary insomnia- (present on admission)   Assessment & Plan    Resume home trazadone      Hypothyroidism due to acquired atrophy of thyroid- (present on admission)   Assessment & Plan    Resume home levothyroxine   TSH 2.7      Dyslipidemia- (present on admission)   Assessment & Plan    Resume statin therapy        Chronic obstructive pulmonary disease (HCC)- (present on admission)   Assessment & Plan    Not in acute exacerbation   Resp care protocol ordered  Echo Reduced ejection fraction LV 45%. Global hypokinesia.  Mild concentric LVH. RVSP 40 mmHg consistent with pulmonary hypertension.       Essential hypertension- (present on admission)   Assessment & Plan    Hold home ace, assess response to BB      Type 2 diabetes mellitus without complication, without long-term current use of insulin (HCC)- (present on admission)   Assessment & Plan    With hyperglycemia  Glycated hemoglobin 6.5, 2 months ago   Long & short acting insulin  Accu-Checks, hypoglycemia protocol       History of CVA (cerebrovascular accident)- (present on admission)   Assessment & Plan    History of, will need to be on asa and statin therapy            VTE prophylaxis: Rivaroxaban

## 2019-05-01 NOTE — PROGRESS NOTES
Assumed care of patient, received bedside report from day shift RN, Pt A&Ox4, on 1.5L of O2 via NC no SOB or distress noted. Call light within reach, personal belongings within reach.  Bed is locked and in lowest position, tele monitor on. Hourly rounding in place.

## 2019-05-01 NOTE — CARE PLAN
Problem: Venous Thromboembolism (VTW)/Deep Vein Thrombosis (DVT) Prevention:  Goal: Patient will participate in Venous Thrombosis (VTE)/Deep Vein Thrombosis (DVT)Prevention Measures  Outcome: PROGRESSING AS EXPECTED  VTE prophylaxis in place, pt educated on use and importance, pt verbalizes understanding and agrees to comply.      Problem: Pain Management  Goal: Pain level will decrease to patient's comfort goal  Outcome: PROGRESSING AS EXPECTED  Pt has no complains of pain at this time, will continue to monitor.

## 2019-05-01 NOTE — PROGRESS NOTES
Cardiology Follow Up Progress Note    Date of Service  5/1/2019    Attending Physician  Weston Levine M.D.    Chief Complaint   SOB and palpitations     Consulted for Atrial Fib/Flutter with RVR     HPI  Jessica Plaza is a 78 y.o. female referred to the emergency room by her PCP for new onset atrial fibrillation with RVR.  Patient was visiting her PCP with complaints of progressive cough and shortness of breath x10 days.     Past medical history significant for COPD, diabetes, emphysema, HLD, GERD, hypertension and previous CVA.     Interim Events  4/30/19: Patient continues to feel poorly.  She still has significant cough, shortness of breath and palpitations. No significant events overnight per nursing.     5/1/19: Resting in bed with  at bedside. Underwent successful DCCV this am. States that she still has a significant cough but does feel a little better overall. No significant events overnight.      Monitor: Atrial Fibrillation 61-87 with rare PVC's, rare couplets    Review of Systems  Review of Systems   Constitutional: Negative for fever.   HENT: Positive for congestion.    Respiratory: Positive for cough and shortness of breath. Negative for chest tightness.    Cardiovascular: Negative for chest pain, palpitations and leg swelling.   Gastrointestinal: Negative for abdominal pain and blood in stool.   Genitourinary: Negative for hematuria.   Musculoskeletal: Negative for gait problem.   Neurological: Negative for dizziness and syncope.   All other systems reviewed and are negative.      Vital signs in last 24 hours  Temp:  [36.1 °C (97 °F)-36.9 °C (98.4 °F)] 36.4 °C (97.6 °F)  Pulse:  [43-84] 47  Resp:  [16-18] 18  BP: ()/(51-89) 135/64  SpO2:  [89 %-99 %] 94 %    Physical Exam  Physical Exam   Constitutional: She is oriented to person, place, and time. She appears well-developed and well-nourished.   HENT:   Head: Normocephalic.   Eyes: EOM are normal.   Neck: No JVD present.    Cardiovascular: Normal rate, regular rhythm, normal heart sounds and intact distal pulses.    Pulmonary/Chest: Effort normal. No respiratory distress. She has wheezes in the right middle field, the right lower field, the left middle field and the left lower field.   Abdominal: Soft. There is no tenderness.   Musculoskeletal: Normal range of motion. She exhibits no edema.   Neurological: She is alert and oriented to person, place, and time.   Skin: Skin is warm and dry.   Psychiatric: She has a normal mood and affect. Her behavior is normal. Thought content normal.   Nursing note and vitals reviewed.      Lab Review  Lab Results   Component Value Date/Time    WBC 9.8 05/01/2019 02:16 AM    RBC 4.09 (L) 05/01/2019 02:16 AM    HEMOGLOBIN 12.2 05/01/2019 02:16 AM    HEMATOCRIT 39.1 05/01/2019 02:16 AM    MCV 95.6 05/01/2019 02:16 AM    MCH 29.8 05/01/2019 02:16 AM    MCHC 31.2 (L) 05/01/2019 02:16 AM    MPV 10.7 05/01/2019 02:16 AM      Lab Results   Component Value Date/Time    SODIUM 135 05/01/2019 02:16 AM    POTASSIUM 4.3 05/01/2019 02:16 AM    CHLORIDE 100 05/01/2019 02:16 AM    CO2 30 05/01/2019 02:16 AM    GLUCOSE 178 (H) 05/01/2019 02:16 AM    BUN 28 (H) 05/01/2019 02:16 AM    CREATININE 1.02 05/01/2019 02:16 AM    BUNCREATRAT 17 02/15/2019 07:53 AM      Lab Results   Component Value Date/Time    ASTSGOT 27 05/01/2019 02:16 AM    ALTSGPT 39 05/01/2019 02:16 AM     Lab Results   Component Value Date/Time    CHOLSTRLTOT 205 (H) 02/15/2019 07:53 AM    CHOLSTRLTOT 135 06/07/2017 03:00 AM    LDL 81 02/15/2019 07:53 AM    LDL 53 06/07/2017 03:00 AM     02/15/2019 07:53 AM    HDL 53 06/07/2017 03:00 AM    TRIGLYCERIDE 75 02/15/2019 07:53 AM    TRIGLYCERIDE 146 06/07/2017 03:00 AM    TROPONINI 0.01 04/30/2019 04:33 AM       Recent Labs      04/29/19   1830   BNPBTYPENAT  111*     Cardiac Imaging and Procedures Review  Echocardiogram: 4/30/19    Technically difficult study - adequate information is obtained.      Left ventricular systolic function is low normal.    Left ventricular ejection fraction is visually estimated to be 45%.    Global hypokinesis.    Mild concentric left ventricular hypertrophy.    Normal estimated left ventricular filling pressures in the setting of atrial fibrillation.    Normal right ventricular size.    Mildly reduced right ventricular systolic function.    Enlarged right atrium.    Aortic sclerosis without stenosis.    Right atrial pressure is estimated to be 15 mmHg, severely elevated.     Right ventricular systolic pressure is estimated to be 40 mmHg, consistent with mild pulmonary hypertension.       Compared to the images of the study done on 06/07/2017  there has been a decrease in the ejection fraction from 60%     to 45%. The RV function now appears diminished. The estimated RA pressure was previously 8mmHg and is now      15mmHg.      Imaging  Chest X-Ray: 4/29/19  The cardiac silhouette is enlarged.  No pulmonary infiltrates or consolidations are noted.  No pleural abnormalities are noted.     Assessment/Plan  Atrial Fibrillation:  -New diagnosis.  -Rate control strategy at this time due to possible upcoming back surgery.  -S/P LONDON and successful DCCV this am.  -AC with Xarelto.   -Start PO amiodarone 400 mg BID.   -Continue Lopressor 50 mg BID.     Mild LV Systolic Dysfunction:  -TTE on 4/30/19 showing LVEF of 45% with global hypokinesis.  -Appears euvolemic.   -Continue BB as above, consider transitioning to Toprol XL in future.     Pulmonary HTN:  -RVSP 40 mmHg.  -History of tobacco use and COPD.     HTN:  -Stable.  -Continue lopressor 50 mg BID.      HLD:  -Stable.  -LDL on 2/15/19 was 81.  -Continue Simvastatin 40 mg daily.    URI:  -No Pulmonary infiltrates or consolidations per CXR on 4/29/19.  -Start Mucinex 600 mg BID.   -RT protocol in place.   -Tessalon for night to help patient sleep.      Thank you for allowing us to participate in the care of this patient.  We will continue  to follow alongside you  in the care of this patient.  Please us if you have any questions or concerns.     Future Appointments  Date Time Provider Department Center   5/15/2019 10:00 AM ALEK Valenzuela [facility] None   8/21/2019 1:20 PM Anurag Moreno M.D. [id number]     ALEK Valenzuela   [facility]  [phone]

## 2019-05-01 NOTE — CARE PLAN
Problem: Infection  Goal: Will remain free from infection    Intervention: Implement standard precautions and perform hand washing before and after patient contact  Patient educated on hand hygiene and importance of cleanliness. Patient verbalizes understanding. RN performs appropriate hand hygiene and maintains aseptic technique / standard precautions when in contact with patient.      Problem: Knowledge Deficit  Goal: Knowledge of disease process/condition, treatment plan, diagnostic tests, and medications will improve    Intervention: Explain information regarding disease process/condition, treatment plan, diagnostic tests, and medications and document in education  Patient educated on POC, treatment plan, diagnostics tests, medications, NPO status, plan for LONDON with cardioversion, and encouraged to ask questions regarding care. Patient verbalizes understanding, and actively participates in care. Reoriented to call light for assistance. Hourly rounding in place.

## 2019-05-01 NOTE — DISCHARGE PLANNING
Care Transition Team Discharge Planning     Anticipated Discharge Disposition: Home on Xarelto.     Action: This RN CM contacted [facility]'s pharmacy. Patient's co-pay for Xarelto is going to be $94. No prior-auth required. Patient reports being able to afford this monthly co-pay.      Barriers to Discharge: None.      Plan: No other D/C needs identified at this time. Thank you for allowing me the pleasure of participating in this patient's plan of care.

## 2019-05-01 NOTE — PROCEDURES
Electrical Cardioversion  Date/Time: 5/1/2019 10:59 AM  Performed by: TERELL HOOPER  Authorized by: TERELL HOOPER     Consent:     Consent obtained:  Verbal and written    Consent given by:  Patient    Risks discussed:  Cutaneous burn, death, induced arrhythmia and pain    Alternatives discussed:  No treatment  Sedation:     Patient sedated: Yes      Sedation type:  Per anesthesia  Pre-procedure details:     Cardioversion basis:  Emergent    Rhythm:  Atrial flutter  Attempt one:     Cardioversion mode:  Synchronous    Waveform:  Biphasic    Shock (Joules):  150    Shock outcome:  Conversion to normal sinus rhythm  Post-procedure details:     Patient status:  Awake    Patient tolerance of procedure:  Tolerated well, no immediate complications  Comments:      Terell Hooper MD  Cardiologist, [facility] [place]

## 2019-05-01 NOTE — PROGRESS NOTES
Paged Dr. Levine regarding patient request for a regular diet. Patient passed swallow evaluation post cardioversion. Order received via telephone with read back to start regular diet.

## 2019-05-01 NOTE — PROGRESS NOTES
Lesley Palmer APRN with cardiology at bedside, update given on patient's HR and rhythm (SB 48-55) post cardioversion. Otherwise VSS. Patient denies lightheadedness, dizziness, fatigue, or headache. APRN made aware, no new orders received at this time.

## 2019-05-01 NOTE — PROGRESS NOTES
Patient back from LONDON with cardioversion, transported on zoll with ACLS RN. Monitor room notified. Post op vital signs initiated, VSS. Patient denies pain. Will continue to monitor.

## 2019-05-01 NOTE — OR NURSING
1109 patient arrived from cath lab s/p LONDON/cardioversion, patient awake. Denies pain. Monitor reveals SB 40s. bp stable.  1150 patient given water tolerating well. Criteria met to discharge patient out of ppu  1155 report given to Stephani ANTUNEZ [id number] bed 2 all questions answered.  1200 patient transported to room with all her personal belongings.

## 2019-05-01 NOTE — THERAPY
Occupational Therapy Contact Note    OT order received. Pt screened for OT needs, pt reports no deficits in functional independence for ADLs/ADL transfers and supportive  at home to assist as needed. Pt has no questions or concerns for safe d/c to home once medically stable. Acute OT intervention is not indicated at this time.     Johanne Murray, OTR/L

## 2019-05-01 NOTE — RESPIRATORY CARE
COPD EDUCATION by COPD CLINICAL EDUCATOR  4/30/2019 at 2:37 PM by Latia Meredith     COPD Education complete?  Pt declined to participate in full program, I did however educate her on proper inhaler use. Spacer also given with instruction.    Does patient currently use inhalers/nebulizer at home? Yes, Albuterol and Symbicort inhalers.

## 2019-05-01 NOTE — PROGRESS NOTES
Received report from NOC RN. Assumed care of patient at 0700. Patient A&Ox4. On 3L NC. Patient denies pain at this time. POC discussed and agreed upon with patient. Call light and belongings within reach. Bed in lowest locked position. Upper side rails raised. Fall risk precautions in place. Hourly rounding. Will continue to monitor.

## 2019-05-02 LAB
GLUCOSE BLD-MCNC: 157 MG/DL (ref 65–99)
GLUCOSE BLD-MCNC: 188 MG/DL (ref 65–99)
GLUCOSE BLD-MCNC: 192 MG/DL (ref 65–99)
GLUCOSE BLD-MCNC: 228 MG/DL (ref 65–99)

## 2019-05-02 PROCEDURE — A9270 NON-COVERED ITEM OR SERVICE: HCPCS | Performed by: HOSPITALIST

## 2019-05-02 PROCEDURE — 99232 SBSQ HOSP IP/OBS MODERATE 35: CPT | Performed by: INTERNAL MEDICINE

## 2019-05-02 PROCEDURE — 82962 GLUCOSE BLOOD TEST: CPT | Mod: 91

## 2019-05-02 PROCEDURE — A9270 NON-COVERED ITEM OR SERVICE: HCPCS | Performed by: NURSE PRACTITIONER

## 2019-05-02 PROCEDURE — 99232 SBSQ HOSP IP/OBS MODERATE 35: CPT | Performed by: HOSPITALIST

## 2019-05-02 PROCEDURE — 700102 HCHG RX REV CODE 250 W/ 637 OVERRIDE(OP): Performed by: NURSE PRACTITIONER

## 2019-05-02 PROCEDURE — 700101 HCHG RX REV CODE 250: Performed by: HOSPITALIST

## 2019-05-02 PROCEDURE — 700102 HCHG RX REV CODE 250 W/ 637 OVERRIDE(OP): Performed by: HOSPITALIST

## 2019-05-02 PROCEDURE — 700105 HCHG RX REV CODE 258: Performed by: INTERNAL MEDICINE

## 2019-05-02 PROCEDURE — 770020 HCHG ROOM/CARE - TELE (206)

## 2019-05-02 RX ORDER — SODIUM CHLORIDE 9 MG/ML
500 INJECTION, SOLUTION INTRAVENOUS ONCE
Status: COMPLETED | OUTPATIENT
Start: 2019-05-02 | End: 2019-05-02

## 2019-05-02 RX ADMIN — GABAPENTIN 300 MG: 300 CAPSULE ORAL at 11:56

## 2019-05-02 RX ADMIN — SODIUM CHLORIDE 500 ML: 9 INJECTION, SOLUTION INTRAVENOUS at 00:17

## 2019-05-02 RX ADMIN — GUAIFENESIN 600 MG: 600 TABLET, EXTENDED RELEASE ORAL at 18:00

## 2019-05-02 RX ADMIN — AMIODARONE HYDROCHLORIDE 400 MG: 200 TABLET ORAL at 17:42

## 2019-05-02 RX ADMIN — GUAIFENESIN 600 MG: 600 TABLET, EXTENDED RELEASE ORAL at 05:18

## 2019-05-02 RX ADMIN — BUDESONIDE AND FORMOTEROL FUMARATE DIHYDRATE 2 PUFF: 80; 4.5 AEROSOL RESPIRATORY (INHALATION) at 05:19

## 2019-05-02 RX ADMIN — SIMVASTATIN 40 MG: 40 TABLET, FILM COATED ORAL at 05:18

## 2019-05-02 RX ADMIN — TRAZODONE HYDROCHLORIDE 100 MG: 50 TABLET ORAL at 20:35

## 2019-05-02 RX ADMIN — AMIODARONE HYDROCHLORIDE 400 MG: 200 TABLET ORAL at 11:55

## 2019-05-02 RX ADMIN — RIVAROXABAN 15 MG: 15 TABLET, FILM COATED ORAL at 17:41

## 2019-05-02 RX ADMIN — OMEPRAZOLE 40 MG: 20 CAPSULE, DELAYED RELEASE ORAL at 05:18

## 2019-05-02 RX ADMIN — BUDESONIDE AND FORMOTEROL FUMARATE DIHYDRATE 2 PUFF: 80; 4.5 AEROSOL RESPIRATORY (INHALATION) at 17:40

## 2019-05-02 RX ADMIN — BENZONATATE 100 MG: 100 CAPSULE ORAL at 20:35

## 2019-05-02 RX ADMIN — METOPROLOL TARTRATE 12.5 MG: 25 TABLET ORAL at 17:41

## 2019-05-02 RX ADMIN — LEVOTHYROXINE SODIUM 75 MCG: 75 TABLET ORAL at 05:17

## 2019-05-02 RX ADMIN — IPRATROPIUM BROMIDE AND ALBUTEROL SULFATE 3 ML: .5; 3 SOLUTION RESPIRATORY (INHALATION) at 11:45

## 2019-05-02 RX ADMIN — GABAPENTIN 300 MG: 300 CAPSULE ORAL at 17:42

## 2019-05-02 RX ADMIN — GABAPENTIN 300 MG: 300 CAPSULE ORAL at 05:17

## 2019-05-02 ASSESSMENT — ENCOUNTER SYMPTOMS
HEMOPTYSIS: 0
DIZZINESS: 1
SORE THROAT: 0
FOCAL WEAKNESS: 0
EYE PAIN: 0
CHEST TIGHTNESS: 0
CHILLS: 0
SHORTNESS OF BREATH: 1
STRIDOR: 0
ABDOMINAL PAIN: 0
FLANK PAIN: 0
NERVOUS/ANXIOUS: 0
BRUISES/BLEEDS EASILY: 0
HALLUCINATIONS: 0
SPEECH CHANGE: 0
SEIZURES: 0
LOSS OF CONSCIOUSNESS: 0
FEVER: 0
PALPITATIONS: 1
PALPITATIONS: 0
BLOOD IN STOOL: 0
DIARRHEA: 0
SPUTUM PRODUCTION: 0
COUGH: 1
MYALGIAS: 0
VOMITING: 0
EYE DISCHARGE: 0
DIZZINESS: 0
EYE REDNESS: 0

## 2019-05-02 NOTE — PROGRESS NOTES
Dr. Levine at bedside. This RN asked to hold PO metoprolol as HR is 52, MD stated to give medication as cardiology doesn't want it held unless patient symptomatic. Parameters were not given to hold medication. Patient denies lightheadedness, dizziness, headache, and BP is 119/59 at this time.

## 2019-05-02 NOTE — PROGRESS NOTES
2232: Pt sustaining HR of 38, pt states she feels dizzy, paged cardiologist.    2236: MD Burnett aware, new orders received. Will give 500 ml bolus of NS.

## 2019-05-02 NOTE — PROGRESS NOTES
Assumed care of patient, received bedside report from day shift RN, Pt A&Ox4, on 3L of O2 via NC no SOB noted, pt has no complains of pain at this time. Call light within reach, personal belongings within reach.  Bed is locked and in lowest position, tele monitor on. Hourly rounding in place.

## 2019-05-02 NOTE — CARE PLAN
Problem: Knowledge Deficit  Goal: Knowledge of disease process/condition, treatment plan, diagnostic tests, and medications will improve  Outcome: PROGRESSING AS EXPECTED  Pt educated on POC, questions answered in regards to disease process, treatment and diet. Pt verbalizes understanding and voices no further questions at this time.

## 2019-05-02 NOTE — PROGRESS NOTES
Pt blood pressure after 500 ml bolus 96/47, pt continues to feel dizzy, HR 45 MD Burnett aware. New orders received will give another 500 ml NS bolus.

## 2019-05-02 NOTE — PROGRESS NOTES
[facility] Medicine Daily Progress Note    Date of Service  5/2/2019    Chief Complaint  Shortness of breath    Hospital Course      78 y.o. female with past medical history of chronic obstructive pulmonary disease, diabetes, hypertension, previous stroke. Admitted 4/29/2019 with shortness of breath and the finding of atrial fibrillation with rapid ventricular response as part of the preoperative evaluation.        Interval Problem Update  Symptomatic bradycardia mid 30s-mid 40s.  Reducing metoprolol dose.  Still requiring 3 L of oxygen, encourage incentive spirometry try to wean off as able.  I am ordering a home O2 evaluation.  Blood sugars 188-192   Discussed with patient, patient's nurse and with multidisciplinary team during rounds including , pharmacist and charge nurse.      Consultants/Specialty  Cardiology     Code Status  FULL     Disposition  Likely discharge home tomorrow if cleared by cardiology no longer having bradycardia.    Review of Systems  Review of Systems   Constitutional: Negative for chills and fever.   HENT: Negative for congestion and sore throat.    Eyes: Negative for pain, discharge and redness.   Respiratory: Positive for cough and shortness of breath. Negative for hemoptysis, sputum production and stridor.    Cardiovascular: Positive for palpitations (Improving). Negative for chest pain and leg swelling.   Gastrointestinal: Negative for abdominal pain, blood in stool, diarrhea and vomiting.   Genitourinary: Negative for flank pain, hematuria and urgency.   Musculoskeletal: Negative for myalgias.   Skin: Negative.    Neurological: Positive for dizziness. Negative for speech change, focal weakness, seizures and loss of consciousness.   Endo/Heme/Allergies: Does not bruise/bleed easily.   Psychiatric/Behavioral: Negative for hallucinations and suicidal ideas. The patient is not nervous/anxious.         Physical Exam  Temp:  [36.4 °C (97.5 °F)-37.1 °C (98.8 °F)] 36.7 °C (98  °F)  Pulse:  [] 54  Resp:  [16-19] 16  BP: ()/(41-85) 114/50  SpO2:  [91 %-97 %] 91 %    Physical Exam   Constitutional: She is oriented to person, place, and time. She appears well-developed and well-nourished. No distress.   HENT:   Head: Normocephalic and atraumatic.   Right Ear: External ear normal.   Left Ear: External ear normal.   Eyes: Pupils are equal, round, and reactive to light. Conjunctivae are normal. Right eye exhibits no discharge. Left eye exhibits no discharge.   Neck: Normal range of motion. Neck supple. No JVD present. No tracheal deviation present. No thyromegaly present.   Cardiovascular: Regular rhythm.  Exam reveals no gallop and no friction rub.    No murmur heard.  Regular manuel    Pulmonary/Chest: Effort normal. No stridor. No respiratory distress. She has wheezes. She has no rales. She exhibits no tenderness.   Wheezing scattered bilaterally   Abdominal: Soft. Bowel sounds are normal. She exhibits no distension. There is no tenderness. There is no rebound and no guarding.   Musculoskeletal: Normal range of motion. She exhibits no edema, tenderness or deformity.   Neurological: She is alert and oriented to person, place, and time. No cranial nerve deficit. Coordination normal.   Skin: Skin is warm and dry. No rash noted. She is not diaphoretic. No erythema. No pallor.   Psychiatric: She has a normal mood and affect. Her behavior is normal. Judgment normal.   Nursing note and vitals reviewed.      Fluids    Intake/Output Summary (Last 24 hours) at 05/02/19 1856  Last data filed at 05/02/19 1200   Gross per 24 hour   Intake              600 ml   Output                0 ml   Net              600 ml       Laboratory  Recent Labs      05/01/19 0216   WBC  9.8   RBC  4.09*   HEMOGLOBIN  12.2   HEMATOCRIT  39.1   MCV  95.6   MCH  29.8   MCHC  31.2*   RDW  50.5*   PLATELETCT  228   MPV  10.7     Recent Labs      05/01/19 0216   SODIUM  135   POTASSIUM  4.3   CHLORIDE  100   CO2  30    GLUCOSE  178*   BUN  28*   CREATININE  1.02   CALCIUM  8.7                   Imaging  EC-LONDON W/ CONT   Final Result      EC-ECHOCARDIOGRAM COMPLETE W/O CONT   Final Result      CT-CTA CHEST PULMONARY ARTERY W/ RECONS   Final Result      1.  No CT evidence for pulmonary emboli.   2.  Central pulmonary vessels are prominent suggesting pulmonary hypertension.   3.  Mildly prominent interstitium, concerning for edema.   4.  Nonspecific LEFT lower lobe nodules measuring up to 6 mm.      Low Risk: No routine follow-up      High Risk: Optional CT at 12 months      Comments: Use most suspicious nodule as guide to management. Follow-up intervals may vary according to size and risk.      Low Risk - Minimal or absent history of smoking and of other known risk factors.      High Risk - History of smoking or of other known risk factors.      Note: These recommendations do not apply to lung cancer screening, patients with immunosuppression, or patients with known primary cancer.      Fleischner Society 2017 Guidelines for Management of Incidentally Detected Pulmonary Nodules in Adults      DX-CHEST-PORTABLE (1 VIEW)   Final Result      Enlarged cardia silhouette without evidence of acute disease.      CL-CARDIOVERSION    (Results Pending)        Assessment/Plan  * Atrial fibrillation with RVR (HCC)   Assessment & Plan    New onset afibb with RVR    Metoprolol, xarelto  Cardiac monitoring   Cardiology s/p cardioversion        Chronic anticoagulation   Assessment & Plan    Started for atrial fibrillation       Pulmonary nodule   Assessment & Plan    Needs outpatient follow up       Pulmonary HTN (HCC)   Assessment & Plan    Noted on CT-scan  Echo Reduced ejection fraction LV 45%. Global hypokinesia.  Mild concentric LVH. RVSP 40 mmHg consistent with pulmonary hypertension.  COPD, +/- obstructive sleep apnea component  May need outpatient sleep study          Elevated BUN   Assessment & Plan    Mostly due to dehydration   S/p  Intravenous fluids  Avoid nephrotoxins, monitor inputs and outputs      Acute hypoxemic respiratory failure (HCC)   Assessment & Plan    This is likely due to atrial fibrillation with RVR   Wean 02 as tolerated  Keep sats 88-92% given history of COPD        Primary insomnia- (present on admission)   Assessment & Plan    Resume home trazadone      Hypothyroidism due to acquired atrophy of thyroid- (present on admission)   Assessment & Plan    Resume home levothyroxine   TSH 2.7      Dyslipidemia- (present on admission)   Assessment & Plan    Resume statin therapy        Chronic obstructive pulmonary disease (HCC)- (present on admission)   Assessment & Plan    Not in acute exacerbation   Resp care protocol ordered  Echo Reduced ejection fraction LV 45%. Global hypokinesia.  Mild concentric LVH. RVSP 40 mmHg consistent with pulmonary hypertension.        Essential hypertension- (present on admission)   Assessment & Plan    Hold home ace, assess response to BB      Type 2 diabetes mellitus without complication, without long-term current use of insulin ([facility])- (present on admission)   Assessment & Plan    With hyperglycemia  Glycated hemoglobin 6.5, 2 months ago   Long & short acting insulin  Accu-Checks, hypoglycemia protocol       History of CVA (cerebrovascular accident)- (present on admission)   Assessment & Plan    History of, will need to be on asa and statin therapy            VTE prophylaxis: Rivaroxaban

## 2019-05-02 NOTE — CARE PLAN
Problem: Safety  Goal: Will remain free from injury    Intervention: Provide assistance with mobility  Mobility assessed. Patient needs a standby person assist related to bradycardia. Patient educated on use of call light, patient verbalizes understanding and calls appropriately. Bed in lowest locked position. Non-skid socks on. Upper bed rails raised. Hourly rounding in place.       Problem: Pain Management  Goal: Pain level will decrease to patient's comfort goal    Intervention: Educate and implement non-pharmacologic comfort measures. Examples: relaxation, distration, play therapy, activity therapy, massage, etc.  Non-pharmacologic interventions given to promote comfort for patient including ambulation, pillows used for repositioning, extra blankets, education, distraction, emotional support, heat, and rest. Patient states interventions are helpful.

## 2019-05-02 NOTE — PROGRESS NOTES
"Chart reviewed for AMI and HF quality measures. Neither diagnosis applicable at this time, patient is being followed by cardiology for AF c RVR.     Please place a \"Consult Nurse Navigator\" order (can be found in the HF order set) should AMI or HF become an active diagnosis.    Ivone LITTLEJOHN RN, [facility] ext. [phone] M-F        "

## 2019-05-02 NOTE — PROGRESS NOTES
Lesley PETERSON at bedside, update given on patient's HR and BP overnight. NICHOLAS stated she will adjust the metoprolol dose accordingly, and to give AM amiodarone that was held at 0520. Lesley also gave verbal order with read back to hold NOC metoprolol and amiodarone if HR is less than 50.

## 2019-05-02 NOTE — PROGRESS NOTES
Cardiology Follow Up Progress Note    Date of Service  5/2/2019    Attending Physician  Weston Levine M.D.    Chief Complaint   SOB and palpitations     Consulted for Atrial Fib/Flutter with RVR     HPI  Jessica Plaza is a 78 y.o. female referred to the emergency room by her PCP for new onset atrial fibrillation with RVR.  Patient was visiting her PCP with complaints of progressive cough and shortness of breath x10 days.     Past medical history significant for COPD, diabetes, emphysema, HLD, GERD, hypertension and previous CVA.     Interim Events  4/30/19: Patient continues to feel poorly.  She still has significant cough, shortness of breath and palpitations. No significant events overnight per nursing.      5/1/19: Resting in bed with  at bedside. Underwent successful DCCV this am. States that she still has a significant cough but does feel a little better overall. No significant events overnight.     5/2/19: Patient states that she feels her cough and shortness of breath has improved from yesterday.  Denies chest pain, palpitations, edema or dizziness.  No significant events overnight per nursing except for bradycardia.     Monitor: SB 38-52 without ectopy.    Review of Systems  Review of Systems   Constitutional: Negative for fever.   HENT: Positive for congestion.    Respiratory: Positive for cough and shortness of breath. Negative for chest tightness.    Cardiovascular: Negative for chest pain, palpitations and leg swelling.   Gastrointestinal: Negative for abdominal pain and blood in stool.   Genitourinary: Negative for hematuria.   Musculoskeletal: Negative for gait problem.   Neurological: Negative for dizziness and syncope.   All other systems reviewed and are negative.      Vital signs in last 24 hours  Temp:  [36.2 °C (97.2 °F)-37.1 °C (98.8 °F)] 37.1 °C (98.8 °F)  Pulse:  [] 101  Resp:  [15-19] 19  BP: ()/(41-85) 149/85  SpO2:  [89 %-99 %] 96 %    Physical Exam  Physical  Exam   Constitutional: She is oriented to person, place, and time. She appears well-developed and well-nourished.   HENT:   Head: Normocephalic.   Eyes: EOM are normal.   Neck: No JVD present.   Cardiovascular: Normal rate, regular rhythm, normal heart sounds and intact distal pulses.    Pulmonary/Chest: Effort normal. She has wheezes (Fine) in the right lower field and the left lower field.   Abdominal: Soft. There is no tenderness.   Musculoskeletal: Normal range of motion. She exhibits no edema.   Neurological: She is alert and oriented to person, place, and time.   Skin: Skin is warm and dry.   Psychiatric: She has a normal mood and affect. Her behavior is normal. Thought content normal.   Nursing note and vitals reviewed.      Lab Review  Lab Results   Component Value Date/Time    WBC 9.8 05/01/2019 02:16 AM    RBC 4.09 (L) 05/01/2019 02:16 AM    HEMOGLOBIN 12.2 05/01/2019 02:16 AM    HEMATOCRIT 39.1 05/01/2019 02:16 AM    MCV 95.6 05/01/2019 02:16 AM    MCH 29.8 05/01/2019 02:16 AM    MCHC 31.2 (L) 05/01/2019 02:16 AM    MPV 10.7 05/01/2019 02:16 AM      Lab Results   Component Value Date/Time    SODIUM 135 05/01/2019 02:16 AM    POTASSIUM 4.3 05/01/2019 02:16 AM    CHLORIDE 100 05/01/2019 02:16 AM    CO2 30 05/01/2019 02:16 AM    GLUCOSE 178 (H) 05/01/2019 02:16 AM    BUN 28 (H) 05/01/2019 02:16 AM    CREATININE 1.02 05/01/2019 02:16 AM    BUNCREATRAT 17 02/15/2019 07:53 AM      Lab Results   Component Value Date/Time    ASTSGOT 27 05/01/2019 02:16 AM    ALTSGPT 39 05/01/2019 02:16 AM     Lab Results   Component Value Date/Time    CHOLSTRLTOT 205 (H) 02/15/2019 07:53 AM    CHOLSTRLTOT 135 06/07/2017 03:00 AM    LDL 81 02/15/2019 07:53 AM    LDL 53 06/07/2017 03:00 AM     02/15/2019 07:53 AM    HDL 53 06/07/2017 03:00 AM    TRIGLYCERIDE 75 02/15/2019 07:53 AM    TRIGLYCERIDE 146 06/07/2017 03:00 AM    TROPONINI 0.01 04/30/2019 04:33 AM       Recent Labs      04/29/19   1830   BNPBTYPENAT  111*        Cardiac Imaging and Procedures Review  Echocardiogram: 4/30/19    Technically difficult study - adequate information is obtained.     Left ventricular systolic function is low normal.    Left ventricular ejection fraction is visually estimated to be 45%.    Global hypokinesis.    Mild concentric left ventricular hypertrophy.    Normal estimated left ventricular filling pressures in the setting of atrial fibrillation.    Normal right ventricular size.    Mildly reduced right ventricular systolic function.    Enlarged right atrium.    Aortic sclerosis without stenosis.    Right atrial pressure is estimated to be 15 mmHg, severely elevated.     Right ventricular systolic pressure is estimated to be 40 mmHg, consistent with mild pulmonary hypertension.       Compared to the images of the study done on 06/07/2017  there has been a decrease in the ejection fraction from          60% to 45%. The RV function now appears diminished. The estimated RA pressure was previously 8mmHg and is   now 15mmHg.      Imaging  Chest X-Ray: 4/29/19  The cardiac silhouette is enlarged.  No pulmonary infiltrates or consolidations are noted.  No pleural abnormalities are noted.     Assessment/Plan  Atrial Fibrillation:  -New diagnosis.  -S/P LONDON and successful DCCV on 5/1/19.  -Maintaining sinus bradycardia.  -AC with Xarelto.   -Continue PO amiodarone 400 mg BID x10 days, then 200 mg daily (started on 5/1/19).   -Lopressor decreased to 12.5 mg twice daily due to bradycardia.      Mild LV Systolic Dysfunction:  -TTE on 4/30/19 showing LVEF of 45% with global hypokinesis.  -Appears euvolemic.   -Continue BB as above, consider transitioning to Toprol XL in future.      Pulmonary HTN:  -RVSP 40 mmHg.  -History of tobacco use and COPD.      HTN:  -Stable.  -Continue lopressor 50 mg BID.      HLD:  -Stable.  -LDL on 2/15/19 was 81.  -Continue Simvastatin 40 mg daily.     URI:  -Improving.  -No Pulmonary infiltrates or consolidations per CXR  on 4/29/19.  -Continue Mucinex 600 mg twice daily.  -RT protocol in place.   -Tessalon for night to help patient sleep.      Thank you for allowing us to participate in the care of this patient.  Please us if you have any questions or concerns.     Future Appointments  Date Time Provider Department Center   5/15/2019 10:00 AM ALEK Valenzuela [facility] None   8/21/2019 1:20 PM Anurag Moreno M.D. [id number]      ALEK Valenzuela   [facility]  [phone]

## 2019-05-02 NOTE — PROGRESS NOTES
Received report from NOC RN. Assumed care of patient at 0700. Patient resting comfortably in bed. On 3L NC, no signs of respiratory distress. No s/sx of pain or discomfort at this time. Call light and belongings within reach. Bed in lowest locked position. Upper side rails raised. Fall risk precautions in place. Hourly rounding. Will continue to monitor.

## 2019-05-03 ENCOUNTER — PATIENT OUTREACH (OUTPATIENT)
Dept: HEALTH INFORMATION MANAGEMENT | Facility: OTHER | Age: 79
End: 2019-05-03

## 2019-05-03 VITALS
TEMPERATURE: 97.4 F | WEIGHT: 202.82 LBS | HEART RATE: 64 BPM | BODY MASS INDEX: 30.74 KG/M2 | OXYGEN SATURATION: 95 % | RESPIRATION RATE: 18 BRPM | SYSTOLIC BLOOD PRESSURE: 124 MMHG | HEIGHT: 68 IN | DIASTOLIC BLOOD PRESSURE: 51 MMHG

## 2019-05-03 LAB
ANION GAP SERPL CALC-SCNC: 4 MMOL/L (ref 0–11.9)
BUN SERPL-MCNC: 33 MG/DL (ref 8–22)
CALCIUM SERPL-MCNC: 8.8 MG/DL (ref 8.5–10.5)
CHLORIDE SERPL-SCNC: 103 MMOL/L (ref 96–112)
CO2 SERPL-SCNC: 28 MMOL/L (ref 20–33)
CREAT SERPL-MCNC: 1.04 MG/DL (ref 0.5–1.4)
GLUCOSE BLD-MCNC: 175 MG/DL (ref 65–99)
GLUCOSE BLD-MCNC: 204 MG/DL (ref 65–99)
GLUCOSE SERPL-MCNC: 181 MG/DL (ref 65–99)
POTASSIUM SERPL-SCNC: 5.1 MMOL/L (ref 3.6–5.5)
SODIUM SERPL-SCNC: 135 MMOL/L (ref 135–145)

## 2019-05-03 PROCEDURE — 700102 HCHG RX REV CODE 250 W/ 637 OVERRIDE(OP): Performed by: HOSPITALIST

## 2019-05-03 PROCEDURE — 36415 COLL VENOUS BLD VENIPUNCTURE: CPT

## 2019-05-03 PROCEDURE — A9270 NON-COVERED ITEM OR SERVICE: HCPCS | Performed by: NURSE PRACTITIONER

## 2019-05-03 PROCEDURE — 700102 HCHG RX REV CODE 250 W/ 637 OVERRIDE(OP): Performed by: NURSE PRACTITIONER

## 2019-05-03 PROCEDURE — 80048 BASIC METABOLIC PNL TOTAL CA: CPT

## 2019-05-03 PROCEDURE — 82962 GLUCOSE BLOOD TEST: CPT

## 2019-05-03 PROCEDURE — 99239 HOSP IP/OBS DSCHRG MGMT >30: CPT | Performed by: HOSPITALIST

## 2019-05-03 PROCEDURE — A9270 NON-COVERED ITEM OR SERVICE: HCPCS | Performed by: HOSPITALIST

## 2019-05-03 RX ORDER — AMIODARONE HYDROCHLORIDE 200 MG/1
TABLET ORAL
Qty: 90 TAB | Refills: 0 | Status: SHIPPED | OUTPATIENT
Start: 2019-05-03 | End: 2019-05-08

## 2019-05-03 RX ADMIN — AMIODARONE HYDROCHLORIDE 400 MG: 200 TABLET ORAL at 05:51

## 2019-05-03 RX ADMIN — GABAPENTIN 300 MG: 300 CAPSULE ORAL at 05:51

## 2019-05-03 RX ADMIN — GUAIFENESIN 600 MG: 600 TABLET, EXTENDED RELEASE ORAL at 05:51

## 2019-05-03 RX ADMIN — METOPROLOL TARTRATE 12.5 MG: 25 TABLET ORAL at 05:52

## 2019-05-03 RX ADMIN — SIMVASTATIN 40 MG: 40 TABLET, FILM COATED ORAL at 05:52

## 2019-05-03 RX ADMIN — GABAPENTIN 300 MG: 300 CAPSULE ORAL at 12:38

## 2019-05-03 RX ADMIN — BENZONATATE 100 MG: 100 CAPSULE ORAL at 03:01

## 2019-05-03 RX ADMIN — BUDESONIDE AND FORMOTEROL FUMARATE DIHYDRATE 2 PUFF: 80; 4.5 AEROSOL RESPIRATORY (INHALATION) at 05:54

## 2019-05-03 RX ADMIN — OMEPRAZOLE 40 MG: 20 CAPSULE, DELAYED RELEASE ORAL at 05:51

## 2019-05-03 RX ADMIN — LEVOTHYROXINE SODIUM 75 MCG: 75 TABLET ORAL at 05:51

## 2019-05-03 NOTE — FACE TO FACE
"Face to Face Note  -  Durable Medical Equipment    Weston Levine M.D. - NPI: [id number]  I certify that this patient is under my care and that they had a durable medical equipment(DME)face to face encounter by myself that meets the physician DME face-to-face encounter requirements with this patient on:    Date of encounter:   Patient:                    MRN:                       YOB: 2017  Jessica Plaza  [id number]  1940     The encounter with the patient was in whole, or in part, for the following medical condition, which is the primary reason for durable medical equipment:  COPD    I certify that, based on my findings, the following durable medical equipment is medically necessary:  Oxygen.    HOME O2 Saturation Measurements:(Values must be present for Home Oxygen orders)  Room air sat at rest: 84  Room air sat with amb: 75  With liters of O2: 3, O2 sat at rest with O2: 92  With Liters of O2: 4, O2 sat with amb with O2 : 92  Is the patient mobile?: Yes    My Clinical findings support the need for the above equipment due to:  Hypoxia    Supporting Symptoms: The patient requires supplemental oxygen, as the following interventions have been tried with limited or no improvement: \"Bronchodilators and/or steroid inhalers, \"Ambulation with oximetry and \"Incentive spirometry    "

## 2019-05-03 NOTE — CARE PLAN
Problem: Knowledge Deficit  Goal: Knowledge of disease process/condition, treatment plan, diagnostic tests, and medications will improve  Outcome: PROGRESSING SLOWER THAN EXPECTED  Pt educated about need for home O2, pt refusing to have it set up. Will continue to consult with MD MORA    Problem: Pain Management  Goal: Pain level will decrease to patient's comfort goal  Outcome: PROGRESSING AS EXPECTED  Pt denies pain at this time

## 2019-05-03 NOTE — PROGRESS NOTES
Bedside report rec'd. Assumed care of pt. POC reviewed with pt and answered all questions. Pain regiment and fall precautions reviewed with pt. Pt hoping to discharge today,  can pick her up around 5pm after he gets off work. This morning pt expressing that she does not want oxygen at home and will refuse if clinically indicated. Will cont to assess, educate, and perform home O2 eval

## 2019-05-03 NOTE — PROGRESS NOTES
RN spoke to León PETERSON about bradycardia. She discontinued metoprolol and they will resume in outpt setting if appropriate. Ok to discharge from this standpoint.

## 2019-05-03 NOTE — PROGRESS NOTES
Assumed care of patient, received bedside report from day shift RN, Pt A&Ox4, on 3L of O2 via NC no SOB noted, pt has no complains of pain at this time. Pt updated on plan of care, Call light within reach, personal belongings within reach.  Bed is locked and in lowest position, tele monitor on. Hourly rounding in place.

## 2019-05-03 NOTE — PROGRESS NOTES
Patient refused stool softener as she had a bowel movement today, educated regarding importance of intervention, notified provider Dr. Levine.

## 2019-05-03 NOTE — DISCHARGE SUMMARY
Discharge Summary, AGAINST MEDICAL ADVICE    CHIEF COMPLAINT ON ADMISSION  Chief Complaint   Patient presents with   • Abnormal EKG   • Shortness of Breath   • Difficulty Sleeping   • Cough       Reason for Admission  Shortness of breath    Admission Date  4/29/2019    CODE STATUS  Full Code    HPI & HOSPITAL COURSE  78 y.o. female with past medical history of chronic obstructive pulmonary disease, diabetes, hypertension, previous stroke. Admitted 4/29/2019 with shortness of breath.  She was found to have atrial fibrillation with rapid ventricular response as part of the preoperative evaluation.  Cardiology has been consulted.  Transesophageal echocardiography showed no thrombus in the left atrial appendage.  Her ejection fraction was 55% on [place] assessment.  The patient was successfully cardioverted on May 1, she was also started on amiodarone and metoprolol.  However she developed bradycardia, accordingly her metoprolol was discontinued according to cardiology recommendations.  The patient was started on rivaroxaban for anticoagulation.  The patient has chronic obstructive pulmonary disease, home O2 evaluation showed the need for oxygen.  However the patient does not want to wear it despite multiple education and counseling attempts. The patient left AGAINST MEDICAL ADVICE understanding and accepting the risk of her decision including worsening health, death and possible insurance denial of her hospitalization coverage.   The patient met 2-midnight criteria for an inpatient stay at the time of discharge.    Patient left AGAINST MEDICAL ADVICE on the date  5/3/2019     FOLLOW UP ITEMS POST DISCHARGE  Follow-up with cardiology as instructed.    Follow-up with primary care within 5 days.    DISCHARGE DIAGNOSES  Principal Problem:    Atrial fibrillation with RVR (HCC) POA: Unknown  Active Problems:    History of CVA (cerebrovascular accident) POA: Yes    Type 2 diabetes mellitus without complication, without long-term  current use of insulin (HCC) (Chronic) POA: Yes    Essential hypertension (Chronic) POA: Yes    Chronic obstructive pulmonary disease (HCC) (Chronic) POA: Yes    Dyslipidemia (Chronic) POA: Yes    Hypothyroidism due to acquired atrophy of thyroid POA: Yes    Primary insomnia POA: Yes    Acute hypoxemic respiratory failure (HCC) POA: Clinically Undetermined    Elevated BUN POA: Unknown    Pulmonary HTN (HCC) POA: Unknown    Pulmonary nodule POA: Unknown    Chronic anticoagulation (Chronic) POA: No  Resolved Problems:    * No resolved hospital problems. *      FOLLOW UP  Future Appointments  Date Time Provider Department Center   5/15/2019 10:00 AM ALEK Valenzuela [facility] None   8/21/2019 1:20 PM Anurag Moreno M.D. [facility] ERROL Moreno M.D.  [address]  [facility] [address]  [phone]    In 5 days        MEDICATIONS ON DISCHARGE     Medication List      START taking these medications      Instructions   amiodarone 200 MG Tabs  Commonly known as:  CORDARONE   Take two tablets every 12 hours daily for seven days, then take one tablet every 12 hours daily.     rivaroxaban 15 MG Tabs tablet  Commonly known as:  XARELTO   Take 1 Tab by mouth with dinner.  Dose:  15 mg        CONTINUE taking these medications      Instructions   albuterol 108 (90 Base) MCG/ACT Aers inhalation aerosol   Inhale 1 Puff by mouth every 6 hours as needed for Shortness of Breath.  Dose:  1 Puff     aspirin 81 MG Chew chewable tablet  Commonly known as:  ASA   Take 81 mg by mouth every day.  Dose:  81 mg     gabapentin 300 MG Caps  Commonly known as:  NEURONTIN   TAKE 1 CAPSULE BY MOUTH TWICE A DAY     levothyroxine 75 MCG Tabs  Commonly known as:  SYNTHROID   TAKE 1 TAB BY MOUTH EVERY MORNING ON AN EMPTY STOMACH.  Dose:  75 mcg     lisinopril 10 MG Tabs  Commonly known as:  PRINIVIL   TAKE 1 TAB BY MOUTH EVERY DAY.  Dose:  10 mg     metformin 1000 MG tablet  Commonly known as:  GLUCOPHAGE   Take 1 Tab  by mouth 2 times a day, with meals.  Dose:  1000 mg     omeprazole 40 MG delayed-release capsule  Commonly known as:  PRILOSEC   Take 40 mg by mouth every day.  Dose:  40 mg     simvastatin 40 MG Tabs  Commonly known as:  ZOCOR   TAKE 1 TAB BY MOUTH EVERY DAY.  Dose:  40 mg     traZODone 100 MG Tabs  Commonly known as:  DESYREL   TAKE 1 TABLET BY MOUTH EVERYDAY AT BEDTIME     VICTOZA 18 MG/3ML Sopn injection  Generic drug:  liraglutide   Inject 1.2 mg as instructed every day.  Dose:  1.2 mg            Allergies  Allergies   Allergen Reactions   • Bactrim Hives   • Demerol Shortness of Breath and Swelling   • Pcn [Penicillins] Hives   • Sulfa Drugs Hives   • Ferrous Sulfate Rash     itching   • Latex Contact Dermatitis       DIET  Orders Placed This Encounter   Procedures   • Diet Order Regular     Standing Status:   Standing     Number of Occurrences:   1     Order Specific Question:   Diet:     Answer:   Regular [1]       ACTIVITY  As tolerated.  Weight bearing as tolerated    CONSULTATIONS  Cardiology    PROCEDURES  Electrocardioversion     LABORATORY  Lab Results   Component Value Date    SODIUM 135 05/03/2019    POTASSIUM 5.1 05/03/2019    CHLORIDE 103 05/03/2019    CO2 28 05/03/2019    GLUCOSE 181 (H) 05/03/2019    BUN 33 (H) 05/03/2019    CREATININE 1.04 05/03/2019        Lab Results   Component Value Date    WBC 9.8 05/01/2019    HEMOGLOBIN 12.2 05/01/2019    HEMATOCRIT 39.1 05/01/2019    PLATELETCT 228 05/01/2019        Total time of the discharge process exceeds 37 minutes.

## 2019-05-03 NOTE — CARE PLAN
Problem: Infection  Goal: Will remain free from infection  Outcome: PROGRESSING AS EXPECTED  Pt has no s/sx of infection at this time, will continue to monitor.

## 2019-05-04 NOTE — PROGRESS NOTES
Pt left against medical advice for safe discharge as she refused to have home oxygen set up despite Dr advising her she needs it. Pt adament and signed paperwork stating she is refusing this care. Pt did discharge with new meds sent to pharmacy and was instructed to pick them up. Pt also given scheduled upcoming appointments with PCP and cardiology.     Pt was escorted off unit by RN and  via WC to their vehicle.

## 2019-05-04 NOTE — PROGRESS NOTES
Called by microbiology [Janel] for positive culture results 1 out of 4 bottles.  This blood culture was drawn on April 29!  And reported today 5/4/2019.   This is likely contaminant [1 out of 4 bottles] as the patient did not have signs of infection during her stay.

## 2019-05-06 ENCOUNTER — PATIENT OUTREACH (OUTPATIENT)
Dept: HEALTH INFORMATION MANAGEMENT | Facility: OTHER | Age: 79
End: 2019-05-06

## 2019-05-06 ENCOUNTER — HOSPITAL ENCOUNTER (EMERGENCY)
Facility: MEDICAL CENTER | Age: 79
End: 2019-05-06
Attending: EMERGENCY MEDICINE
Payer: MEDICARE

## 2019-05-06 ENCOUNTER — APPOINTMENT (OUTPATIENT)
Dept: RADIOLOGY | Facility: MEDICAL CENTER | Age: 79
End: 2019-05-06
Attending: EMERGENCY MEDICINE
Payer: MEDICARE

## 2019-05-06 VITALS
RESPIRATION RATE: 18 BRPM | WEIGHT: 182.98 LBS | HEART RATE: 82 BPM | TEMPERATURE: 98.4 F | HEIGHT: 68 IN | DIASTOLIC BLOOD PRESSURE: 64 MMHG | BODY MASS INDEX: 27.73 KG/M2 | OXYGEN SATURATION: 94 % | SYSTOLIC BLOOD PRESSURE: 130 MMHG

## 2019-05-06 DIAGNOSIS — J44.9 CHRONIC OBSTRUCTIVE PULMONARY DISEASE, UNSPECIFIED COPD TYPE (HCC): ICD-10-CM

## 2019-05-06 DIAGNOSIS — R09.02 HYPOXIA: ICD-10-CM

## 2019-05-06 DIAGNOSIS — R53.1 GENERAL WEAKNESS: ICD-10-CM

## 2019-05-06 DIAGNOSIS — R05.9 COUGH: ICD-10-CM

## 2019-05-06 DIAGNOSIS — R06.02 SHORTNESS OF BREATH: ICD-10-CM

## 2019-05-06 LAB
ALBUMIN SERPL BCP-MCNC: 3.8 G/DL (ref 3.2–4.9)
ALBUMIN/GLOB SERPL: 1.2 G/DL
ALP SERPL-CCNC: 80 U/L (ref 30–99)
ALT SERPL-CCNC: 48 U/L (ref 2–50)
ANION GAP SERPL CALC-SCNC: 11 MMOL/L (ref 0–11.9)
AST SERPL-CCNC: 31 U/L (ref 12–45)
BASOPHILS # BLD AUTO: 0.7 % (ref 0–1.8)
BASOPHILS # BLD: 0.05 K/UL (ref 0–0.12)
BILIRUB SERPL-MCNC: 0.7 MG/DL (ref 0.1–1.5)
BLOOD CULTURE HOLD CXBCH: NORMAL
BNP SERPL-MCNC: 93 PG/ML (ref 0–100)
BUN SERPL-MCNC: 21 MG/DL (ref 8–22)
CALCIUM SERPL-MCNC: 9.6 MG/DL (ref 8.5–10.5)
CHLORIDE SERPL-SCNC: 100 MMOL/L (ref 96–112)
CO2 SERPL-SCNC: 26 MMOL/L (ref 20–33)
CREAT SERPL-MCNC: 1.44 MG/DL (ref 0.5–1.4)
EKG IMPRESSION: NORMAL
EOSINOPHIL # BLD AUTO: 0.18 K/UL (ref 0–0.51)
EOSINOPHIL NFR BLD: 2.5 % (ref 0–6.9)
ERYTHROCYTE [DISTWIDTH] IN BLOOD BY AUTOMATED COUNT: 50.8 FL (ref 35.9–50)
GLOBULIN SER CALC-MCNC: 3.2 G/DL (ref 1.9–3.5)
GLUCOSE SERPL-MCNC: 156 MG/DL (ref 65–99)
HCT VFR BLD AUTO: 43.2 % (ref 37–47)
HGB BLD-MCNC: 13.5 G/DL (ref 12–16)
IMM GRANULOCYTES # BLD AUTO: 0.03 K/UL (ref 0–0.11)
IMM GRANULOCYTES NFR BLD AUTO: 0.4 % (ref 0–0.9)
LYMPHOCYTES # BLD AUTO: 1.03 K/UL (ref 1–4.8)
LYMPHOCYTES NFR BLD: 14.1 % (ref 22–41)
MCH RBC QN AUTO: 29.6 PG (ref 27–33)
MCHC RBC AUTO-ENTMCNC: 31.3 G/DL (ref 33.6–35)
MCV RBC AUTO: 94.7 FL (ref 81.4–97.8)
MONOCYTES # BLD AUTO: 0.6 K/UL (ref 0–0.85)
MONOCYTES NFR BLD AUTO: 8.2 % (ref 0–13.4)
NEUTROPHILS # BLD AUTO: 5.43 K/UL (ref 2–7.15)
NEUTROPHILS NFR BLD: 74.1 % (ref 44–72)
NRBC # BLD AUTO: 0 K/UL
NRBC BLD-RTO: 0 /100 WBC
PLATELET # BLD AUTO: 262 K/UL (ref 164–446)
PMV BLD AUTO: 10.5 FL (ref 9–12.9)
POTASSIUM SERPL-SCNC: 4.3 MMOL/L (ref 3.6–5.5)
PROT SERPL-MCNC: 7 G/DL (ref 6–8.2)
RBC # BLD AUTO: 4.56 M/UL (ref 4.2–5.4)
SODIUM SERPL-SCNC: 137 MMOL/L (ref 135–145)
TROPONIN I SERPL-MCNC: 0.01 NG/ML (ref 0–0.04)
WBC # BLD AUTO: 7.3 K/UL (ref 4.8–10.8)

## 2019-05-06 PROCEDURE — 93005 ELECTROCARDIOGRAM TRACING: CPT | Performed by: EMERGENCY MEDICINE

## 2019-05-06 PROCEDURE — 80053 COMPREHEN METABOLIC PANEL: CPT

## 2019-05-06 PROCEDURE — 71045 X-RAY EXAM CHEST 1 VIEW: CPT

## 2019-05-06 PROCEDURE — 36415 COLL VENOUS BLD VENIPUNCTURE: CPT

## 2019-05-06 PROCEDURE — 94640 AIRWAY INHALATION TREATMENT: CPT

## 2019-05-06 PROCEDURE — 84484 ASSAY OF TROPONIN QUANT: CPT

## 2019-05-06 PROCEDURE — 304561 HCHG STAT O2

## 2019-05-06 PROCEDURE — 85025 COMPLETE CBC W/AUTO DIFF WBC: CPT

## 2019-05-06 PROCEDURE — 83880 ASSAY OF NATRIURETIC PEPTIDE: CPT

## 2019-05-06 PROCEDURE — 99285 EMERGENCY DEPT VISIT HI MDM: CPT

## 2019-05-06 PROCEDURE — 93005 ELECTROCARDIOGRAM TRACING: CPT

## 2019-05-06 PROCEDURE — 700101 HCHG RX REV CODE 250: Performed by: EMERGENCY MEDICINE

## 2019-05-06 RX ORDER — IPRATROPIUM BROMIDE AND ALBUTEROL SULFATE 2.5; .5 MG/3ML; MG/3ML
3 SOLUTION RESPIRATORY (INHALATION) 4 TIMES DAILY
Qty: 120 BULLET | Refills: 0 | Status: SHIPPED | OUTPATIENT
Start: 2019-05-06 | End: 2019-08-14

## 2019-05-06 RX ORDER — IPRATROPIUM BROMIDE AND ALBUTEROL SULFATE 2.5; .5 MG/3ML; MG/3ML
3 SOLUTION RESPIRATORY (INHALATION)
Status: DISCONTINUED | OUTPATIENT
Start: 2019-05-06 | End: 2019-05-06 | Stop reason: HOSPADM

## 2019-05-06 RX ADMIN — IPRATROPIUM BROMIDE AND ALBUTEROL SULFATE 3 ML: .5; 3 SOLUTION RESPIRATORY (INHALATION) at 15:27

## 2019-05-06 NOTE — ED TRIAGE NOTES
Pt amb to triage.  Chief Complaint   Patient presents with   • Shortness of Breath     worse with exertion   • Chest Pressure     Just dc'd from the hospital 3d ago, admitted after a cardioversion.

## 2019-05-06 NOTE — PROGRESS NOTES
"CM post discharge outreach call.  CM spoke to patient.  Patient reports she still isn't feeling any better. Reports she continues to cough and is very tired.  Pt left hospital AMA on 5/3/19. Pt reports she declined home oxygen.  Patient reports she stopped taking amiodarone after discharge because \"I had a terrible reaction\". Pt reports her eyes were burning and reports itching \"everywhere\".  States itching has improved. Pt reports she didn't contact any provider regarding itching and stopping amiodarone.  CM recommended patient return to ER for evaluation of ongoing cough and not feeling well. Pt declining, stating she won't go back to the hospital.  States she is going to see her PCP on Wednesday. CM again encouraged patient to return to ER for evaluation. CM also recommended patient notify cardiology of stopping amiodarone.  Update to PCP.  "

## 2019-05-06 NOTE — TELEPHONE ENCOUNTER
CM contacted patient with recommendation from Dr. Moreno to return to ER for evaluation if not feeling well. Patient states she doesn't want to go to ER and be admitted.  CM explained rationale for needing to be evaluated after leaving hospital  and not using oxygen as directed by physician.  Patient states she may go to ER later today.  Again CM encouraged patient to go back to hospital for evaluation.

## 2019-05-06 NOTE — FACE TO FACE
"Face to Face Note  -  Durable Medical Equipment    Nimisha Sood M.D. - NPI: [id number]  I certify that this patient is under my care and that they had a durable medical equipment(DME)face to face encounter by myself that meets the physician DME face-to-face encounter requirements with this patient on:    Date of encounter:   Patient:                    MRN:                       YOB: 2017  Jessica Plaza  [id number]  1940     The encounter with the patient was in whole, or in part, for the following medical condition, which is the primary reason for durable medical equipment:  COPD and Cardiac Disease    I certify that, based on my findings, the following durable medical equipment is medically necessary:  Oxygen and Nebulizer.    HOME O2 Saturation Measurements:(Values must be present for Home Oxygen orders)         ,     ,         My Clinical findings support the need for the above equipment due to:  Hypoxia    Supporting Symptoms: The patient requires supplemental oxygen, as the following interventions have been tried with limited or no improvement: \"Bronchodilators and/or steroid inhalers    "

## 2019-05-06 NOTE — DISCHARGE PLANNING
Request from MD to arrange home O2 for patient.  Choice form presented to patient and she chose three companies: [employer], [employer] and [facility].  Choice form faxed to Sarahi MUÑIZ

## 2019-05-06 NOTE — ED NOTES
"Pt to R[facility] via w/c accompanied by spouse.  Pt has moist non-productive cough.  Pt states she was here last week for a-fib w/ RVR and cardioversion.  Pt reports she had this moist cough last week and \"no one did anything and it's worse\".  pCXR bedside at this time.  "

## 2019-05-06 NOTE — ED NOTES
Pt ambulating spo2 85% on ra.  Pt's resting spo2 82% on RA.  ERP aware.  Pt currently on o2 @ 3lpm n/c

## 2019-05-06 NOTE — ED PROVIDER NOTES
"ED Provider Note    Scribed for Nimisha Sood M.D. by Ivone Dominique. 5/6/2019, 2:56 PM.    Primary care provider: Anurag Moreno M.D.  Means of arrival: Walk-In  History obtained from: Patient  History limited by: None    CHIEF COMPLAINT  Chief Complaint   Patient presents with   • Shortness of Breath     worse with exertion   • Chest Pressure       HPI  Jessica Plaza is a 78 y.o. female who presents to the Emergency Department after being advised from Dr. Moreno complaining of exertional shortness of breath. Patient was admitted 4/29/19-5/3/19 for evaluation of shortness of breath. She was found to be in atrial fibrillation, required cardioversion and was discharged home on Amiodarone and Metoprolol. It was advised at that admission, that she be placed on home oxygen, as this does help manage her shortness of breath. However, patient refused at that time.  The patient reports that she has been experiencing a non productive cough for the last several weeks along with this shortness of breath. She additionally reports that since discharge from the hospital she continues to feel fatigued along with her respiratory symptoms. No fevers or leg swelling have been reported, but she does endorse diarrhea, lightheadedness, and vomiting which she describes as \"dry-heaving.\" Patient reports that she is compliant with her Xarelto for her atrial fibrillation, however, is no longer taking the prescribed Amiodarone as just following her first dose the day after discharge, she developed an allergic reaction, described as diffuse itching that resolved once she stopped taking the medication. Patient does note improvement to her breathing while on oxygen and after receiving breathing treatments while hospitalized.    REVIEW OF SYSTEMS  Pertinent positives include shortness of breath, diarrhea, vomiting, rash(resolved), fatigue, cough, lightheadedness. Pertinent negatives include no fevers, sputum production. As above, all " other systems reviewed and are negative.   See HPI for further details.     PAST MEDICAL HISTORY  Past Medical History:   Diagnosis Date   • Anemia    • Arthritis    • Castro esophagus    • Bronchitis    • Chronic anticoagulation 2019   • COPD    • COPD (chronic obstructive pulmonary disease) ([facility])    • Dental disorder     dentures upper and lower   • Diabetes     oral meds   • Disorder of thyroid    • Emphysema of lung ([facility])    • Encounter for medication refill 2018   • GERD (gastroesophageal reflux disease)    • Heart burn    • Hemorrhagic disorder     hx internal bleeding   • High cholesterol    • Hypertension    • Indigestion    • Pain     left shoulder   • Pneumonia .   • Stroke ([facility])    • UTI (urinary tract infection)        SURGICAL HISTORY  Past Surgical History:   Procedure Laterality Date   • SHOULDER DECOMPRESSION ARTHROSCOPIC Left 10/11/2016    Procedure: SHOULDER DECOMPRESSION ARTHROSCOPIC  with   debridment of the labrum and biceps tendon stump;  Surgeon: Bill Ponce M.D.;  Location: [facility];  Service:    • CLAVICLE RESECTION Left 10/11/2016    Procedure: CLAVICLE RESECTION distal open  ;  Surgeon: Bill Ponce M.D.;  Location: SURGERY [facility];  Service:    • CHONDROPLASTY Left 10/11/2016    Procedure: Arthroscopic CHONDROPLASTY of the joint  ;  Surgeon: Bill Ponce M.D.;  Location: SURGERY [facility];  Service:    • SHOULDER DECOMPRESSION ARTHROSCOPIC Left 2016    Procedure: SHOULDER DECOMPRESSION ARTHROSCOPIC SUBACROMIAL and biceps tenotomy;  Surgeon: Johan Roberts M.D.;  Location: SURGERY [facility];  Service:    • GASTROSCOPY-ENDO  2014    Performed by Yonas Grimes Jr., M.D. at ENDOSCOPY [facility]   • TONSILLECTOMY     • PB ANESTH, DELIVERY ONLY      X 2       SOCIAL HISTORY  Social History   Substance Use Topics   • Smoking status: Former Smoker     Packs/day: 0.50     Years: 52.00     Types:  Cigarettes     Quit date: 1/1/2013   • Smokeless tobacco: Never Used      Comment: started smoking at age 21   • Alcohol use 8.4 oz/week     7 Shots of liquor, 7 Standard drinks or equivalent per week      Comment: 1/day      History   Drug Use No       FAMILY HISTORY  Family History   Problem Relation Age of Onset   • Cancer Mother         breast   • Lung Disease Mother         COPD/Emphysema/Smoker   • Stroke Father    • Hypertension Father    • Hyperlipidemia Father    • Heart Attack Father    • Cancer Sister         Pancriatic   • Arrythmia Sister    • Lung Disease Sister         COPD/Emphysema/Smoker   • Bipolar disorder Son    • Bipolar disorder Son    • Bipolar disorder Son    • Cancer Maternal Aunt         stomach       CURRENT MEDICATIONS  No current facility-administered medications for this encounter.     Current Outpatient Prescriptions:   •  amiodarone (CORDARONE) 200 MG Tab, Take two tablets every 12 hours daily for seven days, then take one tablet every 12 hours daily., Disp: 90 Tab, Rfl: 0  •  rivaroxaban (XARELTO) 15 MG Tab tablet, Take 1 Tab by mouth with dinner., Disp: 42 Tab, Rfl: 0  •  liraglutide (VICTOZA) 18 MG/3ML Solution Pen-injector injection, Inject 1.2 mg as instructed every day., Disp: , Rfl:   •  levothyroxine (SYNTHROID) 75 MCG Tab, TAKE 1 TAB BY MOUTH EVERY MORNING ON AN EMPTY STOMACH., Disp: 90 Tab, Rfl: 3  •  gabapentin (NEURONTIN) 300 MG Cap, TAKE 1 CAPSULE BY MOUTH TWICE A DAY, Disp: 180 Cap, Rfl: 3  •  metformin (GLUCOPHAGE) 1000 MG tablet, Take 1 Tab by mouth 2 times a day, with meals., Disp: 180 Tab, Rfl: 2  •  traZODone (DESYREL) 100 MG Tab, TAKE 1 TABLET BY MOUTH EVERYDAY AT BEDTIME, Disp: 90 Tab, Rfl: 3  •  albuterol 108 (90 Base) MCG/ACT Aero Soln inhalation aerosol, Inhale 1 Puff by mouth every 6 hours as needed for Shortness of Breath., Disp: 8.5 g, Rfl: 5  •  lisinopril (PRINIVIL) 10 MG Tab, TAKE 1 TAB BY MOUTH EVERY DAY., Disp: 90 Tab, Rfl: 3  •  simvastatin (ZOCOR) 40  "MG Tab, TAKE 1 TAB BY MOUTH EVERY DAY., Disp: 30 Tab, Rfl: 5  •  aspirin (ASA) 81 MG Chew Tab chewable tablet, Take 81 mg by mouth every day., Disp: , Rfl:   •  omeprazole (PRILOSEC) 40 MG capsule, Take 40 mg by mouth every day., Disp: , Rfl:       ALLERGIES  Allergies   Allergen Reactions   • Bactrim Hives   • Demerol Shortness of Breath and Swelling   • Pcn [Penicillins] Hives   • Sulfa Drugs Hives   • Ferrous Sulfate Rash     itching   • Latex Contact Dermatitis       PHYSICAL EXAM  VITAL SIGNS: /47   Pulse 82   Temp 36.9 °C (98.4 °F) (Temporal)   Resp 18   Ht 1.727 m (5' 8\")   Wt 83 kg (182 lb 15.7 oz)   SpO2 92%   BMI 27.82 kg/m²   Vitals reviewed.    Consitutional: Well-developed, well-nourished. Negative for: distress.  HENT: Normocephalic, right external ear normal, left external ear normal, oropharynx clear and moist.  Eyes: Conjunctivae normal, extraocular movements normal. Negative for: discharge in right and left eye, icterus.  Neck: Range of motion normal, supple. Negative for cervical adenopathy.  Cardiovascular: Normal rate, regular rhythm, heart sounds normal, intact distal pulses. Negative for: murmur, rub, gallop.  Pulmonary/Chest Wall: Mildly decreased breath sounds throughout. Negative for: wheezes, rales, rhonchi.   Abdominal: Soft, bowel sounds normal. Negative for: distention, tenderness, rebound, guarding.  Musculoskeletal: Pes cavus, Normal range of motion. Negative for edema.  Neurological: Alert and oriented x3. No focal deficits.  Skin: Warm, dry. Negative for rash.  Psych: Mood/affect normal, behavior normal, judgment normal.    DIAGNOSTIC STUDIES / PROCEDURES    LABS  Results for orders placed or performed during the hospital encounter of 05/06/19   CBC with Differential   Result Value Ref Range    WBC 7.3 4.8 - 10.8 K/uL    RBC 4.56 4.20 - 5.40 M/uL    Hemoglobin 13.5 12.0 - 16.0 g/dL    Hematocrit 43.2 37.0 - 47.0 %    MCV 94.7 81.4 - 97.8 fL    MCH 29.6 27.0 - 33.0 pg    " MCHC 31.3 (L) 33.6 - 35.0 g/dL    RDW 50.8 (H) 35.9 - 50.0 fL    Platelet Count 262 164 - 446 K/uL    MPV 10.5 9.0 - 12.9 fL    Neutrophils-Polys 74.10 (H) 44.00 - 72.00 %    Lymphocytes 14.10 (L) 22.00 - 41.00 %    Monocytes 8.20 0.00 - 13.40 %    Eosinophils 2.50 0.00 - 6.90 %    Basophils 0.70 0.00 - 1.80 %    Immature Granulocytes 0.40 0.00 - 0.90 %    Nucleated RBC 0.00 /100 WBC    Neutrophils (Absolute) 5.43 2.00 - 7.15 K/uL    Lymphs (Absolute) 1.03 1.00 - 4.80 K/uL    Monos (Absolute) 0.60 0.00 - 0.85 K/uL    Eos (Absolute) 0.18 0.00 - 0.51 K/uL    Baso (Absolute) 0.05 0.00 - 0.12 K/uL    Immature Granulocytes (abs) 0.03 0.00 - 0.11 K/uL    NRBC (Absolute) 0.00 K/uL   Complete Metabolic Panel (CMP)   Result Value Ref Range    Sodium 137 135 - 145 mmol/L    Potassium 4.3 3.6 - 5.5 mmol/L    Chloride 100 96 - 112 mmol/L    Co2 26 20 - 33 mmol/L    Anion Gap 11.0 0.0 - 11.9    Glucose 156 (H) 65 - 99 mg/dL    Bun 21 8 - 22 mg/dL    Creatinine 1.44 (H) 0.50 - 1.40 mg/dL    Calcium 9.6 8.5 - 10.5 mg/dL    AST(SGOT) 31 12 - 45 U/L    ALT(SGPT) 48 2 - 50 U/L    Alkaline Phosphatase 80 30 - 99 U/L    Total Bilirubin 0.7 0.1 - 1.5 mg/dL    Albumin 3.8 3.2 - 4.9 g/dL    Total Protein 7.0 6.0 - 8.2 g/dL    Globulin 3.2 1.9 - 3.5 g/dL    A-G Ratio 1.2 g/dL   Troponin   Result Value Ref Range    Troponin I 0.01 0.00 - 0.04 ng/mL   BTYPE NATRIURETIC PEPTIDE   Result Value Ref Range    B Natriuretic Peptide 93 0 - 100 pg/mL   ESTIMATED GFR   Result Value Ref Range    GFR If  43 (A) >60 mL/min/1.73 m 2    GFR If Non African American 35 (A) >60 mL/min/1.73 m 2   Blood Culture,Hold   Result Value Ref Range    Blood Culture Hold Collected    EKG   Result Value Ref Range    Report       [facility] Emergency Dept.    Test Date:  2019-05-06  Pt Name:    MARILIN MCKEON                   Department: ER  MRN:        [id number]                      Room:  Gender:     Female                        Technician: [id number]  :        [id number]                   Requested By:ER TRIAGE PROTOCOL  Order #:    [id number]                    Reading MD: MORGAN CARDONA MD    Measurements  Intervals                                Axis  Rate:       76                           P:          42  OH:         192                          QRS:        1  QRSD:       98                           T:          57  QT:         407  QTc:        458    Interpretive Statements  Sinus rhythm  Nonspecific T abnormalities, anterior leads  Compared to ECG 2019 11:27:11  Sinus bradycardia no longer present  Poor R-wave progression no longer present  T-wave abnormality still present    Electronically Signed On 2019 15:42:48 PDT by MORGAN CARDONA MD         All labs reviewed by me.    EKG Interpretation:  12-lead EKG by my interpretation as above.  No ST or T wave changes to indicate ischemia or infarct    RADIOLOGY  DX-CHEST-PORTABLE (1 VIEW)   Final Result      1.  Mild interstitial pulmonary edema   2.  Persistently enlarged cardiac silhouette        The radiologist's interpretation of all radiological studies have been reviewed by me.    COURSE & MEDICAL DECISION MAKING  Nursing notes, VS, PMSFHx reviewed in chart.    2:56 PM Patient seen and examined at bedside. The patient presents with chronic shortness of breath.   and the differential diagnosis includes but is not limited to COPD, pneumonia, pleural effusion, pulmonary edema. Ordered DX-Chest, BNP, CBC Diff, CMP, Troponin, and EKG. Patient will be treated with nebulizer Duoneb for her symptoms. I informed the patient that I would evaluate with lab work and imaging for any acute process, seeing if her shortness of breath improves with breathing treatment as well.    3:00 PM I have spoken with social work who will look into how best to get the patient oxygen at home today to continue to help manage her problems with her breathing. The appropriate orders have been placed as  below. If work up today is negative, patient will be set to be discharged home with home oxygen, nebulizer and appropriate medication for it.    3:42 PM patient's oxygen saturations are 85% on RA walking around the department.    The patient will return for new or worsening symptoms and is stable at the time of discharge.    DISPOSITION:  Patient will be discharged home in stable condition.    FOLLOW UP:  Anuarg Moreno M.D.  [address] [facility] [address]  [facility] [id number] [phone]    Schedule an appointment as soon as possible for a visit       [facility], Emergency Dept  [address] [facility] [id number] [phone]    If symptoms worsen      OUTPATIENT MEDICATIONS:  New Prescriptions    HOME CARE OXYGEN    Inhale 3 L/min by mouth Continuous for 120 days. Hypoxic: 82% at rest and 85% with ambulation on room air    IPRATROPIUM-ALBUTEROL (DUONEB) 0.5-2.5 (3) MG/3ML NEBULIZER SOLUTION    3 mL by Nebulization route 4 times a day.    NEBULIZERS (COMPRESSOR/NEBULIZER) MISC    1 Each by Nebulization route every four hours as needed (shortness of breath, coughing).       FINAL IMPRESSION  1. Shortness of breath    2. Hypoxia    3. General weakness    4. Cough          Ivone HUNT (Scribe), am scribing for, and in the presence of, Nimisha Sood M.D..    Electronically signed by: Ivone Dominique (Scribe), 5/6/2019    INimisha M.D. personally performed the services described in this documentation, as scribed by Ivone Dominique in my presence, and it is both accurate and complete.    C    The note accurately reflects work and decisions made by me.  Nimisha Sood  5/6/2019  5:05 PM

## 2019-05-06 NOTE — FLOWSHEET NOTE
05/06/19 1528   Interdisciplinary Plan of Care-Goals (Indications)   Bronchodilator Indications Strong Subjective / Objective Improvement   Interdisciplinary Plan of Care-Outcomes    Bronchodilator Outcome Patient at Stable Baseline   Education   Education Yes - Pt. / Family has been Instructed in use of Respiratory Equipment   RT Assessment of Delivered Medications   Evaluation of Medication Delivery Daily Yes-- Pt /Family has been Instructed in use of Respiratory Medications and Adverse Reactions   SVN Group   #SVN Performed Yes   Given By: Mouthpiece   Respiratory WDL   Respiratory (WDL) X   Chest Exam   Respiration 14   Pulse 70   Heart Rate (Monitored) 70   Breath Sounds   RUL Breath Sounds Clear   RML Breath Sounds Clear   RLL Breath Sounds Diminished   NABILA Breath Sounds Clear   LLL Breath Sounds Diminished   Oximetry   Continuous Oximetry Yes   Oxygen   Pulse Oximetry 98 %   O2 (LPM) 2   O2 Daily Delivery Respiratory  Nasal Cannula

## 2019-05-07 NOTE — ED NOTES
Discharge instructions and prescriptions provided to pt at this time, verbalized understanding. Pt ambulatory to lobby in stable condition, steady gait noted.

## 2019-05-07 NOTE — DISCHARGE PLANNING
Agency/Facility Name: [facility]  Spoke To: Ector  Outcome: Ector informed that they needed an updated order with room air sats and a progress note from the physician that coincides with the Face to Face and Order, that the COPD is the reason the O2 is needed.   RN/CM Sudheer notified

## 2019-05-07 NOTE — DISCHARGE PLANNING
ZAIRA spoke with Ector [facility] manager regarding situation, He advised he unable to accept note from PCP unless order/F2F was completed by PCP. Ector agreed to allow Pt to private pay $100.00.     PRIYAW provided a instructions to follow     !. Call [facility] [phone] (Manager: Ector), provide credit card payment for $100.00 to allow portable tank (to ER) and other home O2 equipment to home.   2. Within the next day Call Anurag Moreno MD office and make an appt (AS SOON AS YOU CAN) to speak with him regarding need for the following things to be sent to   [facility]  [address] [address]  Phone: [phone]     1. Order/Face to Face for Home Oxygen which indicates DX of COPD   2. Progress note detailing need for Home o2/Dx of COPD   Once [facility] receives information, they will work with Medicare to receive approval for ongoing Home O2 needs

## 2019-05-07 NOTE — ED NOTES
Pt updated on POC, awaiting CM to set up home o2 for patient discharge.  Patient.verbalizes frustration and does not want to wait for home o2 setup.  Pt's  at the bedside encouraging her to be patient as they tried setting up home o2 @ recent discharge and she left w/o it, which is why he states she returned.  Apologized for delay and education given.  Will continue to monitor.

## 2019-05-07 NOTE — ED NOTES
Pt given home oxygen tank at this time. Pt demonstrated use of tank, verbalized understanding for using home oxygen.

## 2019-05-07 NOTE — DISCHARGE PLANNING
[facility] sent updated referral. [facility] still needs a progress note from the physician in regards to the patient's diagnosis that coincides with the Face to Face and the Order.     RN/STEFANY Fuentes notified

## 2019-05-07 NOTE — ED NOTES
Assumed care of pt at this time. Pt resting in bed, denies needs. Awaiting home oxygen for discharge.

## 2019-05-08 ENCOUNTER — OFFICE VISIT (OUTPATIENT)
Dept: MEDICAL GROUP | Facility: MEDICAL CENTER | Age: 79
End: 2019-05-08
Payer: MEDICARE

## 2019-05-08 VITALS
HEART RATE: 66 BPM | WEIGHT: 182 LBS | DIASTOLIC BLOOD PRESSURE: 60 MMHG | TEMPERATURE: 97.6 F | BODY MASS INDEX: 27.58 KG/M2 | SYSTOLIC BLOOD PRESSURE: 122 MMHG | HEIGHT: 68 IN | OXYGEN SATURATION: 87 %

## 2019-05-08 DIAGNOSIS — I48.91 ATRIAL FIBRILLATION WITH RVR (HCC): ICD-10-CM

## 2019-05-08 DIAGNOSIS — J96.11 CHRONIC RESPIRATORY FAILURE WITH HYPOXIA (HCC): ICD-10-CM

## 2019-05-08 DIAGNOSIS — J44.9 CHRONIC OBSTRUCTIVE PULMONARY DISEASE, UNSPECIFIED COPD TYPE (HCC): Chronic | ICD-10-CM

## 2019-05-08 PROBLEM — J96.01 ACUTE HYPOXEMIC RESPIRATORY FAILURE (HCC): Status: RESOLVED | Noted: 2019-04-29 | Resolved: 2019-05-08

## 2019-05-08 PROBLEM — R79.9 ELEVATED BUN: Status: RESOLVED | Noted: 2019-04-29 | Resolved: 2019-05-08

## 2019-05-08 PROCEDURE — 99496 TRANSJ CARE MGMT HIGH F2F 7D: CPT | Performed by: INTERNAL MEDICINE

## 2019-05-08 RX ORDER — BUDESONIDE AND FORMOTEROL FUMARATE DIHYDRATE 160; 4.5 UG/1; UG/1
2 AEROSOL RESPIRATORY (INHALATION) 2 TIMES DAILY
Qty: 1 INHALER | Refills: 11 | Status: SHIPPED | OUTPATIENT
Start: 2019-05-08 | End: 2020-01-30 | Stop reason: SDUPTHER

## 2019-05-08 NOTE — PROGRESS NOTES
Subjective:     POST DISCHARGE CALL:  Discharge Date:5/3/2019   Date of Outreach Call: 5/6/2019 11:10 AM  Now that you're home, how are you doing? *  Do you have questions about your medications? No    Did you fill your medications? Yes    Do you have a follow-up appointment scheduled?Yes  Comment:PCP 5/8/19    Discharging Department: Telemetry 7    Number of Attempts: 1  Current or previous attempts completed within two business days of discharge? Yes  Provided education regarding treatment plan, medication, self-management, ADLs? Yes  Comment:CM recommended patient go to ER for evaluation.   Pt declining.  CM strongly recommended pt return to ER for  evaluation.  Has patient completed Advance Directive? If yes, advise them to bring to appointment. No  Care Manager phone number provided? Yes  Is there anything else I can help you with? No    Nurses outreach call note above reviewed.      Have reviewed discharge summary as well as all imaging and labs performed while in the hospital.    CC: Follow-up hospitalization follow-up hospitalization COPD exacerbation, respiratory failure, atrial fibrillation.    HPI:   Jessica presents today with the following.    1. Chronic obstructive pulmonary disease, unspecified COPD type (HCC)  Patient recently seen in this office with a COPD exacerbation.  She was sent to the emergency room.  She was given antibiotics and steroids and reports significant improvement in cough and breathing since that time.  No fevers or chills and reports 75% improvement.    2. Chronic respiratory failure with hypoxia (HCC)  She was set up with oxygen however for some reason the orders have fallen through.  She is satting at 87% on room air in office today saturations improved to 94% on 3 L.    3. Atrial fibrillation with RVR ([facility])  Initially found to be in atrial fibrillation as well she was placed on amiodarone which she did not tolerate.  She is taking metoprolol and heart rate is well controlled no  chest pains or palpitations      Patient Active Problem List    Diagnosis Date Noted   • Chronic respiratory failure with hypoxia ([facility]) 05/08/2019   • Atrial fibrillation with RVR ([facility]) 04/29/2019   • Pulmonary HTN ([facility]) 04/29/2019   • Pulmonary nodule 04/29/2019   • Chronic anticoagulation 04/29/2019   • Iron deficiency anemia, unspecified 09/14/2016   • Castro's esophagus without dysplasia 08/31/2016   • Primary insomnia 07/29/2016   • Left shoulder pain 07/01/2016   • Hypothyroidism due to acquired atrophy of thyroid 11/29/2015   • Lung nodules 06/09/2015   • Type 2 diabetes mellitus without complication, without long-term current use of insulin ([facility]) 04/30/2014   • Essential hypertension 04/30/2014   • Chronic obstructive pulmonary disease ([facility]) 04/30/2014   • Dyslipidemia 04/30/2014   • History of CVA (cerebrovascular accident) 06/02/2013       Current Outpatient Prescriptions   Medication Sig Dispense Refill   • budesonide-formoterol (SYMBICORT) 160-4.5 MCG/ACT Aerosol Inhale 2 Puffs by mouth 2 Times a Day. 1 Inhaler 11   • ipratropium-albuterol (DUONEB) 0.5-2.5 (3) MG/3ML nebulizer solution 3 mL by Nebulization route 4 times a day. 120 Bullet 0   • Home Care Oxygen Inhale 3 L/min by mouth Continuous for 120 days. Hypoxic: 82% at rest and 85% with ambulation on room air 1 Units 0   • Nebulizers (COMPRESSOR/NEBULIZER) Misc 1 Each by Nebulization route every four hours as needed (shortness of breath, coughing). 1 Each 0   • rivaroxaban (XARELTO) 15 MG Tab tablet Take 1 Tab by mouth with dinner. 42 Tab 0   • liraglutide (VICTOZA) 18 MG/3ML Solution Pen-injector injection Inject 1.2 mg as instructed every evening.     • levothyroxine (SYNTHROID) 75 MCG Tab TAKE 1 TAB BY MOUTH EVERY MORNING ON AN EMPTY STOMACH. 90 Tab 3   • gabapentin (NEURONTIN) 300 MG Cap TAKE 1 CAPSULE BY MOUTH TWICE A  Cap 3   • metformin (GLUCOPHAGE) 1000 MG tablet Take 1 Tab by mouth 2 times a day, with meals. 180 Tab 2   • traZODone  "(DESYREL) 100 MG Tab TAKE 1 TABLET BY MOUTH EVERYDAY AT BEDTIME 90 Tab 3   • albuterol 108 (90 Base) MCG/ACT Aero Soln inhalation aerosol Inhale 1 Puff by mouth every 6 hours as needed for Shortness of Breath. 8.5 g 5   • lisinopril (PRINIVIL) 10 MG Tab TAKE 1 TAB BY MOUTH EVERY DAY. 90 Tab 3   • simvastatin (ZOCOR) 40 MG Tab TAKE 1 TAB BY MOUTH EVERY DAY. 30 Tab 5   • aspirin (ASA) 81 MG Chew Tab chewable tablet Take 81 mg by mouth every morning.     • omeprazole (PRILOSEC) 40 MG capsule Take 40 mg by mouth every day.       No current facility-administered medications for this visit.          Allergies as of 05/08/2019 - Reviewed 05/08/2019   Allergen Reaction Noted   • Bactrim Hives 02/14/2013   • Demerol Shortness of Breath and Swelling 02/14/2013   • Pcn [penicillins] Hives 02/14/2013   • Sulfa drugs Hives 02/14/2013   • Ferrous sulfate Rash 09/08/2014   • Latex Contact Dermatitis 02/14/2013        ROS: Denies Chest pain, SOB, LE edema.    /60 (BP Location: Left arm, Patient Position: Sitting)   Pulse 66   Temp 36.4 °C (97.6 °F)   Ht 1.727 m (5' 8\")   Wt 82.6 kg (182 lb)   SpO2 (!) 87%   BMI 27.67 kg/m²     Physical Exam:  Gen:         Alert and oriented, No apparent distress.  Neck:        No Lymphadenopathy or Bruits.  Lungs:     Clear to auscultation bilaterally  CV:          Regular rate and rhythm. No murmurs, rubs or gallops.               Ext:          No clubbing, cyanosis, edema.      Assessment and Plan.   78 y.o. female with the following issues.    1. Chronic obstructive pulmonary disease, unspecified COPD type (HCC)  Continue current inhalers clinically improving.  - budesonide-formoterol (SYMBICORT) 160-4.5 MCG/ACT Aerosol; Inhale 2 Puffs by mouth 2 Times a Day.  Dispense: 1 Inhaler; Refill: 11    2. Chronic respiratory failure with hypoxia (HCC)  Again satting 87% on room air at rest improving to 94% on 3 L we will send an order for oxygen to DME company.  3 L nasal cannula portable " continuous as well as an oxygen concentrator with the same settings at night.    3. Atrial fibrillation with RVR (HCC)  Rate controlled on blood thinner she will follow with cardiology          - Hospitalization and results reviewed with patient.   - Medications reviewed including instructions regarding high risk medications, dosing and side effects.

## 2019-05-12 LAB
BACTERIA BLD CULT: ABNORMAL
BACTERIA BLD CULT: ABNORMAL
SIGNIFICANT IND 70042: ABNORMAL
SITE SITE: ABNORMAL
SOURCE SOURCE: ABNORMAL

## 2019-05-13 LAB
MISC LAB RSLT 95004: NORMAL
TEST NAME 95000: NORMAL

## 2019-05-15 ENCOUNTER — OFFICE VISIT (OUTPATIENT)
Dept: CARDIOLOGY | Facility: MEDICAL CENTER | Age: 79
End: 2019-05-15
Payer: MEDICARE

## 2019-05-15 VITALS
DIASTOLIC BLOOD PRESSURE: 78 MMHG | SYSTOLIC BLOOD PRESSURE: 132 MMHG | HEART RATE: 80 BPM | BODY MASS INDEX: 28.19 KG/M2 | OXYGEN SATURATION: 93 % | WEIGHT: 186 LBS | HEIGHT: 68 IN

## 2019-05-15 DIAGNOSIS — I27.20 PULMONARY HTN (HCC): ICD-10-CM

## 2019-05-15 DIAGNOSIS — I48.0 PAF (PAROXYSMAL ATRIAL FIBRILLATION) (HCC): ICD-10-CM

## 2019-05-15 DIAGNOSIS — N28.9 RENAL INSUFFICIENCY: ICD-10-CM

## 2019-05-15 DIAGNOSIS — E78.5 DYSLIPIDEMIA: Chronic | ICD-10-CM

## 2019-05-15 DIAGNOSIS — J44.9 CHRONIC OBSTRUCTIVE PULMONARY DISEASE, UNSPECIFIED COPD TYPE (HCC): Chronic | ICD-10-CM

## 2019-05-15 DIAGNOSIS — I51.89 SYSTOLIC DYSFUNCTION WITHOUT HEART FAILURE: ICD-10-CM

## 2019-05-15 DIAGNOSIS — Z79.01 CHRONIC ANTICOAGULATION: ICD-10-CM

## 2019-05-15 PROBLEM — I48.91 ATRIAL FIBRILLATION WITH RVR (HCC): Status: RESOLVED | Noted: 2019-04-29 | Resolved: 2019-05-15

## 2019-05-15 PROCEDURE — 99214 OFFICE O/P EST MOD 30 MIN: CPT | Performed by: NURSE PRACTITIONER

## 2019-05-15 ASSESSMENT — ENCOUNTER SYMPTOMS
WEIGHT LOSS: 0
WEAKNESS: 0
DIZZINESS: 0
PALPITATIONS: 0
SHORTNESS OF BREATH: 1
CLAUDICATION: 0
ORTHOPNEA: 0
PND: 0

## 2019-05-15 NOTE — PROGRESS NOTES
Chief Complaint   Patient presents with   • Chest Pain   • Shortness of Breath       Subjective:   Jessica Plaza is a 79 y.o. female who presents today for hospital follow up.    Patient was admitted with atrial fibrillation and SOB on 4/29/19, she underwent successful LONDON with DCCV on 5/2/2019 and was started on amiodarone loading therapy.  After the DCCV, patient became bradycardic so it was decided to hold beta-blocker therapy and reevaluate heart rate after amiodarone dose was tapered down. Patient was also found to have a significant URI and failed a room air saturation test and it was recommended that the patient go home with home oxygen. Patient refused and left the hospital on 5/3/19 against medical advice.     Past medical history significant for COPD, diabetes, emphysema, hyperlipidemia, GERD, hypertension and previous CVA.    Today patient states that she developed a diffuse rash due to the Amiodarone therapy and so she had to stop taking it. She did return to the ER after discharge due to SOB and home O2 was arranged for her at that time. Patient states that her SOB has been improving significantly since discharge and feels that her URI is resolving. Denies chest pain, shortness of breath, palpitations or edema. She has been compliant on her Xarelto OAC and has been tolerating it well.     Past Medical History:   Diagnosis Date   • Anemia    • Arthritis    • Castro esophagus    • Bronchitis    • Chronic anticoagulation 4/29/2019   • COPD    • COPD (chronic obstructive pulmonary disease) (HCC)    • Dental disorder     dentures upper and lower   • Diabetes     oral meds   • Disorder of thyroid    • Emphysema of lung ([facility])    • Encounter for medication refill 2/5/2018   • GERD (gastroesophageal reflux disease)    • Heart burn    • Hemorrhagic disorder     hx internal bleeding   • High cholesterol    • Hypertension    • Indigestion    • Pain     left shoulder   • Pneumonia 2013.   • Stroke (HCC)     • UTI (urinary tract infection)      Past Surgical History:   Procedure Laterality Date   • SHOULDER DECOMPRESSION ARTHROSCOPIC Left 10/11/2016    Procedure: SHOULDER DECOMPRESSION ARTHROSCOPIC  with   debridment of the labrum and biceps tendon stump;  Surgeon: Bill Ponce M.D.;  Location: SURGERY [facility];  Service:    • CLAVICLE RESECTION Left 10/11/2016    Procedure: CLAVICLE RESECTION distal open  ;  Surgeon: Bill Ponce M.D.;  Location: SURGERY [facility];  Service:    • CHONDROPLASTY Left 10/11/2016    Procedure: Arthroscopic CHONDROPLASTY of the joint  ;  Surgeon: Bill Ponce M.D.;  Location: SURGERY [facility];  Service:    • SHOULDER DECOMPRESSION ARTHROSCOPIC Left 2016    Procedure: SHOULDER DECOMPRESSION ARTHROSCOPIC SUBACROMIAL and biceps tenotomy;  Surgeon: Johan Roberts M.D.;  Location: SURGERY [facility];  Service:    • GASTROSCOPY-ENDO  2014    Performed by Yonas Grimes Jr., M.D. at ENDOSCOPY [facility]   • TONSILLECTOMY     • PB ANESTH, DELIVERY ONLY      X 2     Family History   Problem Relation Age of Onset   • Cancer Mother         breast   • Lung Disease Mother         COPD/Emphysema/Smoker   • Stroke Father    • Hypertension Father    • Hyperlipidemia Father    • Heart Attack Father    • Cancer Sister         Pancriatic   • Arrythmia Sister    • Lung Disease Sister         COPD/Emphysema/Smoker   • Bipolar disorder Son    • Bipolar disorder Son    • Bipolar disorder Son    • Cancer Maternal Aunt         stomach     Social History     Social History   • Marital status:      Spouse name: N/A   • Number of children: N/A   • Years of education: N/A     Occupational History   • Not on file.     Social History Main Topics   • Smoking status: Former Smoker     Packs/day: 0.50     Years: 52.00     Types: Cigarettes     Quit date: 2013   • Smokeless tobacco: Never Used      Comment: started smoking at age 21   •  Alcohol use 8.4 oz/week     7 Shots of liquor, 7 Standard drinks or equivalent per week      Comment: 1/day   • Drug use: No   • Sexual activity: No     Other Topics Concern   • Not on file     Social History Narrative   • No narrative on file     Allergies   Allergen Reactions   • Bactrim Hives   • Demerol Shortness of Breath and Swelling   • Pcn [Penicillins] Hives   • Sulfa Drugs Hives   • Ferrous Sulfate Rash     itching   • Latex Contact Dermatitis     Outpatient Encounter Prescriptions as of 5/15/2019   Medication Sig Dispense Refill   • budesonide-formoterol (SYMBICORT) 160-4.5 MCG/ACT Aerosol Inhale 2 Puffs by mouth 2 Times a Day. 1 Inhaler 11   • ipratropium-albuterol (DUONEB) 0.5-2.5 (3) MG/3ML nebulizer solution 3 mL by Nebulization route 4 times a day. 120 Bullet 0   • Home Care Oxygen Inhale 3 L/min by mouth Continuous for 120 days. Hypoxic: 82% at rest and 85% with ambulation on room air 1 Units 0   • rivaroxaban (XARELTO) 15 MG Tab tablet Take 1 Tab by mouth with dinner. 42 Tab 0   • liraglutide (VICTOZA) 18 MG/3ML Solution Pen-injector injection Inject 1.2 mg as instructed every evening.     • levothyroxine (SYNTHROID) 75 MCG Tab TAKE 1 TAB BY MOUTH EVERY MORNING ON AN EMPTY STOMACH. 90 Tab 3   • gabapentin (NEURONTIN) 300 MG Cap TAKE 1 CAPSULE BY MOUTH TWICE A  Cap 3   • metformin (GLUCOPHAGE) 1000 MG tablet Take 1 Tab by mouth 2 times a day, with meals. 180 Tab 2   • traZODone (DESYREL) 100 MG Tab TAKE 1 TABLET BY MOUTH EVERYDAY AT BEDTIME 90 Tab 3   • albuterol 108 (90 Base) MCG/ACT Aero Soln inhalation aerosol Inhale 1 Puff by mouth every 6 hours as needed for Shortness of Breath. 8.5 g 5   • lisinopril (PRINIVIL) 10 MG Tab TAKE 1 TAB BY MOUTH EVERY DAY. 90 Tab 3   • simvastatin (ZOCOR) 40 MG Tab TAKE 1 TAB BY MOUTH EVERY DAY. 30 Tab 5   • omeprazole (PRILOSEC) 40 MG capsule Take 40 mg by mouth every day.     • Nebulizers (COMPRESSOR/NEBULIZER) Misc 1 Each by Nebulization route every four  "hours as needed (shortness of breath, coughing). (Patient not taking: Reported on 5/15/2019) 1 Each 0   • [DISCONTINUED] aspirin (ASA) 81 MG Chew Tab chewable tablet Take 81 mg by mouth every morning.       No facility-administered encounter medications on file as of 5/15/2019.      Review of Systems   Constitutional: Negative for malaise/fatigue and weight loss.   Respiratory: Positive for shortness of breath (Improved).    Cardiovascular: Negative for chest pain, palpitations, orthopnea, claudication, leg swelling and PND.   Neurological: Negative for dizziness and weakness.   All other systems reviewed and are negative.       Objective:   /78 (BP Location: Left arm, Patient Position: Sitting, BP Cuff Size: Adult)   Pulse 80   Ht 1.727 m (5' 8\")   Wt 84.4 kg (186 lb)   SpO2 93%   BMI 28.28 kg/m²     Physical Exam   Constitutional: She is oriented to person, place, and time. She appears well-developed and well-nourished. No distress.   HENT:   Head: Normocephalic.   Eyes: EOM are normal.   Neck: No JVD present.   Cardiovascular: Normal rate, regular rhythm and normal heart sounds.    Pulmonary/Chest: No respiratory distress. She has decreased breath sounds in the right lower field and the left lower field.   Abdominal: Soft. There is no tenderness.   Musculoskeletal: She exhibits no edema.   Neurological: She is alert and oriented to person, place, and time.   Skin: Skin is warm and dry.   Psychiatric: She has a normal mood and affect. Her behavior is normal.     TTE 4/30/19:  Technically difficult study - adequate information is obtained.   Left ventricular systolic function is low normal.  Left ventricular ejection fraction is visually estimated to be 45%.  Global hypokinesis.  Mild concentric left ventricular hypertrophy.  Normal estimated left ventricular filling pressures in the setting of   atrial fibrillation.  Normal right ventricular size.  Mildly reduced right ventricular systolic " function.  Enlarged right atrium.  Aortic sclerosis without stenosis.  Right atrial pressure is estimated to be 15 mmHg, severely elevated.   Right ventricular systolic pressure is estimated to be 40 mmHg,   consistent with mild pulmonary hypertension.     LONDON 5/1/19:  Left Ventricle  The left ventricle was normal in size and function.    Right Ventricle  The right ventricle was normal in size and function.    NM Cardiac Stress Test 7/16/15:  IMPRESSIONS  No evidence of significant jeopardized viable myocardium or prior myocardial infarction.  Normal left ventricular size, ejection fraction, and wall motion.         Assessment:     1. Renal insufficiency  Basic Metabolic Panel   2. Chronic anticoagulation  Basic Metabolic Panel   3. PAF (paroxysmal atrial fibrillation) ([facility])     4. Systolic dysfunction without heart failure     5. Pulmonary HTN ([facility])     6. Dyslipidemia     7. Chronic obstructive pulmonary disease, unspecified COPD type ([facility])         Medical Decision Making:  Today's Assessment / Status / Plan:     Atrial fibrillation:  -S/P LONDON and successful DCCV on 5/1/2019, LONDON showed mildly dilated LA and normal RA.   -Anticoagulated with Xarelto.  -Regular rhythm upon auscultation today, pulse is 80.  -Patient does not wish to try any other antiarrhythmics or rate controlling medications at this time.  -Repeat BMP as Cr was slightly up at 1.44 with GFR of 35 on last CMP.    Mildly Reduced LV Systolic Function:  -LV function low/normal (45%) per TTE, 55% per LONDON.  -Appears euvolemic.  -Global hypokinesis observed on TTE.  -Low Risk MPI on 7/16/15.    -No ACE/ARB due to renal function.  -Patient is not interested in BB therapy at this time.    Pulmonary hypertension:  -RVSP 40 mmHg.  -Significant history of tobacco use and COPD.    Hypertension:  -Stable.    HLD:  -Stable.  -LDL on 2/15/19 was 81.  -Continue Simvastatin 40 mg daily.    COPD:  -Continues to use continuous home O2.  -Followed by PCP.    Patient  will follow up with Dr. Lance Steven in 3 months or earlier if needed. Encouraged patient to contact office should any questions or concerns arise in the mean time. Patient and her  understand and agree with the plan of care.     Future Appointments  Date Time Provider Department Center   8/21/2019 1:20 PM Anurag Moreno M.D. [address]   8/22/2019 1:40 PM Lance Steven M.D. [facility] None     Collaborating Provider: Dr. Johan Rico       Please note that this dictation was created using voice recognition software. I have made every reasonable attempt to correct obvious errors, but I expect that there are errors of grammar and possibly content I did not discover before finalizing the note.

## 2019-05-17 ENCOUNTER — TELEPHONE (OUTPATIENT)
Dept: CARDIOLOGY | Facility: MEDICAL CENTER | Age: 79
End: 2019-05-17

## 2019-05-17 DIAGNOSIS — I10 ESSENTIAL HYPERTENSION: Chronic | ICD-10-CM

## 2019-05-17 LAB
BUN SERPL-MCNC: 22 MG/DL (ref 8–27)
BUN/CREAT SERPL: 15 (ref 12–28)
CALCIUM SERPL-MCNC: 9.1 MG/DL (ref 8.7–10.3)
CHLORIDE SERPL-SCNC: 104 MMOL/L (ref 96–106)
CO2 SERPL-SCNC: 22 MMOL/L (ref 20–29)
CREAT SERPL-MCNC: 1.43 MG/DL (ref 0.57–1)
GLUCOSE SERPL-MCNC: 160 MG/DL (ref 65–99)
POTASSIUM SERPL-SCNC: 4.7 MMOL/L (ref 3.5–5.2)
SODIUM SERPL-SCNC: 141 MMOL/L (ref 134–144)

## 2019-05-17 NOTE — TELEPHONE ENCOUNTER
VERITO Valenzuela.  Felisa King, R.N.             Renal function is stable but still not back to baseline. Would you ask her to please have another BMP drawn in one month just to keep an eye on it?     Thanks!!     JS      Call out to pt. No answer. LVM to call back.

## 2019-06-12 LAB
BUN SERPL-MCNC: 17 MG/DL (ref 8–27)
BUN/CREAT SERPL: 15 (ref 12–28)
CALCIUM SERPL-MCNC: 9.7 MG/DL (ref 8.7–10.3)
CHLORIDE SERPL-SCNC: 103 MMOL/L (ref 96–106)
CO2 SERPL-SCNC: 23 MMOL/L (ref 20–29)
CREAT SERPL-MCNC: 1.16 MG/DL (ref 0.57–1)
GLUCOSE SERPL-MCNC: 125 MG/DL (ref 65–99)
POTASSIUM SERPL-SCNC: 4.7 MMOL/L (ref 3.5–5.2)
SODIUM SERPL-SCNC: 141 MMOL/L (ref 134–144)

## 2019-06-12 RX ORDER — SIMVASTATIN 40 MG
TABLET ORAL
Qty: 90 TAB | Refills: 5 | Status: SHIPPED | OUTPATIENT
Start: 2019-06-12 | End: 2019-08-14

## 2019-06-14 DIAGNOSIS — N28.9 IMPAIRED RENAL FUNCTION: ICD-10-CM

## 2019-06-17 ENCOUNTER — TELEPHONE (OUTPATIENT)
Dept: CARDIOLOGY | Facility: MEDICAL CENTER | Age: 79
End: 2019-06-17

## 2019-06-17 NOTE — TELEPHONE ENCOUNTER
----- Message from Margaret Meadows R.N. sent at 6/17/2019 10:16 AM PDT -----      ----- Message -----  From: ALEK Valenzuela  Sent: 6/14/2019   3:47 PM  To: JESSIKA Velasquez,     I ordered a BMP for this patient to do before her FU with RS August. Would you please mail her the lab slip?    Thank you,    JS

## 2019-07-17 ENCOUNTER — TELEPHONE (OUTPATIENT)
Dept: CARDIOLOGY | Facility: MEDICAL CENTER | Age: 79
End: 2019-07-17

## 2019-07-17 NOTE — TELEPHONE ENCOUNTER
Janel King R.N.             STEPHAN/Kate       Patient left a message on the voicemail. She is calling about clearance for back surgery. She can be reached at [phone].      Advised pt to call surgeon doing procedure and have them fax over a clearance request. Provided fax number. She verbalized understanding and will call them.

## 2019-07-19 NOTE — TELEPHONE ENCOUNTER
Cardiac clearance received from [facility] neurosurgery for posterior L4-L5, L5-S1 decompression, fusion surgery.     Patient last seen on 5/15/19 by STEPHAN, hx:PAF, systolic dysfunction without heart failure, HTN. On Xarelto.    To JS--please advise on clearance and xarelto.

## 2019-07-23 NOTE — TELEPHONE ENCOUNTER
ALEK Valenzuela R.N.   Caller: Unspecified (6 days ago,  3:34 PM)             Moderate risk for surgery. Please discuss with patient the risk with PAF and holding OAC, if she agrees hold per surgeon recommendations. Would like patient to take ASA 81 mg daily during perioperative course if possible.      Clearance completed. Patient updated. Clearance faxed to [phone] and sent to scanning.

## 2019-07-25 NOTE — TELEPHONE ENCOUNTER
I received a message from Dr. Grupo Cabrales stating that patient must come off of ASA for 10-14 days for the surgery.  I spoke to patient and told her Lesley's recommendations below and the risk involved.  Patient said she will think about it and let Dr. Cabrales's office know whether she wants to proceed. I faxed the clearance note back stating that Lesley does not want to stop ASA and it is up to the patient to proceed.

## 2019-08-02 RX ORDER — LISINOPRIL 10 MG/1
TABLET ORAL
Qty: 90 TAB | Refills: 3 | Status: SHIPPED | OUTPATIENT
Start: 2019-08-02 | End: 2019-08-22

## 2019-08-14 ENCOUNTER — HOSPITAL ENCOUNTER (OUTPATIENT)
Dept: RADIOLOGY | Facility: MEDICAL CENTER | Age: 79
DRG: 459 | End: 2019-08-14
Attending: NEUROLOGICAL SURGERY | Admitting: NEUROLOGICAL SURGERY
Payer: MEDICARE

## 2019-08-14 DIAGNOSIS — Z01.811 PRE-OPERATIVE RESPIRATORY EXAMINATION: ICD-10-CM

## 2019-08-14 DIAGNOSIS — Z01.812 PRE-PROCEDURAL LABORATORY EXAMINATION: ICD-10-CM

## 2019-08-14 DIAGNOSIS — Z01.810 PRE-OPERATIVE CARDIOVASCULAR EXAMINATION: ICD-10-CM

## 2019-08-14 LAB
ANION GAP SERPL CALC-SCNC: 9 MMOL/L (ref 0–11.9)
APPEARANCE UR: ABNORMAL
APTT PPP: 25 SEC (ref 24.7–36)
BACTERIA #/AREA URNS HPF: ABNORMAL /HPF
BASOPHILS # BLD AUTO: 0.5 % (ref 0–1.8)
BASOPHILS # BLD: 0.04 K/UL (ref 0–0.12)
BUN SERPL-MCNC: 16 MG/DL (ref 8–27)
BUN SERPL-MCNC: 19 MG/DL (ref 8–22)
BUN/CREAT SERPL: 14 (ref 12–28)
CALCIUM SERPL-MCNC: 9.4 MG/DL (ref 8.7–10.3)
CALCIUM SERPL-MCNC: 9.5 MG/DL (ref 8.5–10.5)
CHLORIDE SERPL-SCNC: 103 MMOL/L (ref 96–106)
CHLORIDE SERPL-SCNC: 104 MMOL/L (ref 96–112)
CO2 SERPL-SCNC: 22 MMOL/L (ref 20–29)
CO2 SERPL-SCNC: 24 MMOL/L (ref 20–33)
COLOR UR: ABNORMAL
CREAT SERPL-MCNC: 1.13 MG/DL (ref 0.57–1)
CREAT SERPL-MCNC: 1.24 MG/DL (ref 0.5–1.4)
EKG IMPRESSION: NORMAL
EOSINOPHIL # BLD AUTO: 0.13 K/UL (ref 0–0.51)
EOSINOPHIL NFR BLD: 1.7 % (ref 0–6.9)
EPI CELLS #/AREA URNS HPF: ABNORMAL /HPF
ERYTHROCYTE [DISTWIDTH] IN BLOOD BY AUTOMATED COUNT: 58 FL (ref 35.9–50)
EST. AVERAGE GLUCOSE BLD GHB EST-MCNC: 151 MG/DL
GLUCOSE SERPL-MCNC: 124 MG/DL (ref 65–99)
GLUCOSE SERPL-MCNC: 252 MG/DL (ref 65–99)
HBA1C MFR BLD: 6.9 % (ref 0–5.6)
HCT VFR BLD AUTO: 37.8 % (ref 37–47)
HGB BLD-MCNC: 11.5 G/DL (ref 12–16)
HYALINE CASTS #/AREA URNS LPF: ABNORMAL /LPF
IMM GRANULOCYTES # BLD AUTO: 0.03 K/UL (ref 0–0.11)
IMM GRANULOCYTES NFR BLD AUTO: 0.4 % (ref 0–0.9)
INR PPP: 1.03 (ref 0.87–1.13)
LYMPHOCYTES # BLD AUTO: 1.31 K/UL (ref 1–4.8)
LYMPHOCYTES NFR BLD: 17.4 % (ref 22–41)
MCH RBC QN AUTO: 28.3 PG (ref 27–33)
MCHC RBC AUTO-ENTMCNC: 30.4 G/DL (ref 33.6–35)
MCV RBC AUTO: 93.1 FL (ref 81.4–97.8)
MICRO URNS: ABNORMAL
MONOCYTES # BLD AUTO: 0.53 K/UL (ref 0–0.85)
MONOCYTES NFR BLD AUTO: 7.1 % (ref 0–13.4)
NEUTROPHILS # BLD AUTO: 5.47 K/UL (ref 2–7.15)
NEUTROPHILS NFR BLD: 72.9 % (ref 44–72)
NRBC # BLD AUTO: 0 K/UL
NRBC BLD-RTO: 0 /100 WBC
PLATELET # BLD AUTO: 204 K/UL (ref 164–446)
PMV BLD AUTO: 10.8 FL (ref 9–12.9)
POTASSIUM SERPL-SCNC: 4.3 MMOL/L (ref 3.6–5.5)
POTASSIUM SERPL-SCNC: 4.8 MMOL/L (ref 3.5–5.2)
PROTHROMBIN TIME: 13.7 SEC (ref 12–14.6)
RBC # BLD AUTO: 4.06 M/UL (ref 4.2–5.4)
RBC # URNS HPF: ABNORMAL /HPF
SODIUM SERPL-SCNC: 137 MMOL/L (ref 135–145)
SODIUM SERPL-SCNC: 142 MMOL/L (ref 134–144)
SP GR UR STRIP.AUTO: ABNORMAL
WBC # BLD AUTO: 7.5 K/UL (ref 4.8–10.8)
WBC #/AREA URNS HPF: ABNORMAL /HPF

## 2019-08-14 PROCEDURE — 93010 ELECTROCARDIOGRAM REPORT: CPT | Performed by: INTERNAL MEDICINE

## 2019-08-14 PROCEDURE — 71046 X-RAY EXAM CHEST 2 VIEWS: CPT

## 2019-08-14 PROCEDURE — 93005 ELECTROCARDIOGRAM TRACING: CPT | Performed by: NEUROLOGICAL SURGERY

## 2019-08-14 PROCEDURE — 85610 PROTHROMBIN TIME: CPT

## 2019-08-14 PROCEDURE — 87086 URINE CULTURE/COLONY COUNT: CPT

## 2019-08-14 PROCEDURE — 85025 COMPLETE CBC W/AUTO DIFF WBC: CPT

## 2019-08-14 PROCEDURE — 85730 THROMBOPLASTIN TIME PARTIAL: CPT

## 2019-08-14 PROCEDURE — 81001 URINALYSIS AUTO W/SCOPE: CPT

## 2019-08-14 PROCEDURE — 36415 COLL VENOUS BLD VENIPUNCTURE: CPT

## 2019-08-14 PROCEDURE — 80048 BASIC METABOLIC PNL TOTAL CA: CPT

## 2019-08-14 PROCEDURE — 83036 HEMOGLOBIN GLYCOSYLATED A1C: CPT

## 2019-08-14 SDOH — HEALTH STABILITY: MENTAL HEALTH: HOW OFTEN DO YOU HAVE A DRINK CONTAINING ALCOHOL?: 2-3 TIMES A WEEK

## 2019-08-16 LAB
BACTERIA UR CULT: NORMAL
SIGNIFICANT IND 70042: NORMAL
SITE SITE: NORMAL
SOURCE SOURCE: NORMAL

## 2019-08-21 ENCOUNTER — HOSPITAL ENCOUNTER (OUTPATIENT)
Dept: LAB | Facility: MEDICAL CENTER | Age: 79
End: 2019-08-21
Attending: INTERNAL MEDICINE
Payer: MEDICARE

## 2019-08-21 ENCOUNTER — OFFICE VISIT (OUTPATIENT)
Dept: MEDICAL GROUP | Facility: MEDICAL CENTER | Age: 79
End: 2019-08-21
Payer: MEDICARE

## 2019-08-21 VITALS
HEART RATE: 66 BPM | TEMPERATURE: 97.4 F | OXYGEN SATURATION: 90 % | SYSTOLIC BLOOD PRESSURE: 146 MMHG | HEIGHT: 68 IN | WEIGHT: 200 LBS | BODY MASS INDEX: 30.31 KG/M2 | DIASTOLIC BLOOD PRESSURE: 90 MMHG

## 2019-08-21 DIAGNOSIS — M79.89 LEG SWELLING: ICD-10-CM

## 2019-08-21 DIAGNOSIS — E11.9 TYPE 2 DIABETES MELLITUS WITHOUT COMPLICATION, WITHOUT LONG-TERM CURRENT USE OF INSULIN (HCC): Chronic | ICD-10-CM

## 2019-08-21 DIAGNOSIS — Z00.00 MEDICARE ANNUAL WELLNESS VISIT, SUBSEQUENT: ICD-10-CM

## 2019-08-21 DIAGNOSIS — I51.89 SYSTOLIC DYSFUNCTION WITHOUT HEART FAILURE: ICD-10-CM

## 2019-08-21 DIAGNOSIS — F51.01 PRIMARY INSOMNIA: ICD-10-CM

## 2019-08-21 DIAGNOSIS — I27.20 PULMONARY HTN (HCC): ICD-10-CM

## 2019-08-21 DIAGNOSIS — H61.21 IMPACTED CERUMEN OF RIGHT EAR: ICD-10-CM

## 2019-08-21 DIAGNOSIS — J96.11 CHRONIC RESPIRATORY FAILURE WITH HYPOXIA (HCC): ICD-10-CM

## 2019-08-21 DIAGNOSIS — I48.0 PAF (PAROXYSMAL ATRIAL FIBRILLATION) (HCC): ICD-10-CM

## 2019-08-21 DIAGNOSIS — I10 ESSENTIAL HYPERTENSION: Chronic | ICD-10-CM

## 2019-08-21 DIAGNOSIS — E78.5 DYSLIPIDEMIA: Chronic | ICD-10-CM

## 2019-08-21 DIAGNOSIS — E03.4 HYPOTHYROIDISM DUE TO ACQUIRED ATROPHY OF THYROID: ICD-10-CM

## 2019-08-21 DIAGNOSIS — J44.9 CHRONIC OBSTRUCTIVE PULMONARY DISEASE, UNSPECIFIED COPD TYPE (HCC): Chronic | ICD-10-CM

## 2019-08-21 DIAGNOSIS — Z86.73 HISTORY OF CVA (CEREBROVASCULAR ACCIDENT): ICD-10-CM

## 2019-08-21 LAB — NT-PROBNP SERPL IA-MCNC: 310 PG/ML (ref 0–125)

## 2019-08-21 PROCEDURE — 83880 ASSAY OF NATRIURETIC PEPTIDE: CPT | Mod: GA

## 2019-08-21 PROCEDURE — G0439 PPPS, SUBSEQ VISIT: HCPCS | Performed by: INTERNAL MEDICINE

## 2019-08-21 PROCEDURE — 69210 REMOVE IMPACTED EAR WAX UNI: CPT | Performed by: INTERNAL MEDICINE

## 2019-08-21 PROCEDURE — 36415 COLL VENOUS BLD VENIPUNCTURE: CPT | Mod: GA

## 2019-08-21 PROCEDURE — 99213 OFFICE O/P EST LOW 20 MIN: CPT | Mod: 25 | Performed by: INTERNAL MEDICINE

## 2019-08-21 ASSESSMENT — PATIENT HEALTH QUESTIONNAIRE - PHQ9: CLINICAL INTERPRETATION OF PHQ2 SCORE: 0

## 2019-08-21 ASSESSMENT — ENCOUNTER SYMPTOMS: GENERAL WELL-BEING: FAIR

## 2019-08-21 ASSESSMENT — ACTIVITIES OF DAILY LIVING (ADL): BATHING_REQUIRES_ASSISTANCE: 0

## 2019-08-21 NOTE — PROGRESS NOTES
CC: Leg swelling, diabetes, wellness examination    HPI:   Jessica presents today with the following.      1. Medicare annual wellness visit, subsequent  Screenings performed below information given on advanced directives    2. Leg swelling  Presents reporting swelling for the last 4 to 5 days.  No fevers or chills denies any orthopnea slight increase in shortness of breath.  She does have a slight reduced ejection fraction.  She reports compliance to medications except for her Xarelto.    3. Type 2 diabetes mellitus without complication, without long-term current use of insulin ([facility])  A1c up slightly at 6.9 upcoming surgery.    4. Impacted cerumen of right ear  On physical exam found to have an impacted cerumen she would like it removed    5. Essential hypertension  Had slightly elevated today reports compliance medication on low-dose lisinopril.          Information for advance directives given to patient or instructed to bring in advance directives into to office to put in chart.      Depression Screening    Little interest or pleasure in doing things?  0 - not at all  Feeling down, depressed , or hopeless? 0 - not at all  Patient Health Questionnaire Score: 0     If depressive symptoms identified deferred to follow up visit unless specifically addressed in assessment and plan.    Interpretation of PHQ-9 Total Score   Score Severity   1-4 No Depression   5-9 Mild Depression   10-14 Moderate Depression   15-19 Moderately Severe Depression   20-27 Severe Depression    Screening for Cognitive Impairment    Three Minute Recall (village, kitchen, baby) 3/3    Edwardo clock face with all 12 numbers and set the hands to show 10 past 10.  Yes 5/5  Cognitive concerns identified deferred for follow up unless specifically addressed in assessment and plan.    Fall Risk Assessment    Has the patient had two or more falls in the last year or any fall with injury in the last year?  No    Safety Assessment    Throw rugs on floor.   Yes  Handrails on all stairs.  Yes  Good lighting in all hallways.  Yes  Difficulty hearing.  No  Patient counseled about all safety risks that were identified.    Functional Assessment ADLs    Are there any barriers preventing you from cooking for yourself or meeting nutritional needs?  No.    Are there any barriers preventing you from driving safely or obtaining transportation?  No.    Are there any barriers preventing you from using a telephone or calling for help?  No.    Are there any barriers preventing you from shopping?  No.    Are there any barriers preventing you from taking care of your own finances?  No.    Are there any barriers preventing you from managing your medications?  No.    Are there any barriers preventing you from showering, bathing or dressing yourself?  No.    Are you currently engaging in any exercise or physical activity?  No.     What is your perception of your health?  Fair.      Health Maintenance Summary                PFT SCREENING-FEV1 AND FEV/FVC RATIO / SPIROMETRY SHOULD BE PERFORMED ANNUALLY Overdue 5/15/1960     DIABETES MONOFILAMENT / LE EXAM Overdue 8/21/2019      Done 8/21/2018 AMB DIABETIC MONOFILAMENT LOWER EXTREMITY EXAM     Patient has more history with this topic...    IMM DTaP/Tdap/Td Vaccine Postponed 5/10/2023 Originally 5/15/1959. Patient Refused    IMM ZOSTER VACCINES Postponed 2/8/2035 Originally 5/15/1992. Insurance/Financial    IMM HEP B VACCINE Postponed 2/16/2035 Originally 5/15/1961. Insurance/Financial    Annual Wellness Visit Next Due 8/22/2019      Done 8/21/2018 Visit Dx: Medicare annual wellness visit, subsequent     Patient has more history with this topic...    IMM INFLUENZA Next Due 9/1/2019      Done 10/10/2018 Imm Admin: Influenza Vaccine Adult HD     Patient has more history with this topic...    RETINAL SCREENING Next Due 11/1/2019      Done 11/1/2018 REFERRAL FOR RETINAL SCREENING EXAM     Patient has more history with this topic...    A1C  SCREENING Next Due 2/14/2020      Done 8/14/2019 HEMOGLOBIN A1C     Patient has more history with this topic...    FASTING LIPID PROFILE Next Due 2/15/2020      Done 2/15/2019 LIPID PANEL     Patient has more history with this topic...    URINE ACR / MICROALBUMIN Next Due 2/15/2020      Done 2/15/2019 MICROALB/CREAT RATIO RAND. UR     Patient has more history with this topic...    SERUM CREATININE Next Due 8/14/2020      Done 8/14/2019 BASIC METABOLIC PANEL     Patient has more history with this topic...    COLONOSCOPY Next Due 8/2/2024      Previously completed 8/2/2014           Patient Care Team:  Anurag Moreno M.D. as PCP - General (Internal Medicine)  Carlo Sanchez D.O. as Consulting Physician (Gastroenterology)  HUDSON Cook as Consulting Physician (Urology)  Carlo Li M.D. as Consulting Physician (Ophthalmology)            Health Care Screening: Age-appropriate preventive services that Medicare covers were discussed today and ordered as indicated and agreed upon by the patient, and as recommended by USPTF and ACIP.     Patient Active Problem List    Diagnosis Date Noted   • PAF (paroxysmal atrial fibrillation) ([facility]) 05/15/2019   • Systolic dysfunction without heart failure 05/15/2019   • Chronic respiratory failure with hypoxia ([facility]) 05/08/2019   • Pulmonary HTN ([facility]) 04/29/2019   • Pulmonary nodule 04/29/2019   • Chronic anticoagulation 04/29/2019   • Iron deficiency anemia, unspecified 09/14/2016   • Castro's esophagus without dysplasia 08/31/2016   • Primary insomnia 07/29/2016   • Left shoulder pain 07/01/2016   • Hypothyroidism due to acquired atrophy of thyroid 11/29/2015   • Lung nodules 06/09/2015   • Type 2 diabetes mellitus without complication, without long-term current use of insulin ([facility]) 04/30/2014   • Essential hypertension 04/30/2014   • Chronic obstructive pulmonary disease ([facility]) 04/30/2014   • Dyslipidemia 04/30/2014   • History of CVA (cerebrovascular accident)  06/02/2013       Current Outpatient Medications   Medication Sig Dispense Refill   • Phenazopyridine HCl (PYRIDIUM PO) Take  by mouth 2 Times a Day.     • lisinopril (PRINIVIL) 10 MG Tab TAKE 1 TABLET BY MOUTH EVERY DAY 90 Tab 3   • budesonide-formoterol (SYMBICORT) 160-4.5 MCG/ACT Aerosol Inhale 2 Puffs by mouth 2 Times a Day. 1 Inhaler 11   • Home Care Oxygen Inhale 3 L/min by mouth Continuous for 120 days. Hypoxic: 82% at rest and 85% with ambulation on room air 1 Units 0   • liraglutide (VICTOZA) 18 MG/3ML Solution Pen-injector injection Inject 1.2 mg as instructed every evening.     • levothyroxine (SYNTHROID) 75 MCG Tab TAKE 1 TAB BY MOUTH EVERY MORNING ON AN EMPTY STOMACH. 90 Tab 3   • gabapentin (NEURONTIN) 300 MG Cap TAKE 1 CAPSULE BY MOUTH TWICE A  Cap 3   • metformin (GLUCOPHAGE) 1000 MG tablet Take 1 Tab by mouth 2 times a day, with meals. 180 Tab 2   • traZODone (DESYREL) 100 MG Tab TAKE 1 TABLET BY MOUTH EVERYDAY AT BEDTIME 90 Tab 3   • albuterol 108 (90 Base) MCG/ACT Aero Soln inhalation aerosol Inhale 1 Puff by mouth every 6 hours as needed for Shortness of Breath. 8.5 g 5   • simvastatin (ZOCOR) 40 MG Tab TAKE 1 TAB BY MOUTH EVERY DAY. 30 Tab 5   • omeprazole (PRILOSEC) 40 MG capsule Take 40 mg by mouth every day.     • rivaroxaban (XARELTO) 15 MG Tab tablet Take 1 Tab by mouth with dinner. (Patient not taking: Reported on 8/21/2019) 42 Tab 0     No current facility-administered medications for this visit.        Family History   Problem Relation Age of Onset   • Cancer Mother         breast   • Lung Disease Mother         COPD/Emphysema/Smoker   • Stroke Father    • Hypertension Father    • Hyperlipidemia Father    • Heart Attack Father    • Cancer Sister         Pancriatic   • Arrythmia Sister    • Lung Disease Sister         COPD/Emphysema/Smoker   • Bipolar disorder Son    • Bipolar disorder Son    • Bipolar disorder Son    • Cancer Maternal Aunt         stomach       Social History          Socioeconomic History   • Marital status:      Spouse name: Not on file   • Number of children: Not on file   • Years of education: Not on file   • Highest education level: Not on file   Occupational History   • Not on file   Social Needs   • Financial resource strain: Not on file   • Food insecurity:     Worry: Not on file     Inability: Not on file   • Transportation needs:     Medical: Not on file     Non-medical: Not on file   Tobacco Use   • Smoking status: Former Smoker     Packs/day: 0.50     Years: 52.00     Pack years: 26.00     Types: Cigarettes     Last attempt to quit: 2013     Years since quittin.6   • Smokeless tobacco: Never Used   • Tobacco comment: started smoking at age 21   Substance and Sexual Activity   • Alcohol use: Yes     Alcohol/week: 8.4 oz     Types: 7 Shots of liquor, 7 Standard drinks or equivalent per week     Frequency: 2-3 times a week     Comment: Once or twice a week.   • Drug use: No   • Sexual activity: Never     Partners: Male     Birth control/protection: Post-Menopausal   Lifestyle   • Physical activity:     Days per week: Not on file     Minutes per session: Not on file   • Stress: Not on file   Relationships   • Social connections:     Talks on phone: Not on file     Gets together: Not on file     Attends Hoahaoism service: Not on file     Active member of club or organization: Not on file     Attends meetings of clubs or organizations: Not on file     Relationship status: Not on file   • Intimate partner violence:     Fear of current or ex partner: Not on file     Emotionally abused: Not on file     Physically abused: Not on file     Forced sexual activity: Not on file   Other Topics Concern   • Not on file   Social History Narrative   • Not on file       Past Surgical History:   Procedure Laterality Date   • SHOULDER DECOMPRESSION ARTHROSCOPIC Left 10/11/2016    Procedure: SHOULDER DECOMPRESSION ARTHROSCOPIC  with   debridment of the labrum and biceps  "tendon stump;  Surgeon: Bill Ponce M.D.;  Location: SURGERY [facility];  Service:    • CLAVICLE RESECTION Left 10/11/2016    Procedure: CLAVICLE RESECTION distal open  ;  Surgeon: Bill Ponce M.D.;  Location: SURGERY [facility];  Service:    • CHONDROPLASTY Left 10/11/2016    Procedure: Arthroscopic CHONDROPLASTY of the joint  ;  Surgeon: Bill Ponce M.D.;  Location: SURGERY [facility];  Service:    • SHOULDER DECOMPRESSION ARTHROSCOPIC Left 2016    Procedure: SHOULDER DECOMPRESSION ARTHROSCOPIC SUBACROMIAL and biceps tenotomy;  Surgeon: Johan Roberts M.D.;  Location: SURGERY [facility];  Service:    • GASTROSCOPY-ENDO  2014    Performed by Yonas Grimes Jr., M.D. at ENDOSCOPY [facility]   • TONSILLECTOMY     • PB ANESTH, DELIVERY ONLY      X 2       Allergies as of 2019 - Reviewed 2019   Allergen Reaction Noted   • Bactrim Hives 2013   • Demerol Shortness of Breath and Swelling 2013   • Pcn [penicillins] Hives 2013   • Sulfa drugs Hives 2013   • Ferrous sulfate Rash 2014   • Latex Contact Dermatitis 2013        ROS: Denies Chest pain, SOB, orthopnea.    /90 (BP Location: Right arm, Patient Position: Sitting)   Pulse 66   Temp 36.3 °C (97.4 °F)   Ht 1.727 m (5' 8\")   Wt 90.7 kg (200 lb)   SpO2 90%   BMI 30.41 kg/m²     Physical Exam:  Gen:         Alert and oriented, No apparent distress.  Neck:        No Lymphadenopathy or Bruits.  Lungs:     Clear to auscultation bilaterally  CV:          Regular rate and rhythm. No murmurs, rubs or gallops.  Abd:         Soft non tender, non distended. Normal active bowel sounds.  No  Hepatosplenomegaly, No pulsatile masses.                   Ext:          No clubbing, cyanosis, 1+ bilateral edema.      Assessment and Plan.   79 y.o. female with the following issues.    1. Medicare annual wellness visit, subsequent  Discussed healthy lifestyle habits " as well as screening regimens.    2. Leg swelling  Sending for blood work she has an appoint with cardiology tomorrow to make decisions about possible diuresis last EF was 45%.  - proBrain Natriuretic Peptide, NT; Future    3. Type 2 diabetes mellitus without complication, without long-term current use of insulin ([facility])  Currently well controlled no changes. Discussed diet and exercise recheck A1c in 6 months. Reminded about yearly eye exam.  - Diabetic Monofilament Lower Extremity Exam    4. Impacted cerumen of right ear  Medical assistant lavaged ears bilaterally.  Personally remove residual wax with curette.    5. Essential hypertension  Again we will see cardiology tomorrow recheck values.    6. Hypothyroidism due to acquired atrophy of thyroid  Stable as of last check    7. Dyslipidemia  Continue simvastatin    8. Chronic obstructive pulmonary disease, unspecified COPD type ([facility])  Stable no advancing symptomology    9. Chronic respiratory failure with hypoxia ([facility])  Continue current treatment    10. Primary insomnia  Stable    11. Systolic dysfunction without heart failure  Again follow with cardiology    12. PAF (paroxysmal atrial fibrillation) ([facility])  Rate controlled    13. Pulmonary HTN ([facility])  Stable    14. History of CVA (cerebrovascular accident)  Continue risk reduction        Referrals offered: Community-based lifestyle interventions to reduce health risks and promote self-management and wellness, fall prevention, nutrition, physical activity, tobacco-use cessation, weight loss, and mental health services as per orders if indicated.    Discussion today about general wellness and lifestyle habits:    · Prevent falls and reduce trip hazards; Cautioned about securing or removing rugs.  · Have a working fire alarm and carbon monoxide detector;   · Engage in regular physical activity and social activities

## 2019-08-22 ENCOUNTER — OFFICE VISIT (OUTPATIENT)
Dept: CARDIOLOGY | Facility: MEDICAL CENTER | Age: 79
End: 2019-08-22
Payer: MEDICARE

## 2019-08-22 VITALS
WEIGHT: 196 LBS | DIASTOLIC BLOOD PRESSURE: 70 MMHG | HEART RATE: 70 BPM | HEIGHT: 67 IN | SYSTOLIC BLOOD PRESSURE: 132 MMHG | OXYGEN SATURATION: 90 % | BODY MASS INDEX: 30.76 KG/M2

## 2019-08-22 DIAGNOSIS — Z79.01 CHRONIC ANTICOAGULATION: Chronic | ICD-10-CM

## 2019-08-22 DIAGNOSIS — E78.5 DYSLIPIDEMIA: Chronic | ICD-10-CM

## 2019-08-22 DIAGNOSIS — I10 ESSENTIAL HYPERTENSION: Chronic | ICD-10-CM

## 2019-08-22 PROCEDURE — 99214 OFFICE O/P EST MOD 30 MIN: CPT | Performed by: INTERNAL MEDICINE

## 2019-08-22 RX ORDER — LISINOPRIL 20 MG/1
20 TABLET ORAL
Qty: 90 TAB | Refills: 3 | Status: SHIPPED | OUTPATIENT
Start: 2019-08-22 | End: 2020-09-01

## 2019-08-22 ASSESSMENT — ENCOUNTER SYMPTOMS
BACK PAIN: 1
INSOMNIA: 1
GASTROINTESTINAL NEGATIVE: 1
CONSTITUTIONAL NEGATIVE: 1
RESPIRATORY NEGATIVE: 1
SHORTNESS OF BREATH: 0
NEUROLOGICAL NEGATIVE: 1
PALPITATIONS: 0

## 2019-08-22 NOTE — PROGRESS NOTES
"Chief Complaint   Patient presents with   • Atrial Fibrillation       Subjective:   Jessica Plaza is a 79 y.o. female who presents today for follow-up of PAF, hypertension, anticoagulation, and high risk medication use.  Patient underwent cardioversion on May 1 for atrial fibrillation of uncertain duration.  She was started on amiodarone and anticoagulation with Xarelto.  She was seen in the office on May 15 and was in regular rhythm at that time.    Since then, she stopped her Xarelto because her legs were swelling thought it was secondary to this.  She also stopped her amiodarone because she \"did not like it\".    The patient is scheduled for back surgery next Wednesday.    There is no history of known coronary disease.  She had a myocardial perfusion scan in 2015 that was normal.  Recent echo showed normal LV function.    Past Medical History:   Diagnosis Date   • Anemia    • Arthritis    • Asthma     Inhaler use daily and PRN   • Castro esophagus    • Bronchitis    • Chronic anticoagulation 4/29/2019   • COPD    • COPD (chronic obstructive pulmonary disease) ([facility])    • Dental disorder     dentures upper and lower   • Diabetes     oral meds   • Disorder of thyroid    • Emphysema of lung ([facility])     Uses oxygen PRN.  Patient not sure of liters/minute.   • Encounter for medication refill 2/5/2018   • GERD (gastroesophageal reflux disease)    • Heart burn    • Hemorrhagic disorder     hx internal bleeding   • High cholesterol    • Hypertension    • Indigestion    • Pain     left shoulder   • Pneumonia 2013.   • Stroke ([facility]) 2013   • UTI (urinary tract infection)      Past Surgical History:   Procedure Laterality Date   • SHOULDER DECOMPRESSION ARTHROSCOPIC Left 10/11/2016    Procedure: SHOULDER DECOMPRESSION ARTHROSCOPIC  with   debridment of the labrum and biceps tendon stump;  Surgeon: Bill Ponce M.D.;  Location: SURGERY [facility];  Service:    • CLAVICLE RESECTION Left 10/11/2016    Procedure: " CLAVICLE RESECTION distal open  ;  Surgeon: Bill Ponce M.D.;  Location: SURGERY [facility];  Service:    • CHONDROPLASTY Left 10/11/2016    Procedure: Arthroscopic CHONDROPLASTY of the joint  ;  Surgeon: Bill Ponce M.D.;  Location: SURGERY [facility];  Service:    • SHOULDER DECOMPRESSION ARTHROSCOPIC Left 2016    Procedure: SHOULDER DECOMPRESSION ARTHROSCOPIC SUBACROMIAL and biceps tenotomy;  Surgeon: Johan Roberts M.D.;  Location: SURGERY [facility];  Service:    • GASTROSCOPY-ENDO  2014    Performed by Yonas Grimes Jr., M.D. at ENDOSCOPY [facility]   • TONSILLECTOMY     • PB ANESTH, DELIVERY ONLY      X 2     Family History   Problem Relation Age of Onset   • Cancer Mother         breast   • Lung Disease Mother         COPD/Emphysema/Smoker   • Stroke Father    • Hypertension Father    • Hyperlipidemia Father    • Heart Attack Father    • Cancer Sister         Pancriatic   • Arrythmia Sister    • Lung Disease Sister         COPD/Emphysema/Smoker   • Bipolar disorder Son    • Bipolar disorder Son    • Bipolar disorder Son    • Cancer Maternal Aunt         stomach     Social History     Socioeconomic History   • Marital status:      Spouse name: Not on file   • Number of children: Not on file   • Years of education: Not on file   • Highest education level: Not on file   Occupational History   • Not on file   Social Needs   • Financial resource strain: Not on file   • Food insecurity:     Worry: Not on file     Inability: Not on file   • Transportation needs:     Medical: Not on file     Non-medical: Not on file   Tobacco Use   • Smoking status: Former Smoker     Packs/day: 0.50     Years: 52.00     Pack years: 26.00     Types: Cigarettes     Last attempt to quit: 2013     Years since quittin.6   • Smokeless tobacco: Never Used   • Tobacco comment: started smoking at age 21   Substance and Sexual Activity   • Alcohol use: Yes      Alcohol/week: 8.4 oz     Types: 7 Shots of liquor, 7 Standard drinks or equivalent per week     Frequency: 2-3 times a week     Comment: Once or twice a week.   • Drug use: No   • Sexual activity: Never     Partners: Male     Birth control/protection: Post-Menopausal   Lifestyle   • Physical activity:     Days per week: Not on file     Minutes per session: Not on file   • Stress: Not on file   Relationships   • Social connections:     Talks on phone: Not on file     Gets together: Not on file     Attends Jehovah's witness service: Not on file     Active member of club or organization: Not on file     Attends meetings of clubs or organizations: Not on file     Relationship status: Not on file   • Intimate partner violence:     Fear of current or ex partner: Not on file     Emotionally abused: Not on file     Physically abused: Not on file     Forced sexual activity: Not on file   Other Topics Concern   • Not on file   Social History Narrative   • Not on file     Allergies   Allergen Reactions   • Bactrim Hives   • Demerol Shortness of Breath and Swelling   • Pcn [Penicillins] Hives   • Sulfa Drugs Hives   • Ferrous Sulfate Rash     itching   • Latex Contact Dermatitis     Outpatient Encounter Medications as of 8/22/2019   Medication Sig Dispense Refill   • lisinopril (PRINIVIL) 20 MG Tab Take 1 Tab by mouth every day. 90 Tab 3   • dronedarone (MULTAQ) 400 MG Tab Take 1 Tab by mouth 2 times a day, with meals. 60 Tab 3   • budesonide-formoterol (SYMBICORT) 160-4.5 MCG/ACT Aerosol Inhale 2 Puffs by mouth 2 Times a Day. 1 Inhaler 11   • Home Care Oxygen Inhale 3 L/min by mouth Continuous for 120 days. Hypoxic: 82% at rest and 85% with ambulation on room air 1 Units 0   • liraglutide (VICTOZA) 18 MG/3ML Solution Pen-injector injection Inject 1.2 mg as instructed every evening.     • levothyroxine (SYNTHROID) 75 MCG Tab TAKE 1 TAB BY MOUTH EVERY MORNING ON AN EMPTY STOMACH. 90 Tab 3   • gabapentin (NEURONTIN) 300 MG Cap TAKE 1  "CAPSULE BY MOUTH TWICE A  Cap 3   • metformin (GLUCOPHAGE) 1000 MG tablet Take 1 Tab by mouth 2 times a day, with meals. 180 Tab 2   • traZODone (DESYREL) 100 MG Tab TAKE 1 TABLET BY MOUTH EVERYDAY AT BEDTIME 90 Tab 3   • albuterol 108 (90 Base) MCG/ACT Aero Soln inhalation aerosol Inhale 1 Puff by mouth every 6 hours as needed for Shortness of Breath. 8.5 g 5   • simvastatin (ZOCOR) 40 MG Tab TAKE 1 TAB BY MOUTH EVERY DAY. 30 Tab 5   • omeprazole (PRILOSEC) 40 MG capsule Take 40 mg by mouth every day.     • Phenazopyridine HCl (PYRIDIUM PO) Take  by mouth 2 Times a Day.     • [DISCONTINUED] lisinopril (PRINIVIL) 10 MG Tab TAKE 1 TABLET BY MOUTH EVERY DAY 90 Tab 3   • rivaroxaban (XARELTO) 15 MG Tab tablet Take 1 Tab by mouth with dinner. (Patient not taking: Reported on 8/21/2019) 42 Tab 0     No facility-administered encounter medications on file as of 8/22/2019.      Review of Systems   Constitutional: Negative.    HENT: Negative.    Respiratory: Negative.  Negative for shortness of breath.    Cardiovascular: Positive for leg swelling. Negative for chest pain and palpitations.   Gastrointestinal: Negative.    Musculoskeletal: Positive for back pain.   Skin: Negative.    Neurological: Negative.         History of TIA 2013   Endo/Heme/Allergies: Negative.    Psychiatric/Behavioral: The patient has insomnia.         Objective:   /70 (BP Location: Left arm, Patient Position: Sitting, BP Cuff Size: Adult)   Pulse 70   Ht 1.702 m (5' 7\")   Wt 88.9 kg (196 lb)   SpO2 90%   BMI 30.70 kg/m²     Physical Exam   Constitutional: She is oriented to person, place, and time. No distress.   Obese   HENT:   Head: Normocephalic and atraumatic.   Eyes: Pupils are equal, round, and reactive to light. No scleral icterus.   Neck: No JVD present.   Cardiovascular: Normal rate and regular rhythm.   No murmur heard.  Pulmonary/Chest: No respiratory distress. She has no wheezes.   Abdominal: Soft. She exhibits no " distension. There is no tenderness.   Musculoskeletal: She exhibits no edema.   Neurological: She is alert and oriented to person, place, and time.   Skin: Skin is warm.   Psychiatric: She has a normal mood and affect.       Assessment:     1. Chronic anticoagulation     2. Dyslipidemia     3. Essential hypertension         Medical Decision Making:  Today's Assessment / Status / Plan:   PAF: In sinus rhythm today.  Patient stopped her amiodarone on her own.  We do not have a recent stress test to ensure there is no evidence of coronary disease.  The safest choice for her would be to restart antiarrhythmic with dronedarone.  Would like to stay away from amiodarone because of her lung disease.    Anticoagulation: Patient stopped Xarelto on her own.  Her CHADS-Vasc Score is at least 7 so she should certainly be on anticoagulation indefinitely.  She was given a copy of the score card for her reference.  Since she is scheduled for back surgery next Wednesday which is less than 5 days away, would not recommend her starting anticoagulation until after the surgery.    Ankle edema: Patient has stigmata of COPD on exam.  I suspect her edema secondary to excess water intake and her lung disease.  This was explained to the patient.    Return in 1 month for reevaluation.  She will call should the dronedarone be prohibitively expensive.  The patient will start her anticoagulation again as soon as cleared by neurosurgery.    The importance of compliance with medical therapy was discussed with her and her .  She was reminded not to discontinue any cardiac medication or anticoagulation without first discussing it with us.

## 2019-08-28 ENCOUNTER — APPOINTMENT (OUTPATIENT)
Dept: RADIOLOGY | Facility: MEDICAL CENTER | Age: 79
DRG: 459 | End: 2019-08-28
Attending: NEUROLOGICAL SURGERY
Payer: MEDICARE

## 2019-08-28 ENCOUNTER — ANESTHESIA EVENT (OUTPATIENT)
Dept: SURGERY | Facility: MEDICAL CENTER | Age: 79
DRG: 459 | End: 2019-08-28
Payer: MEDICARE

## 2019-08-28 ENCOUNTER — ANESTHESIA (OUTPATIENT)
Dept: SURGERY | Facility: MEDICAL CENTER | Age: 79
DRG: 459 | End: 2019-08-28
Payer: MEDICARE

## 2019-08-28 ENCOUNTER — HOSPITAL ENCOUNTER (INPATIENT)
Facility: MEDICAL CENTER | Age: 79
LOS: 9 days | DRG: 459 | End: 2019-09-06
Attending: NEUROLOGICAL SURGERY | Admitting: NEUROLOGICAL SURGERY
Payer: MEDICARE

## 2019-08-28 DIAGNOSIS — M51.36 LUMBAR DEGENERATIVE DISC DISEASE: ICD-10-CM

## 2019-08-28 DIAGNOSIS — G89.18 ACUTE POSTOPERATIVE PAIN: ICD-10-CM

## 2019-08-28 DIAGNOSIS — J44.9 CHRONIC OBSTRUCTIVE PULMONARY DISEASE, UNSPECIFIED COPD TYPE (HCC): Chronic | ICD-10-CM

## 2019-08-28 LAB
GLUCOSE BLD-MCNC: 137 MG/DL (ref 65–99)
GLUCOSE BLD-MCNC: 185 MG/DL (ref 65–99)

## 2019-08-28 PROCEDURE — 700111 HCHG RX REV CODE 636 W/ 250 OVERRIDE (IP): Performed by: ANESTHESIOLOGY

## 2019-08-28 PROCEDURE — 500367 HCHG DRAIN KIT, HEMOVAC: Performed by: NEUROLOGICAL SURGERY

## 2019-08-28 PROCEDURE — 500864 HCHG NEEDLE, SPINAL 18G: Performed by: NEUROLOGICAL SURGERY

## 2019-08-28 PROCEDURE — L8699 PROSTHETIC IMPLANT NOS: HCPCS | Performed by: NEUROLOGICAL SURGERY

## 2019-08-28 PROCEDURE — 700105 HCHG RX REV CODE 258: Performed by: NEUROLOGICAL SURGERY

## 2019-08-28 PROCEDURE — 160036 HCHG PACU - EA ADDL 30 MINS PHASE I: Performed by: NEUROLOGICAL SURGERY

## 2019-08-28 PROCEDURE — 3E0U0GB INTRODUCTION OF RECOMBINANT BONE MORPHOGENETIC PROTEIN INTO JOINTS, OPEN APPROACH: ICD-10-PCS | Performed by: NEUROLOGICAL SURGERY

## 2019-08-28 PROCEDURE — 160002 HCHG RECOVERY MINUTES (STAT): Performed by: NEUROLOGICAL SURGERY

## 2019-08-28 PROCEDURE — 160042 HCHG SURGERY MINUTES - EA ADDL 1 MIN LEVEL 5: Performed by: NEUROLOGICAL SURGERY

## 2019-08-28 PROCEDURE — 110371 HCHG SHELL REV 272: Performed by: NEUROLOGICAL SURGERY

## 2019-08-28 PROCEDURE — A4314 CATH W/DRAINAGE 2-WAY LATEX: HCPCS | Performed by: NEUROLOGICAL SURGERY

## 2019-08-28 PROCEDURE — A9270 NON-COVERED ITEM OR SERVICE: HCPCS | Performed by: NURSE PRACTITIONER

## 2019-08-28 PROCEDURE — 160009 HCHG ANES TIME/MIN: Performed by: NEUROLOGICAL SURGERY

## 2019-08-28 PROCEDURE — 160048 HCHG OR STATISTICAL LEVEL 1-5: Performed by: NEUROLOGICAL SURGERY

## 2019-08-28 PROCEDURE — 72100 X-RAY EXAM L-S SPINE 2/3 VWS: CPT

## 2019-08-28 PROCEDURE — 700101 HCHG RX REV CODE 250: Performed by: NEUROLOGICAL SURGERY

## 2019-08-28 PROCEDURE — 700111 HCHG RX REV CODE 636 W/ 250 OVERRIDE (IP): Performed by: NEUROLOGICAL SURGERY

## 2019-08-28 PROCEDURE — 502240 HCHG MISC OR SUPPLY RC 0272: Performed by: NEUROLOGICAL SURGERY

## 2019-08-28 PROCEDURE — 01NB0ZZ RELEASE LUMBAR NERVE, OPEN APPROACH: ICD-10-PCS | Performed by: NEUROLOGICAL SURGERY

## 2019-08-28 PROCEDURE — 700111 HCHG RX REV CODE 636 W/ 250 OVERRIDE (IP)

## 2019-08-28 PROCEDURE — 700102 HCHG RX REV CODE 250 W/ 637 OVERRIDE(OP): Performed by: ANESTHESIOLOGY

## 2019-08-28 PROCEDURE — 95938 SOMATOSENSORY TESTING: CPT | Performed by: NEUROLOGICAL SURGERY

## 2019-08-28 PROCEDURE — C1821 INTERSPINOUS IMPLANT: HCPCS | Performed by: NEUROLOGICAL SURGERY

## 2019-08-28 PROCEDURE — 0SG30AJ FUSION OF LUMBOSACRAL JOINT WITH INTERBODY FUSION DEVICE, POSTERIOR APPROACH, ANTERIOR COLUMN, OPEN APPROACH: ICD-10-PCS | Performed by: NEUROLOGICAL SURGERY

## 2019-08-28 PROCEDURE — 700102 HCHG RX REV CODE 250 W/ 637 OVERRIDE(OP): Performed by: NURSE PRACTITIONER

## 2019-08-28 PROCEDURE — 700111 HCHG RX REV CODE 636 W/ 250 OVERRIDE (IP): Performed by: NURSE PRACTITIONER

## 2019-08-28 PROCEDURE — A9270 NON-COVERED ITEM OR SERVICE: HCPCS | Performed by: ANESTHESIOLOGY

## 2019-08-28 PROCEDURE — 95940 IONM IN OPERATNG ROOM 15 MIN: CPT | Performed by: NEUROLOGICAL SURGERY

## 2019-08-28 PROCEDURE — 700112 HCHG RX REV CODE 229: Performed by: NURSE PRACTITIONER

## 2019-08-28 PROCEDURE — 95861 NEEDLE EMG 2 EXTREMITIES: CPT | Performed by: NEUROLOGICAL SURGERY

## 2019-08-28 PROCEDURE — C1713 ANCHOR/SCREW BN/BN,TIS/BN: HCPCS | Performed by: NEUROLOGICAL SURGERY

## 2019-08-28 PROCEDURE — 770001 HCHG ROOM/CARE - MED/SURG/GYN PRIV*

## 2019-08-28 PROCEDURE — A4344 CATH INDW FOLEY 2 WAY SILICN: HCPCS | Performed by: NEUROLOGICAL SURGERY

## 2019-08-28 PROCEDURE — 501838 HCHG SUTURE GENERAL: Performed by: NEUROLOGICAL SURGERY

## 2019-08-28 PROCEDURE — 160035 HCHG PACU - 1ST 60 MINS PHASE I: Performed by: NEUROLOGICAL SURGERY

## 2019-08-28 PROCEDURE — 160031 HCHG SURGERY MINUTES - 1ST 30 MINS LEVEL 5: Performed by: NEUROLOGICAL SURGERY

## 2019-08-28 PROCEDURE — 500885 HCHG PACK, JACKSON TABLE: Performed by: NEUROLOGICAL SURGERY

## 2019-08-28 PROCEDURE — 82962 GLUCOSE BLOOD TEST: CPT

## 2019-08-28 PROCEDURE — 700101 HCHG RX REV CODE 250: Performed by: ANESTHESIOLOGY

## 2019-08-28 DEVICE — ROD PREBENT TITANIUM 5.5MM X 55MM (2TCONX2=4): Type: IMPLANTABLE DEVICE | Site: BACK | Status: FUNCTIONAL

## 2019-08-28 DEVICE — SCREW MAS  SOLERA 7.5 X 35MM (1TCX8+3TCX8=32): Type: IMPLANTABLE DEVICE | Site: BACK | Status: FUNCTIONAL

## 2019-08-28 DEVICE — SCREW SOLERA SET SCREW (1TCX40+3TCX21+2TCX10=123): Type: IMPLANTABLE DEVICE | Site: BACK | Status: FUNCTIONAL

## 2019-08-28 DEVICE — GRAPH BONE KIT INFUSE SMALL: Type: IMPLANTABLE DEVICE | Site: BACK | Status: FUNCTIONAL

## 2019-08-28 DEVICE — SPACER RISE 10X30MM 10-17MM 15 DEGREES (3TCONX2=6): Type: IMPLANTABLE DEVICE | Site: BACK | Status: FUNCTIONAL

## 2019-08-28 DEVICE — SPACER RISE 10X26MM 10-17MM 15 DEGREES (3TCONX2=6): Type: IMPLANTABLE DEVICE | Site: BACK | Status: FUNCTIONAL

## 2019-08-28 DEVICE — SCREW MAS SOLERA 7.5 X 40MM (1TCX8+3TCX8=32): Type: IMPLANTABLE DEVICE | Site: BACK | Status: FUNCTIONAL

## 2019-08-28 DEVICE — SCREW MAS  SOLERA 6.5 X 45MM (1TCX16+3TCX8=40): Type: IMPLANTABLE DEVICE | Site: BACK | Status: FUNCTIONAL

## 2019-08-28 RX ORDER — POLYETHYLENE GLYCOL 3350 17 G/17G
1 POWDER, FOR SOLUTION ORAL 2 TIMES DAILY PRN
Status: DISCONTINUED | OUTPATIENT
Start: 2019-08-28 | End: 2019-09-06 | Stop reason: HOSPADM

## 2019-08-28 RX ORDER — ONDANSETRON 2 MG/ML
INJECTION INTRAMUSCULAR; INTRAVENOUS PRN
Status: DISCONTINUED | OUTPATIENT
Start: 2019-08-28 | End: 2019-08-28 | Stop reason: SURG

## 2019-08-28 RX ORDER — AMOXICILLIN 250 MG
1 CAPSULE ORAL NIGHTLY
Status: DISCONTINUED | OUTPATIENT
Start: 2019-08-28 | End: 2019-09-06 | Stop reason: HOSPADM

## 2019-08-28 RX ORDER — ROCURONIUM BROMIDE 10 MG/ML
INJECTION, SOLUTION INTRAVENOUS PRN
Status: DISCONTINUED | OUTPATIENT
Start: 2019-08-28 | End: 2019-08-28 | Stop reason: SURG

## 2019-08-28 RX ORDER — MAGNESIUM SULFATE HEPTAHYDRATE 40 MG/ML
INJECTION, SOLUTION INTRAVENOUS PRN
Status: DISCONTINUED | OUTPATIENT
Start: 2019-08-28 | End: 2019-08-28 | Stop reason: SURG

## 2019-08-28 RX ORDER — HALOPERIDOL 5 MG/ML
1 INJECTION INTRAMUSCULAR
Status: DISCONTINUED | OUTPATIENT
Start: 2019-08-28 | End: 2019-08-28 | Stop reason: HOSPADM

## 2019-08-28 RX ORDER — DIPHENHYDRAMINE HCL 25 MG
25 TABLET ORAL EVERY 6 HOURS PRN
Status: DISCONTINUED | OUTPATIENT
Start: 2019-08-28 | End: 2019-08-29

## 2019-08-28 RX ORDER — CEFAZOLIN SODIUM 2 G/100ML
2 INJECTION, SOLUTION INTRAVENOUS EVERY 8 HOURS
Status: COMPLETED | OUTPATIENT
Start: 2019-08-28 | End: 2019-08-29

## 2019-08-28 RX ORDER — ONDANSETRON 2 MG/ML
4 INJECTION INTRAMUSCULAR; INTRAVENOUS
Status: DISCONTINUED | OUTPATIENT
Start: 2019-08-28 | End: 2019-08-28 | Stop reason: HOSPADM

## 2019-08-28 RX ORDER — ENEMA 19; 7 G/133ML; G/133ML
1 ENEMA RECTAL
Status: DISCONTINUED | OUTPATIENT
Start: 2019-08-28 | End: 2019-08-30

## 2019-08-28 RX ORDER — SODIUM CHLORIDE, SODIUM LACTATE, POTASSIUM CHLORIDE, CALCIUM CHLORIDE 600; 310; 30; 20 MG/100ML; MG/100ML; MG/100ML; MG/100ML
INJECTION, SOLUTION INTRAVENOUS CONTINUOUS
Status: ACTIVE | OUTPATIENT
Start: 2019-08-28 | End: 2019-08-28

## 2019-08-28 RX ORDER — TIZANIDINE 4 MG/1
2 TABLET ORAL 3 TIMES DAILY PRN
Status: DISCONTINUED | OUTPATIENT
Start: 2019-08-28 | End: 2019-08-29

## 2019-08-28 RX ORDER — OXYCODONE HYDROCHLORIDE AND ACETAMINOPHEN 5; 325 MG/1; MG/1
1 TABLET ORAL
Status: COMPLETED | OUTPATIENT
Start: 2019-08-28 | End: 2019-08-28

## 2019-08-28 RX ORDER — LIDOCAINE HYDROCHLORIDE 10 MG/ML
INJECTION, SOLUTION EPIDURAL; INFILTRATION; INTRACAUDAL; PERINEURAL
Status: COMPLETED
Start: 2019-08-28 | End: 2019-08-28

## 2019-08-28 RX ORDER — SIMVASTATIN 20 MG
40 TABLET ORAL DAILY
Status: DISCONTINUED | OUTPATIENT
Start: 2019-08-28 | End: 2019-09-06 | Stop reason: HOSPADM

## 2019-08-28 RX ORDER — OMEPRAZOLE 40 MG/1
40 CAPSULE, DELAYED RELEASE ORAL DAILY
Status: ON HOLD | COMMUNITY
End: 2022-12-29

## 2019-08-28 RX ORDER — DEXMEDETOMIDINE HYDROCHLORIDE 100 UG/ML
INJECTION, SOLUTION INTRAVENOUS PRN
Status: DISCONTINUED | OUTPATIENT
Start: 2019-08-28 | End: 2019-08-28 | Stop reason: SURG

## 2019-08-28 RX ORDER — CEFAZOLIN SODIUM 1 G/3ML
INJECTION, POWDER, FOR SOLUTION INTRAMUSCULAR; INTRAVENOUS PRN
Status: DISCONTINUED | OUTPATIENT
Start: 2019-08-28 | End: 2019-08-28 | Stop reason: SURG

## 2019-08-28 RX ORDER — ACETAMINOPHEN 500 MG
1000 TABLET ORAL EVERY 6 HOURS PRN
Status: ON HOLD | COMMUNITY
End: 2019-09-05

## 2019-08-28 RX ORDER — PHENYLEPHRINE HYDROCHLORIDE 10 MG/ML
INJECTION, SOLUTION INTRAMUSCULAR; INTRAVENOUS; SUBCUTANEOUS PRN
Status: DISCONTINUED | OUTPATIENT
Start: 2019-08-28 | End: 2019-08-28 | Stop reason: SURG

## 2019-08-28 RX ORDER — HYDRALAZINE HYDROCHLORIDE 20 MG/ML
10 INJECTION INTRAMUSCULAR; INTRAVENOUS
Status: DISCONTINUED | OUTPATIENT
Start: 2019-08-28 | End: 2019-09-06 | Stop reason: HOSPADM

## 2019-08-28 RX ORDER — SODIUM CHLORIDE, SODIUM LACTATE, POTASSIUM CHLORIDE, CALCIUM CHLORIDE 600; 310; 30; 20 MG/100ML; MG/100ML; MG/100ML; MG/100ML
INJECTION, SOLUTION INTRAVENOUS CONTINUOUS
Status: DISCONTINUED | OUTPATIENT
Start: 2019-08-28 | End: 2019-08-28 | Stop reason: HOSPADM

## 2019-08-28 RX ORDER — DEXAMETHASONE SODIUM PHOSPHATE 4 MG/ML
INJECTION, SOLUTION INTRA-ARTICULAR; INTRALESIONAL; INTRAMUSCULAR; INTRAVENOUS; SOFT TISSUE PRN
Status: DISCONTINUED | OUTPATIENT
Start: 2019-08-28 | End: 2019-08-28 | Stop reason: SURG

## 2019-08-28 RX ORDER — HYDROMORPHONE HYDROCHLORIDE 1 MG/ML
0.2 INJECTION, SOLUTION INTRAMUSCULAR; INTRAVENOUS; SUBCUTANEOUS
Status: DISCONTINUED | OUTPATIENT
Start: 2019-08-28 | End: 2019-08-28 | Stop reason: HOSPADM

## 2019-08-28 RX ORDER — HYDROMORPHONE HYDROCHLORIDE 1 MG/ML
0.1 INJECTION, SOLUTION INTRAMUSCULAR; INTRAVENOUS; SUBCUTANEOUS
Status: DISCONTINUED | OUTPATIENT
Start: 2019-08-28 | End: 2019-08-28 | Stop reason: HOSPADM

## 2019-08-28 RX ORDER — ONDANSETRON 4 MG/1
4 TABLET, ORALLY DISINTEGRATING ORAL EVERY 4 HOURS PRN
Status: DISCONTINUED | OUTPATIENT
Start: 2019-08-28 | End: 2019-09-06 | Stop reason: HOSPADM

## 2019-08-28 RX ORDER — TRAZODONE HYDROCHLORIDE 100 MG/1
100 TABLET ORAL NIGHTLY
COMMUNITY
End: 2020-09-25 | Stop reason: SDUPTHER

## 2019-08-28 RX ORDER — VANCOMYCIN HYDROCHLORIDE 1 G/20ML
INJECTION, POWDER, LYOPHILIZED, FOR SOLUTION INTRAVENOUS
Status: COMPLETED | OUTPATIENT
Start: 2019-08-28 | End: 2019-08-28

## 2019-08-28 RX ORDER — DIPHENHYDRAMINE HYDROCHLORIDE 50 MG/ML
25 INJECTION INTRAMUSCULAR; INTRAVENOUS EVERY 6 HOURS PRN
Status: DISCONTINUED | OUTPATIENT
Start: 2019-08-28 | End: 2019-08-29

## 2019-08-28 RX ORDER — GABAPENTIN 300 MG/1
300 CAPSULE ORAL 2 TIMES DAILY
COMMUNITY
End: 2020-04-14 | Stop reason: SDUPTHER

## 2019-08-28 RX ORDER — BISACODYL 10 MG
10 SUPPOSITORY, RECTAL RECTAL
Status: DISCONTINUED | OUTPATIENT
Start: 2019-08-28 | End: 2019-09-06 | Stop reason: HOSPADM

## 2019-08-28 RX ORDER — HYDROMORPHONE HYDROCHLORIDE 1 MG/ML
0.4 INJECTION, SOLUTION INTRAMUSCULAR; INTRAVENOUS; SUBCUTANEOUS
Status: DISCONTINUED | OUTPATIENT
Start: 2019-08-28 | End: 2019-08-28 | Stop reason: HOSPADM

## 2019-08-28 RX ORDER — BUDESONIDE AND FORMOTEROL FUMARATE DIHYDRATE 160; 4.5 UG/1; UG/1
2 AEROSOL RESPIRATORY (INHALATION) 2 TIMES DAILY
Status: DISCONTINUED | OUTPATIENT
Start: 2019-08-28 | End: 2019-09-06 | Stop reason: HOSPADM

## 2019-08-28 RX ORDER — TRAZODONE HYDROCHLORIDE 100 MG/1
100 TABLET ORAL NIGHTLY
Status: DISCONTINUED | OUTPATIENT
Start: 2019-08-28 | End: 2019-09-06 | Stop reason: HOSPADM

## 2019-08-28 RX ORDER — LIDOCAINE HYDROCHLORIDE 40 MG/ML
SOLUTION TOPICAL PRN
Status: DISCONTINUED | OUTPATIENT
Start: 2019-08-28 | End: 2019-08-28 | Stop reason: SURG

## 2019-08-28 RX ORDER — AMOXICILLIN 250 MG
1 CAPSULE ORAL
Status: DISCONTINUED | OUTPATIENT
Start: 2019-08-28 | End: 2019-09-06 | Stop reason: HOSPADM

## 2019-08-28 RX ORDER — LISINOPRIL 20 MG/1
20 TABLET ORAL DAILY
Status: DISCONTINUED | OUTPATIENT
Start: 2019-08-29 | End: 2019-08-30

## 2019-08-28 RX ORDER — OMEPRAZOLE 20 MG/1
40 CAPSULE, DELAYED RELEASE ORAL DAILY
Status: DISCONTINUED | OUTPATIENT
Start: 2019-08-28 | End: 2019-09-06 | Stop reason: HOSPADM

## 2019-08-28 RX ORDER — CALCIUM CARBONATE 500 MG/1
500 TABLET, CHEWABLE ORAL 2 TIMES DAILY
Status: DISCONTINUED | OUTPATIENT
Start: 2019-08-28 | End: 2019-09-06 | Stop reason: HOSPADM

## 2019-08-28 RX ORDER — LEVOTHYROXINE SODIUM 0.07 MG/1
75 TABLET ORAL
Status: DISCONTINUED | OUTPATIENT
Start: 2019-08-28 | End: 2019-09-06 | Stop reason: HOSPADM

## 2019-08-28 RX ORDER — DOCUSATE SODIUM 100 MG/1
100 CAPSULE, LIQUID FILLED ORAL 2 TIMES DAILY
Status: DISCONTINUED | OUTPATIENT
Start: 2019-08-28 | End: 2019-09-06 | Stop reason: HOSPADM

## 2019-08-28 RX ORDER — ONDANSETRON 2 MG/ML
4 INJECTION INTRAMUSCULAR; INTRAVENOUS EVERY 4 HOURS PRN
Status: DISCONTINUED | OUTPATIENT
Start: 2019-08-28 | End: 2019-09-06 | Stop reason: HOSPADM

## 2019-08-28 RX ORDER — GABAPENTIN 300 MG/1
300 CAPSULE ORAL 2 TIMES DAILY
Status: DISCONTINUED | OUTPATIENT
Start: 2019-08-28 | End: 2019-09-06 | Stop reason: HOSPADM

## 2019-08-28 RX ORDER — ALBUTEROL SULFATE 90 UG/1
1 AEROSOL, METERED RESPIRATORY (INHALATION) EVERY 6 HOURS PRN
Status: DISCONTINUED | OUTPATIENT
Start: 2019-08-28 | End: 2019-09-06 | Stop reason: HOSPADM

## 2019-08-28 RX ORDER — BUPIVACAINE HYDROCHLORIDE AND EPINEPHRINE 2.5; 5 MG/ML; UG/ML
INJECTION, SOLUTION EPIDURAL; INFILTRATION; INTRACAUDAL; PERINEURAL
Status: DISCONTINUED | OUTPATIENT
Start: 2019-08-28 | End: 2019-08-28 | Stop reason: HOSPADM

## 2019-08-28 RX ORDER — OXYCODONE HYDROCHLORIDE AND ACETAMINOPHEN 5; 325 MG/1; MG/1
2 TABLET ORAL
Status: COMPLETED | OUTPATIENT
Start: 2019-08-28 | End: 2019-08-28

## 2019-08-28 RX ADMIN — DEXAMETHASONE SODIUM PHOSPHATE 4 MG: 4 INJECTION, SOLUTION INTRA-ARTICULAR; INTRALESIONAL; INTRAMUSCULAR; INTRAVENOUS; SOFT TISSUE at 12:21

## 2019-08-28 RX ADMIN — FENTANYL CITRATE 100 MCG: 50 INJECTION, SOLUTION INTRAMUSCULAR; INTRAVENOUS at 12:58

## 2019-08-28 RX ADMIN — PROPOFOL 50 MCG/KG/MIN: 10 INJECTION, EMULSION INTRAVENOUS at 13:22

## 2019-08-28 RX ADMIN — MAGNESIUM SULFATE IN WATER 3 G: 40 INJECTION, SOLUTION INTRAVENOUS at 12:30

## 2019-08-28 RX ADMIN — SODIUM CHLORIDE, POTASSIUM CHLORIDE, SODIUM LACTATE AND CALCIUM CHLORIDE: 600; 310; 30; 20 INJECTION, SOLUTION INTRAVENOUS at 09:21

## 2019-08-28 RX ADMIN — CEFAZOLIN 2 G: 330 INJECTION, POWDER, FOR SOLUTION INTRAMUSCULAR; INTRAVENOUS at 12:11

## 2019-08-28 RX ADMIN — LIDOCAINE HYDROCHLORIDE 0.5 ML: 10 INJECTION, SOLUTION EPIDURAL; INFILTRATION; INTRACAUDAL at 09:21

## 2019-08-28 RX ADMIN — CEFAZOLIN SODIUM 2 G: 2 INJECTION, SOLUTION INTRAVENOUS at 20:27

## 2019-08-28 RX ADMIN — DOCUSATE SODIUM 100 MG: 100 CAPSULE, LIQUID FILLED ORAL at 18:35

## 2019-08-28 RX ADMIN — HYDROMORPHONE HYDROCHLORIDE: 10 INJECTION, SOLUTION INTRAMUSCULAR; INTRAVENOUS; SUBCUTANEOUS at 18:29

## 2019-08-28 RX ADMIN — ONDANSETRON 4 MG: 2 INJECTION INTRAMUSCULAR; INTRAVENOUS at 14:48

## 2019-08-28 RX ADMIN — ROCURONIUM BROMIDE 40 MG: 10 INJECTION, SOLUTION INTRAVENOUS at 12:14

## 2019-08-28 RX ADMIN — METFORMIN HYDROCHLORIDE 1000 MG: 500 TABLET, FILM COATED ORAL at 18:35

## 2019-08-28 RX ADMIN — DEXMEDETOMIDINE HYDROCHLORIDE 100 MCG: 100 INJECTION, SOLUTION INTRAVENOUS at 12:30

## 2019-08-28 RX ADMIN — PHENYLEPHRINE HYDROCHLORIDE 100 MCG: 10 INJECTION INTRAVENOUS at 13:43

## 2019-08-28 RX ADMIN — PHENYLEPHRINE HYDROCHLORIDE 100 MCG: 10 INJECTION INTRAVENOUS at 13:12

## 2019-08-28 RX ADMIN — HYDROMORPHONE HYDROCHLORIDE 0.4 MG: 1 INJECTION, SOLUTION INTRAMUSCULAR; INTRAVENOUS; SUBCUTANEOUS at 15:41

## 2019-08-28 RX ADMIN — FENTANYL CITRATE 50 MCG: 50 INJECTION, SOLUTION INTRAMUSCULAR; INTRAVENOUS at 14:46

## 2019-08-28 RX ADMIN — FENTANYL CITRATE 50 MCG: 50 INJECTION, SOLUTION INTRAMUSCULAR; INTRAVENOUS at 15:26

## 2019-08-28 RX ADMIN — PHENYLEPHRINE HYDROCHLORIDE 100 MCG: 10 INJECTION INTRAVENOUS at 14:17

## 2019-08-28 RX ADMIN — SENNOSIDES, DOCUSATE SODIUM 1 TABLET: 50; 8.6 TABLET, FILM COATED ORAL at 20:24

## 2019-08-28 RX ADMIN — HYDROMORPHONE HYDROCHLORIDE 0.4 MG: 1 INJECTION, SOLUTION INTRAMUSCULAR; INTRAVENOUS; SUBCUTANEOUS at 15:31

## 2019-08-28 RX ADMIN — FENTANYL CITRATE 50 MCG: 50 INJECTION, SOLUTION INTRAMUSCULAR; INTRAVENOUS at 15:10

## 2019-08-28 RX ADMIN — SODIUM CHLORIDE, POTASSIUM CHLORIDE, SODIUM LACTATE AND CALCIUM CHLORIDE: 600; 310; 30; 20 INJECTION, SOLUTION INTRAVENOUS at 12:44

## 2019-08-28 RX ADMIN — BUDESONIDE AND FORMOTEROL FUMARATE DIHYDRATE 2 PUFF: 160; 4.5 AEROSOL RESPIRATORY (INHALATION) at 20:23

## 2019-08-28 RX ADMIN — OXYCODONE HYDROCHLORIDE AND ACETAMINOPHEN 2 TABLET: 5; 325 TABLET ORAL at 15:12

## 2019-08-28 RX ADMIN — SODIUM CHLORIDE, POTASSIUM CHLORIDE, SODIUM LACTATE AND CALCIUM CHLORIDE: 600; 310; 30; 20 INJECTION, SOLUTION INTRAVENOUS at 13:48

## 2019-08-28 RX ADMIN — PHENYLEPHRINE HYDROCHLORIDE 100 MCG: 10 INJECTION INTRAVENOUS at 13:03

## 2019-08-28 RX ADMIN — FENTANYL CITRATE 100 MCG: 50 INJECTION, SOLUTION INTRAMUSCULAR; INTRAVENOUS at 12:14

## 2019-08-28 RX ADMIN — LIDOCAINE HYDROCHLORIDE 4 ML: 40 SOLUTION TOPICAL at 12:16

## 2019-08-28 RX ADMIN — FENTANYL CITRATE 50 MCG: 50 INJECTION, SOLUTION INTRAMUSCULAR; INTRAVENOUS at 15:19

## 2019-08-28 RX ADMIN — ANTACID TABLETS 500 MG: 500 TABLET, CHEWABLE ORAL at 18:35

## 2019-08-28 RX ADMIN — FENTANYL CITRATE 50 MCG: 50 INJECTION, SOLUTION INTRAMUSCULAR; INTRAVENOUS at 15:17

## 2019-08-28 RX ADMIN — PHENYLEPHRINE HYDROCHLORIDE 100 MCG: 10 INJECTION INTRAVENOUS at 13:27

## 2019-08-28 RX ADMIN — GABAPENTIN 300 MG: 300 CAPSULE ORAL at 18:36

## 2019-08-28 RX ADMIN — Medication 0.5 ML: at 09:21

## 2019-08-28 RX ADMIN — HYDROMORPHONE HYDROCHLORIDE 0.2 MG: 1 INJECTION, SOLUTION INTRAMUSCULAR; INTRAVENOUS; SUBCUTANEOUS at 15:51

## 2019-08-28 RX ADMIN — PROPOFOL 120 MG: 10 INJECTION, EMULSION INTRAVENOUS at 12:24

## 2019-08-28 ASSESSMENT — PAIN SCALES - GENERAL: PAIN_LEVEL: 4

## 2019-08-28 NOTE — ANESTHESIA PREPROCEDURE EVALUATION
Relevant Problems   PULMONARY   (+) Chronic obstructive pulmonary disease (HCC)      NEURO   (+) History of CVA (cerebrovascular accident)      CARDIAC   (+) Essential hypertension   (+) PAF (paroxysmal atrial fibrillation) (HCC)   (+) Pulmonary HTN (HCC)      ENDO   (+) Hypothyroidism due to acquired atrophy of thyroid   (+) Type 2 diabetes mellitus without complication, without long-term current use of insulin (HCC)       Physical Exam    Airway   Mallampati: II  TM distance: >3 FB  Neck ROM: full       Cardiovascular - normal exam  Rhythm: regular  Rate: normal  (-) murmur     Dental - normal exam         Pulmonary - normal exam  Breath sounds clear to auscultation  (+) decreased breath sounds     Abdominal    Neurological - normal exam                 Anesthesia Plan    ASA 3   ASA physical status 3 criteria: COPD    Plan - general       Airway plan will be ETT        Induction: intravenous    Postoperative Plan: Postoperative administration of opioids is intended.    Pertinent diagnostic labs and testing reviewed    Informed Consent:    Anesthetic plan and risks discussed with patient.    Use of blood products discussed with: patient whom consented to blood products.

## 2019-08-28 NOTE — PROGRESS NOTES
Med rec updated and complete  Allergies reviewed  Interviewed pt with  at bedside with permission from pt.  Pt reports no antibiotics in the last 2 weeks  Pt reports no vitamins.

## 2019-08-28 NOTE — ANESTHESIA TIME REPORT
Anesthesia Start and Stop Event Times     Date Time Event    8/28/2019 1146 Ready for Procedure     1211 Anesthesia Start     1504 Anesthesia Stop        Responsible Staff  08/28/19    Name Role Begin End    Anurag Baez M.D. Anesth 1211 1504        Preop Diagnosis (Free Text):  Pre-op Diagnosis     SPONDYLOLISTHESIS        Preop Diagnosis (Codes):    Post op Diagnosis  Lumbar spondylosis      Premium Reason  Non-Premium    Comments:

## 2019-08-28 NOTE — ANESTHESIA POSTPROCEDURE EVALUATION
Patient: Jessica Plaza    Procedure Summary     Date:  08/28/19 Room / Location:  [facility] 05 / SURGERY [facility]    Anesthesia Start:  1211 Anesthesia Stop:  1504    Procedures:       FUSION, SPINE, LUMBAR, PLIF - L4-5, L5-S1 TLIF (N/A Back)      DECOMPRESSION, SPINE, LUMBAR (N/A Back) Diagnosis:  (SPONDYLOLISTHESIS)    Surgeon:  Grupo Cabrales M.D. Responsible Provider:  Anurag Baez M.D.    Anesthesia Type:  general ASA Status:  3          Final Anesthesia Type: general  Last vitals  BP   NIBP: (!) 183/75    Temp   36.4 °C (97.6 °F)    Pulse   Pulse: 68   Resp   19    SpO2   90 %      Anesthesia Post Evaluation    Patient location during evaluation: PACU  Patient participation: complete - patient participated  Level of consciousness: awake and alert  Pain score: 4    Airway patency: patent  Anesthetic complications: no  Cardiovascular status: hemodynamically stable  Respiratory status: acceptable  Hydration status: euvolemic    PONV: none           Nurse Pain Score: 8 (NPRS)

## 2019-08-28 NOTE — ANESTHESIA QCDR
2019 [facility] Clinical Data Registry (for Quality Improvement)     Postoperative nausea/vomiting risk protocol (Adult = 18 yrs and Pediatric 3-17 yrs)- (430 and 463)  General inhalation anesthetic (NOT TIVA) with PONV risk factors: Yes  Provision of anti-emetic therapy with at least 2 different classes of agents: Yes   Patient DID NOT receive anti-emetic therapy and reason is documented in Medical Record:  N/A    Multimodal Pain Management- (AQI59)  Patient undergoing Elective Surgery (i.e. Outpatient, or ASC, or Prescheduled Surgery prior to Hospital Admission): Yes  Use of Multimodal Pain Management, two or more drugs and/or interventions, NOT including systemic opioids: Yes   Exception: Documented allergy to multiple classes of analgesics:  N/A    PACU assessment of acute postoperative pain prior to Anesthesia Care End- Applies to Patients Age = 18- (ABG7)  Initial PACU pain score is which of the following: < 7/10  Patient unable to report pain score: N/A    Post-anesthetic transfer of care checklist/protocol to PACU/ICU- (426 and 427)  Upon conclusion of case, patient transferred to which of the following locations: PACU/Non-ICU  Use of transfer checklist/protocol: Yes  Exclusion: Service Performed in Patient Hospital Room (and thus did not require transfer): N/A    PACU Reintubation- (AQI31)  General anesthesia requiring endotracheal intubation (ETT) along with subsequent extubation in OR or PACU: No  Required reintubation in the PACU: N/A  Extubation was a planned trial documented in the medical record prior to removal of the original airway device: N/A    Unplanned admission to ICU related to anesthesia service up through end of PACU care- (MD51)  Unplanned admission to ICU (not initially anticipated at anesthesia start time): No

## 2019-08-29 LAB
ANION GAP SERPL CALC-SCNC: 6 MMOL/L (ref 0–11.9)
BUN SERPL-MCNC: 25 MG/DL (ref 8–22)
CALCIUM SERPL-MCNC: 8.8 MG/DL (ref 8.5–10.5)
CHLORIDE SERPL-SCNC: 103 MMOL/L (ref 96–112)
CO2 SERPL-SCNC: 25 MMOL/L (ref 20–33)
CREAT SERPL-MCNC: 1.42 MG/DL (ref 0.5–1.4)
ERYTHROCYTE [DISTWIDTH] IN BLOOD BY AUTOMATED COUNT: 61.2 FL (ref 35.9–50)
GLUCOSE SERPL-MCNC: 181 MG/DL (ref 65–99)
HCT VFR BLD AUTO: 31.8 % (ref 37–47)
HGB BLD-MCNC: 9.7 G/DL (ref 12–16)
MCH RBC QN AUTO: 29.1 PG (ref 27–33)
MCHC RBC AUTO-ENTMCNC: 30.5 G/DL (ref 33.6–35)
MCV RBC AUTO: 95.5 FL (ref 81.4–97.8)
PLATELET # BLD AUTO: 171 K/UL (ref 164–446)
PMV BLD AUTO: 10.8 FL (ref 9–12.9)
POTASSIUM SERPL-SCNC: 5.9 MMOL/L (ref 3.6–5.5)
RBC # BLD AUTO: 3.33 M/UL (ref 4.2–5.4)
SODIUM SERPL-SCNC: 134 MMOL/L (ref 135–145)
WBC # BLD AUTO: 10 K/UL (ref 4.8–10.8)

## 2019-08-29 PROCEDURE — A9270 NON-COVERED ITEM OR SERVICE: HCPCS | Performed by: PHYSICIAN ASSISTANT

## 2019-08-29 PROCEDURE — A9270 NON-COVERED ITEM OR SERVICE: HCPCS | Performed by: NURSE PRACTITIONER

## 2019-08-29 PROCEDURE — 85027 COMPLETE CBC AUTOMATED: CPT

## 2019-08-29 PROCEDURE — 94760 N-INVAS EAR/PLS OXIMETRY 1: CPT

## 2019-08-29 PROCEDURE — 700102 HCHG RX REV CODE 250 W/ 637 OVERRIDE(OP): Performed by: NURSE PRACTITIONER

## 2019-08-29 PROCEDURE — 36415 COLL VENOUS BLD VENIPUNCTURE: CPT

## 2019-08-29 PROCEDURE — 97162 PT EVAL MOD COMPLEX 30 MIN: CPT

## 2019-08-29 PROCEDURE — 97535 SELF CARE MNGMENT TRAINING: CPT

## 2019-08-29 PROCEDURE — 700112 HCHG RX REV CODE 229: Performed by: NURSE PRACTITIONER

## 2019-08-29 PROCEDURE — 80048 BASIC METABOLIC PNL TOTAL CA: CPT

## 2019-08-29 PROCEDURE — 700111 HCHG RX REV CODE 636 W/ 250 OVERRIDE (IP): Performed by: NURSE PRACTITIONER

## 2019-08-29 PROCEDURE — 700102 HCHG RX REV CODE 250 W/ 637 OVERRIDE(OP): Performed by: PHYSICIAN ASSISTANT

## 2019-08-29 PROCEDURE — 94664 DEMO&/EVAL PT USE INHALER: CPT

## 2019-08-29 PROCEDURE — 770001 HCHG ROOM/CARE - MED/SURG/GYN PRIV*

## 2019-08-29 RX ORDER — HYDROMORPHONE HYDROCHLORIDE 1 MG/ML
0.5 INJECTION, SOLUTION INTRAMUSCULAR; INTRAVENOUS; SUBCUTANEOUS
Status: DISCONTINUED | OUTPATIENT
Start: 2019-08-29 | End: 2019-08-31

## 2019-08-29 RX ORDER — DIPHENHYDRAMINE HYDROCHLORIDE 50 MG/ML
25 INJECTION INTRAMUSCULAR; INTRAVENOUS EVERY 6 HOURS PRN
Status: DISCONTINUED | OUTPATIENT
Start: 2019-08-29 | End: 2019-09-06 | Stop reason: HOSPADM

## 2019-08-29 RX ORDER — OXYCODONE HYDROCHLORIDE 10 MG/1
10 TABLET ORAL
Status: DISCONTINUED | OUTPATIENT
Start: 2019-08-29 | End: 2019-08-30

## 2019-08-29 RX ORDER — HYDROMORPHONE HYDROCHLORIDE 2 MG/1
2-4 TABLET ORAL
Status: DISCONTINUED | OUTPATIENT
Start: 2019-08-29 | End: 2019-08-31

## 2019-08-29 RX ORDER — OXYCODONE HYDROCHLORIDE 10 MG/1
10 TABLET ORAL EVERY 4 HOURS PRN
Status: DISCONTINUED | OUTPATIENT
Start: 2019-08-29 | End: 2019-08-29

## 2019-08-29 RX ORDER — TIZANIDINE 4 MG/1
4 TABLET ORAL EVERY 8 HOURS
Status: DISCONTINUED | OUTPATIENT
Start: 2019-08-29 | End: 2019-08-31

## 2019-08-29 RX ORDER — DIPHENHYDRAMINE HCL 25 MG
25 TABLET ORAL EVERY 6 HOURS PRN
Status: DISCONTINUED | OUTPATIENT
Start: 2019-08-29 | End: 2019-09-06 | Stop reason: HOSPADM

## 2019-08-29 RX ADMIN — OXYCODONE HYDROCHLORIDE 10 MG: 10 TABLET ORAL at 12:34

## 2019-08-29 RX ADMIN — HYDROMORPHONE HYDROCHLORIDE 0.5 MG: 1 INJECTION, SOLUTION INTRAMUSCULAR; INTRAVENOUS; SUBCUTANEOUS at 18:21

## 2019-08-29 RX ADMIN — ANTACID TABLETS 500 MG: 500 TABLET, CHEWABLE ORAL at 04:16

## 2019-08-29 RX ADMIN — TIZANIDINE 4 MG: 4 TABLET ORAL at 21:03

## 2019-08-29 RX ADMIN — BUDESONIDE AND FORMOTEROL FUMARATE DIHYDRATE 2 PUFF: 160; 4.5 AEROSOL RESPIRATORY (INHALATION) at 04:14

## 2019-08-29 RX ADMIN — HYDROMORPHONE HYDROCHLORIDE 4 MG: 2 TABLET ORAL at 19:28

## 2019-08-29 RX ADMIN — BUDESONIDE AND FORMOTEROL FUMARATE DIHYDRATE 2 PUFF: 160; 4.5 AEROSOL RESPIRATORY (INHALATION) at 16:39

## 2019-08-29 RX ADMIN — SENNOSIDES, DOCUSATE SODIUM 1 TABLET: 50; 8.6 TABLET, FILM COATED ORAL at 21:03

## 2019-08-29 RX ADMIN — METFORMIN HYDROCHLORIDE 1000 MG: 500 TABLET, FILM COATED ORAL at 09:00

## 2019-08-29 RX ADMIN — DOCUSATE SODIUM 100 MG: 100 CAPSULE, LIQUID FILLED ORAL at 04:15

## 2019-08-29 RX ADMIN — CEFAZOLIN SODIUM 2 G: 2 INJECTION, SOLUTION INTRAVENOUS at 04:18

## 2019-08-29 RX ADMIN — DOCUSATE SODIUM 100 MG: 100 CAPSULE, LIQUID FILLED ORAL at 16:30

## 2019-08-29 RX ADMIN — TRAZODONE HYDROCHLORIDE 100 MG: 100 TABLET ORAL at 21:03

## 2019-08-29 RX ADMIN — TIZANIDINE 2 MG: 4 TABLET ORAL at 12:34

## 2019-08-29 RX ADMIN — ANTACID TABLETS 500 MG: 500 TABLET, CHEWABLE ORAL at 16:30

## 2019-08-29 RX ADMIN — TIZANIDINE 2 MG: 4 TABLET ORAL at 16:30

## 2019-08-29 RX ADMIN — HYDROMORPHONE HYDROCHLORIDE 4 MG: 2 TABLET ORAL at 16:31

## 2019-08-29 RX ADMIN — GABAPENTIN 300 MG: 300 CAPSULE ORAL at 04:15

## 2019-08-29 RX ADMIN — GABAPENTIN 300 MG: 300 CAPSULE ORAL at 16:30

## 2019-08-29 RX ADMIN — BENZOCAINE AND MENTHOL 1 LOZENGE: 15; 3.6 LOZENGE ORAL at 13:55

## 2019-08-29 RX ADMIN — OMEPRAZOLE 40 MG: 20 CAPSULE, DELAYED RELEASE ORAL at 04:15

## 2019-08-29 RX ADMIN — SIMVASTATIN 40 MG: 20 TABLET, FILM COATED ORAL at 04:15

## 2019-08-29 RX ADMIN — HYDROMORPHONE HYDROCHLORIDE 0.5 MG: 1 INJECTION, SOLUTION INTRAMUSCULAR; INTRAVENOUS; SUBCUTANEOUS at 15:22

## 2019-08-29 RX ADMIN — METFORMIN HYDROCHLORIDE 1000 MG: 500 TABLET, FILM COATED ORAL at 16:30

## 2019-08-29 ASSESSMENT — COGNITIVE AND FUNCTIONAL STATUS - GENERAL
MOVING TO AND FROM BED TO CHAIR: UNABLE
CLIMB 3 TO 5 STEPS WITH RAILING: TOTAL
STANDING UP FROM CHAIR USING ARMS: TOTAL
SUGGESTED CMS G CODE MODIFIER MOBILITY: CM
WALKING IN HOSPITAL ROOM: A LOT
MOBILITY SCORE: 8
TURNING FROM BACK TO SIDE WHILE IN FLAT BAD: UNABLE
MOVING FROM LYING ON BACK TO SITTING ON SIDE OF FLAT BED: A LOT

## 2019-08-29 ASSESSMENT — COPD QUESTIONNAIRES
HAVE YOU SMOKED AT LEAST 100 CIGARETTES IN YOUR ENTIRE LIFE: YES
DURING THE PAST 4 WEEKS HOW MUCH DID YOU FEEL SHORT OF BREATH: SOME OF THE TIME
DO YOU EVER COUGH UP ANY MUCUS OR PHLEGM?: NO/ONLY WITH OCCASIONAL COLDS OR INFECTIONS
COPD SCREENING SCORE: 5

## 2019-08-29 ASSESSMENT — GAIT ASSESSMENTS
ASSISTIVE DEVICE: FRONT WHEEL WALKER
DISTANCE (FEET): 10
DEVIATION: INCREASED BASE OF SUPPORT;DECREASED HEEL STRIKE;DECREASED TOE OFF
GAIT LEVEL OF ASSIST: STAND BY ASSIST

## 2019-08-29 NOTE — PROGRESS NOTES
Neurosurgery Progress Note    Subjective:  Pt seen by Dr. Cabrales earlier today she is doing well & is comfortable on pca    Exam:  AAO, cooperative, fc's, rivas's strong. Drain remains    BP  Min: 90/66  Max: 137/46  Pulse  Av.3  Min: 49  Max: 74  Resp  Avg: 15  Min: 10  Max: 19  Temp  Av.2 °C (97.1 °F)  Min: 35.8 °C (96.5 °F)  Max: 36.4 °C (97.6 °F)  SpO2  Av.8 %  Min: 90 %  Max: 97 %    No data recorded    Recent Labs     19  0255   WBC 10.0   RBC 3.33*   HEMOGLOBIN 9.7*   HEMATOCRIT 31.8*   MCV 95.5   MCH 29.1   MCHC 30.5*   RDW 61.2*   PLATELETCT 171   MPV 10.8     Recent Labs     19  0255   SODIUM 134*   POTASSIUM 5.9*   CHLORIDE 103   CO2 25   GLUCOSE 181*   BUN 25*   CREATININE 1.42*   CALCIUM 8.8               Intake/Output       19 0700 - 19 0659 19 0700 - 19 0659      0763-5352 6819-4378 Total 9602-3596 5910-8471 Total       Intake    I.V.  2800  3.5 2803.5  21.9  -- 21.9    PCA End of Shift Total Volume (ml) -- 3.5 3.5 21.9 -- 21.9    Volume (mL) (lactated ringers infusion) 2800 -- 2800 -- -- --    Total Intake 2800 3.5 2803.5 21.9 -- 21.9       Output    Urine  1450  375 1825  --  -- --    Urine 1450 -- 1450 -- -- --    Output (mL) ([REMOVED] Urethral Catheter Non-latex 14 Fr.) -- 375 375 -- -- --    Drains  --  220 220  --  -- --    Output (mL) (Closed/Suction Drain 1 Posterior Back Hemovac) -- 220 220 -- -- --    Blood  300  -- 300  --  -- --    Est. Blood Loss 300 -- 300 -- -- --    Total Output 7967 391 2468 -- -- --       Net I/O     1050 -591.6 458.5 21.9 -- 21.9            Intake/Output Summary (Last 24 hours) at 2019 0744  Last data filed at 2019 0708  Gross per 24 hour   Intake 2825.35 ml   Output 2345 ml   Net 480.35 ml            • albuterol  1 Puff Q6HRS PRN   • budesonide-formoterol  2 Puff BID   • dronedarone  400 mg BID WITH MEALS   • gabapentin  300 mg BID   • levothyroxine  75 mcg AM ES   • lisinopril  20 mg DAILY   • metformin   1,000 mg BID WITH MEALS   • omeprazole  40 mg DAILY   • simvastatin  40 mg DAILY   • traZODone  100 mg Nightly   • MD ALERT...DO NOT ADMINISTER NSAIDS or ASPIRIN unless ORDERED By Neurosurgery  1 Each PRN   • docusate sodium  100 mg BID   • senna-docusate  1 Tab Nightly   • senna-docusate  1 Tab Q24HRS PRN   • polyethylene glycol/lytes  1 Packet BID PRN   • magnesium hydroxide  30 mL QDAY PRN   • bisacodyl  10 mg Q24HRS PRN   • fleet  1 Each Once PRN   • Respiratory Care per Protocol   Continuous RT   • HYDROmorphone   Continuous   • diphenhydrAMINE  25 mg Q6HRS PRN    Or   • diphenhydrAMINE  25 mg Q6HRS PRN   • ondansetron  4 mg Q4HRS PRN   • ondansetron  4 mg Q4HRS PRN   • tizanidine  2 mg TID PRN   • hydrALAZINE  10 mg Q HOUR PRN   • benzocaine-menthol  1 Lozenge Q2HRS PRN   • calcium carbonate  500 mg BID       Assessment and Plan:  POD #1 L4 - sacral TLIF  NM as above  Prophylactic anticoagulation: no         Start date/time: no need  Pt to mobilize today with therapies  Will transition onto oral meds after lunch & dc pca  Monitor drain

## 2019-08-29 NOTE — DISCHARGE PLANNING
Anticipated Discharge Disposition: TBD    Action: Spoke with pt and confirmed pt's information. Pt lives in 1 story condo with spouse and states dc supports are spouse and sister. Has rx coverage. Uses home O2 at night. Was independent of ADLs prior to admission. Currently has surgical drain and PCA. Plan to dc PCA and switch to orals after lunch per neurosurgery note.     Barriers to Discharge: medical clearance, PT/OT eval pending    Plan: follow up after PT/OT eval

## 2019-08-29 NOTE — PROGRESS NOTES
RN MOBILITY NOTE 08/28/19    Surgery patient?: yes  Date of surgery: 08/28  Ambulated 50 ft on day of surgery? (N/A if today is not date of surgery): yes  Number of times ambulated 50 feet or greater today: 1  Patient has been up to chair, edge of bed or HOB 90 degrees for all meals?: 1/1  Goal met? (goal is ambulating at least 50 feet 2 times on day shift, one time on night shift): yes, 50 ft POD 0  If patient did not meet mobility goal, why?:

## 2019-08-29 NOTE — THERAPY
"Physical Therapy Evaluation completed.   Bed Mobility:  Supine to Sit: Minimal Assist(via log roll with use of bed rail)  Transfers: Sit to Stand: Minimal Assist  Gait: Level Of Assist: Stand by Assist with Front-Wheel Walker       Plan of Care: Will benefit from Physical Therapy 5 times per week  Discharge Recommendations: Equipment: Front-Wheel Walker. Post-acute therapy Discharge to a transitional care facility for continued skilled therapy services.    Pt is 78 y/o s/p PLIF L4-5 and TLIF L5-S1 with decompression. Pt demonstrating impairments including increased pain, difficulty with bed mobility, difficulty with transfers, and impared amb. Pt instructed in spinal precautions. She required min A and use of bed roll to perform log roll for supine>sit. She require set-up assistance to don lumbar corset EOB.  She reports that she previously got out of bed to bathroom without donning brace earlier today. She tolerated sit>stand with min A from bed/toilet and min A for amb x10ft with FWW. Pt reporting a significant increase in pain pre/post activity. Pt very emotional regarding amount of pain she is in reporting 8/10 pain. Pt typically only amb short distances (10-20ft) in the home and has supplemental oxygen in a tank that she reports she is unable to manage when she leaves the home as it is too difficult for her. Pt reports that her  will be able to assist but not physically as he is 82 y/o. She reports her sister is only intermittently able to assist as she is very busy. Pt will benefit from skilled acute PT 5x/wk. She currently would require post-acute placement prior to DC home unless she progresses in her functional mobility which would warrant home health.     See \"Rehab Therapy-Acute\" Patient Summary Report for complete documentation.     "

## 2019-08-29 NOTE — PROGRESS NOTES
Pt aaox4. TALAMANTES 5/5. Denies N/T. Up w/ x1 assist with FWW. Pt c/o back pain. PCA stopped after lunch per order. Oxy and Zanaflex given with +results. Pt had increased pain after working with therapy this afternoon. IV Dilaudid given for BTP with + results. +BS, good appetite. Pt voided this afternoon in BR. HVAC in place, compressed. Reviewed poc with pt-verbalized understanding. Call light in reach.

## 2019-08-29 NOTE — PROGRESS NOTES
Received report from Yanira in PACU, and assumed pt care.  A&O x4.  Bed alarm and treaded socks on.  Call light and personal belongings within reach.  Bed locked and at lowest position.  TALAMANTES.  VSS.  PCA, graham catheter, and hemovac in use.   at bedside.

## 2019-08-29 NOTE — CARE PLAN
Problem: Communication  Goal: The ability to communicate needs accurately and effectively will improve  Outcome: PROGRESSING AS EXPECTED    Patient able to communicate needs to staff. Call light within reach, patient educated to call for assistance. Patient calls appropriately. Will continue to assess.      Problem: Safety  Goal: Will remain free from injury  Outcome: PROGRESSING AS EXPECTED    Patient educated to call for assistance. Precautions in place; Call light within reach. Bed alarm in use.  Bed locked and in lowest position. Treaded socks in place. Hourly rounding. Will continue to monitor.

## 2019-08-29 NOTE — PROGRESS NOTES
2010 Pt A&Ox4. Pt denies numbness and tingling. Pt sitting up for meal. Pain 8/10, PCA pump in use. Pt educated on use, all questions answered at this time. Bed locked and in lowest position. Bed alarm on. Pt educated to call for assistance. Call light within reach. Treaded socks in place. SCDs in use.    2340 BP: 92/41. Pt denies headache, nausea, dizziness, and blurred vision. Fluids running, will continue to monitor. PO fluids encouraged.     0310 [facility] Neurosurgery paged. Dr. Mari to update on pt's vital signs.    0315 No new orders at this time.

## 2019-08-29 NOTE — DISCHARGE PLANNING
Care Transition Team Assessment    Information Source  Orientation : Oriented x 4  Information Given By: Patient  Informant's Name: Jessica Plaza  Who is responsible for making decisions for patient? : Patient    Readmission Evaluation  Is this a readmission?: No    Elopement Risk  Legal Hold: No  Ambulatory or Self Mobile in Wheelchair: Yes  Disoriented: No  Psychiatric Symptoms: None  History of Wandering: No  Elopement this Admit: No  Vocalizing Wanting to Leave: No  Displays Behaviors, Body Language Wanting to Leave: No-Not at Risk for Elopement  Elopement Risk: Not at Risk for Elopement    Interdisciplinary Discharge Planning  Does Admitting Nurse Feel This Could be a Complex Discharge?: No  Primary Care Physician: Anurag Moreno  Lives with - Patient's Self Care Capacity: Spouse  Patient or legal guardian wants to designate a caregiver (see row info): No  Support Systems: Spouse / Significant Other, Other (Comments)(sister)  Housing / Facility: 1 Story Apartment / Condo  Do You Take your Prescribed Medications Regularly: Yes  Able to Return to Previous ADL's: Yes  Mobility Issues: No  Prior Services: None  Assistance Needed: Unknown at this Time  Durable Medical Equipment: Home Oxygen  DME Provider / Phone: [employer]    Discharge Preparedness  What is your plan after discharge?: Home with help  What are your discharge supports?: Spouse, Sibling  Prior Functional Level: Independent with Activities of Daily Living, Independent with Medication Management    Functional Assesment  Prior Functional Level: Independent with Activities of Daily Living, Independent with Medication Management    Finances  Financial Barriers to Discharge: No  Prescription Coverage: Yes         Values / Beliefs / Concerns  Values / Beliefs Concerns : No    Advance Directive  Advance Directive?: None  Advance Directive offered?: AD Booklet refused    Domestic Abuse  Physical Abuse or Sexual Abuse: No  Verbal Abuse or Emotional Abuse: No  Possible Abuse  Reported to:: Not Applicable              Anticipated Discharge Information  Anticipated discharge disposition: Home  Discharge Address: [address], [address]  Discharge Contact Phone Number: [phone]

## 2019-08-29 NOTE — RESPIRATORY CARE
COPD EDUCATION by COPD CLINICAL EDUCATOR  8/29/2019  at  8:45 AM by Giuliano Vargas     Patient interviewed by COPD education team.  Patient unable to participate in full program.  Short intervention has been conducted.  Spacer instruct done.  A comprehensive packet including information about COPD, treatments, and smoking cessation given.  Pt. States she will call with questions or decides to participate in COPD program or pulmonary rehab.

## 2019-08-29 NOTE — PROGRESS NOTES
2 RN skin check complete.   Devices in place:  Montanez catheter, hemovac.  Skin assessed under devices:  SCDs, and socks.  No pressure ulcers noted.  Bruising, especially on 3rd toe of right foot.    The following interventions in place:  q2 turns if pt not turning due to pain.  PCA in use for pain management.

## 2019-08-29 NOTE — CARE PLAN
Problem: Safety  Goal: Will remain free from falls  Outcome: PROGRESSING AS EXPECTED  Intervention: Implement fall precautions  Note:   Call light w/in reach. Instructed pt to call for assistance before getting OOB- pt verbalized understanding.      Problem: Knowledge Deficit  Goal: Knowledge of the prescribed therapeutic regimen will improve  Outcome: PROGRESSING AS EXPECTED  Intervention: Discuss information regarding therpeutic regimen and document in education  Note:   Poc discussed. Pt verbalized understanding.

## 2019-08-30 ENCOUNTER — APPOINTMENT (OUTPATIENT)
Dept: RADIOLOGY | Facility: MEDICAL CENTER | Age: 79
DRG: 459 | End: 2019-08-30
Attending: INTERNAL MEDICINE
Payer: MEDICARE

## 2019-08-30 PROBLEM — N17.9 ACUTE KIDNEY INJURY (HCC): Status: ACTIVE | Noted: 2019-08-30

## 2019-08-30 PROBLEM — Z98.890 POSTOPERATIVE HYPOXIA: Status: ACTIVE | Noted: 2019-08-30

## 2019-08-30 PROBLEM — J18.9 PNEUMONIA: Status: ACTIVE | Noted: 2019-08-30

## 2019-08-30 PROBLEM — J81.1 PULMONARY EDEMA: Status: ACTIVE | Noted: 2019-08-30

## 2019-08-30 PROBLEM — R09.02 POSTOPERATIVE HYPOXIA: Status: ACTIVE | Noted: 2019-08-30

## 2019-08-30 PROBLEM — E87.5 HYPERKALEMIA: Status: ACTIVE | Noted: 2019-08-30

## 2019-08-30 PROBLEM — I50.23 ACUTE ON CHRONIC SYSTOLIC (CONGESTIVE) HEART FAILURE (HCC): Status: ACTIVE | Noted: 2019-08-30

## 2019-08-30 LAB
ANION GAP SERPL CALC-SCNC: 5 MMOL/L (ref 0–11.9)
ANION GAP SERPL CALC-SCNC: 8 MMOL/L (ref 0–11.9)
ANISOCYTOSIS BLD QL SMEAR: ABNORMAL
BASOPHILS # BLD AUTO: 0.3 % (ref 0–1.8)
BASOPHILS # BLD: 0.04 K/UL (ref 0–0.12)
BUN SERPL-MCNC: 28 MG/DL (ref 8–22)
BUN SERPL-MCNC: 36 MG/DL (ref 8–22)
CALCIUM SERPL-MCNC: 8.6 MG/DL (ref 8.5–10.5)
CALCIUM SERPL-MCNC: 8.7 MG/DL (ref 8.5–10.5)
CHLORIDE SERPL-SCNC: 102 MMOL/L (ref 96–112)
CHLORIDE SERPL-SCNC: 99 MMOL/L (ref 96–112)
CO2 SERPL-SCNC: 23 MMOL/L (ref 20–33)
CO2 SERPL-SCNC: 25 MMOL/L (ref 20–33)
COMMENT 1642: NORMAL
CREAT SERPL-MCNC: 1.37 MG/DL (ref 0.5–1.4)
CREAT SERPL-MCNC: 1.86 MG/DL (ref 0.5–1.4)
D DIMER PPP IA.FEU-MCNC: 2.07 UG/ML (FEU) (ref 0–0.5)
EOSINOPHIL # BLD AUTO: 0.07 K/UL (ref 0–0.51)
EOSINOPHIL NFR BLD: 0.5 % (ref 0–6.9)
ERYTHROCYTE [DISTWIDTH] IN BLOOD BY AUTOMATED COUNT: 63.3 FL (ref 35.9–50)
ERYTHROCYTE [DISTWIDTH] IN BLOOD BY AUTOMATED COUNT: 64.1 FL (ref 35.9–50)
GLUCOSE BLD-MCNC: 128 MG/DL (ref 65–99)
GLUCOSE BLD-MCNC: 143 MG/DL (ref 65–99)
GLUCOSE BLD-MCNC: 152 MG/DL (ref 65–99)
GLUCOSE SERPL-MCNC: 158 MG/DL (ref 65–99)
GLUCOSE SERPL-MCNC: 163 MG/DL (ref 65–99)
HCT VFR BLD AUTO: 30.8 % (ref 37–47)
HCT VFR BLD AUTO: 31.5 % (ref 37–47)
HGB BLD-MCNC: 9 G/DL (ref 12–16)
HGB BLD-MCNC: 9.4 G/DL (ref 12–16)
IMM GRANULOCYTES # BLD AUTO: 0.05 K/UL (ref 0–0.11)
IMM GRANULOCYTES NFR BLD AUTO: 0.4 % (ref 0–0.9)
LYMPHOCYTES # BLD AUTO: 1.03 K/UL (ref 1–4.8)
LYMPHOCYTES NFR BLD: 7.8 % (ref 22–41)
MACROCYTES BLD QL SMEAR: ABNORMAL
MCH RBC QN AUTO: 28.8 PG (ref 27–33)
MCH RBC QN AUTO: 29.1 PG (ref 27–33)
MCHC RBC AUTO-ENTMCNC: 29.2 G/DL (ref 33.6–35)
MCHC RBC AUTO-ENTMCNC: 29.8 G/DL (ref 33.6–35)
MCV RBC AUTO: 97.5 FL (ref 81.4–97.8)
MCV RBC AUTO: 98.7 FL (ref 81.4–97.8)
MONOCYTES # BLD AUTO: 1.29 K/UL (ref 0–0.85)
MONOCYTES NFR BLD AUTO: 9.7 % (ref 0–13.4)
MORPHOLOGY BLD-IMP: NORMAL
NEUTROPHILS # BLD AUTO: 10.78 K/UL (ref 2–7.15)
NEUTROPHILS NFR BLD: 81.3 % (ref 44–72)
NRBC # BLD AUTO: 0 K/UL
NRBC BLD-RTO: 0 /100 WBC
NT-PROBNP SERPL IA-MCNC: 725 PG/ML (ref 0–125)
PLATELET # BLD AUTO: 162 K/UL (ref 164–446)
PLATELET # BLD AUTO: 169 K/UL (ref 164–446)
PLATELET BLD QL SMEAR: NORMAL
PMV BLD AUTO: 10.8 FL (ref 9–12.9)
PMV BLD AUTO: 10.9 FL (ref 9–12.9)
POLYCHROMASIA BLD QL SMEAR: NORMAL
POTASSIUM SERPL-SCNC: 5.5 MMOL/L (ref 3.6–5.5)
POTASSIUM SERPL-SCNC: 6.1 MMOL/L (ref 3.6–5.5)
PROCALCITONIN SERPL-MCNC: 1.29 NG/ML
PROCALCITONIN SERPL-MCNC: 1.39 NG/ML
RBC # BLD AUTO: 3.12 M/UL (ref 4.2–5.4)
RBC # BLD AUTO: 3.23 M/UL (ref 4.2–5.4)
RBC BLD AUTO: PRESENT
SODIUM SERPL-SCNC: 130 MMOL/L (ref 135–145)
SODIUM SERPL-SCNC: 132 MMOL/L (ref 135–145)
TROPONIN T SERPL-MCNC: 27 NG/L (ref 6–19)
WBC # BLD AUTO: 11 K/UL (ref 4.8–10.8)
WBC # BLD AUTO: 13.3 K/UL (ref 4.8–10.8)

## 2019-08-30 PROCEDURE — 97165 OT EVAL LOW COMPLEX 30 MIN: CPT

## 2019-08-30 PROCEDURE — 700105 HCHG RX REV CODE 258: Performed by: INTERNAL MEDICINE

## 2019-08-30 PROCEDURE — 700112 HCHG RX REV CODE 229: Performed by: NURSE PRACTITIONER

## 2019-08-30 PROCEDURE — 85379 FIBRIN DEGRADATION QUANT: CPT

## 2019-08-30 PROCEDURE — 84100 ASSAY OF PHOSPHORUS: CPT

## 2019-08-30 PROCEDURE — 700102 HCHG RX REV CODE 250 W/ 637 OVERRIDE(OP): Performed by: INTERNAL MEDICINE

## 2019-08-30 PROCEDURE — 80053 COMPREHEN METABOLIC PANEL: CPT

## 2019-08-30 PROCEDURE — 700102 HCHG RX REV CODE 250 W/ 637 OVERRIDE(OP): Performed by: PHYSICIAN ASSISTANT

## 2019-08-30 PROCEDURE — 93010 ELECTROCARDIOGRAM REPORT: CPT | Performed by: INTERNAL MEDICINE

## 2019-08-30 PROCEDURE — 700111 HCHG RX REV CODE 636 W/ 250 OVERRIDE (IP): Performed by: INTERNAL MEDICINE

## 2019-08-30 PROCEDURE — 82962 GLUCOSE BLOOD TEST: CPT

## 2019-08-30 PROCEDURE — 74176 CT ABD & PELVIS W/O CONTRAST: CPT

## 2019-08-30 PROCEDURE — 83880 ASSAY OF NATRIURETIC PEPTIDE: CPT

## 2019-08-30 PROCEDURE — 84484 ASSAY OF TROPONIN QUANT: CPT | Mod: 91

## 2019-08-30 PROCEDURE — 85027 COMPLETE CBC AUTOMATED: CPT

## 2019-08-30 PROCEDURE — A9270 NON-COVERED ITEM OR SERVICE: HCPCS | Performed by: NURSE PRACTITIONER

## 2019-08-30 PROCEDURE — 770020 HCHG ROOM/CARE - TELE (206)

## 2019-08-30 PROCEDURE — 700101 HCHG RX REV CODE 250: Performed by: INTERNAL MEDICINE

## 2019-08-30 PROCEDURE — 700111 HCHG RX REV CODE 636 W/ 250 OVERRIDE (IP): Performed by: NURSE PRACTITIONER

## 2019-08-30 PROCEDURE — 700102 HCHG RX REV CODE 250 W/ 637 OVERRIDE(OP): Performed by: NURSE PRACTITIONER

## 2019-08-30 PROCEDURE — 80048 BASIC METABOLIC PNL TOTAL CA: CPT

## 2019-08-30 PROCEDURE — A9270 NON-COVERED ITEM OR SERVICE: HCPCS | Performed by: INTERNAL MEDICINE

## 2019-08-30 PROCEDURE — 93005 ELECTROCARDIOGRAM TRACING: CPT | Performed by: CLINICAL NURSE SPECIALIST

## 2019-08-30 PROCEDURE — 71045 X-RAY EXAM CHEST 1 VIEW: CPT

## 2019-08-30 PROCEDURE — 85025 COMPLETE CBC W/AUTO DIFF WBC: CPT | Mod: 91

## 2019-08-30 PROCEDURE — 84145 PROCALCITONIN (PCT): CPT | Mod: 91

## 2019-08-30 PROCEDURE — 99223 1ST HOSP IP/OBS HIGH 75: CPT | Performed by: INTERNAL MEDICINE

## 2019-08-30 PROCEDURE — A9270 NON-COVERED ITEM OR SERVICE: HCPCS | Performed by: PHYSICIAN ASSISTANT

## 2019-08-30 PROCEDURE — 97530 THERAPEUTIC ACTIVITIES: CPT

## 2019-08-30 RX ORDER — DOXYCYCLINE 100 MG/1
100 TABLET ORAL EVERY 12 HOURS
Status: COMPLETED | OUTPATIENT
Start: 2019-08-30 | End: 2019-09-04

## 2019-08-30 RX ORDER — SODIUM POLYSTYRENE SULFONATE 15 G/60ML
30 SUSPENSION ORAL; RECTAL ONCE
Status: COMPLETED | OUTPATIENT
Start: 2019-08-30 | End: 2019-08-30

## 2019-08-30 RX ORDER — OXYCODONE HYDROCHLORIDE 5 MG/1
5-10 TABLET ORAL EVERY 4 HOURS PRN
Status: DISCONTINUED | OUTPATIENT
Start: 2019-08-30 | End: 2019-08-31

## 2019-08-30 RX ORDER — LACTOBACILLUS RHAMNOSUS GG 10B CELL
1 CAPSULE ORAL
Status: DISCONTINUED | OUTPATIENT
Start: 2019-08-31 | End: 2019-09-06 | Stop reason: HOSPADM

## 2019-08-30 RX ORDER — LACTOBACILLUS RHAMNOSUS GG 10B CELL
1 CAPSULE ORAL
Status: DISCONTINUED | OUTPATIENT
Start: 2019-08-31 | End: 2019-08-30

## 2019-08-30 RX ADMIN — METRONIDAZOLE 500 MG: 500 INJECTION, SOLUTION INTRAVENOUS at 21:10

## 2019-08-30 RX ADMIN — DRONEDARONE 400 MG: 400 TABLET, FILM COATED ORAL at 09:39

## 2019-08-30 RX ADMIN — DOCUSATE SODIUM 100 MG: 100 CAPSULE, LIQUID FILLED ORAL at 04:18

## 2019-08-30 RX ADMIN — SIMVASTATIN 40 MG: 20 TABLET, FILM COATED ORAL at 04:18

## 2019-08-30 RX ADMIN — SODIUM POLYSTYRENE SULFONATE 30 G: 15 SUSPENSION ORAL; RECTAL at 22:48

## 2019-08-30 RX ADMIN — LEVOTHYROXINE SODIUM 75 MCG: 75 TABLET ORAL at 04:18

## 2019-08-30 RX ADMIN — TIZANIDINE 4 MG: 4 TABLET ORAL at 04:19

## 2019-08-30 RX ADMIN — ANTACID TABLETS 500 MG: 500 TABLET, CHEWABLE ORAL at 04:19

## 2019-08-30 RX ADMIN — OMEPRAZOLE 40 MG: 20 CAPSULE, DELAYED RELEASE ORAL at 04:18

## 2019-08-30 RX ADMIN — OXYCODONE HYDROCHLORIDE 10 MG: 5 TABLET ORAL at 12:10

## 2019-08-30 RX ADMIN — LISINOPRIL 20 MG: 20 TABLET ORAL at 04:18

## 2019-08-30 RX ADMIN — GABAPENTIN 300 MG: 300 CAPSULE ORAL at 04:18

## 2019-08-30 RX ADMIN — OXYCODONE HYDROCHLORIDE 5 MG: 5 TABLET ORAL at 21:17

## 2019-08-30 RX ADMIN — DOXYCYCLINE 100 MG: 100 TABLET, FILM COATED ORAL at 21:08

## 2019-08-30 RX ADMIN — OXYCODONE HYDROCHLORIDE 10 MG: 10 TABLET ORAL at 00:39

## 2019-08-30 RX ADMIN — BUDESONIDE AND FORMOTEROL FUMARATE DIHYDRATE 2 PUFF: 160; 4.5 AEROSOL RESPIRATORY (INHALATION) at 21:09

## 2019-08-30 RX ADMIN — SENNOSIDES, DOCUSATE SODIUM 1 TABLET: 50; 8.6 TABLET, FILM COATED ORAL at 21:09

## 2019-08-30 RX ADMIN — CALCIUM GLUCONATE 1000 MG: 98 INJECTION, SOLUTION INTRAVENOUS at 23:55

## 2019-08-30 RX ADMIN — DEXTROSE MONOHYDRATE 250 ML: 100 INJECTION, SOLUTION INTRAVENOUS at 20:47

## 2019-08-30 RX ADMIN — TRAZODONE HYDROCHLORIDE 100 MG: 100 TABLET ORAL at 21:10

## 2019-08-30 RX ADMIN — CEFTRIAXONE SODIUM 2 G: 2 INJECTION, POWDER, FOR SOLUTION INTRAMUSCULAR; INTRAVENOUS at 22:35

## 2019-08-30 RX ADMIN — ONDANSETRON 4 MG: 2 INJECTION INTRAMUSCULAR; INTRAVENOUS at 12:39

## 2019-08-30 RX ADMIN — METFORMIN HYDROCHLORIDE 1000 MG: 500 TABLET, FILM COATED ORAL at 09:39

## 2019-08-30 RX ADMIN — BUDESONIDE AND FORMOTEROL FUMARATE DIHYDRATE 2 PUFF: 160; 4.5 AEROSOL RESPIRATORY (INHALATION) at 04:19

## 2019-08-30 RX ADMIN — OXYCODONE HYDROCHLORIDE 10 MG: 10 TABLET ORAL at 04:19

## 2019-08-30 RX ADMIN — TIZANIDINE 4 MG: 4 TABLET ORAL at 21:42

## 2019-08-30 ASSESSMENT — COGNITIVE AND FUNCTIONAL STATUS - GENERAL
PERSONAL GROOMING: A LITTLE
HELP NEEDED FOR BATHING: A LOT
DRESSING REGULAR LOWER BODY CLOTHING: A LOT
EATING MEALS: A LITTLE
DAILY ACTIVITIY SCORE: 15
WALKING IN HOSPITAL ROOM: A LOT
DRESSING REGULAR UPPER BODY CLOTHING: A LITTLE
MOBILITY SCORE: 9
TURNING FROM BACK TO SIDE WHILE IN FLAT BAD: UNABLE
STANDING UP FROM CHAIR USING ARMS: A LITTLE
SUGGESTED CMS G CODE MODIFIER MOBILITY: CM
SUGGESTED CMS G CODE MODIFIER DAILY ACTIVITY: CK
MOVING TO AND FROM BED TO CHAIR: UNABLE
MOVING FROM LYING ON BACK TO SITTING ON SIDE OF FLAT BED: UNABLE
TOILETING: A LOT
CLIMB 3 TO 5 STEPS WITH RAILING: TOTAL

## 2019-08-30 ASSESSMENT — GAIT ASSESSMENTS
DISTANCE (FEET): 3
GAIT LEVEL OF ASSIST: MINIMAL ASSIST
ASSISTIVE DEVICE: FRONT WHEEL WALKER

## 2019-08-30 ASSESSMENT — PATIENT HEALTH QUESTIONNAIRE - PHQ9
1. LITTLE INTEREST OR PLEASURE IN DOING THINGS: NOT AT ALL
SUM OF ALL RESPONSES TO PHQ9 QUESTIONS 1 AND 2: 0
2. FEELING DOWN, DEPRESSED, IRRITABLE, OR HOPELESS: NOT AT ALL

## 2019-08-30 ASSESSMENT — ACTIVITIES OF DAILY LIVING (ADL): TOILETING: INDEPENDENT

## 2019-08-30 NOTE — THERAPY
"Physical Therapy Treatment completed.   Bed Mobility:  Supine to Sit: Moderate Assist  Transfers: Sit to Stand: Minimal Assist  Gait: Level Of Assist: Minimal Assist with Front-Wheel Walker       Plan of Care: Will benefit from Physical Therapy 5 times per week  Discharge Recommendations: Equipment: Will Continue to Assess for Equipment Needs. Post-acute therapy Recommend post-acute placement for continued physical therapy services prior to discharge home. Patient can tolerate post-acute therapies at a 5x/week frequency.       See \"Rehab Therapy-Acute\" Patient Summary Report for complete documentation.     Pt presenting w/ decreased functional mobility from initial eval. Pt not recalling any spinal precautions or log roll. Pt falling asleep mid sentence while talking to therapist yet w/ high c/o pain. Pt requiring a lot of time to perform all mobility. She needed ModA to perform bed mobility and a lot of cues to sequence. Pt then w/ very delayed initiation for standing and gait attempts. Pt needing FWW management as she could not coordinate moving forward. Pt buckling when she got BTB and needed therapist to maintain balance. Pt currently is not at a level in which she will be able to DC home. Pt continues to be at a level in which she will require post acute therapy.  "

## 2019-08-30 NOTE — CARE PLAN
Problem: Communication  Goal: The ability to communicate needs accurately and effectively will improve  Outcome: PROGRESSING AS EXPECTED     Problem: Pain Management  Goal: Pain level will decrease to patient's comfort goal  Outcome: PROGRESSING SLOWER THAN EXPECTED

## 2019-08-30 NOTE — PROGRESS NOTES
Neurosurgery Progress Note    Subjective:  Pt having difficulty voiding. Reports back pain, legs ache, but this does not feel as bad as it did preoperatively.     Exam:  AAO, cooperative, fc's, rivas's strong. Dressing c&d    BP  Min: 108/37  Max: 125/49  Pulse  Av.3  Min: 62  Max: 91  Resp  Av.8  Min: 17  Max: 18  Temp  Av °C (98.6 °F)  Min: 36.8 °C (98.2 °F)  Max: 37.2 °C (99 °F)  SpO2  Av %  Min: 91 %  Max: 98 %    No data recorded    Recent Labs     193   WBC 10.0 11.0*   RBC 3.33* 3.23*   HEMOGLOBIN 9.7* 9.4*   HEMATOCRIT 31.8* 31.5*   MCV 95.5 97.5   MCH 29.1 29.1   MCHC 30.5* 29.8*   RDW 61.2* 63.3*   PLATELETCT 171 162*   MPV 10.8 10.8     Recent Labs     195 19   SODIUM 134* 132*   POTASSIUM 5.9* 5.5   CHLORIDE 103 102   CO2 25 25   GLUCOSE 181* 163*   BUN 25* 28*   CREATININE 1.42* 1.37   CALCIUM 8.8 8.7               Intake/Output       19 0700 - 19 0659 19 07 - 19 0659      6220-7797 2558-6946 Total  3709-9299 Total       Intake    P.O.  1240  300 1540  --  -- --    P.O. 5348 241 4649 -- -- --    I.V.  21.9  -- 21.9  --  -- --    PCA End of Shift Total Volume (ml) 21.9 -- 21.9 -- -- --    Total Intake 1261.9 300 1561.9 -- -- --       Output    Urine  --  -- --  --  -- --    Number of Times Voided 1 x 1 x 2 x -- -- --    Drains  50  5 55  --  -- --    Output (mL) ([REMOVED] Closed/Suction Drain 1 Posterior Back Hemovac) 50 5 55 -- -- --    Total Output 50 5 55 -- -- --       Net I/O     1211.9 295 1506.9 -- -- --            Intake/Output Summary (Last 24 hours) at 2019 0832  Last data filed at 2019 0035  Gross per 24 hour   Intake 1040 ml   Output 55 ml   Net 985 ml       $ Bladder Scan Results (mL): 63    • HYDROmorphone  2-4 mg Q3HRS PRN   • HYDROmorphone  0.5 mg Q3HRS PRN   • oxyCODONE immediate-release  10 mg Q3HRS PRN   • tizanidine  4 mg Q8HRS   • diphenhydrAMINE  25 mg Q6HRS PRN    Or   •  diphenhydrAMINE  25 mg Q6HRS PRN   • albuterol  1 Puff Q6HRS PRN   • budesonide-formoterol  2 Puff BID   • dronedarone  400 mg BID WITH MEALS   • gabapentin  300 mg BID   • levothyroxine  75 mcg AM ES   • lisinopril  20 mg DAILY   • metformin  1,000 mg BID WITH MEALS   • omeprazole  40 mg DAILY   • simvastatin  40 mg DAILY   • traZODone  100 mg Nightly   • MD ALERT...DO NOT ADMINISTER NSAIDS or ASPIRIN unless ORDERED By Neurosurgery  1 Each PRN   • docusate sodium  100 mg BID   • senna-docusate  1 Tab Nightly   • senna-docusate  1 Tab Q24HRS PRN   • polyethylene glycol/lytes  1 Packet BID PRN   • magnesium hydroxide  30 mL QDAY PRN   • bisacodyl  10 mg Q24HRS PRN   • fleet  1 Each Once PRN   • Respiratory Care per Protocol   Continuous RT   • HYDROmorphone   Continuous   • ondansetron  4 mg Q4HRS PRN   • ondansetron  4 mg Q4HRS PRN   • hydrALAZINE  10 mg Q HOUR PRN   • benzocaine-menthol  1 Lozenge Q2HRS PRN   • calcium carbonate  500 mg BID       Assessment and Plan:  POD #2 L4 - sacral TLIF  NM as above  Prophylactic anticoagulation: no         Start date/time: no need  Pt to mobilize today with therapies  Continue with oral meds & relaxants.   Check pvr - & probably insert indwelling cath

## 2019-08-30 NOTE — PROGRESS NOTES
Pt still c/o severe pain after IV Dilaudid given. PO Dilaudid then given with poor results. Ice applied. Starr LUZ. New orders received. Pt updated. IV Dilaudid given again. Will give Oxy with next dose of pain meds

## 2019-08-30 NOTE — THERAPY
"Occupational Therapy Evaluation completed.   Functional Status: Pt is a 78 y/o female admitted for elective lumbar spine surgery. Pleasant and cooperative however focused on pain and being unable to urinate. She needed mod cueing for functional mobility and walker safety. Bob functional mobility with FWW. Bob sit<>Stand. ModA toilet txf. MaxA LB dressing. MaxA to don/doff LSC. She is limited by weakness, fatigue, impaired balance, and impaired safety awareness which impacts independence in self care and functional mobility.  Plan of Care: Will benefit from Occupational Therapy 4 times per week  Discharge Recommendations:  Equipment: Will Continue to Assess for Equipment Needs. Recommend post-acute placement for additional occupational therapy services prior to discharge home. Patient can tolerate post-acute therapies at a 5x/week frequency.      See \"Rehab Therapy-Acute\" Patient Summary Report for complete documentation.    "

## 2019-08-31 PROBLEM — D64.9 ANEMIA: Status: ACTIVE | Noted: 2019-08-31

## 2019-08-31 PROBLEM — I95.9 HYPOTENSION: Status: ACTIVE | Noted: 2019-08-31

## 2019-08-31 LAB
ABO GROUP BLD: NORMAL
ALBUMIN SERPL BCP-MCNC: 3 G/DL (ref 3.2–4.9)
ALBUMIN/GLOB SERPL: 1.1 G/DL
ALP SERPL-CCNC: 59 U/L (ref 30–99)
ALT SERPL-CCNC: 9 U/L (ref 2–50)
ANION GAP SERPL CALC-SCNC: 6 MMOL/L (ref 0–11.9)
ANION GAP SERPL CALC-SCNC: 8 MMOL/L (ref 0–11.9)
APPEARANCE UR: CLEAR
AST SERPL-CCNC: 26 U/L (ref 12–45)
BACTERIA #/AREA URNS HPF: NEGATIVE /HPF
BARCODED ABORH UBTYP: 6200
BARCODED PRD CODE UBPRD: NORMAL
BARCODED UNIT NUM UBUNT: NORMAL
BASE EXCESS BLDA CALC-SCNC: -5 MMOL/L (ref -4–3)
BASOPHILS # BLD AUTO: 0.3 % (ref 0–1.8)
BASOPHILS # BLD AUTO: 0.3 % (ref 0–1.8)
BASOPHILS # BLD: 0.03 K/UL (ref 0–0.12)
BASOPHILS # BLD: 0.03 K/UL (ref 0–0.12)
BILIRUB SERPL-MCNC: 0.6 MG/DL (ref 0.1–1.5)
BILIRUB UR QL STRIP.AUTO: NEGATIVE
BLD GP AB SCN SERPL QL: NORMAL
BODY TEMPERATURE: 36 CENTIGRADE
BUN SERPL-MCNC: 37 MG/DL (ref 8–22)
BUN SERPL-MCNC: 37 MG/DL (ref 8–22)
CALCIUM SERPL-MCNC: 8.4 MG/DL (ref 8.5–10.5)
CALCIUM SERPL-MCNC: 8.7 MG/DL (ref 8.5–10.5)
CHLORIDE SERPL-SCNC: 101 MMOL/L (ref 96–112)
CHLORIDE SERPL-SCNC: 99 MMOL/L (ref 96–112)
CO2 SERPL-SCNC: 24 MMOL/L (ref 20–33)
CO2 SERPL-SCNC: 25 MMOL/L (ref 20–33)
COLOR UR: YELLOW
COMPONENT R 8504R: NORMAL
CORTIS SERPL-MCNC: 13.2 UG/DL (ref 0–23)
CREAT SERPL-MCNC: 1.65 MG/DL (ref 0.5–1.4)
CREAT SERPL-MCNC: 1.69 MG/DL (ref 0.5–1.4)
EKG IMPRESSION: NORMAL
EOSINOPHIL # BLD AUTO: 0.06 K/UL (ref 0–0.51)
EOSINOPHIL # BLD AUTO: 0.09 K/UL (ref 0–0.51)
EOSINOPHIL NFR BLD: 0.6 % (ref 0–6.9)
EOSINOPHIL NFR BLD: 0.8 % (ref 0–6.9)
EPI CELLS #/AREA URNS HPF: NEGATIVE /HPF
ERYTHROCYTE [DISTWIDTH] IN BLOOD BY AUTOMATED COUNT: 63.2 FL (ref 35.9–50)
ERYTHROCYTE [DISTWIDTH] IN BLOOD BY AUTOMATED COUNT: 63.7 FL (ref 35.9–50)
GLOBULIN SER CALC-MCNC: 2.7 G/DL (ref 1.9–3.5)
GLUCOSE BLD-MCNC: 140 MG/DL (ref 65–99)
GLUCOSE BLD-MCNC: 147 MG/DL (ref 65–99)
GLUCOSE BLD-MCNC: 152 MG/DL (ref 65–99)
GLUCOSE BLD-MCNC: 152 MG/DL (ref 65–99)
GLUCOSE BLD-MCNC: 198 MG/DL (ref 65–99)
GLUCOSE SERPL-MCNC: 137 MG/DL (ref 65–99)
GLUCOSE SERPL-MCNC: 155 MG/DL (ref 65–99)
GLUCOSE UR STRIP.AUTO-MCNC: NEGATIVE MG/DL
HCO3 BLDA-SCNC: 20 MMOL/L (ref 17–25)
HCT VFR BLD AUTO: 28.2 % (ref 37–47)
HCT VFR BLD AUTO: 28.9 % (ref 37–47)
HGB BLD-MCNC: 7.7 G/DL (ref 12–16)
HGB BLD-MCNC: 8.1 G/DL (ref 12–16)
HGB BLD-MCNC: 8.3 G/DL (ref 12–16)
HGB BLD-MCNC: 8.6 G/DL (ref 12–16)
HYALINE CASTS #/AREA URNS LPF: ABNORMAL /LPF
IMM GRANULOCYTES # BLD AUTO: 0.05 K/UL (ref 0–0.11)
IMM GRANULOCYTES # BLD AUTO: 0.05 K/UL (ref 0–0.11)
IMM GRANULOCYTES NFR BLD AUTO: 0.5 % (ref 0–0.9)
IMM GRANULOCYTES NFR BLD AUTO: 0.5 % (ref 0–0.9)
INHALED O2 FLOW RATE: 4 L/MIN (ref 2–10)
KETONES UR STRIP.AUTO-MCNC: NEGATIVE MG/DL
LACTATE BLD-SCNC: 0.5 MMOL/L (ref 0.5–2)
LEUKOCYTE ESTERASE UR QL STRIP.AUTO: ABNORMAL
LYMPHOCYTES # BLD AUTO: 1.02 K/UL (ref 1–4.8)
LYMPHOCYTES # BLD AUTO: 1.03 K/UL (ref 1–4.8)
LYMPHOCYTES NFR BLD: 10.2 % (ref 22–41)
LYMPHOCYTES NFR BLD: 9.5 % (ref 22–41)
MCH RBC QN AUTO: 28.5 PG (ref 27–33)
MCH RBC QN AUTO: 29.2 PG (ref 27–33)
MCHC RBC AUTO-ENTMCNC: 29.4 G/DL (ref 33.6–35)
MCHC RBC AUTO-ENTMCNC: 29.8 G/DL (ref 33.6–35)
MCV RBC AUTO: 96.9 FL (ref 81.4–97.8)
MCV RBC AUTO: 98 FL (ref 81.4–97.8)
MICRO URNS: ABNORMAL
MONOCYTES # BLD AUTO: 0.95 K/UL (ref 0–0.85)
MONOCYTES # BLD AUTO: 1.04 K/UL (ref 0–0.85)
MONOCYTES NFR BLD AUTO: 9.5 % (ref 0–13.4)
MONOCYTES NFR BLD AUTO: 9.6 % (ref 0–13.4)
NEUTROPHILS # BLD AUTO: 7.91 K/UL (ref 2–7.15)
NEUTROPHILS # BLD AUTO: 8.62 K/UL (ref 2–7.15)
NEUTROPHILS NFR BLD: 78.9 % (ref 44–72)
NEUTROPHILS NFR BLD: 79.3 % (ref 44–72)
NITRITE UR QL STRIP.AUTO: NEGATIVE
NRBC # BLD AUTO: 0 K/UL
NRBC # BLD AUTO: 0 K/UL
NRBC BLD-RTO: 0 /100 WBC
NRBC BLD-RTO: 0 /100 WBC
PCO2 BLDA: 36.9 MMHG (ref 26–37)
PCO2 TEMP ADJ BLDA: 35.3 MMHG (ref 26–37)
PH BLDA: 7.36 [PH] (ref 7.4–7.5)
PH TEMP ADJ BLDA: 7.37 [PH] (ref 7.4–7.5)
PH UR STRIP.AUTO: 6 [PH] (ref 5–8)
PHOSPHATE SERPL-MCNC: 4.6 MG/DL (ref 2.5–4.5)
PLATELET # BLD AUTO: 161 K/UL (ref 164–446)
PLATELET # BLD AUTO: 165 K/UL (ref 164–446)
PMV BLD AUTO: 10.7 FL (ref 9–12.9)
PMV BLD AUTO: 10.9 FL (ref 9–12.9)
PO2 BLDA: 69.4 MMHG (ref 64–87)
PO2 TEMP ADJ BLDA: 64.8 MMHG (ref 64–87)
POTASSIUM SERPL-SCNC: 5.2 MMOL/L (ref 3.6–5.5)
POTASSIUM SERPL-SCNC: 5.6 MMOL/L (ref 3.6–5.5)
PRODUCT TYPE UPROD: NORMAL
PROT SERPL-MCNC: 5.7 G/DL (ref 6–8.2)
PROT UR QL STRIP: NEGATIVE MG/DL
RBC # BLD AUTO: 2.91 M/UL (ref 4.2–5.4)
RBC # BLD AUTO: 2.95 M/UL (ref 4.2–5.4)
RBC # URNS HPF: ABNORMAL /HPF
RBC UR QL AUTO: ABNORMAL
RH BLD: NORMAL
SAO2 % BLDA: 92.4 % (ref 93–99)
SODIUM SERPL-SCNC: 130 MMOL/L (ref 135–145)
SODIUM SERPL-SCNC: 133 MMOL/L (ref 135–145)
SP GR UR STRIP.AUTO: 1.01
TROPONIN T SERPL-MCNC: 27 NG/L (ref 6–19)
TROPONIN T SERPL-MCNC: 28 NG/L (ref 6–19)
TSH SERPL DL<=0.005 MIU/L-ACNC: 2.6 UIU/ML (ref 0.38–5.33)
UNIT STATUS USTAT: NORMAL
UROBILINOGEN UR STRIP.AUTO-MCNC: 0.2 MG/DL
WBC # BLD AUTO: 10 K/UL (ref 4.8–10.8)
WBC # BLD AUTO: 10.9 K/UL (ref 4.8–10.8)
WBC #/AREA URNS HPF: ABNORMAL /HPF

## 2019-08-31 PROCEDURE — 700111 HCHG RX REV CODE 636 W/ 250 OVERRIDE (IP): Performed by: INTERNAL MEDICINE

## 2019-08-31 PROCEDURE — 86923 COMPATIBILITY TEST ELECTRIC: CPT

## 2019-08-31 PROCEDURE — 86901 BLOOD TYPING SEROLOGIC RH(D): CPT

## 2019-08-31 PROCEDURE — 81001 URINALYSIS AUTO W/SCOPE: CPT

## 2019-08-31 PROCEDURE — 82533 TOTAL CORTISOL: CPT

## 2019-08-31 PROCEDURE — 94640 AIRWAY INHALATION TREATMENT: CPT

## 2019-08-31 PROCEDURE — 700105 HCHG RX REV CODE 258: Performed by: INTERNAL MEDICINE

## 2019-08-31 PROCEDURE — 86900 BLOOD TYPING SEROLOGIC ABO: CPT

## 2019-08-31 PROCEDURE — A9270 NON-COVERED ITEM OR SERVICE: HCPCS | Performed by: PHYSICIAN ASSISTANT

## 2019-08-31 PROCEDURE — 700102 HCHG RX REV CODE 250 W/ 637 OVERRIDE(OP): Performed by: NURSE PRACTITIONER

## 2019-08-31 PROCEDURE — 770022 HCHG ROOM/CARE - ICU (200)

## 2019-08-31 PROCEDURE — A9270 NON-COVERED ITEM OR SERVICE: HCPCS | Performed by: HOSPITALIST

## 2019-08-31 PROCEDURE — 85018 HEMOGLOBIN: CPT | Mod: 91

## 2019-08-31 PROCEDURE — 700101 HCHG RX REV CODE 250: Performed by: INTERNAL MEDICINE

## 2019-08-31 PROCEDURE — 82962 GLUCOSE BLOOD TEST: CPT | Mod: 91

## 2019-08-31 PROCEDURE — 83605 ASSAY OF LACTIC ACID: CPT

## 2019-08-31 PROCEDURE — A9270 NON-COVERED ITEM OR SERVICE: HCPCS | Performed by: INTERNAL MEDICINE

## 2019-08-31 PROCEDURE — 80048 BASIC METABOLIC PNL TOTAL CA: CPT

## 2019-08-31 PROCEDURE — P9016 RBC LEUKOCYTES REDUCED: HCPCS

## 2019-08-31 PROCEDURE — A9270 NON-COVERED ITEM OR SERVICE: HCPCS | Performed by: NURSE PRACTITIONER

## 2019-08-31 PROCEDURE — 87040 BLOOD CULTURE FOR BACTERIA: CPT

## 2019-08-31 PROCEDURE — 85025 COMPLETE CBC W/AUTO DIFF WBC: CPT

## 2019-08-31 PROCEDURE — 700101 HCHG RX REV CODE 250: Performed by: NEUROLOGICAL SURGERY

## 2019-08-31 PROCEDURE — 700102 HCHG RX REV CODE 250 W/ 637 OVERRIDE(OP): Performed by: INTERNAL MEDICINE

## 2019-08-31 PROCEDURE — 84443 ASSAY THYROID STIM HORMONE: CPT

## 2019-08-31 PROCEDURE — 700111 HCHG RX REV CODE 636 W/ 250 OVERRIDE (IP): Performed by: HOSPITALIST

## 2019-08-31 PROCEDURE — 82803 BLOOD GASES ANY COMBINATION: CPT

## 2019-08-31 PROCEDURE — 700102 HCHG RX REV CODE 250 W/ 637 OVERRIDE(OP): Performed by: PHYSICIAN ASSISTANT

## 2019-08-31 PROCEDURE — 99292 CRITICAL CARE ADDL 30 MIN: CPT | Performed by: INTERNAL MEDICINE

## 2019-08-31 PROCEDURE — 700102 HCHG RX REV CODE 250 W/ 637 OVERRIDE(OP): Performed by: HOSPITALIST

## 2019-08-31 PROCEDURE — 94760 N-INVAS EAR/PLS OXIMETRY 1: CPT

## 2019-08-31 PROCEDURE — 700105 HCHG RX REV CODE 258: Performed by: HOSPITALIST

## 2019-08-31 PROCEDURE — 86850 RBC ANTIBODY SCREEN: CPT

## 2019-08-31 PROCEDURE — 36415 COLL VENOUS BLD VENIPUNCTURE: CPT

## 2019-08-31 PROCEDURE — 99233 SBSQ HOSP IP/OBS HIGH 50: CPT | Performed by: HOSPITALIST

## 2019-08-31 PROCEDURE — 99291 CRITICAL CARE FIRST HOUR: CPT | Performed by: INTERNAL MEDICINE

## 2019-08-31 PROCEDURE — 36430 TRANSFUSION BLD/BLD COMPNT: CPT

## 2019-08-31 PROCEDURE — 700112 HCHG RX REV CODE 229: Performed by: NURSE PRACTITIONER

## 2019-08-31 PROCEDURE — 84484 ASSAY OF TROPONIN QUANT: CPT

## 2019-08-31 PROCEDURE — 36600 WITHDRAWAL OF ARTERIAL BLOOD: CPT

## 2019-08-31 RX ORDER — ACETAMINOPHEN 325 MG/1
650 TABLET ORAL EVERY 4 HOURS PRN
Status: DISCONTINUED | OUTPATIENT
Start: 2019-08-31 | End: 2019-09-04

## 2019-08-31 RX ORDER — MORPHINE SULFATE 4 MG/ML
2 INJECTION, SOLUTION INTRAMUSCULAR; INTRAVENOUS EVERY 4 HOURS PRN
Status: DISCONTINUED | OUTPATIENT
Start: 2019-08-31 | End: 2019-09-03

## 2019-08-31 RX ORDER — IPRATROPIUM BROMIDE AND ALBUTEROL SULFATE 2.5; .5 MG/3ML; MG/3ML
3 SOLUTION RESPIRATORY (INHALATION)
Status: DISCONTINUED | OUTPATIENT
Start: 2019-08-31 | End: 2019-08-31 | Stop reason: ALTCHOICE

## 2019-08-31 RX ORDER — SODIUM CHLORIDE 9 MG/ML
500 INJECTION, SOLUTION INTRAVENOUS ONCE
Status: COMPLETED | OUTPATIENT
Start: 2019-08-31 | End: 2019-08-31

## 2019-08-31 RX ORDER — CYCLOBENZAPRINE HCL 10 MG
10 TABLET ORAL 3 TIMES DAILY PRN
Status: DISCONTINUED | OUTPATIENT
Start: 2019-08-31 | End: 2019-09-06 | Stop reason: HOSPADM

## 2019-08-31 RX ORDER — IPRATROPIUM BROMIDE AND ALBUTEROL SULFATE 2.5; .5 MG/3ML; MG/3ML
SOLUTION RESPIRATORY (INHALATION)
Status: DISCONTINUED
Start: 2019-08-31 | End: 2019-08-31 | Stop reason: ALTCHOICE

## 2019-08-31 RX ORDER — TRAMADOL HYDROCHLORIDE 50 MG/1
50 TABLET ORAL EVERY 6 HOURS PRN
Status: DISCONTINUED | OUTPATIENT
Start: 2019-08-31 | End: 2019-09-04

## 2019-08-31 RX ORDER — HYDROCODONE BITARTRATE AND ACETAMINOPHEN 5; 325 MG/1; MG/1
1-2 TABLET ORAL EVERY 6 HOURS PRN
Status: DISCONTINUED | OUTPATIENT
Start: 2019-08-31 | End: 2019-09-01

## 2019-08-31 RX ORDER — NALOXONE HYDROCHLORIDE 0.4 MG/ML
INJECTION, SOLUTION INTRAMUSCULAR; INTRAVENOUS; SUBCUTANEOUS
Status: ACTIVE
Start: 2019-08-31 | End: 2019-08-31

## 2019-08-31 RX ORDER — NALOXONE HYDROCHLORIDE 0.4 MG/ML
0.4 INJECTION, SOLUTION INTRAMUSCULAR; INTRAVENOUS; SUBCUTANEOUS ONCE
Status: COMPLETED | OUTPATIENT
Start: 2019-08-31 | End: 2019-08-31

## 2019-08-31 RX ORDER — SODIUM CHLORIDE, SODIUM LACTATE, POTASSIUM CHLORIDE, AND CALCIUM CHLORIDE .6; .31; .03; .02 G/100ML; G/100ML; G/100ML; G/100ML
1000 INJECTION, SOLUTION INTRAVENOUS ONCE
Status: COMPLETED | OUTPATIENT
Start: 2019-08-31 | End: 2019-08-31

## 2019-08-31 RX ADMIN — TRAMADOL HYDROCHLORIDE 50 MG: 50 TABLET ORAL at 09:53

## 2019-08-31 RX ADMIN — ACETAMINOPHEN 650 MG: 325 TABLET, FILM COATED ORAL at 15:09

## 2019-08-31 RX ADMIN — SODIUM CHLORIDE 500 ML: 9 INJECTION, SOLUTION INTRAVENOUS at 03:05

## 2019-08-31 RX ADMIN — TRAMADOL HYDROCHLORIDE 50 MG: 50 TABLET ORAL at 19:47

## 2019-08-31 RX ADMIN — ANTACID TABLETS 500 MG: 500 TABLET, CHEWABLE ORAL at 09:53

## 2019-08-31 RX ADMIN — OMEPRAZOLE 40 MG: 20 CAPSULE, DELAYED RELEASE ORAL at 09:54

## 2019-08-31 RX ADMIN — BUDESONIDE AND FORMOTEROL FUMARATE DIHYDRATE 2 PUFF: 160; 4.5 AEROSOL RESPIRATORY (INHALATION) at 17:37

## 2019-08-31 RX ADMIN — NALOXONE HYDROCHLORIDE 0.4 MG: 0.4 INJECTION, SOLUTION INTRAMUSCULAR; INTRAVENOUS; SUBCUTANEOUS at 03:35

## 2019-08-31 RX ADMIN — SIMVASTATIN 40 MG: 20 TABLET, FILM COATED ORAL at 09:54

## 2019-08-31 RX ADMIN — TRAZODONE HYDROCHLORIDE 100 MG: 100 TABLET ORAL at 19:34

## 2019-08-31 RX ADMIN — BUDESONIDE AND FORMOTEROL FUMARATE DIHYDRATE 2 PUFF: 160; 4.5 AEROSOL RESPIRATORY (INHALATION) at 06:07

## 2019-08-31 RX ADMIN — DOXYCYCLINE 100 MG: 100 TABLET, FILM COATED ORAL at 17:37

## 2019-08-31 RX ADMIN — SODIUM CHLORIDE, POTASSIUM CHLORIDE, SODIUM LACTATE AND CALCIUM CHLORIDE 1000 ML: 600; 310; 30; 20 INJECTION, SOLUTION INTRAVENOUS at 16:20

## 2019-08-31 RX ADMIN — METRONIDAZOLE 500 MG: 500 INJECTION, SOLUTION INTRAVENOUS at 15:10

## 2019-08-31 RX ADMIN — ANTACID TABLETS 500 MG: 500 TABLET, CHEWABLE ORAL at 19:35

## 2019-08-31 RX ADMIN — METRONIDAZOLE 500 MG: 500 INJECTION, SOLUTION INTRAVENOUS at 06:08

## 2019-08-31 RX ADMIN — DOCUSATE SODIUM 100 MG: 100 CAPSULE, LIQUID FILLED ORAL at 17:37

## 2019-08-31 RX ADMIN — GABAPENTIN 300 MG: 300 CAPSULE ORAL at 17:37

## 2019-08-31 RX ADMIN — IPRATROPIUM BROMIDE AND ALBUTEROL SULFATE 3 ML: .5; 3 SOLUTION RESPIRATORY (INHALATION) at 09:02

## 2019-08-31 RX ADMIN — GABAPENTIN 300 MG: 300 CAPSULE ORAL at 06:06

## 2019-08-31 RX ADMIN — TIZANIDINE 4 MG: 4 TABLET ORAL at 15:09

## 2019-08-31 RX ADMIN — INSULIN HUMAN 1 UNITS: 100 INJECTION, SOLUTION PARENTERAL at 20:59

## 2019-08-31 RX ADMIN — DRONEDARONE 400 MG: 400 TABLET, FILM COATED ORAL at 17:37

## 2019-08-31 RX ADMIN — DOXYCYCLINE 100 MG: 100 TABLET, FILM COATED ORAL at 09:54

## 2019-08-31 RX ADMIN — DRONEDARONE 400 MG: 400 TABLET, FILM COATED ORAL at 09:53

## 2019-08-31 RX ADMIN — LEVOTHYROXINE SODIUM 75 MCG: 75 TABLET ORAL at 09:54

## 2019-08-31 RX ADMIN — HYDROCORTISONE SODIUM SUCCINATE 50 MG: 100 INJECTION, POWDER, FOR SOLUTION INTRAMUSCULAR; INTRAVENOUS at 19:31

## 2019-08-31 RX ADMIN — SENNOSIDES, DOCUSATE SODIUM 1 TABLET: 50; 8.6 TABLET, FILM COATED ORAL at 19:31

## 2019-08-31 RX ADMIN — ACETAMINOPHEN 650 MG: 325 TABLET, FILM COATED ORAL at 09:32

## 2019-08-31 RX ADMIN — CEFTRIAXONE SODIUM 2 G: 2 INJECTION, POWDER, FOR SOLUTION INTRAMUSCULAR; INTRAVENOUS at 19:31

## 2019-08-31 ASSESSMENT — ENCOUNTER SYMPTOMS
DIZZINESS: 1
SHORTNESS OF BREATH: 1

## 2019-08-31 NOTE — ASSESSMENT & PLAN NOTE
compensated  LVEF unchanged to comparison studies  LVEF 50%  Telemetry, trend troponins, EKG, BNP  Sees Dr. Steven in the outpatient

## 2019-08-31 NOTE — PROGRESS NOTES
1800 pt sitting up in chair lethargic with increased O2 demand 3L to 5L NC and hypotensive.  Pt A and O x4 and followed commands but could not stay awake for more than a couple seconds.  RN CNA and PT helped the patient back into bed and this writer notified the on call MD of the status of the patient.  Orders placed for labs EKG and bolus.  EKG showed a fib which is new onset for pt and pt was transferred to tele floor.

## 2019-08-31 NOTE — PROGRESS NOTES
Neurosurgery Progress Note    Subjective:  Denies new LE symptoms  Rapid nurse at bedside as being transferred to ICU dt symptomatic hypotension (med)  Montanez    Exam:  Awake, alert cooperative  BLE str intact CDI mild old drainage to dressing    BP  Min: 84/38  Max: 129/61  Pulse  Av.1  Min: 103  Max: 128  Resp  Av.5  Min: 16  Max: 24  Temp  Av.2 °C (98.9 °F)  Min: 36.2 °C (97.2 °F)  Max: 37.9 °C (100.2 °F)  SpO2  Av.1 %  Min: 88 %  Max: 97 %    No data recorded    Recent Labs     19  2350 19  0320   WBC 13.3* 10.9* 10.0   RBC 3.12* 2.95* 2.91*   HEMOGLOBIN 9.0* 8.6* 8.3*   HEMATOCRIT 30.8* 28.9* 28.2*   MCV 98.7* 98.0* 96.9   MCH 28.8 29.2 28.5   MCHC 29.2* 29.8* 29.4*   RDW 64.1* 63.7* 63.2*   PLATELETCT 169 161* 165   MPV 10.9 10.9 10.7     Recent Labs     194 19  0320   SODIUM 130* 130* 133*   POTASSIUM 6.1* 5.2 5.6*   CHLORIDE 99 99 101   CO2 23 25 24   GLUCOSE 158* 137* 155*   BUN 36* 37* 37*   CREATININE 1.86* 1.65* 1.69*   CALCIUM 8.6 8.7 8.4*               Intake/Output       19 - 19 0659 19 - 19 0659       Total  Total       Intake    Total Intake -- -- -- -- -- --       Output    Urine  800  700 1500  --  -- --    Output (mL) (Urethral Catheter Straight-tip)  -- -- --    Total Output  -- -- --       Net I/O     -800 -700 -1500 -- -- --            Intake/Output Summary (Last 24 hours) at 2019 1334  Last data filed at 2019 0500  Gross per 24 hour   Intake --   Output 1500 ml   Net -1500 ml            • naloxone      • acetaminophen  650 mg Q4HRS PRN   • cyclobenzaprine  10 mg TID PRN   • tramadol  50 mg Q6HRS PRN   • morphine injection  2 mg Q4HRS PRN   • ipratropium-albuterol      • ipratropium-albuterol  3 mL 4X/DAY (RT)   • benzocaine-menthol  1 Lozenge Q2HRS PRN   • doxycycline monohydrate  100 mg Q12HRS   •  lactobacillus rhamnosus  1 Cap QDAY with Breakfast   • cefTRIAXone (ROCEPHIN) IV  2 g Q24HRS   • metroNIDAZOLE (FLAGYL) IV  500 mg Q8HRS   • insulin regular  1-6 Units 4X/DAY ACHS    And   • glucose  16 g Q15 MIN PRN    And   • dextrose 10% bolus  250 mL Q15 MIN PRN   • tizanidine  4 mg Q8HRS   • diphenhydrAMINE  25 mg Q6HRS PRN    Or   • diphenhydrAMINE  25 mg Q6HRS PRN   • albuterol  1 Puff Q6HRS PRN   • budesonide-formoterol  2 Puff BID   • dronedarone  400 mg BID WITH MEALS   • gabapentin  300 mg BID   • levothyroxine  75 mcg AM ES   • omeprazole  40 mg DAILY   • simvastatin  40 mg DAILY   • traZODone  100 mg Nightly   • MD ALERT...DO NOT ADMINISTER NSAIDS or ASPIRIN unless ORDERED By Neurosurgery  1 Each PRN   • docusate sodium  100 mg BID   • senna-docusate  1 Tab Nightly   • senna-docusate  1 Tab Q24HRS PRN   • polyethylene glycol/lytes  1 Packet BID PRN   • magnesium hydroxide  30 mL QDAY PRN   • bisacodyl  10 mg Q24HRS PRN   • Respiratory Care per Protocol   Continuous RT   • ondansetron  4 mg Q4HRS PRN   • ondansetron  4 mg Q4HRS PRN   • hydrALAZINE  10 mg Q HOUR PRN   • benzocaine-menthol  1 Lozenge Q2HRS PRN   • calcium carbonate  500 mg BID       Assessment and Plan:  POD #3 L4 - sacral TLIF  Prophylactic anticoagulation: no         Start date/time: no need    Stable from surgery  Appreciate med management- considering pressors, lasix cards consult

## 2019-08-31 NOTE — CARE PLAN
Problem: Communication  Goal: The ability to communicate needs accurately and effectively will improve  Outcome: PROGRESSING AS EXPECTED     Problem: Safety  Goal: Will remain free from injury  Outcome: PROGRESSING AS EXPECTED  Goal: Will remain free from falls  Outcome: PROGRESSING AS EXPECTED     Problem: Infection  Goal: Will remain free from infection  Outcome: PROGRESSING AS EXPECTED     Problem: Venous Thromboembolism (VTW)/Deep Vein Thrombosis (DVT) Prevention:  Goal: Patient will participate in Venous Thrombosis (VTE)/Deep Vein Thrombosis (DVT)Prevention Measures  Outcome: PROGRESSING AS EXPECTED     Problem: Bowel/Gastric:  Goal: Normal bowel function is maintained or improved  Outcome: PROGRESSING AS EXPECTED  Goal: Will not experience complications related to bowel motility  Outcome: PROGRESSING AS EXPECTED     Problem: Knowledge Deficit  Goal: Knowledge of disease process/condition, treatment plan, diagnostic tests, and medications will improve  Outcome: PROGRESSING AS EXPECTED  Goal: Knowledge of the prescribed therapeutic regimen will improve  Outcome: PROGRESSING AS EXPECTED     Problem: Discharge Barriers/Planning  Goal: Patient's continuum of care needs will be met  Outcome: PROGRESSING AS EXPECTED     Problem: Pain Management  Goal: Pain level will decrease to patient's comfort goal  Outcome: PROGRESSING AS EXPECTED     Problem: Respiratory:  Goal: Respiratory status will improve  Outcome: PROGRESSING AS EXPECTED     Problem: Fluid Volume:  Goal: Will maintain balanced intake and output  Outcome: PROGRESSING AS EXPECTED     Problem: Urinary Elimination:  Goal: Ability to reestablish a normal urinary elimination pattern will improve  Outcome: PROGRESSING AS EXPECTED     Problem: Mobility  Goal: Risk for activity intolerance will decrease  Outcome: PROGRESSING AS EXPECTED     Problem: Skin Integrity  Goal: Risk for impaired skin integrity will decrease  Outcome: PROGRESSING AS EXPECTED

## 2019-08-31 NOTE — ASSESSMENT & PLAN NOTE
9/2  Creat is back to her baseline aroung 1 for GFR in upper 50's-60  No hydro on CT  Avoid nephrotoxins  Renally dose medications as appropriate  Follow daily BMP and  UOP    9/3 resolved    9/4 increasing renal insufficiency, will monitor  Continue metformin at current dose    9/5 improving renal function  Urinary retention requiring straight catheterization, may need Montanez catheter placement on discharge  Encourage activity  May need urology consult as an outpatient    9/6 resolving, but now with urinary retention  Montanez catheter placed  Patient states that she is unable to straight cath  Allergic to sulfa, unable to start Flomax  For outpatient neurology follow-up per neurosurgery to arrange  Continue education on Montanez catheter care  Home health arranged

## 2019-08-31 NOTE — CONSULTS
Hospital Medicine Consultation    Date of Service  8/31/2019    Referring Physician  Grupo Cabrales M.D.    Consulting Physician  Ivone Rg M.D.    Reason for Consultation  hypoxia    History of Presenting Illness  79 y.o. female who presented 8/28/2019 with hypoxia  History of congestive heart failure. April 2019 echo showed:  Left ventricular systolic function is low normal.  Left ventricular ejection fraction is visually estimated to be 45%.  Global hypokinesis.  Mild concentric left ventricular hypertrophy.  Normal estimated left ventricular filling pressures in the setting of   atrial fibrillation.  Normal right ventricular size.  Mildly reduced right ventricular systolic function.  Enlarged right atrium.  Aortic sclerosis without stenosis.  Right atrial pressure is estimated to be 15 mmHg, severely elevated.   Right ventricular systolic pressure is estimated to be 40 mmHg,   consistent with mild pulmonary hypertension.   April 2019 CTA Chest showed no PE and she is on chronic anticoagulation for par Afib prior to surgery. That CTA Chest yvon showed pulmonary edema, which may likely be chronic with mild pulmonary hypertension.  Status post lumbar laminectomy, I.e. Sacral TLIF by Dr. Cabrales 8/29.  Today RN had called Surgical APRN that patient was short of breath. Increased O2 to 5L. Blood pressure initially was hypotensive on the 70s, seemed to improve with boluses of IV fluids. Mentation still oriented and alert. Neurosurgery APRN called us for consult.  I ordered workup including CXR, labs, EKG, echo and acted on the findings. Given history of COPD, I ordered resp/breathing treatments and O2 per protocol.  When I saw her t Neuro she is in no acute distress. She said she is on chronic O2 t home. She is feeling better. She has cogntive deficits, she does not remember having an echo in the past but [facility]        Interval history:  Overnight patient became increasingly hypoxic and  "confused and lethargic which was thought likely secondary to increased narcotic pain medications.  She was given 1 dose of Narcan with improvement in her mentation.  This morning patient has alert and oriented x3 however is intermittently confused and states \" I do not know\" after answering each question.  States she is having pain all over her body and numbness and tingling in her hands and her feet as well.  When asked if she is cold she states\" I do not know\"  No family at bedside     called by bedside nurse for symptomatic dizziness and shortness of breath.  Patient's blood pressure is low 80s over 50s and now she is symptomatic.  She also sounds wet on her exam unable to give her diuretics secondary to hypotension and dizziness.  I will at this time transferred to the ICU    Review of Systems  Review of Systems   Unable to perform ROS: Mental acuity   Respiratory: Positive for shortness of breath.    Neurological: Positive for dizziness.       Past Medical History   has a past medical history of Anemia, Arthritis, Asthma, Castro esophagus, Bronchitis, Chronic anticoagulation (2019), COPD, COPD (chronic obstructive pulmonary disease) ([facility]), Dental disorder, Diabetes, Disorder of thyroid, Emphysema of lung ([facility]), Encounter for medication refill (2018), GERD (gastroesophageal reflux disease), Heart burn, Hemorrhagic disorder, High cholesterol, Hypertension, Indigestion, Pain, Pneumonia (.), Stroke ([facility]) (), and UTI (urinary tract infection). She also has no past medical history of Breast cancer ([facility]).    Surgical History   has a past surgical history that includes gastroscopy-endo (2014); tonsillectomy (1957); pr anesth, delivery only; shoulder decompression arthroscopic (Left, 2016); shoulder decompression arthroscopic (Left, 10/11/2016); clavicle resection (Left, 10/11/2016); chondroplasty (Left, 10/11/2016); lumbar fusion posterior (N/A, 2019); and lumbar decompression " (N/A, 8/28/2019).    Family History  family history includes Arrythmia in her sister; Bipolar disorder in her son, son, and son; Cancer in her maternal aunt, mother, and sister; Heart Attack in her father; Hyperlipidemia in her father; Hypertension in her father; Lung Disease in her mother and sister; Stroke in her father.    Social History   reports that she quit smoking about 6 years ago. Her smoking use included cigarettes. She has a 26.00 pack-year smoking history. She has never used smokeless tobacco. She reports that she drinks about 8.4 oz of alcohol per week. She reports that she does not use drugs.    Medications  Prior to Admission Medications   Prescriptions Last Dose Informant Patient Reported? Taking?   acetaminophen (TYLENOL) 500 MG Tab 8/27/2019 at 0600 Patient Yes Yes   Sig: Take 1,000 mg by mouth every 6 hours as needed for Moderate Pain.   albuterol 108 (90 Base) MCG/ACT Aero Soln inhalation aerosol > 4 days at Unknown Patient No No   Sig: Inhale 1 Puff by mouth every 6 hours as needed for Shortness of Breath.   budesonide-formoterol (SYMBICORT) 160-4.5 MCG/ACT Aerosol 8/28/2019 at 0600 Patient No No   Sig: Inhale 2 Puffs by mouth 2 Times a Day.   dronedarone (MULTAQ) 400 MG Tab 8/26/2019 at 0600 Patient No No   Sig: Take 1 Tab by mouth 2 times a day, with meals.   gabapentin (NEURONTIN) 300 MG Cap 8/26/2019 at 1600 Patient Yes Yes   Sig: Take 300 mg by mouth 2 Times a Day.   levothyroxine (SYNTHROID) 75 MCG Tab 8/26/2019 at 0600 Patient No No   Sig: TAKE 1 TAB BY MOUTH EVERY MORNING ON AN EMPTY STOMACH.   liraglutide (VICTOZA) 18 MG/3ML Solution Pen-injector injection 8/26/2019 at 1700 Patient Yes No   Sig: Inject 1.2 mg as instructed every evening.   lisinopril (PRINIVIL) 20 MG Tab 8/27/2019 at 0600 Patient No No   Sig: Take 1 Tab by mouth every day.   metformin (GLUCOPHAGE) 1000 MG tablet 8/26/2019 at 1700 Patient No No   Sig: Take 1 Tab by mouth 2 times a day, with meals.   omeprazole  (PRILOSEC) 40 MG delayed-release capsule >1 week at AM Patient Yes Yes   Sig: Take 40 mg by mouth every day.   rivaroxaban (XARELTO) 15 MG Tab tablet > 2 weeks at HOLD Patient Yes Yes   Sig: Take 15 mg by mouth with dinner.   simvastatin (ZOCOR) 40 MG Tab 8/26/2019 at AM Patient No No   Sig: TAKE 1 TAB BY MOUTH EVERY DAY.   traZODone (DESYREL) 100 MG Tab 8/26/2019 at PM Patient Yes Yes   Sig: Take 100 mg by mouth every evening.      Facility-Administered Medications: None       Allergies  Allergies   Allergen Reactions   • Bactrim Hives   • Demerol Shortness of Breath and Swelling   • Pcn [Penicillins] Hives     Tolerates ceftriaxone and cefazolin   • Sulfa Drugs Hives   • Ferrous Sulfate Rash     itching   • Latex Contact Dermatitis       Physical Exam  Temp:  [36.2 °C (97.2 °F)-37.9 °C (100.2 °F)] 36.7 °C (98 °F)  Pulse:  [103-128] 111  Resp:  [16-24] 18  BP: ()/(38-75) 121/57  SpO2:  [88 %-97 %] 94 %    Physical Exam   Constitutional: She appears well-developed.   Generalized fatigue   HENT:   Head: Normocephalic and atraumatic.   Mouth/Throat: No oropharyngeal exudate.   Eyes: Conjunctivae are normal. Left eye exhibits no discharge. No scleral icterus.   Neck: Normal range of motion. Neck supple. No JVD present. No thyromegaly present.   Cardiovascular: Normal rate and regular rhythm. Exam reveals no gallop and no friction rub.   No murmur heard.  Pulmonary/Chest: Effort normal. No respiratory distress. She has no wheezes. She has rales (Mild bibasilar).   Abdominal: Soft. Bowel sounds are normal. She exhibits no distension. There is no tenderness. There is no rebound and no guarding.   Musculoskeletal: She exhibits tenderness (Mild back soreness). She exhibits no edema.   Lymphadenopathy:     She has no cervical adenopathy.   Neurological: She is alert. No cranial nerve deficit. She exhibits normal muscle tone.   Generalized weakness.   Skin: Skin is warm. No rash noted. No erythema.   Psychiatric: She is  slowed and withdrawn.   Confused       Fluids      Laboratory  Recent Labs     08/30/19  1820 08/30/19  2350 08/31/19  0320   WBC 13.3* 10.9* 10.0   RBC 3.12* 2.95* 2.91*   HEMOGLOBIN 9.0* 8.6* 8.3*   HEMATOCRIT 30.8* 28.9* 28.2*   MCV 98.7* 98.0* 96.9   MCH 28.8 29.2 28.5   MCHC 29.2* 29.8* 29.4*   RDW 64.1* 63.7* 63.2*   PLATELETCT 169 161* 165   MPV 10.9 10.9 10.7     Recent Labs     08/30/19  1820 08/30/19  2354 08/31/19  0320   SODIUM 130* 130* 133*   POTASSIUM 6.1* 5.2 5.6*   CHLORIDE 99 99 101   CO2 23 25 24   GLUCOSE 158* 137* 155*   BUN 36* 37* 37*   CREATININE 1.86* 1.65* 1.69*   CALCIUM 8.6 8.7 8.4*                     Imaging  CT-RENAL COLIC EVALUATION(A/P W/O)   Final Result         1.  No acute abnormality.   2.  Hepatomegaly   3.  Bibasilar atelectasis   4.  Bilateral fat-containing inguinal hernias   5.  Cardiomegaly   6.  Diverticulosis   7.  Atherosclerosis and atherosclerotic coronary artery disease.      DX-CHEST-PORTABLE (1 VIEW)   Final Result      Left lung base atelectasis/possible pneumonitis or pneumonia.   Mild diffuse interstitial opacities consistent with atelectasis/edema.      DX-PORTABLE FLUORO > 1 HOUR   Final Result      Portable fluoroscopy utilized for 19 seconds.         INTERPRETING LOCATION: [address] [place] [address], [facility]      DX-LUMBAR SPINE-2 OR 3 VIEWS   Final Result      Intraoperative fluoroscopy spot images as described above.      EC-ECHOCARDIOGRAM COMPLETE W/O CONT    (Results Pending)       Assessment/Plan  * acute on chronic systolic (congestive) heart failure (HCC)  Assessment & Plan  She appears to be fluid overloaded, symptomatic with acute shortness of breath requiring oxygen. mild CHF as last EF was 45%  Echo pending  At this time her blood pressures are very low map is below 60 and unfortunately she is symptomatic with shortness of breath therefore she may need to be on pressors and I will transfer to the ICU for higher level of care  For now cannot do diuretics  because she was hypotensive.  Needs cardiology consult    Acute on chronic respiratory failure   -Likely secondary to acute pulmonary edema and acute on chronic systolic heart failure.  -She is also hypotensive  -Unable to give diuretics at this time  -We will transfer to the ICU for higher level of care and possible pressors and Lasix  -Respiratory protocol ordered maintain oxygen saturations over 90%      post-lumbar laminectomy hypoxia, dyspnea. PNA and edema on CXR  Assessment & Plan  On chornic O2 of 3L at home.  Needing higher oxygen requirement secondary to acute pulmonary edema and acute on chronic systolic heart failure  Checks x-ray and labs have been noted  Treat PNA and pulmonary edema (below)  Resp and O2 per protocolDoubt PE given CXR findings, recent CTA Chest was negative for PE, recent chronic anticoagulation being held due to surgery. However, please discuss with Neurosurgery in the morning when Xarelto can be restarted.     Acute kidney injury (HCC)  Assessment & Plan  Hold metformin  Ordered urinalysis  Ordered CT renal as patient was having belly pain. This can also evaluate and r/o obstructive uropathy    Hyperkalemia  Assessment & Plan  Was not on insulin for diabetes  Was hypotensive  Has cardiac issues and propensity for arrhythmia, will give Ca gluconate x 1 and review monitors.  STOP lisinopril  OK to give small boluses of fluid last EF was 45% but note that she has mild acute pulmonary edema and symptomatic shortness of breath I will hold off on fluid bolus and transferred to the ICU for possible pressors  K is 5.6 this morning    Pneumonia  Assessment & Plan  Possible on CT  Resp and O2 per protocol  Continue antibiotics  Follow procalcitonin    Acute Pulmonary edema  Assessment & Plan  Acute given current symptoms of shortness of breath  Can't give diuretics with hypotension   Echo is pending  I will at this time transferred to the ICU and she will need a cardiology consult    PAF  (paroxysmal atrial fibrillation) ([facility])- (present on admission)  Assessment & Plan  Stable  Anticoagulation held    Chronic anticoagulation- (present on admission)  Assessment & Plan  Held because of Surgery  Defer to neurosurgery as to when to restart Xarelto    Chronic obstructive pulmonary disease ([facility])- (present on admission)  Assessment & Plan  Resp and O2 per protocol    Essential hypertension- (present on admission)  Assessment & Plan  STOP blood pressure meds as she is hypotensive, continue to hold hypertension medications as her blood pressures remain soft    Type 2 diabetes mellitus with hyperglycemia, without long-term current use of insulin ([facility])  Assessment & Plan  Continue sliding scale insulin, most recent A1c on 8/14/2019 was 6.9%     SCDs    At this time plan of care discussed with patient she continues to be short of breath and hypotensive now starting to become dizzy therefore I will transfer to the ICU for higher level of care.

## 2019-08-31 NOTE — PROGRESS NOTES
Patient transferred from [place] [address] to [facility].  Patient alert and oriented.  Patients complains of back pain ( post op L4-L5 fusion and LF to S1 decompression) Will give pain meds.  Patient placed on monitor. AFIB 110>HR.  Patient oriented to floor and unit protocol.

## 2019-08-31 NOTE — PROGRESS NOTES
Called by RN to bedside for altered mentation, hypotension and hypoxia.  Patient admitted by neurosurgery for laminectomy and decompression.  In the postop setting patient developed AFIB with RVR and hospitalist service was consulted.  Patient had rate controlled and was also treated for hyperkalemia and suspected pneumonia.  This evening, patient with new AMS.  Upon visit at bedside, records were reviewed with rapid team and patient had copious narcotic medications in the previous day.  Patient was awake but disoriented and minimally following commands.  Saturation improved when transitioned to mask but blood pressures remained soft.  STAT abg ordered and given the patient's pinpoint pupils, 0.4mg narcane was given with good response.  Following this, patient was alert, awake, complained of pain and blood pressure normalized.

## 2019-08-31 NOTE — PROGRESS NOTES
2 RN skin check complete. Didi ANTUNEZ  Devices in place Montanez cath, PIV, Oxy mask, SCDs, Monitoring equip.  Skin assessed under devices yes.  Confirmed pressure ulcers found on NA.  New potential pressure ulcers noted on NA. Wound consult placed NA.  The following interventions in place Low air loss mattress, Q2 turns, heels floated, pillows

## 2019-08-31 NOTE — CONSULTS
Hospital Medicine Consultation    Date of Service  8/30/2019    Referring Physician  Grupo Cabrales M.D.    Consulting Physician  Fred Groves M.D.    Reason for Consultation  hypoxia    History of Presenting Illness  79 y.o. female who presented 8/28/2019 with hypoxia  History of congestive heart failure. April 2019 echo showed:  Left ventricular systolic function is low normal.  Left ventricular ejection fraction is visually estimated to be 45%.  Global hypokinesis.  Mild concentric left ventricular hypertrophy.  Normal estimated left ventricular filling pressures in the setting of   atrial fibrillation.  Normal right ventricular size.  Mildly reduced right ventricular systolic function.  Enlarged right atrium.  Aortic sclerosis without stenosis.  Right atrial pressure is estimated to be 15 mmHg, severely elevated.   Right ventricular systolic pressure is estimated to be 40 mmHg,   consistent with mild pulmonary hypertension.   April 2019 CTA Chest showed no PE and she is on chronic anticoagulation for par Afib prior to surgery. That CTA Chest yvon showed pulmonary edema, which may likely be chronic with mild pulmonary hypertension.  Status post lumbar laminectomy, I.e. Sacral TLIF by Dr. Cabrales 8/29.  Today RN had called Surgical APRN that patient was short of breath. Increased O2 to 5L. Blood pressure initially was hypotensive on the 70s, seemed to improve with boluses of IV fluids. Mentation still oriented and alert. Neurosurgery APRN called us for consult.  I ordered workup including CXR, labs, EKG, echo and acted on the findings. Given history of COPD, I ordered resp/breathing treatments and O2 per protocol.  When I saw her t Neuro she is in no acute distress. She said she is on chronic O2 t home. She is feeling better. She has cogntive deficits, she does not remember having an echo in the past but she does follow Dr. Steven, [facility]    Review of Systems  Review of Systems   Unable to perform ROS: Mental  acuity       Past Medical History   has a past medical history of Anemia, Arthritis, Asthma, Castro esophagus, Bronchitis, Chronic anticoagulation (2019), COPD, COPD (chronic obstructive pulmonary disease) ([facility]), Dental disorder, Diabetes, Disorder of thyroid, Emphysema of lung ([facility]), Encounter for medication refill (2018), GERD (gastroesophageal reflux disease), Heart burn, Hemorrhagic disorder, High cholesterol, Hypertension, Indigestion, Pain, Pneumonia (.), Stroke ([facility]) (), and UTI (urinary tract infection). She also has no past medical history of Breast cancer ([facility]).    Surgical History   has a past surgical history that includes gastroscopy-endo (2014); tonsillectomy (); pr anesth, delivery only; shoulder decompression arthroscopic (Left, 2016); shoulder decompression arthroscopic (Left, 10/11/2016); clavicle resection (Left, 10/11/2016); chondroplasty (Left, 10/11/2016); lumbar fusion posterior (N/A, 2019); and lumbar decompression (N/A, 2019).    Family History  family history includes Arrythmia in her sister; Bipolar disorder in her son, son, and son; Cancer in her maternal aunt, mother, and sister; Heart Attack in her father; Hyperlipidemia in her father; Hypertension in her father; Lung Disease in her mother and sister; Stroke in her father.    Social History   reports that she quit smoking about 6 years ago. Her smoking use included cigarettes. She has a 26.00 pack-year smoking history. She has never used smokeless tobacco. She reports that she drinks about 8.4 oz of alcohol per week. She reports that she does not use drugs.    Medications  Prior to Admission Medications   Prescriptions Last Dose Informant Patient Reported? Taking?   acetaminophen (TYLENOL) 500 MG Tab 2019 at 0600 Patient Yes Yes   Sig: Take 1,000 mg by mouth every 6 hours as needed for Moderate Pain.   albuterol 108 (90 Base) MCG/ACT Aero Soln inhalation aerosol > 4 days at Unknown  Patient No No   Sig: Inhale 1 Puff by mouth every 6 hours as needed for Shortness of Breath.   budesonide-formoterol (SYMBICORT) 160-4.5 MCG/ACT Aerosol 8/28/2019 at 0600 Patient No No   Sig: Inhale 2 Puffs by mouth 2 Times a Day.   dronedarone (MULTAQ) 400 MG Tab 8/26/2019 at 0600 Patient No No   Sig: Take 1 Tab by mouth 2 times a day, with meals.   gabapentin (NEURONTIN) 300 MG Cap 8/26/2019 at 1600 Patient Yes Yes   Sig: Take 300 mg by mouth 2 Times a Day.   levothyroxine (SYNTHROID) 75 MCG Tab 8/26/2019 at 0600 Patient No No   Sig: TAKE 1 TAB BY MOUTH EVERY MORNING ON AN EMPTY STOMACH.   liraglutide (VICTOZA) 18 MG/3ML Solution Pen-injector injection 8/26/2019 at 1700 Patient Yes No   Sig: Inject 1.2 mg as instructed every evening.   lisinopril (PRINIVIL) 20 MG Tab 8/27/2019 at 0600 Patient No No   Sig: Take 1 Tab by mouth every day.   metformin (GLUCOPHAGE) 1000 MG tablet 8/26/2019 at 1700 Patient No No   Sig: Take 1 Tab by mouth 2 times a day, with meals.   omeprazole (PRILOSEC) 40 MG delayed-release capsule >1 week at AM Patient Yes Yes   Sig: Take 40 mg by mouth every day.   rivaroxaban (XARELTO) 15 MG Tab tablet > 2 weeks at HOLD Patient Yes Yes   Sig: Take 15 mg by mouth with dinner.   simvastatin (ZOCOR) 40 MG Tab 8/26/2019 at AM Patient No No   Sig: TAKE 1 TAB BY MOUTH EVERY DAY.   traZODone (DESYREL) 100 MG Tab 8/26/2019 at PM Patient Yes Yes   Sig: Take 100 mg by mouth every evening.      Facility-Administered Medications: None       Allergies  Allergies   Allergen Reactions   • Bactrim Hives   • Demerol Shortness of Breath and Swelling   • Pcn [Penicillins] Hives   • Sulfa Drugs Hives   • Ferrous Sulfate Rash     itching   • Latex Contact Dermatitis       Physical Exam  Temp:  [36.8 °C (98.3 °F)-37.6 °C (99.7 °F)] 37.6 °C (99.7 °F)  Pulse:  [] 107  Resp:  [17-18] 18  BP: ()/(43-50) 92/44  SpO2:  [90 %-94 %] 90 %    Physical Exam   Constitutional: She appears well-developed.   Generalized  fatigue   HENT:   Head: Normocephalic and atraumatic.   Eyes: Conjunctivae are normal. No scleral icterus.   Neck: Normal range of motion. Neck supple.   Cardiovascular: Normal rate and regular rhythm. Exam reveals no gallop and no friction rub.   No murmur heard.  Pulmonary/Chest: Effort normal. No respiratory distress. She has no wheezes. She has rales (Mild bibasilar).   Abdominal: Soft. Bowel sounds are normal. She exhibits no distension. There is no tenderness. There is no rebound and no guarding.   Musculoskeletal: She exhibits tenderness (Mild back soreness). She exhibits no edema.   Neurological: She is alert.   Generalized weakness.   Skin: Skin is warm.   Psychiatric: She has a normal mood and affect. Her behavior is normal.       Fluids      Laboratory  Recent Labs     08/29/19  0255 08/30/19  0313   WBC 10.0 11.0*   RBC 3.33* 3.23*   HEMOGLOBIN 9.7* 9.4*   HEMATOCRIT 31.8* 31.5*   MCV 95.5 97.5   MCH 29.1 29.1   MCHC 30.5* 29.8*   RDW 61.2* 63.3*   PLATELETCT 171 162*   MPV 10.8 10.8     Recent Labs     08/29/19  0255 08/30/19  0313   SODIUM 134* 132*   POTASSIUM 5.9* 5.5   CHLORIDE 103 102   CO2 25 25   GLUCOSE 181* 163*   BUN 25* 28*   CREATININE 1.42* 1.37   CALCIUM 8.8 8.7                     Imaging  DX-CHEST-PORTABLE (1 VIEW)   Final Result      Left lung base atelectasis/possible pneumonitis or pneumonia.   Mild diffuse interstitial opacities consistent with atelectasis/edema.      DX-PORTABLE FLUORO > 1 HOUR   Final Result      Portable fluoroscopy utilized for 19 seconds.         INTERPRETING LOCATION: [address] [place] [address], [facility]      DX-LUMBAR SPINE-2 OR 3 VIEWS   Final Result      Intraoperative fluoroscopy spot images as described above.      EC-ECHOCARDIOGRAM COMPLETE W/O CONT    (Results Pending)   CT-RENAL COLIC EVALUATION(A/P W/O)    (Results Pending)       Assessment/Plan  * Post-lumbar laminectomy hypoxia, dyspnea. PNA and edema on CXR  Assessment & Plan  On chornic O2 of 3L at  home.  Ordered and reviewed labs and CXR.  Treat PNA and pulmonary edema (below)  Resp and O2 per protocolDoubt PE given CXR findings, recent CTA Chest was negative for PE, recent chronic anticoagulation being held due to surgery. However, please discuss with Neurosurgery in the morning when Xarelto can be restarted.     Acute kidney injury (HCC)  Assessment & Plan  Hold metformin  Ordered urinalysis  Ordered CT renal as patient was having belly pain. This can also evaluate and r/o obstructive uropathy    Hyperkalemia  Assessment & Plan  Was not on insulin for diabetes  Was hypotensive  Has cardiac issues and propensity for arrhythmia, will give Ca gluconate x 1 and review monitors.  STOP lisinopril  OK to give small boluses of fluid last EF was 45% but note that she has chronic mild pulmonary edema  Insulin + dextrose x 1  Shince she is short of breath can do breathing treatment x 1  Recheck renal function panel    Pneumonia  Assessment & Plan  Possible on CT  Resp and O2 per protocol  Ordered antibiotics  Fpollow procalcitonin    Pulmonary edema  Assessment & Plan  Seems to be chronic  Can;t give diuretics with hypotension but hypotension is improving  Ordered echo, troponin, telemetry, BNP  Consult Cardiology in the AM depending on results.    Acute on chronic systolic (congestive) heart failure (HCC)  Assessment & Plan  Possible. Mild CHF as last EF was 45%  Ordered echo  Telemetry, trend troponins, EKG, BNP  For now cannot do diuretics because she was hypotensive.  COnsult Cardiology in the AM if needed. SHe sees Dr. Steven in the outpatient    PAF (paroxysmal atrial fibrillation) ([facility])- (present on admission)  Assessment & Plan  Stable  Anticoagulation held    Chronic anticoagulation- (present on admission)  Assessment & Plan  Held because of Surgery  Discuss with Neurosurgery when to restart    Chronic obstructive pulmonary disease (HCC)- (present on admission)  Assessment & Plan  Resp and O2 per  protocol    Essential hypertension- (present on admission)  Assessment & Plan  STOP blood pressure meds as she is hypotensive    Type 2 diabetes mellitus with hyperglycemia, without long-term current use of insulin ([facility])  Assessment & Plan  Was not on insulin.   Ordered sliding scale insulin    SCDs    Reviewed vitals, labs, imaging, staff notes.  Discussed assessment and plan with Jessica Plaza   Discussed with NICHOLAS

## 2019-08-31 NOTE — PROGRESS NOTES
Bedside report received, pt on 5L via mask, sats in 90s, will wake briefly when name is called and drifts back to sleep right away. Assessment completed, currently oriented only to name and birthday, needing to repeat to follow directions. Bed alarm on, locked and in lowest position, call light in reach

## 2019-08-31 NOTE — RESPIRATORY CARE
Respiratory Rapid Response Note    Symptoms hypoxemia, hypotension, AMS     Breath Sounds  RUL Breath Sounds: Diminished (08/30/19 2100)  RML Breath Sounds: Diminished (08/30/19 2100)  RLL Breath Sounds: Diminished (08/30/19 2100)  NABILA Breath Sounds: Diminished (08/30/19 2100)  LLL Breath Sounds: Diminished (08/30/19 2100)  Cough: Non Productive (08/30/19 2100)  Sputum Amount: Unable to Evaluate (08/29/19 0145)  Sputum Color: Unable to Evaluate (08/29/19 0145)  Sputum Consistency: Unable to Evaluate (08/29/19 0145)  ABG Results pending      O2 (LPM): 6 (08/31/19 0000)       Events/Summary/Plan: Pt. with AMS and Hypotension and Hypoxia with Rapid called.  ABG done for lab to send off.  After Narcan given pt. responded appropriately (08/31/19 0315)  Transferred to ICU not at this time.

## 2019-08-31 NOTE — ASSESSMENT & PLAN NOTE
Now that renal function has improved resume metformin  Cont SSI    9/4 ongoing need for insulin coverage  On metformin 500 twice daily, will adjust with renal improvement  Continue to monitor  On Victoza at home    9/5 diabetic educator made  May need sliding scale insulin coverage on discharge    9/7 seen by diabetic educator  Patient uses metformin and Victoza at home  Poor oral intake during this inpatient admission  Will resume home diet  Okay to resume toes and metformin, would continue at lowered metformin dose

## 2019-08-31 NOTE — PROGRESS NOTES
0900: Pt more alert than 0700 assessment, c/o difficulty breathing, lungs now with exp wheezing, RT at bedside for tx    1130: Vitals taken, SBP 80s, pt symptomatic with lightheaded and dizziness, BP rechecked /53 MAP of 58, still symptomatic, c/o difficulty breathing, breathing more labored, HR 110s. MD notified and at bedside, orders received to transfer to ICU, pt updated on POC    1230:  at bedside, updated on POC and pt moving rooms    1340: Transferred to [id number] with X2 ICU RN's,  at bedside, all belongings with patient

## 2019-08-31 NOTE — ASSESSMENT & PLAN NOTE
9/2 Stable  Anticoagulation held  Cont Dronedarone    9/3 cont multaq  afib with rvr, cardiac monitor  Rate control, add metoprolol  ?AC, to d/w pt    9/4 no history of GI bleed or frequent falls  We will discuss with neurosurgery regarding clearance for initiation of full dose anticoagulation    9/5 to follow-up as an outpatient regarding initiation of full dose anticoagulation when cleared by neurosurgery    9/6 rate controlled  To review with neurosurgery regarding benefits of starting full dose anticoagulation  Recent laminectomy

## 2019-08-31 NOTE — ASSESSMENT & PLAN NOTE
9/2 On chornic O2 of 3L at home.  Ordered and reviewed labs and CXR.  Treat PNA and pulmonary edema (below)  Resp and O2 per protocolDoubt PE given CXR findings, recent CTA Chest was negative for PE, recent chronic anticoagulation being held due to surgery.   cathy overmedicated as responded to narcan    9/3  POD #6  Pain control  Pt min assist  May need placement    9/4 postop day #7  Continue therapy  Patient wants to go home, will follow up therapy notes  Pain controlled    9/6 postop day #9  Per neurosurgery okay for discharge home  Discharge home per second neurosurgery ,to arrange

## 2019-08-31 NOTE — ASSESSMENT & PLAN NOTE
Seems to be chronic  Mild  DC IVFs  Consider low dose loop diuretic dependent on how she does over next few days

## 2019-08-31 NOTE — RESPIRATORY CARE
COPD EDUCATION by COPD CLINICAL EDUCATOR  8/31/2019 at 9:13 AM by Giuliano Vargas     Patient interviewed by COPD education team. Patient refused COPD program at this time.

## 2019-08-31 NOTE — PROGRESS NOTES
Assumed care at 0030, bedside report received from prior RN. Pt on the monitor. Agree with prior assessment, orders reviewed, call light within reach, bed alarm in use, and hourly rounding in place.

## 2019-09-01 ENCOUNTER — APPOINTMENT (OUTPATIENT)
Dept: CARDIOLOGY | Facility: MEDICAL CENTER | Age: 79
DRG: 459 | End: 2019-09-01
Attending: INTERNAL MEDICINE
Payer: MEDICARE

## 2019-09-01 LAB
ALBUMIN SERPL BCP-MCNC: 2.9 G/DL (ref 3.2–4.9)
ALBUMIN/GLOB SERPL: 1.1 G/DL
ALP SERPL-CCNC: 58 U/L (ref 30–99)
ALT SERPL-CCNC: 10 U/L (ref 2–50)
ANION GAP SERPL CALC-SCNC: 9 MMOL/L (ref 0–11.9)
AST SERPL-CCNC: 24 U/L (ref 12–45)
BILIRUB SERPL-MCNC: 0.5 MG/DL (ref 0.1–1.5)
BUN SERPL-MCNC: 23 MG/DL (ref 8–22)
CALCIUM SERPL-MCNC: 8.6 MG/DL (ref 8.5–10.5)
CHLORIDE SERPL-SCNC: 102 MMOL/L (ref 96–112)
CO2 SERPL-SCNC: 24 MMOL/L (ref 20–33)
CREAT SERPL-MCNC: 0.95 MG/DL (ref 0.5–1.4)
ERYTHROCYTE [DISTWIDTH] IN BLOOD BY AUTOMATED COUNT: 60.4 FL (ref 35.9–50)
GLOBULIN SER CALC-MCNC: 2.7 G/DL (ref 1.9–3.5)
GLUCOSE BLD-MCNC: 171 MG/DL (ref 65–99)
GLUCOSE BLD-MCNC: 194 MG/DL (ref 65–99)
GLUCOSE BLD-MCNC: 210 MG/DL (ref 65–99)
GLUCOSE BLD-MCNC: 211 MG/DL (ref 65–99)
GLUCOSE BLD-MCNC: 254 MG/DL (ref 65–99)
GLUCOSE SERPL-MCNC: 195 MG/DL (ref 65–99)
HCT VFR BLD AUTO: 29.4 % (ref 37–47)
HGB BLD-MCNC: 9.1 G/DL (ref 12–16)
LV EJECT FRACT  99904: 50
LV EJECT FRACT MOD 2C 99903: 69.5
LV EJECT FRACT MOD 4C 99902: 63.38
LV EJECT FRACT MOD BP 99901: 65.45
MCH RBC QN AUTO: 29.2 PG (ref 27–33)
MCHC RBC AUTO-ENTMCNC: 31 G/DL (ref 33.6–35)
MCV RBC AUTO: 94.2 FL (ref 81.4–97.8)
PLATELET # BLD AUTO: 159 K/UL (ref 164–446)
PMV BLD AUTO: 10.7 FL (ref 9–12.9)
POTASSIUM SERPL-SCNC: 5 MMOL/L (ref 3.6–5.5)
PROCALCITONIN SERPL-MCNC: 0.72 NG/ML
PROT SERPL-MCNC: 5.6 G/DL (ref 6–8.2)
RBC # BLD AUTO: 3.12 M/UL (ref 4.2–5.4)
SODIUM SERPL-SCNC: 135 MMOL/L (ref 135–145)
WBC # BLD AUTO: 8.9 K/UL (ref 4.8–10.8)

## 2019-09-01 PROCEDURE — A9270 NON-COVERED ITEM OR SERVICE: HCPCS | Performed by: NURSE PRACTITIONER

## 2019-09-01 PROCEDURE — 80053 COMPREHEN METABOLIC PANEL: CPT

## 2019-09-01 PROCEDURE — 99233 SBSQ HOSP IP/OBS HIGH 50: CPT | Performed by: HOSPITALIST

## 2019-09-01 PROCEDURE — A9270 NON-COVERED ITEM OR SERVICE: HCPCS | Performed by: INTERNAL MEDICINE

## 2019-09-01 PROCEDURE — 99233 SBSQ HOSP IP/OBS HIGH 50: CPT | Performed by: INTERNAL MEDICINE

## 2019-09-01 PROCEDURE — 93306 TTE W/DOPPLER COMPLETE: CPT | Mod: 26 | Performed by: INTERNAL MEDICINE

## 2019-09-01 PROCEDURE — 93306 TTE W/DOPPLER COMPLETE: CPT

## 2019-09-01 PROCEDURE — 700111 HCHG RX REV CODE 636 W/ 250 OVERRIDE (IP): Performed by: INTERNAL MEDICINE

## 2019-09-01 PROCEDURE — 770020 HCHG ROOM/CARE - TELE (206)

## 2019-09-01 PROCEDURE — 84145 PROCALCITONIN (PCT): CPT

## 2019-09-01 PROCEDURE — 700112 HCHG RX REV CODE 229: Performed by: NURSE PRACTITIONER

## 2019-09-01 PROCEDURE — 700102 HCHG RX REV CODE 250 W/ 637 OVERRIDE(OP): Performed by: NURSE PRACTITIONER

## 2019-09-01 PROCEDURE — 700102 HCHG RX REV CODE 250 W/ 637 OVERRIDE(OP): Performed by: HOSPITALIST

## 2019-09-01 PROCEDURE — A9270 NON-COVERED ITEM OR SERVICE: HCPCS | Performed by: HOSPITALIST

## 2019-09-01 PROCEDURE — 700105 HCHG RX REV CODE 258: Performed by: INTERNAL MEDICINE

## 2019-09-01 PROCEDURE — 82962 GLUCOSE BLOOD TEST: CPT

## 2019-09-01 PROCEDURE — 85027 COMPLETE CBC AUTOMATED: CPT

## 2019-09-01 PROCEDURE — 700102 HCHG RX REV CODE 250 W/ 637 OVERRIDE(OP): Performed by: INTERNAL MEDICINE

## 2019-09-01 RX ORDER — ACETAMINOPHEN 325 MG/1
650 TABLET ORAL EVERY 6 HOURS
Status: DISCONTINUED | OUTPATIENT
Start: 2019-09-01 | End: 2019-09-06 | Stop reason: HOSPADM

## 2019-09-01 RX ORDER — OXYCODONE HYDROCHLORIDE 5 MG/1
5-10 TABLET ORAL EVERY 4 HOURS PRN
Status: DISCONTINUED | OUTPATIENT
Start: 2019-09-01 | End: 2019-09-02

## 2019-09-01 RX ADMIN — ANTACID TABLETS 500 MG: 500 TABLET, CHEWABLE ORAL at 04:57

## 2019-09-01 RX ADMIN — SENNOSIDES, DOCUSATE SODIUM 1 TABLET: 50; 8.6 TABLET, FILM COATED ORAL at 21:00

## 2019-09-01 RX ADMIN — DRONEDARONE 400 MG: 400 TABLET, FILM COATED ORAL at 16:52

## 2019-09-01 RX ADMIN — POLYETHYLENE GLYCOL 3350 1 PACKET: 17 POWDER, FOR SOLUTION ORAL at 04:56

## 2019-09-01 RX ADMIN — ACETAMINOPHEN 650 MG: 325 TABLET, FILM COATED ORAL at 12:59

## 2019-09-01 RX ADMIN — CYCLOBENZAPRINE HYDROCHLORIDE 10 MG: 10 TABLET, FILM COATED ORAL at 21:00

## 2019-09-01 RX ADMIN — SIMVASTATIN 40 MG: 20 TABLET, FILM COATED ORAL at 04:56

## 2019-09-01 RX ADMIN — DRONEDARONE 400 MG: 400 TABLET, FILM COATED ORAL at 07:43

## 2019-09-01 RX ADMIN — INSULIN HUMAN 2 UNITS: 100 INJECTION, SOLUTION PARENTERAL at 16:52

## 2019-09-01 RX ADMIN — ANTACID TABLETS 500 MG: 500 TABLET, CHEWABLE ORAL at 16:52

## 2019-09-01 RX ADMIN — CEFTRIAXONE SODIUM 2 G: 2 INJECTION, POWDER, FOR SOLUTION INTRAMUSCULAR; INTRAVENOUS at 20:59

## 2019-09-01 RX ADMIN — LEVOTHYROXINE SODIUM 75 MCG: 75 TABLET ORAL at 04:56

## 2019-09-01 RX ADMIN — Medication 1 CAPSULE: at 07:43

## 2019-09-01 RX ADMIN — HYDROCODONE BITARTRATE AND ACETAMINOPHEN 2 TABLET: 5; 325 TABLET ORAL at 07:43

## 2019-09-01 RX ADMIN — HYDROCORTISONE SODIUM SUCCINATE 50 MG: 100 INJECTION, POWDER, FOR SOLUTION INTRAMUSCULAR; INTRAVENOUS at 16:51

## 2019-09-01 RX ADMIN — GABAPENTIN 300 MG: 300 CAPSULE ORAL at 04:56

## 2019-09-01 RX ADMIN — INSULIN HUMAN 2 UNITS: 100 INJECTION, SOLUTION PARENTERAL at 11:33

## 2019-09-01 RX ADMIN — OXYCODONE HYDROCHLORIDE 10 MG: 5 TABLET ORAL at 16:51

## 2019-09-01 RX ADMIN — OMEPRAZOLE 40 MG: 20 CAPSULE, DELAYED RELEASE ORAL at 04:56

## 2019-09-01 RX ADMIN — ACETAMINOPHEN 650 MG: 325 TABLET, FILM COATED ORAL at 21:01

## 2019-09-01 RX ADMIN — TRAZODONE HYDROCHLORIDE 100 MG: 100 TABLET ORAL at 21:00

## 2019-09-01 RX ADMIN — DOXYCYCLINE 100 MG: 100 TABLET, FILM COATED ORAL at 16:51

## 2019-09-01 RX ADMIN — INSULIN HUMAN 1 UNITS: 100 INJECTION, SOLUTION PARENTERAL at 07:43

## 2019-09-01 RX ADMIN — HYDROCORTISONE SODIUM SUCCINATE 50 MG: 100 INJECTION, POWDER, FOR SOLUTION INTRAMUSCULAR; INTRAVENOUS at 04:56

## 2019-09-01 RX ADMIN — BUDESONIDE AND FORMOTEROL FUMARATE DIHYDRATE 2 PUFF: 160; 4.5 AEROSOL RESPIRATORY (INHALATION) at 16:52

## 2019-09-01 RX ADMIN — GABAPENTIN 300 MG: 300 CAPSULE ORAL at 16:51

## 2019-09-01 RX ADMIN — INSULIN HUMAN 3 UNITS: 100 INJECTION, SOLUTION PARENTERAL at 21:18

## 2019-09-01 RX ADMIN — HYDROCODONE BITARTRATE AND ACETAMINOPHEN 1 TABLET: 5; 325 TABLET ORAL at 00:44

## 2019-09-01 RX ADMIN — HYDROCORTISONE SODIUM SUCCINATE 50 MG: 100 INJECTION, POWDER, FOR SOLUTION INTRAMUSCULAR; INTRAVENOUS at 11:34

## 2019-09-01 RX ADMIN — HYDROCORTISONE SODIUM SUCCINATE 50 MG: 100 INJECTION, POWDER, FOR SOLUTION INTRAMUSCULAR; INTRAVENOUS at 00:44

## 2019-09-01 RX ADMIN — DOCUSATE SODIUM 100 MG: 100 CAPSULE, LIQUID FILLED ORAL at 04:56

## 2019-09-01 RX ADMIN — BUDESONIDE AND FORMOTEROL FUMARATE DIHYDRATE 2 PUFF: 160; 4.5 AEROSOL RESPIRATORY (INHALATION) at 07:43

## 2019-09-01 RX ADMIN — DOXYCYCLINE 100 MG: 100 TABLET, FILM COATED ORAL at 04:56

## 2019-09-01 ASSESSMENT — ENCOUNTER SYMPTOMS
SORE THROAT: 0
ABDOMINAL PAIN: 0
HEADACHES: 0
LOSS OF CONSCIOUSNESS: 0
SHORTNESS OF BREATH: 0
DIARRHEA: 0
DIZZINESS: 0
NAUSEA: 0
BACK PAIN: 1
FEVER: 0
CHILLS: 0
VOMITING: 0
PALPITATIONS: 0
DOUBLE VISION: 0
BLURRED VISION: 0
COUGH: 0

## 2019-09-01 NOTE — ASSESSMENT & PLAN NOTE
Improved with transfusion  Continue to monitor and transfuse for hemoglobin less than 7; or less than 8 if hemodynamically unstable

## 2019-09-01 NOTE — PROGRESS NOTES
Neurosurgery Progress Note    Subjective:  No events      Exam:    A&O x3, GCS 15  PERRL, EOMI  Face symm, tongue midline  TALAMANTES with FS, no drift  C/d/i    BP  Min: 70/43  Max: 164/79  Pulse  Av.4  Min: 87  Max: 117  Resp  Av.1  Min: 8  Max: 32  Temp  Av.6 °C (97.8 °F)  Min: 35.8 °C (96.4 °F)  Max: 36.9 °C (98.4 °F)  SpO2  Av.2 %  Min: 92 %  Max: 97 %    No data recorded    Recent Labs     19  1645 19   WBC 10.9* 10.0  --   --  8.9   RBC 2.95* 2.91*  --   --  3.12*   HEMOGLOBIN 8.6* 8.3* 7.7* 8.1* 9.1*   HEMATOCRIT 28.9* 28.2*  --   --  29.4*   MCV 98.0* 96.9  --   --  94.2   MCH 29.2 28.5  --   --  29.2   MCHC 29.8* 29.4*  --   --  31.0*   RDW 63.7* 63.2*  --   --  60.4*   PLATELETCT 161* 165  --   --  159*   MPV 10.9 10.7  --   --  10.7     Recent Labs     19   SODIUM 130* 133* 135   POTASSIUM 5.2 5.6* 5.0   CHLORIDE 99 101 102   CO2 25 24 24   GLUCOSE 137* 155* 195*   BUN 37* 37* 23*   CREATININE 1.65* 1.69* 0.95   CALCIUM 8.7 8.4* 8.6               Intake/Output       19 - 19 - 19 Total  Total       Intake    P.O.  100  -- 100  --  -- --    P.O. 100 -- 100 -- -- --    I.V.  --  75 75  --  -- --    Magnesium Sulfate Volume -- 75 75 -- -- --    Blood  --  300 300  --  -- --    Volume (RELEASE RED BLOOD CELLS) -- 300 300 -- -- --    IV Piggyback  1000  496.7 1496.7  --  -- --    Volume (mL) (lactated ringer BOLUS infusion) 1000 -- 1000 -- -- --    Volume (mL) (cefTRIAXone (ROCEPHIN) 2 g in  mL IVPB) -- 96.7 96.7 -- -- --    Volume (mL) (cefTRIAXone (ROCEPHIN) 2 g in  mL IVPB) -- 100 100 -- -- --    Volume (mL) (metroNIDAZOLE (FLAGYL) IVPB 500 mg) -- 300 300 -- -- --    Total Intake 1100 871.7 1971.7 -- -- --       Output    Urine  425  1400 1825  235  -- 235    Output (mL) (Urethral Catheter  Straight-tip) 425 1400 1825 235 -- 235    Stool  --  -- --  --  -- --    Number of Times Stooled -- -- -- 1 x -- 1 x    Total Output 425 1400 1825 235 -- 235       Net I/O     675 -528.3 146.7 -235 -- -235            Intake/Output Summary (Last 24 hours) at 9/1/2019 1155  Last data filed at 9/1/2019 1000  Gross per 24 hour   Intake 1971.67 ml   Output 2060 ml   Net -88.33 ml            • acetaminophen  650 mg Q4HRS PRN   • cyclobenzaprine  10 mg TID PRN   • tramadol  50 mg Q6HRS PRN   • morphine injection  2 mg Q4HRS PRN   • cefTRIAXone (ROCEPHIN) IV  2 g Q24HRS   • HYDROcodone-acetaminophen  1-2 Tab Q6HRS PRN   • hydrocortisone sodium succinate PF  50 mg Q6HRS   • benzocaine-menthol  1 Lozenge Q2HRS PRN   • doxycycline monohydrate  100 mg Q12HRS   • lactobacillus rhamnosus  1 Cap QDAY with Breakfast   • insulin regular  1-6 Units 4X/DAY ACHS    And   • glucose  16 g Q15 MIN PRN    And   • dextrose 10% bolus  250 mL Q15 MIN PRN   • diphenhydrAMINE  25 mg Q6HRS PRN    Or   • diphenhydrAMINE  25 mg Q6HRS PRN   • albuterol  1 Puff Q6HRS PRN   • budesonide-formoterol  2 Puff BID   • dronedarone  400 mg BID WITH MEALS   • gabapentin  300 mg BID   • levothyroxine  75 mcg AM ES   • omeprazole  40 mg DAILY   • simvastatin  40 mg DAILY   • traZODone  100 mg Nightly   • MD ALERT...DO NOT ADMINISTER NSAIDS or ASPIRIN unless ORDERED By Neurosurgery  1 Each PRN   • docusate sodium  100 mg BID   • senna-docusate  1 Tab Nightly   • senna-docusate  1 Tab Q24HRS PRN   • polyethylene glycol/lytes  1 Packet BID PRN   • magnesium hydroxide  30 mL QDAY PRN   • bisacodyl  10 mg Q24HRS PRN   • Respiratory Care per Protocol   Continuous RT   • ondansetron  4 mg Q4HRS PRN   • ondansetron  4 mg Q4HRS PRN   • hydrALAZINE  10 mg Q HOUR PRN   • benzocaine-menthol  1 Lozenge Q2HRS PRN   • calcium carbonate  500 mg BID       Assessment and Plan:  POD #4 L4-S1 TLIF  Prophylactic anticoagulation: no         Start date/time: no need  Stable from  surgery  Ok to transfer to floor when stable per medicine

## 2019-09-01 NOTE — PROGRESS NOTES
Hospital Medicine Daily Progress Note    Date of Service  9/1/2019    Chief Complaint  79 y.o. female admitted 8/28/2019 with back pain    Hospital Course    PMHx CHF 45%, lVH, AFib on rivaroxaban, PE, COPD, DM, Hypothyroidism, GERD, HTN, HLD, CVA.  Presented for a planned L spine laminectomy by Dr Cabrales on 8/29.  On 8/30 became increasingly SOB and hypotensive to SBPs in the 70s.  Given fluid boluses and narcan with some improvement.   It was felt she had been given too much pain medication.  Transferred to the ICU on 8/31 for hypotension.       Interval Problem Update  Pt c/o pain in her back reports it is constant.  No radiation down either leg.  Midline.  No c/o sensation changes  AFib  SBP stable  Tolerating po  No drips  Consultants/Specialty  Neurosurgery  CC    Code Status  full    Disposition  OK to floor    Review of Systems  Review of Systems   Constitutional: Negative for chills and fever.   HENT: Negative for nosebleeds and sore throat.    Eyes: Negative for blurred vision and double vision.   Respiratory: Negative for cough and shortness of breath.    Cardiovascular: Negative for chest pain, palpitations and leg swelling.   Gastrointestinal: Negative for abdominal pain, diarrhea, nausea and vomiting.   Genitourinary: Negative for dysuria and urgency.   Musculoskeletal: Positive for back pain.   Skin: Negative for rash.   Neurological: Negative for dizziness, loss of consciousness and headaches.        Physical Exam  Temp:  [35.8 °C (96.4 °F)-37.9 °C (100.2 °F)] 35.8 °C (96.4 °F)  Pulse:  [] 91  Resp:  [8-32] 13  BP: ()/(43-59) 99/52  SpO2:  [92 %-97 %] 93 %    Physical Exam   Constitutional: She is oriented to person, place, and time. She appears well-developed and well-nourished. No distress.   HENT:   Head: Normocephalic and atraumatic.   Nose: Nose normal.   Mouth/Throat: Oropharynx is clear and moist.   Eyes: Conjunctivae are normal. No scleral icterus.   Neck: No JVD present.    Cardiovascular: Normal rate and regular rhythm.   Pulmonary/Chest: Effort normal. No stridor. No respiratory distress. She has no wheezes. She has no rales.   Abdominal: Soft. There is no tenderness. There is no rebound and no guarding.   Musculoskeletal: She exhibits no edema.   Neurological: She is alert and oriented to person, place, and time.   Sensation intact B legs.  Unable to get accurate strength exam as pain precludes max effort   Skin: Skin is warm and dry. No rash noted. She is not diaphoretic.   Psychiatric: She has a normal mood and affect. Thought content normal.   Nursing note and vitals reviewed.      Fluids    Intake/Output Summary (Last 24 hours) at 9/1/2019 0545  Last data filed at 9/1/2019 0300  Gross per 24 hour   Intake 1971.67 ml   Output 1425 ml   Net 546.67 ml       Laboratory  Recent Labs     08/30/19  2350 08/31/19  0320 08/31/19  1645 08/31/19  1900 09/01/19  0337   WBC 10.9* 10.0  --   --  8.9   RBC 2.95* 2.91*  --   --  3.12*   HEMOGLOBIN 8.6* 8.3* 7.7* 8.1* 9.1*   HEMATOCRIT 28.9* 28.2*  --   --  29.4*   MCV 98.0* 96.9  --   --  94.2   MCH 29.2 28.5  --   --  29.2   MCHC 29.8* 29.4*  --   --  31.0*   RDW 63.7* 63.2*  --   --  60.4*   PLATELETCT 161* 165  --   --  159*   MPV 10.9 10.7  --   --  10.7     Recent Labs     08/30/19  2354 08/31/19  0320 09/01/19  0337   SODIUM 130* 133* 135   POTASSIUM 5.2 5.6* 5.0   CHLORIDE 99 101 102   CO2 25 24 24   GLUCOSE 137* 155* 195*   BUN 37* 37* 23*   CREATININE 1.65* 1.69* 0.95   CALCIUM 8.7 8.4* 8.6                   Imaging  CT-RENAL COLIC EVALUATION(A/P W/O)   Final Result         1.  No acute abnormality.   2.  Hepatomegaly   3.  Bibasilar atelectasis   4.  Bilateral fat-containing inguinal hernias   5.  Cardiomegaly   6.  Diverticulosis   7.  Atherosclerosis and atherosclerotic coronary artery disease.      DX-CHEST-PORTABLE (1 VIEW)   Final Result      Left lung base atelectasis/possible pneumonitis or pneumonia.   Mild diffuse  interstitial opacities consistent with atelectasis/edema.      DX-PORTABLE FLUORO > 1 HOUR   Final Result      Portable fluoroscopy utilized for 19 seconds.         INTERPRETING LOCATION: [address] [facility], [place], [address]      DX-LUMBAR SPINE-2 OR 3 VIEWS   Final Result      Intraoperative fluoroscopy spot images as described above.      EC-ECHOCARDIOGRAM COMPLETE W/O CONT    (Results Pending)        Assessment/Plan  * Post-lumbar laminectomy hypoxia, dyspnea. PNA and edema on CXR- (present on admission)  Assessment & Plan  On chornic O2 of 3L at home.  Ordered and reviewed labs and CXR.  Treat PNA and pulmonary edema (below)  Resp and O2 per protocolDoubt PE given CXR findings, recent CTA Chest was negative for PE, recent chronic anticoagulation being held due to surgery.   cathy overmedicated as responded to narcan    Acute kidney injury (HCC)  Assessment & Plan  Hold metformin  Creat improved today s/p resuscitation  No hydro on CT  Avoid nephrotoxins  Renally dose medications as appropriate  Follow daily BMP and  UOP    Hyperkalemia- (present on admission)  Assessment & Plan  Resolved  Cont to follow daily    Pneumonia- (present on admission)  Assessment & Plan  Possible on CT  Resp and O2 per protocol  Ordered antibiotics  Fpollow procalcitonin    Pulmonary edema- (present on admission)  Assessment & Plan  Seems to be chronic  Mild  DC IVFs  Consider low dose loop diuretic dependent on how she does over next few days    Hypotension- (present on admission)  Assessment & Plan  Pt has responded to narcan and IVFs  Dc IVFs now  Cont to follow closely    Acute on chronic systolic (congestive) heart failure (HCC)- (present on admission)  Assessment & Plan  compensated  Ordered echo  Telemetry, trend troponins, EKG, BNP  Sees Dr. Steven in the outpatient    PAF (paroxysmal atrial fibrillation) (HCC)- (present on admission)  Assessment & Plan  Stable  Anticoagulation held  Cont Dronedarone    Chronic anticoagulation-  (present on admission)  Assessment & Plan  Held because of Surgery  Discuss with Neurosurgery when to restart    Dyslipidemia- (present on admission)  Assessment & Plan  statin    Chronic obstructive pulmonary disease (HCC)- (present on admission)  Assessment & Plan  Resp and O2 per protocol    Essential hypertension- (present on admission)  Assessment & Plan  Resume home lisinopril as pressures dictate    Type 2 diabetes mellitus with hyperglycemia, without long-term current use of insulin (HCC)- (present on admission)  Assessment & Plan  Was not on insulin.   Ordered sliding scale insulin       VTE prophylaxis: none due to recent spine surgery

## 2019-09-01 NOTE — ASSESSMENT & PLAN NOTE
Appears to be related to intravascular volume depletion rather than overload and heart failure.    Improved following crystalloid resuscitation  Sepsis unlikely as patient is afebrile, normal white blood cell count and no source.  Low threshold to begin antibiotics

## 2019-09-01 NOTE — PROGRESS NOTES
"Critical Care Progress Note    Date of admission  8/28/2019    Chief Complaint  79 y.o. female admitted 8/28/2019 for [facility] Course    \"79 y.o. female with past medical history of asthma, COPD, diabetes, hypothyroidism, emphysema, GERD, hypertension, hypercholesterolemia, CVA who presented 8/28/2019 for elective TLIF with Dr. Cabrales.  Postsurgically was doing well until 8/30/2019 when she became hypoxic and the hospitalist was consulted.  On the morning of 8/31/2019 the patient became confused, hypotensive and worsened hypoxia; the patient was given 0.4 mg of Narcan with good improvement in her symptoms.  This afternoon the patient became more confused and hypotensive she was given 500 mL of IVF with improvement in her hemodynamic.  Upon transfer the ICU her systolic blood pressure has again dropped to the 70s.  She is relatively asymptomatic with this and can carry conversations however is slightly.  At this time her only complaint is back pain.  She denies any lower extremity weakness, numbness, tingling, abdominal pain, diarrhea, fever or chills.\"      Interval Problem Update  Reviewed last 24 hour events:   - NAEON   - Neuro: AOx4   - HR: 90s-110s   - SBP: 70s-160s   - GI: tolerating diet   - UOP: 1.8L/24h   - Montanez: yes   - Tm: afeb   - Lines: PIV   - PPx: GI prilosec, DVT anemia   - 2L   - CXR (personally reviewed and compared to prior): no new    Review of Systems  Review of Systems   Constitutional: Negative for chills and fever.   Respiratory: Negative for cough, sputum production, shortness of breath and stridor.    Cardiovascular: Negative for chest pain.   Gastrointestinal: Negative for abdominal pain, nausea and vomiting.   Genitourinary: Negative for dysuria.   Musculoskeletal: Positive for back pain. Negative for myalgias.   Skin: Negative for rash.   Neurological: Negative for dizziness, sensory change and focal weakness.        Vital Signs for last 24 hours   Temp:  [35.8 °C (96.4 °F)-36.9 " °C (98.4 °F)] 36.9 °C (98.4 °F)  Pulse:  [] 115  Resp:  [8-32] 22  BP: ()/(43-79) 149/70  SpO2:  [92 %-97 %] 95 %    Hemodynamic parameters for last 24 hours       Respiratory Information for the last 24 hours       Physical Exam   Physical Exam   Constitutional: She is oriented to person, place, and time. She appears well-developed and well-nourished.   Chronically ill appearing   HENT:   Head: Normocephalic and atraumatic.   Right Ear: External ear normal.   Left Ear: External ear normal.   Nose: Nose normal.   Eyes: Pupils are equal, round, and reactive to light. Conjunctivae are normal.   Neck: Neck supple. No JVD present. No tracheal deviation present.   Cardiovascular: Normal rate, regular rhythm and intact distal pulses.   Pulmonary/Chest: Breath sounds normal. No accessory muscle usage. No respiratory distress.   Abdominal: Soft. Bowel sounds are normal. She exhibits no distension. There is no tenderness.   obese   Musculoskeletal: Normal range of motion. She exhibits no tenderness or deformity.   Neurological: She is alert and oriented to person, place, and time. She exhibits normal muscle tone. Coordination normal.   Sensation and motor intact   Skin: Skin is warm and dry. No rash noted.   Psychiatric: She has a normal mood and affect. Her behavior is normal.   Nursing note and vitals reviewed.      Medications  Current Facility-Administered Medications   Medication Dose Route Frequency Provider Last Rate Last Dose   • acetaminophen (TYLENOL) tablet 650 mg  650 mg Oral Q4HRS PRN Yuliana Gamble M.D.   650 mg at 08/31/19 1509   • cyclobenzaprine (FLEXERIL) tablet 10 mg  10 mg Oral TID PRN Yuliana Gamble M.D.       • tramadol (ULTRAM) 50 MG tablet 50 mg  50 mg Oral Q6HRS PRN Yuliana Gamble M.D.   50 mg at 08/31/19 1947   • morphine (pf) 4 mg/ml injection 2 mg  2 mg Intravenous Q4HRS PRN Yuliana Gamble M.D.       • cefTRIAXone (ROCEPHIN) 2 g in  mL IVPB  2 g Intravenous Q24HRS Fred RUCKER  MARVA Gorves   Stopped at 08/31/19 2001   • HYDROcodone-acetaminophen (NORCO) 5-325 MG per tablet 1-2 Tab  1-2 Tab Oral Q6HRS PRN Lance García Jr., D.O.   2 Tab at 09/01/19 0743   • hydrocortisone sodium succinate PF (SOLU-CORTEF) 100 MG injection 50 mg  50 mg Intravenous Q6HRS Lance García Jr. D.O.   50 mg at 09/01/19 0456   • benzocaine-menthol (CEPACOL) lozenge 1 Lozenge  1 Lozenge Mouth/Throat Q2HRS PRN Naomy Salvador, A.P.N.       • doxycycline monohydrate (ADOXA) tablet 100 mg  100 mg Oral Q12HRS Fred Groves M.D.   100 mg at 09/01/19 0456   • lactobacillus rhamnosus (CULTURELLE) capsule 1 Cap  1 Cap Oral QDAY with Breakfast Fred Groves M.D.   1 Cap at 09/01/19 0743   • insulin regular (HUMULIN R) injection 1-6 Units  1-6 Units Subcutaneous 4X/DAY ACHS Fred Groves M.D.   1 Units at 09/01/19 0743    And   • glucose 4 g chewable tablet 16 g  16 g Oral Q15 MIN PRN Fred Groves M.D.        And   • DEXTROSE 10% BOLUS 250 mL  250 mL Intravenous Q15 MIN PRN Fred Groves M.D.       • diphenhydrAMINE (BENADRYL) tablet/capsule 25 mg  25 mg Oral Q6HRS PRN Shannon Salmeron P.A.-C.        Or   • diphenhydrAMINE (BENADRYL) injection 25 mg  25 mg Intravenous Q6HRS PRN Shannon Salmeron, P.A.-C.       • albuterol inhaler 1 Puff  1 Puff Inhalation Q6HRS PRN Naomy Salvador, A.P.N.       • budesonide-formoterol (SYMBICORT) 160-4.5 MCG/ACT inhaler 2 Puff  2 Puff Inhalation BID Naomy Salvador, A.P.N.   2 Puff at 09/01/19 0743   • dronedarone (MULTAQ) tablet 400 mg  400 mg Oral BID WITH MEALS Naomy Salvador, A.P.N.   400 mg at 09/01/19 0743   • gabapentin (NEURONTIN) capsule 300 mg  300 mg Oral BID Naomy Salvador, A.P.N.   300 mg at 09/01/19 0456   • levothyroxine (SYNTHROID) tablet 75 mcg  75 mcg Oral AM ES Naomy Salvador, A.P.N.   75 mcg at 09/01/19 0456   • omeprazole (PRILOSEC) capsule 40 mg  40 mg Oral DAILY Naomy  [place], A.P.N.   40 mg at 09/01/19 0456   • simvastatin (ZOCOR) tablet 40 mg  40 mg Oral DAILY Naomy Salvador, A.P.N.   40 mg at 09/01/19 0456   • traZODone (DESYREL) tablet 100 mg  100 mg Oral Nightly Naomy Salvador, A.P.N.   100 mg at 08/31/19 1934   • MD ALERT...DO NOT ADMINISTER NSAIDS or ASPIRIN unless ORDERED By Neurosurgery 1 Each  1 Each Other PRN Naomy Salvador, A.P.N.       • docusate sodium (COLACE) capsule 100 mg  100 mg Oral BID Naomy Salvador, A.P.N.   100 mg at 09/01/19 0456   • senna-docusate (PERICOLACE or SENOKOT S) 8.6-50 MG per tablet 1 Tab  1 Tab Oral Nightly Naomy Salvador A.P.N.   1 Tab at 08/31/19 1931   • senna-docusate (PERICOLACE or SENOKOT S) 8.6-50 MG per tablet 1 Tab  1 Tab Oral Q24HRS PRN Naomy Salvador, A.P.N.       • polyethylene glycol/lytes (MIRALAX) PACKET 1 Packet  1 Packet Oral BID PRN Naomy Salvador, A.P.N.   1 Packet at 09/01/19 0456   • magnesium hydroxide (MILK OF MAGNESIA) suspension 30 mL  30 mL Oral QDAY PRN Naomy Salvador, A.P.N.       • bisacodyl (DULCOLAX) suppository 10 mg  10 mg Rectal Q24HRS PRN Naomy Salvador, A.P.N.       • Respiratory Care per Protocol   Nebulization Continuous RT Naomy Salvador, A.P.N.       • ondansetron (ZOFRAN) syringe/vial injection 4 mg  4 mg Intravenous Q4HRS PRN Naomy Salvador, A.P.N.   4 mg at 08/30/19 1239   • ondansetron (ZOFRAN ODT) dispertab 4 mg  4 mg Oral Q4HRS PRN Naomy Salvador, A.P.N.       • hydrALAZINE (APRESOLINE) injection 10 mg  10 mg Intravenous Q HOUR PRN Naomy Salvador A.P.N.       • benzocaine-menthol (CEPACOL) lozenge 1 Lozenge  1 Lozenge Mouth/Throat Q2HRS PRN SANDOVAL Toscano.P.N.   1 Lozenge at 08/29/19 4011   • calcium carbonate (TUMS) chewable tab 500 mg  500 mg Oral BID Naomy Salvador, A.P.N.   500 mg at 09/01/19 6799        Fluids    Intake/Output Summary (Last 24 hours) at 9/1/2019 0838  Last data filed at 9/1/2019 0800  Gross per 24 hour   Intake 1971.67 ml   Output 2000 ml   Net -28.33 ml       Laboratory  Recent Labs     08/31/19  0320   EEHCD53Q 7.36*   [id number] 36.9   [id number] 69.4   NMPE2DQJ 92.4*   ARTHCO3 20   [id number] 4.0   ARTBE -5*         Recent Labs     08/30/19 2354 08/31/19 0320 09/01/19  0337   SODIUM 130* 133* 135   POTASSIUM 5.2 5.6* 5.0   CHLORIDE 99 101 102   CO2 25 24 24   BUN 37* 37* 23*   CREATININE 1.65* 1.69* 0.95   PHOSPHORUS 4.6*  --   --    CALCIUM 8.7 8.4* 8.6     Recent Labs     08/30/19 2354 08/31/19 0320 09/01/19  0337   ALTSGPT 9  --  10   ASTSGOT 26  --  24   ALKPHOSPHAT 59  --  58   TBILIRUBIN 0.6  --  0.5   GLUCOSE 137* 155* 195*     Recent Labs     08/30/19  1820 08/30/19 2350 08/30/19 2354 08/31/19 0320 09/01/19  0337   WBC 13.3* 10.9*  --  10.0 8.9   NEUTSPOLYS 81.30* 79.30*  --  78.90*  --    LYMPHOCYTES 7.80* 9.50*  --  10.20*  --    MONOCYTES 9.70 9.60  --  9.50  --    EOSINOPHILS 0.50 0.80  --  0.60  --    BASOPHILS 0.30 0.30  --  0.30  --    ASTSGOT  --   --  26  --  24   ALTSGPT  --   --  9  --  10   ALKPHOSPHAT  --   --  59  --  58   TBILIRUBIN  --   --  0.6  --  0.5     Recent Labs     08/30/19  2350 08/31/19 0320 08/31/19  1645 08/31/19  1900 09/01/19  0337   RBC 2.95* 2.91*  --   --  3.12*   HEMOGLOBIN 8.6* 8.3* 7.7* 8.1* 9.1*   HEMATOCRIT 28.9* 28.2*  --   --  29.4*   PLATELETCT 161* 165  --   --  159*       Imaging  no new images    Assessment/Plan  Acute kidney injury (HCC)  Assessment & Plan  Renal dose meds, avoid nephrotoxins  Strict I/Os  Follow renal function    Hyperkalemia- (present on admission)  Assessment & Plan  Recheck  Consider medical treatment if.  Recheck potassium is elevated    Pneumonia- (present on admission)  Assessment & Plan  Possible  Continue Rocephin and doxycycline until procalcitonin improves    Pulmonary edema- (present on  admission)  Assessment & Plan  Mild, continue IVF if needed  Echo: Likely normal left ventricular systolic function.  Left ventricular ejection fraction is visually estimated to be 50%. No evidence of valvular abnormality based on blind Doppler evaluation.     Anemia  Assessment & Plan  Improved with transfusion  Continue to monitor and transfuse for hemoglobin less than 7; or less than 8 if hemodynamically unstable    Hypotension- (present on admission)  Assessment & Plan  Appears to be related to intravascular volume depletion rather than overload and heart failure.    Improved following crystalloid resuscitation  Sepsis unlikely as patient is afebrile, normal white blood cell count and no source.  Low threshold to begin antibiotics    Acute on chronic systolic (congestive) heart failure (HCC)- (present on admission)  Assessment & Plan  On [place] in May 2019: LVEF 55%  Judicious IV fluid use, currently fluid resuscitation    PAF (paroxysmal atrial fibrillation) ([facility])- (present on admission)  Assessment & Plan  Chronic, on Multaq and Xarelto at home  Hold Xarelto  Continue multiple    Chronic anticoagulation- (present on admission)  Assessment & Plan  Restart Xarelto when okay with neurosurgery and anemia improved    Dyslipidemia- (present on admission)  Assessment & Plan  Continue statin    Chronic obstructive pulmonary disease (HCC)- (present on admission)  Assessment & Plan  RT/O2 Protocols  Titrate supplemental FiO2 to maintain SpO2 >88%  Continue Symbicort  Monitor for the need to start IV steroids, currently not in exacerbation    Essential hypertension- (present on admission)  Assessment & Plan  Hold antihypertensives due to current hypotension    Type 2 diabetes mellitus with hyperglycemia, without long-term current use of insulin ([facility])- (present on admission)  Assessment & Plan  Goal blood glucose 120-180  sliding scale insulin, accuchecks  hypoglycemia protocol  A1c 6.9       VTE:  pending neurosx ok  Ulcer:  Not Indicated  Lines: Montanez Catheter  Ongoing indication addressed    I have performed a physical exam and reviewed and updated ROS and Plan today (9/1/2019). In review of yesterday's note (8/31/2019), there are no changes except as documented above.     Discussed patient condition and risk of morbidity and/or mortality with Hospitalist, RN, RT, Pharmacy, Code status disscussed, Charge nurse / hot rounds, Patient and neurosurgery

## 2019-09-01 NOTE — CONSULTS
Critical Care Consultation    Date of consult: 2019    Referring Physician  Ivone Rg M.D.    Reason for Consultation  Hypertension, status    History of Presenting Illness  79 y.o. female with past medical history of asthma, COPD, diabetes, hypothyroidism, emphysema, GERD, hypertension, hypercholesterolemia, CVA who presented 2019 for elective TLIF with Dr. Cabrales.  Postsurgically was doing well until 2019 when she became hypoxic and the hospitalist was consulted.  On the morning of 2019 the patient became confused, hypotensive and worsened hypoxia; the patient was given 0.4 mg of Narcan with good improvement in her symptoms.  This afternoon the patient became more confused and hypotensive she was given 500 mL of IVF with improvement in her hemodynamic.  Upon transfer the ICU her systolic blood pressure has again dropped to the 70s.  She is relatively asymptomatic with this and can carry conversations however is slightly.  At this time her only complaint is back pain.  She denies any lower extremity weakness, numbness, tingling, abdominal pain, diarrhea, fever or chills.    Code Status  Full Code    Review of Systems  ROS    Past Medical History   has a past medical history of Anemia, Arthritis, Asthma, Castro esophagus, Bronchitis, Chronic anticoagulation (2019), COPD, COPD (chronic obstructive pulmonary disease) ([facility]), Dental disorder, Diabetes, Disorder of thyroid, Emphysema of lung ([facility]), Encounter for medication refill (2018), GERD (gastroesophageal reflux disease), Heart burn, Hemorrhagic disorder, High cholesterol, Hypertension, Indigestion, Pain, Pneumonia (.), Stroke (HCC) (), and UTI (urinary tract infection). She also has no past medical history of Breast cancer ([facility]).    Surgical History   has a past surgical history that includes gastroscopy-endo (2014); tonsillectomy (1957); pr anesth, delivery only; shoulder decompression arthroscopic (Left,  2/18/2016); shoulder decompression arthroscopic (Left, 10/11/2016); clavicle resection (Left, 10/11/2016); chondroplasty (Left, 10/11/2016); lumbar fusion posterior (N/A, 8/28/2019); and lumbar decompression (N/A, 8/28/2019).    Family History  family history includes Arrythmia in her sister; Bipolar disorder in her son, son, and son; Cancer in her maternal aunt, mother, and sister; Heart Attack in her father; Hyperlipidemia in her father; Hypertension in her father; Lung Disease in her mother and sister; Stroke in her father.    Social History   reports that she quit smoking about 6 years ago. Her smoking use included cigarettes. She has a 26.00 pack-year smoking history. She has never used smokeless tobacco. She reports that she drinks about 8.4 oz of alcohol per week. She reports that she does not use drugs.    Medications  Home Medications     Reviewed by Tita Esquivel R.N. (Registered Nurse) on 08/28/19 at 0922  Med List Status: Complete   Medication Last Dose Status   acetaminophen (TYLENOL) 500 MG Tab 8/27/2019 Active   albuterol 108 (90 Base) MCG/ACT Aero Soln inhalation aerosol > 4 days Active   budesonide-formoterol (SYMBICORT) 160-4.5 MCG/ACT Aerosol 8/28/2019 Active   dronedarone (MULTAQ) 400 MG Tab 8/26/2019 Active   gabapentin (NEURONTIN) 300 MG Cap 8/26/2019 Active   levothyroxine (SYNTHROID) 75 MCG Tab 8/26/2019 Active   liraglutide (VICTOZA) 18 MG/3ML Solution Pen-injector injection 8/26/2019 Active   lisinopril (PRINIVIL) 20 MG Tab 8/27/2019 Active   metformin (GLUCOPHAGE) 1000 MG tablet 8/26/2019 Active   omeprazole (PRILOSEC) 40 MG delayed-release capsule >1 week Active   rivaroxaban (XARELTO) 15 MG Tab tablet > 2 weeks Active   simvastatin (ZOCOR) 40 MG Tab 8/26/2019 Active   traZODone (DESYREL) 100 MG Tab 8/26/2019 Active              Current Facility-Administered Medications   Medication Dose Route Frequency Provider Last Rate Last Dose   • acetaminophen (TYLENOL) tablet 650 mg  650 mg Oral  Q4HRS PRN Yuliana Gamble M.D.   650 mg at 08/31/19 1509   • cyclobenzaprine (FLEXERIL) tablet 10 mg  10 mg Oral TID PRN Yuliana Gamble M.D.       • tramadol (ULTRAM) 50 MG tablet 50 mg  50 mg Oral Q6HRS PRN Yuliana Gamble M.D.   50 mg at 08/31/19 1947   • morphine (pf) 4 mg/ml injection 2 mg  2 mg Intravenous Q4HRS PRN Yuliana Gamble M.D.       • cefTRIAXone (ROCEPHIN) 2 g in  mL IVPB  2 g Intravenous Q24HRS Fred Groves M.D.   Stopped at 08/31/19 2001   • HYDROcodone-acetaminophen (NORCO) 5-325 MG per tablet 1-2 Tab  1-2 Tab Oral Q6HRS PRN Lance García Jr., D.O.       • hydrocortisone sodium succinate PF (SOLU-CORTEF) 100 MG injection 50 mg  50 mg Intravenous Q6HRS Lance García Jr., D.O.   50 mg at 08/31/19 1931   • benzocaine-menthol (CEPACOL) lozenge 1 Lozenge  1 Lozenge Mouth/Throat Q2HRS PRN Naomy Salvador, A.P.N.       • doxycycline monohydrate (ADOXA) tablet 100 mg  100 mg Oral Q12HRS Fred Groves M.D.   100 mg at 08/31/19 1737   • lactobacillus rhamnosus (CULTURELLE) capsule 1 Cap  1 Cap Oral QDAY with Breakfast Fred Groves M.D.       • insulin regular (HUMULIN R) injection 1-6 Units  1-6 Units Subcutaneous 4X/DAY ACHS Fred Groves M.D.   1 Units at 08/31/19 2059    And   • glucose 4 g chewable tablet 16 g  16 g Oral Q15 MIN PRN Fred Groves M.D.        And   • DEXTROSE 10% BOLUS 250 mL  250 mL Intravenous Q15 MIN PRN Fred Groves M.D.       • diphenhydrAMINE (BENADRYL) tablet/capsule 25 mg  25 mg Oral Q6HRS PRN Shannonella Salmeron P.A.-C.        Or   • diphenhydrAMINE (BENADRYL) injection 25 mg  25 mg Intravenous Q6HRS PRN Shannon Salmeron P.A.-C.       • albuterol inhaler 1 Puff  1 Puff Inhalation Q6HRS PRN SANDOVAL Toscano.P.N.       • budesonide-formoterol (SYMBICORT) 160-4.5 MCG/ACT inhaler 2 Puff  2 Puff Inhalation BID Naomy Salvador, A.P.N.   2 Puff at 08/31/19 8591   • dronedarone (MULTAQ) tablet 400 mg  400 mg Oral BID  WITH MEALS Naomy Salvador, A.P.N.   400 mg at 08/31/19 1737   • gabapentin (NEURONTIN) capsule 300 mg  300 mg Oral BID Naomy Salvador, A.P.N.   300 mg at 08/31/19 1737   • levothyroxine (SYNTHROID) tablet 75 mcg  75 mcg Oral AM ES Naomy Salvador, A.P.N.   75 mcg at 08/31/19 0954   • omeprazole (PRILOSEC) capsule 40 mg  40 mg Oral DAILY Naomy Salvador, A.P.N.   40 mg at 08/31/19 0954   • simvastatin (ZOCOR) tablet 40 mg  40 mg Oral DAILY Naomy Salvador, A.P.N.   40 mg at 08/31/19 0954   • traZODone (DESYREL) tablet 100 mg  100 mg Oral Nightly Naomy Salvador, A.P.N.   100 mg at 08/31/19 1934   • MD ALERT...DO NOT ADMINISTER NSAIDS or ASPIRIN unless ORDERED By Neurosurgery 1 Each  1 Each Other PRN Naomy Salvador, A.P.N.       • docusate sodium (COLACE) capsule 100 mg  100 mg Oral BID Naomy Salvador, A.P.N.   100 mg at 08/31/19 1737   • senna-docusate (PERICOLACE or SENOKOT S) 8.6-50 MG per tablet 1 Tab  1 Tab Oral Nightly Naomy Salvador, A.P.N.   1 Tab at 08/31/19 1931   • senna-docusate (PERICOLACE or SENOKOT S) 8.6-50 MG per tablet 1 Tab  1 Tab Oral Q24HRS PRN Naomy Salvador, A.P.N.       • polyethylene glycol/lytes (MIRALAX) PACKET 1 Packet  1 Packet Oral BID PRN Naomy Salvador, A.P.N.       • magnesium hydroxide (MILK OF MAGNESIA) suspension 30 mL  30 mL Oral QDAY PRN Naomy Salvador, A.P.N.       • bisacodyl (DULCOLAX) suppository 10 mg  10 mg Rectal Q24HRS PRN Naomy Salvador, A.P.N.       • Respiratory Care per Protocol   Nebulization Continuous RT Naomy Salvador, A.P.N.       • ondansetron (ZOFRAN) syringe/vial injection 4 mg  4 mg Intravenous Q4HRS PRN Naomy Salvador, A.P.N.   4 mg at 08/30/19 1239   • ondansetron (ZOFRAN ODT) dispertab 4 mg  4 mg Oral Q4HRS PRN Naomy Salvador, A.P.N.       • hydrALAZINE (APRESOLINE) injection 10 mg  10 mg  Intravenous Q HOUR PRN Naomy Salvador, A.P.N.       • benzocaine-menthol (CEPACOL) lozenge 1 Lozenge  1 Lozenge Mouth/Throat Q2HRS PRN Naomy Salvador, A.P.N.   1 Lozenge at 08/29/19 1355   • calcium carbonate (TUMS) chewable tab 500 mg  500 mg Oral BID Naomy Salvador, A.P.N.   500 mg at 08/31/19 1935       Allergies  Allergies   Allergen Reactions   • Bactrim Hives   • Demerol Shortness of Breath and Swelling   • Pcn [Penicillins] Hives     Tolerates ceftriaxone and cefazolin   • Sulfa Drugs Hives   • Ferrous Sulfate Rash     itching   • Latex Contact Dermatitis       Vital Signs last 24 hours  Temp:  [36.2 °C (97.2 °F)-37.9 °C (100.2 °F)] 36.6 °C (97.8 °F)  Pulse:  [] 110  Resp:  [8-25] 13  BP: ()/(38-75) 102/54  SpO2:  [88 %-97 %] 92 %    Physical Exam  Physical Exam   Constitutional: She is oriented to person, place, and time. She appears well-developed and well-nourished. No distress.   Chronically ill-appearing   HENT:   Head: Normocephalic and atraumatic.   Right Ear: External ear normal.   Left Ear: External ear normal.   Nose: Nose normal.   Eyes: Pupils are equal, round, and reactive to light. Conjunctivae are normal.   Neck: Neck supple. No JVD present. No tracheal deviation present.   Cardiovascular: Normal rate, regular rhythm and intact distal pulses.   Pulmonary/Chest: Breath sounds normal. No accessory muscle usage. No respiratory distress.   Abdominal: Soft. Bowel sounds are normal. She exhibits no distension. There is no tenderness.   Obese   Musculoskeletal: Normal range of motion. She exhibits no tenderness or deformity.   Neurological: She is alert and oriented to person, place, and time. She exhibits abnormal muscle tone (generalized weakness). Coordination normal.   Confused   Skin: Skin is warm and dry. No rash noted.   Psychiatric: She has a normal mood and affect. Her behavior is normal.   Nursing note and vitals  reviewed.      Fluids    Intake/Output Summary (Last 24 hours) at 2019  Last data filed at 2019  Gross per 24 hour   Intake 1400 ml   Output 1125 ml   Net 275 ml       Laboratory  Recent Results (from the past 48 hour(s))   Basic Metabolic Panel (BMP)    Collection Time: 19  3:13 AM   Result Value Ref Range    Sodium 132 (L) 135 - 145 mmol/L    Potassium 5.5 3.6 - 5.5 mmol/L    Chloride 102 96 - 112 mmol/L    Co2 25 20 - 33 mmol/L    Glucose 163 (H) 65 - 99 mg/dL    Bun 28 (H) 8 - 22 mg/dL    Creatinine 1.37 0.50 - 1.40 mg/dL    Calcium 8.7 8.5 - 10.5 mg/dL    Anion Gap 5.0 0.0 - 11.9   CBC without Differential    Collection Time: 19  3:13 AM   Result Value Ref Range    WBC 11.0 (H) 4.8 - 10.8 K/uL    RBC 3.23 (L) 4.20 - 5.40 M/uL    Hemoglobin 9.4 (L) 12.0 - 16.0 g/dL    Hematocrit 31.5 (L) 37.0 - 47.0 %    MCV 97.5 81.4 - 97.8 fL    MCH 29.1 27.0 - 33.0 pg    MCHC 29.8 (L) 33.6 - 35.0 g/dL    RDW 63.3 (H) 35.9 - 50.0 fL    Platelet Count 162 (L) 164 - 446 K/uL    MPV 10.8 9.0 - 12.9 fL   ESTIMATED GFR    Collection Time: 19  3:13 AM   Result Value Ref Range    GFR If African American 45 (A) >60 mL/min/1.73 m 2    GFR If Non  37 (A) >60 mL/min/1.73 m 2   ACCU-CHEK GLUCOSE    Collection Time: 19  5:01 PM   Result Value Ref Range    Glucose - Accu-Ck 143 (H) 65 - 99 mg/dL   EKG    Collection Time: 19  6:16 PM   Result Value Ref Range    Report       [facility]    Test Date:  2019  Pt Name:    MARILIN MCKEON                   Department: [facility]  MRN:        [id number]                      Room:       T823  Gender:     Female                       Technician: [facility]  :        [id number]                   Requested By:DERRICK DIAZ  Order #:    [id number]                    Reading MD: Lance Steven MD    Measurements  Intervals                                Axis  Rate:       112                          P:  NC:                                       QRS:        12  QRSD:       99                           T:          55  QT:         325  QTc:        444    Interpretive Statements  ATRIAL FIBRILLATION  LOW VOLTAGE, PRECORDIAL LEADS  NONSPECIFIC ST-T ABNORMALITIES  Compared to ECG 08/14/2019 16:00:00  Low QRS voltage now present  Sinus rhythm no longer present      Electronically Signed On 8- 9:34:28 PDT by Lance Steven MD     CBC WITH DIFFERENTIAL    Collection Time: 08/30/19  6:20 PM   Result Value Ref Range    WBC 13.3 (H) 4.8 - 10.8 K/uL    RBC 3.12 (L) 4.20 - 5.40 M/uL    Hemoglobin 9.0 (L) 12.0 - 16.0 g/dL    Hematocrit 30.8 (L) 37.0 - 47.0 %    MCV 98.7 (H) 81.4 - 97.8 fL    MCH 28.8 27.0 - 33.0 pg    MCHC 29.2 (L) 33.6 - 35.0 g/dL    RDW 64.1 (H) 35.9 - 50.0 fL    Platelet Count 169 164 - 446 K/uL    MPV 10.9 9.0 - 12.9 fL    Neutrophils-Polys 81.30 (H) 44.00 - 72.00 %    Lymphocytes 7.80 (L) 22.00 - 41.00 %    Monocytes 9.70 0.00 - 13.40 %    Eosinophils 0.50 0.00 - 6.90 %    Basophils 0.30 0.00 - 1.80 %    Immature Granulocytes 0.40 0.00 - 0.90 %    Nucleated RBC 0.00 /100 WBC    Neutrophils (Absolute) 10.78 (H) 2.00 - 7.15 K/uL    Lymphs (Absolute) 1.03 1.00 - 4.80 K/uL    Monos (Absolute) 1.29 (H) 0.00 - 0.85 K/uL    Eos (Absolute) 0.07 0.00 - 0.51 K/uL    Baso (Absolute) 0.04 0.00 - 0.12 K/uL    Immature Granulocytes (abs) 0.05 0.00 - 0.11 K/uL    NRBC (Absolute) 0.00 K/uL    Anisocytosis 1+     Macrocytosis 1+    Basic Metabolic Panel    Collection Time: 08/30/19  6:20 PM   Result Value Ref Range    Sodium 130 (L) 135 - 145 mmol/L    Potassium 6.1 (H) 3.6 - 5.5 mmol/L    Chloride 99 96 - 112 mmol/L    Co2 23 20 - 33 mmol/L    Glucose 158 (H) 65 - 99 mg/dL    Bun 36 (H) 8 - 22 mg/dL    Creatinine 1.86 (H) 0.50 - 1.40 mg/dL    Calcium 8.6 8.5 - 10.5 mg/dL    Anion Gap 8.0 0.0 - 11.9   PERIPHERAL SMEAR REVIEW    Collection Time: 08/30/19  6:20 PM   Result Value Ref Range    Peripheral Smear Review see below    MORPHOLOGY    Collection  Time: 08/30/19  6:20 PM   Result Value Ref Range    RBC Morphology Present     Polychromia 1+    PLATELET ESTIMATE    Collection Time: 08/30/19  6:20 PM   Result Value Ref Range    Plt Estimation Normal    DIFFERENTIAL COMMENT    Collection Time: 08/30/19  6:20 PM   Result Value Ref Range    Comments-Diff see below    ESTIMATED GFR    Collection Time: 08/30/19  6:20 PM   Result Value Ref Range    GFR If  32 (A) >60 mL/min/1.73 m 2    GFR If Non African American 26 (A) >60 mL/min/1.73 m 2   D-Dimer    Collection Time: 08/30/19  6:32 PM   Result Value Ref Range    D-Dimer Screen 2.07 (H) 0.00 - 0.50 ug/mL (FEU)   PROCALCITONIN    Collection Time: 08/30/19  6:32 PM   Result Value Ref Range    Procalcitonin 1.29 (H) <0.25 ng/mL   TROPONIN    Collection Time: 08/30/19  7:40 PM   Result Value Ref Range    Troponin T 27 (H) 6 - 19 ng/L   Procalcitonin    Collection Time: 08/30/19  7:40 PM   Result Value Ref Range    Procalcitonin 1.39 (H) <0.25 ng/mL   proBrain Natriuretic Peptide, NT    Collection Time: 08/30/19  7:40 PM   Result Value Ref Range    NT-proBNP 725 (H) 0 - 125 pg/mL   ACCU-CHEK GLUCOSE    Collection Time: 08/30/19  8:43 PM   Result Value Ref Range    Glucose - Accu-Ck 152 (H) 65 - 99 mg/dL   ACCU-CHEK GLUCOSE    Collection Time: 08/30/19 10:01 PM   Result Value Ref Range    Glucose - Accu-Ck 128 (H) 65 - 99 mg/dL   Troponin in four (4) hours    Collection Time: 08/30/19 11:50 PM   Result Value Ref Range    Troponin T 27 (H) 6 - 19 ng/L   CBC WITH DIFFERENTIAL    Collection Time: 08/30/19 11:50 PM   Result Value Ref Range    WBC 10.9 (H) 4.8 - 10.8 K/uL    RBC 2.95 (L) 4.20 - 5.40 M/uL    Hemoglobin 8.6 (L) 12.0 - 16.0 g/dL    Hematocrit 28.9 (L) 37.0 - 47.0 %    MCV 98.0 (H) 81.4 - 97.8 fL    MCH 29.2 27.0 - 33.0 pg    MCHC 29.8 (L) 33.6 - 35.0 g/dL    RDW 63.7 (H) 35.9 - 50.0 fL    Platelet Count 161 (L) 164 - 446 K/uL    MPV 10.9 9.0 - 12.9 fL    Neutrophils-Polys 79.30 (H) 44.00 - 72.00 %     Lymphocytes 9.50 (L) 22.00 - 41.00 %    Monocytes 9.60 0.00 - 13.40 %    Eosinophils 0.80 0.00 - 6.90 %    Basophils 0.30 0.00 - 1.80 %    Immature Granulocytes 0.50 0.00 - 0.90 %    Nucleated RBC 0.00 /100 WBC    Neutrophils (Absolute) 8.62 (H) 2.00 - 7.15 K/uL    Lymphs (Absolute) 1.03 1.00 - 4.80 K/uL    Monos (Absolute) 1.04 (H) 0.00 - 0.85 K/uL    Eos (Absolute) 0.09 0.00 - 0.51 K/uL    Baso (Absolute) 0.03 0.00 - 0.12 K/uL    Immature Granulocytes (abs) 0.05 0.00 - 0.11 K/uL    NRBC (Absolute) 0.00 K/uL   Comp Metabolic Panel    Collection Time: 08/30/19 11:54 PM   Result Value Ref Range    Sodium 130 (L) 135 - 145 mmol/L    Potassium 5.2 3.6 - 5.5 mmol/L    Chloride 99 96 - 112 mmol/L    Co2 25 20 - 33 mmol/L    Anion Gap 6.0 0.0 - 11.9    Glucose 137 (H) 65 - 99 mg/dL    Bun 37 (H) 8 - 22 mg/dL    Creatinine 1.65 (H) 0.50 - 1.40 mg/dL    Calcium 8.7 8.5 - 10.5 mg/dL    AST(SGOT) 26 12 - 45 U/L    ALT(SGPT) 9 2 - 50 U/L    Alkaline Phosphatase 59 30 - 99 U/L    Total Bilirubin 0.6 0.1 - 1.5 mg/dL    Albumin 3.0 (L) 3.2 - 4.9 g/dL    Total Protein 5.7 (L) 6.0 - 8.2 g/dL    Globulin 2.7 1.9 - 3.5 g/dL    A-G Ratio 1.1 g/dL   Renal Function Panel    Collection Time: 08/30/19 11:54 PM   Result Value Ref Range    Phosphorus 4.6 (H) 2.5 - 4.5 mg/dL   ESTIMATED GFR    Collection Time: 08/30/19 11:54 PM   Result Value Ref Range    GFR If  36 (A) >60 mL/min/1.73 m 2    GFR If Non African American 30 (A) >60 mL/min/1.73 m 2   ACCU-CHEK GLUCOSE    Collection Time: 08/31/19  2:57 AM   Result Value Ref Range    Glucose - Accu-Ck 152 (H) 65 - 99 mg/dL   Basic Metabolic Panel    Collection Time: 08/31/19  3:20 AM   Result Value Ref Range    Sodium 133 (L) 135 - 145 mmol/L    Potassium 5.6 (H) 3.6 - 5.5 mmol/L    Chloride 101 96 - 112 mmol/L    Co2 24 20 - 33 mmol/L    Glucose 155 (H) 65 - 99 mg/dL    Bun 37 (H) 8 - 22 mg/dL    Creatinine 1.69 (H) 0.50 - 1.40 mg/dL    Calcium 8.4 (L) 8.5 - 10.5 mg/dL     Anion Gap 8.0 0.0 - 11.9   CBC WITH DIFFERENTIAL    Collection Time: 08/31/19  3:20 AM   Result Value Ref Range    WBC 10.0 4.8 - 10.8 K/uL    RBC 2.91 (L) 4.20 - 5.40 M/uL    Hemoglobin 8.3 (L) 12.0 - 16.0 g/dL    Hematocrit 28.2 (L) 37.0 - 47.0 %    MCV 96.9 81.4 - 97.8 fL    MCH 28.5 27.0 - 33.0 pg    MCHC 29.4 (L) 33.6 - 35.0 g/dL    RDW 63.2 (H) 35.9 - 50.0 fL    Platelet Count 165 164 - 446 K/uL    MPV 10.7 9.0 - 12.9 fL    Neutrophils-Polys 78.90 (H) 44.00 - 72.00 %    Lymphocytes 10.20 (L) 22.00 - 41.00 %    Monocytes 9.50 0.00 - 13.40 %    Eosinophils 0.60 0.00 - 6.90 %    Basophils 0.30 0.00 - 1.80 %    Immature Granulocytes 0.50 0.00 - 0.90 %    Nucleated RBC 0.00 /100 WBC    Neutrophils (Absolute) 7.91 (H) 2.00 - 7.15 K/uL    Lymphs (Absolute) 1.02 1.00 - 4.80 K/uL    Monos (Absolute) 0.95 (H) 0.00 - 0.85 K/uL    Eos (Absolute) 0.06 0.00 - 0.51 K/uL    Baso (Absolute) 0.03 0.00 - 0.12 K/uL    Immature Granulocytes (abs) 0.05 0.00 - 0.11 K/uL    NRBC (Absolute) 0.00 K/uL   TROPONIN    Collection Time: 08/31/19  3:20 AM   Result Value Ref Range    Troponin T 28 (H) 6 - 19 ng/L   ARTERIAL BLOOD GAS    Collection Time: 08/31/19  3:20 AM   Result Value Ref Range    Ph 7.36 (L) 7.40 - 7.50    Pco2 36.9 26.0 - 37.0 mmHg    Po2 69.4 64.0 - 87.0 mmHg    O2 Saturation 92.4 (L) 93.0 - 99.0 %    Hco3 20 17 - 25 mmol/L    Base Excess -5 (L) -4 - 3 mmol/L    Body Temp 36.0 Centigrade    O2 Therapy 4.0 2.0 - 10.0 L/min    Ph -TC 7.37 (L) 7.40 - 7.50    Pco2 -TC 35.3 26.0 - 37.0 mmHg    Po2 -TC 64.8 64.0 - 87.0 mmHg   LACTIC ACID    Collection Time: 08/31/19  3:20 AM   Result Value Ref Range    Lactic Acid 0.5 0.5 - 2.0 mmol/L   ESTIMATED GFR    Collection Time: 08/31/19  3:20 AM   Result Value Ref Range    GFR If African American 35 (A) >60 mL/min/1.73 m 2    GFR If Non  29 (A) >60 mL/min/1.73 m 2   COD (Adult) - Type and Crossmatch only order if transfusing RBC'S    Collection Time: 08/31/19  3:20 AM    Result Value Ref Range    ABO Grouping Only A     Rh Grouping Only POS     Antibody Screen-Cod NEG     Component R       R3                  Red Blood Cells3    [id number]   issued       08/31/19   20:27      Product Type Red Blood Cells LR Pheresis     Dispense Status issued     Unit Number (Barcoded) [id number]     Product Code (Barcoded) I0963P95     Blood Type (Barcoded) 6200    URINALYSIS    Collection Time: 08/31/19  3:59 AM   Result Value Ref Range    Color Yellow     Character Clear     Specific Gravity 1.007 <1.035    Ph 6.0 5.0 - 8.0    Glucose Negative Negative mg/dL    Ketones Negative Negative mg/dL    Protein Negative Negative mg/dL    Bilirubin Negative Negative    Urobilinogen, Urine 0.2 Negative    Nitrite Negative Negative    Leukocyte Esterase Small (A) Negative    Occult Blood Small (A) Negative    Micro Urine Req Microscopic    URINE MICROSCOPIC (W/UA)    Collection Time: 08/31/19  3:59 AM   Result Value Ref Range    WBC 5-10 (A) /hpf    RBC 2-5 (A) /hpf    Bacteria Negative None /hpf    Epithelial Cells Negative /hpf    Hyaline Cast 0-2 /lpf   ACCU-CHEK GLUCOSE    Collection Time: 08/31/19  8:52 AM   Result Value Ref Range    Glucose - Accu-Ck 147 (H) 65 - 99 mg/dL   ACCU-CHEK GLUCOSE    Collection Time: 08/31/19  1:35 PM   Result Value Ref Range    Glucose - Accu-Ck 152 (H) 65 - 99 mg/dL   HGB    Collection Time: 08/31/19  4:45 PM   Result Value Ref Range    Hemoglobin 7.7 (L) 12.0 - 16.0 g/dL   TSH    Collection Time: 08/31/19  4:45 PM   Result Value Ref Range    TSH 2.600 0.380 - 5.330 uIU/mL   CORTISOL    Collection Time: 08/31/19  4:45 PM   Result Value Ref Range    Cortisol 13.2 0.0 - 23.0 ug/dL   ACCU-CHEK GLUCOSE    Collection Time: 08/31/19  5:43 PM   Result Value Ref Range    Glucose - Accu-Ck 140 (H) 65 - 99 mg/dL   HGB    Collection Time: 08/31/19  7:00 PM   Result Value Ref Range    Hemoglobin 8.1 (L) 12.0 - 16.0 g/dL   ACCU-CHEK GLUCOSE    Collection Time: 08/31/19  8:58  PM   Result Value Ref Range    Glucose - Accu-Ck 198 (H) 65 - 99 mg/dL       Imaging  CT-RENAL COLIC EVALUATION(A/P W/O)   Final Result         1.  No acute abnormality.   2.  Hepatomegaly   3.  Bibasilar atelectasis   4.  Bilateral fat-containing inguinal hernias   5.  Cardiomegaly   6.  Diverticulosis   7.  Atherosclerosis and atherosclerotic coronary artery disease.      DX-CHEST-PORTABLE (1 VIEW)   Final Result      Left lung base atelectasis/possible pneumonitis or pneumonia.   Mild diffuse interstitial opacities consistent with atelectasis/edema.      DX-PORTABLE FLUORO > 1 HOUR   Final Result      Portable fluoroscopy utilized for 19 seconds.         INTERPRETING LOCATION: [address] [place] [address], [facility], [id number]      DX-LUMBAR SPINE-2 OR 3 VIEWS   Final Result      Intraoperative fluoroscopy spot images as described above.      EC-ECHOCARDIOGRAM COMPLETE W/O CONT    (Results Pending)       Assessment/Plan  Acute kidney injury (HCC)  Assessment & Plan  Renal dose meds, avoid nephrotoxins  Strict I/Os  Follow renal function    Hyperkalemia- (present on admission)  Assessment & Plan  Recheck  Consider medical treatment if.  Recheck potassium is elevated    Pneumonia- (present on admission)  Assessment & Plan  Possible  Continue antibiotics until procalcitonin improved    Pulmonary edema- (present on admission)  Assessment & Plan  Mild, continue IVF if needed    Anemia  Assessment & Plan  Will give 1 unit PRBCs as patient is hypotensive with downtrending hemoglobin  Trend Hg    Hypotension- (present on admission)  Assessment & Plan  Appears to be related to intravascular volume depletion rather than overload and heart failure.    Sepsis unlikely as patient is afebrile, normal white blood cell count and no source.    Acute on chronic systolic (congestive) heart failure (HCC)- (present on admission)  Assessment & Plan  On [place] in May 2019: LVEF 55%  Judicious IV fluid use, currently fluid resuscitation    PAF (paroxysmal  atrial fibrillation) ([facility])- (present on admission)  Assessment & Plan  Chronic, on Multaq and Xarelto at home  Hold Xarelto  Continue multiple    Chronic anticoagulation- (present on admission)  Assessment & Plan  Restart Xarelto when okay with neurosurgery and anemia improved    Dyslipidemia- (present on admission)  Assessment & Plan  Continue statin    Chronic obstructive pulmonary disease (HCC)- (present on admission)  Assessment & Plan  RT/O2 Protocols  Titrate supplemental FiO2 to maintain SpO2 >88%  Continue Symbicort  Monitor for the need to start IV steroids, currently not in exacerbation    Essential hypertension- (present on admission)  Assessment & Plan  Hold antihypertensives due to current hypotension    Type 2 diabetes mellitus with hyperglycemia, without long-term current use of insulin ([facility])- (present on admission)  Assessment & Plan  Goal blood glucose 120-180  sliding scale insulin, accuchecks  hypoglycemia protocol  A1c 6.9      Discussed patient condition and risk of morbidity and/or mortality with Hospitalist, RN, RT, Pharmacy, Code status disscussed, Charge nurse / hot rounds and Patient.      The patient remains critically ill.  Critical care time = 78 minutes in directly providing and coordinating critical care and extensive data review.  No time overlap and excludes procedures.

## 2019-09-01 NOTE — ASSESSMENT & PLAN NOTE
Mild, continue IVF if needed  Echo: Likely normal left ventricular systolic function.  Left ventricular ejection fraction is visually estimated to be 50%. No evidence of valvular abnormality based on blind Doppler evaluation.

## 2019-09-01 NOTE — ASSESSMENT & PLAN NOTE
RT/O2 Protocols  Titrate supplemental FiO2 to maintain SpO2 >88%  Continue Symbicort  Monitor for the need to start IV steroids, currently not in exacerbation

## 2019-09-02 LAB
ANION GAP SERPL CALC-SCNC: 6 MMOL/L (ref 0–11.9)
BASOPHILS # BLD AUTO: 0.3 % (ref 0–1.8)
BASOPHILS # BLD: 0.02 K/UL (ref 0–0.12)
BUN SERPL-MCNC: 22 MG/DL (ref 8–22)
CALCIUM SERPL-MCNC: 8.9 MG/DL (ref 8.5–10.5)
CHLORIDE SERPL-SCNC: 104 MMOL/L (ref 96–112)
CO2 SERPL-SCNC: 27 MMOL/L (ref 20–33)
CREAT SERPL-MCNC: 0.91 MG/DL (ref 0.5–1.4)
EOSINOPHIL # BLD AUTO: 0.11 K/UL (ref 0–0.51)
EOSINOPHIL NFR BLD: 1.5 % (ref 0–6.9)
ERYTHROCYTE [DISTWIDTH] IN BLOOD BY AUTOMATED COUNT: 59.5 FL (ref 35.9–50)
GLUCOSE BLD-MCNC: 120 MG/DL (ref 65–99)
GLUCOSE BLD-MCNC: 131 MG/DL (ref 65–99)
GLUCOSE BLD-MCNC: 160 MG/DL (ref 65–99)
GLUCOSE BLD-MCNC: 209 MG/DL (ref 65–99)
GLUCOSE SERPL-MCNC: 160 MG/DL (ref 65–99)
HCT VFR BLD AUTO: 30.7 % (ref 37–47)
HGB BLD-MCNC: 9.3 G/DL (ref 12–16)
IMM GRANULOCYTES # BLD AUTO: 0.04 K/UL (ref 0–0.11)
IMM GRANULOCYTES NFR BLD AUTO: 0.5 % (ref 0–0.9)
LYMPHOCYTES # BLD AUTO: 0.92 K/UL (ref 1–4.8)
LYMPHOCYTES NFR BLD: 12.6 % (ref 22–41)
MCH RBC QN AUTO: 28.3 PG (ref 27–33)
MCHC RBC AUTO-ENTMCNC: 30.3 G/DL (ref 33.6–35)
MCV RBC AUTO: 93.3 FL (ref 81.4–97.8)
MONOCYTES # BLD AUTO: 0.69 K/UL (ref 0–0.85)
MONOCYTES NFR BLD AUTO: 9.5 % (ref 0–13.4)
NEUTROPHILS # BLD AUTO: 5.5 K/UL (ref 2–7.15)
NEUTROPHILS NFR BLD: 75.6 % (ref 44–72)
NRBC # BLD AUTO: 0 K/UL
NRBC BLD-RTO: 0 /100 WBC
PLATELET # BLD AUTO: 195 K/UL (ref 164–446)
PMV BLD AUTO: 10 FL (ref 9–12.9)
POTASSIUM SERPL-SCNC: 3.9 MMOL/L (ref 3.6–5.5)
RBC # BLD AUTO: 3.29 M/UL (ref 4.2–5.4)
SODIUM SERPL-SCNC: 137 MMOL/L (ref 135–145)
WBC # BLD AUTO: 7.3 K/UL (ref 4.8–10.8)

## 2019-09-02 PROCEDURE — 700102 HCHG RX REV CODE 250 W/ 637 OVERRIDE(OP): Performed by: HOSPITALIST

## 2019-09-02 PROCEDURE — 97530 THERAPEUTIC ACTIVITIES: CPT

## 2019-09-02 PROCEDURE — 80048 BASIC METABOLIC PNL TOTAL CA: CPT

## 2019-09-02 PROCEDURE — 700102 HCHG RX REV CODE 250 W/ 637 OVERRIDE(OP): Performed by: INTERNAL MEDICINE

## 2019-09-02 PROCEDURE — 85025 COMPLETE CBC W/AUTO DIFF WBC: CPT

## 2019-09-02 PROCEDURE — A9270 NON-COVERED ITEM OR SERVICE: HCPCS | Performed by: NURSE PRACTITIONER

## 2019-09-02 PROCEDURE — 700105 HCHG RX REV CODE 258: Performed by: HOSPITALIST

## 2019-09-02 PROCEDURE — 700102 HCHG RX REV CODE 250 W/ 637 OVERRIDE(OP): Performed by: NURSE PRACTITIONER

## 2019-09-02 PROCEDURE — 82962 GLUCOSE BLOOD TEST: CPT

## 2019-09-02 PROCEDURE — 36415 COLL VENOUS BLD VENIPUNCTURE: CPT

## 2019-09-02 PROCEDURE — 770020 HCHG ROOM/CARE - TELE (206)

## 2019-09-02 PROCEDURE — 700111 HCHG RX REV CODE 636 W/ 250 OVERRIDE (IP): Performed by: HOSPITALIST

## 2019-09-02 PROCEDURE — A9270 NON-COVERED ITEM OR SERVICE: HCPCS | Performed by: INTERNAL MEDICINE

## 2019-09-02 PROCEDURE — A9270 NON-COVERED ITEM OR SERVICE: HCPCS | Performed by: HOSPITALIST

## 2019-09-02 PROCEDURE — 700112 HCHG RX REV CODE 229: Performed by: NURSE PRACTITIONER

## 2019-09-02 PROCEDURE — 97116 GAIT TRAINING THERAPY: CPT

## 2019-09-02 PROCEDURE — 99233 SBSQ HOSP IP/OBS HIGH 50: CPT | Performed by: INTERNAL MEDICINE

## 2019-09-02 PROCEDURE — 99232 SBSQ HOSP IP/OBS MODERATE 35: CPT | Performed by: HOSPITALIST

## 2019-09-02 RX ORDER — OXYCODONE HYDROCHLORIDE 5 MG/1
5-10 TABLET ORAL
Status: DISCONTINUED | OUTPATIENT
Start: 2019-09-02 | End: 2019-09-04

## 2019-09-02 RX ADMIN — ACETAMINOPHEN 650 MG: 325 TABLET, FILM COATED ORAL at 15:41

## 2019-09-02 RX ADMIN — ACETAMINOPHEN 650 MG: 325 TABLET, FILM COATED ORAL at 06:27

## 2019-09-02 RX ADMIN — CEFTRIAXONE SODIUM 2 G: 2 INJECTION, POWDER, FOR SOLUTION INTRAMUSCULAR; INTRAVENOUS at 21:27

## 2019-09-02 RX ADMIN — TRAZODONE HYDROCHLORIDE 100 MG: 100 TABLET ORAL at 21:27

## 2019-09-02 RX ADMIN — DRONEDARONE 400 MG: 400 TABLET, FILM COATED ORAL at 15:41

## 2019-09-02 RX ADMIN — SENNOSIDES, DOCUSATE SODIUM 1 TABLET: 50; 8.6 TABLET, FILM COATED ORAL at 21:27

## 2019-09-02 RX ADMIN — ANTACID TABLETS 500 MG: 500 TABLET, CHEWABLE ORAL at 06:27

## 2019-09-02 RX ADMIN — OXYCODONE HYDROCHLORIDE 10 MG: 5 TABLET ORAL at 11:27

## 2019-09-02 RX ADMIN — OXYCODONE HYDROCHLORIDE 10 MG: 5 TABLET ORAL at 23:35

## 2019-09-02 RX ADMIN — BUDESONIDE AND FORMOTEROL FUMARATE DIHYDRATE 2 PUFF: 160; 4.5 AEROSOL RESPIRATORY (INHALATION) at 15:41

## 2019-09-02 RX ADMIN — ACETAMINOPHEN 650 MG: 325 TABLET, FILM COATED ORAL at 11:27

## 2019-09-02 RX ADMIN — LEVOTHYROXINE SODIUM 75 MCG: 75 TABLET ORAL at 06:27

## 2019-09-02 RX ADMIN — Medication 1 CAPSULE: at 08:03

## 2019-09-02 RX ADMIN — GABAPENTIN 300 MG: 300 CAPSULE ORAL at 06:26

## 2019-09-02 RX ADMIN — OXYCODONE HYDROCHLORIDE 10 MG: 5 TABLET ORAL at 19:36

## 2019-09-02 RX ADMIN — OXYCODONE HYDROCHLORIDE 10 MG: 5 TABLET ORAL at 03:29

## 2019-09-02 RX ADMIN — MORPHINE SULFATE 2 MG: 4 INJECTION INTRAVENOUS at 06:25

## 2019-09-02 RX ADMIN — DOCUSATE SODIUM 100 MG: 100 CAPSULE, LIQUID FILLED ORAL at 15:41

## 2019-09-02 RX ADMIN — DOXYCYCLINE 100 MG: 100 TABLET, FILM COATED ORAL at 06:26

## 2019-09-02 RX ADMIN — DOCUSATE SODIUM 100 MG: 100 CAPSULE, LIQUID FILLED ORAL at 06:27

## 2019-09-02 RX ADMIN — INSULIN HUMAN 1 UNITS: 100 INJECTION, SOLUTION PARENTERAL at 21:33

## 2019-09-02 RX ADMIN — OMEPRAZOLE 40 MG: 20 CAPSULE, DELAYED RELEASE ORAL at 06:27

## 2019-09-02 RX ADMIN — SIMVASTATIN 40 MG: 20 TABLET, FILM COATED ORAL at 06:26

## 2019-09-02 RX ADMIN — ACETAMINOPHEN 650 MG: 325 TABLET, FILM COATED ORAL at 23:35

## 2019-09-02 RX ADMIN — OXYCODONE HYDROCHLORIDE 10 MG: 5 TABLET ORAL at 15:37

## 2019-09-02 RX ADMIN — ANTACID TABLETS 500 MG: 500 TABLET, CHEWABLE ORAL at 15:41

## 2019-09-02 RX ADMIN — DRONEDARONE 400 MG: 400 TABLET, FILM COATED ORAL at 08:04

## 2019-09-02 RX ADMIN — METFORMIN HYDROCHLORIDE 500 MG: 500 TABLET, FILM COATED ORAL at 16:52

## 2019-09-02 RX ADMIN — CYCLOBENZAPRINE HYDROCHLORIDE 10 MG: 10 TABLET, FILM COATED ORAL at 11:27

## 2019-09-02 RX ADMIN — CYCLOBENZAPRINE HYDROCHLORIDE 10 MG: 10 TABLET, FILM COATED ORAL at 21:27

## 2019-09-02 RX ADMIN — INSULIN HUMAN 2 UNITS: 100 INJECTION, SOLUTION PARENTERAL at 16:52

## 2019-09-02 RX ADMIN — GABAPENTIN 300 MG: 300 CAPSULE ORAL at 15:41

## 2019-09-02 RX ADMIN — DOXYCYCLINE 100 MG: 100 TABLET, FILM COATED ORAL at 15:41

## 2019-09-02 ASSESSMENT — GAIT ASSESSMENTS
GAIT LEVEL OF ASSIST: MINIMAL ASSIST
DEVIATION: INCREASED BASE OF SUPPORT;BRADYKINETIC;SHUFFLED GAIT
ASSISTIVE DEVICE: FRONT WHEEL WALKER
DISTANCE (FEET): 10

## 2019-09-02 ASSESSMENT — ENCOUNTER SYMPTOMS
ABDOMINAL PAIN: 0
COUGH: 0
BACK PAIN: 1
DOUBLE VISION: 0
FOCAL WEAKNESS: 0
HEADACHES: 0
FEVER: 0
MYALGIAS: 0
LOSS OF CONSCIOUSNESS: 0
SPUTUM PRODUCTION: 0
SENSORY CHANGE: 0
STRIDOR: 0
VOMITING: 0
BLURRED VISION: 0
NAUSEA: 0
CHILLS: 0
DIZZINESS: 0
PALPITATIONS: 0
SORE THROAT: 0
SHORTNESS OF BREATH: 0
DIARRHEA: 0

## 2019-09-02 ASSESSMENT — COGNITIVE AND FUNCTIONAL STATUS - GENERAL
MOVING FROM LYING ON BACK TO SITTING ON SIDE OF FLAT BED: UNABLE
STANDING UP FROM CHAIR USING ARMS: A LITTLE
MOBILITY SCORE: 10
CLIMB 3 TO 5 STEPS WITH RAILING: TOTAL
TURNING FROM BACK TO SIDE WHILE IN FLAT BAD: UNABLE
WALKING IN HOSPITAL ROOM: A LITTLE
MOVING TO AND FROM BED TO CHAIR: UNABLE
SUGGESTED CMS G CODE MODIFIER MOBILITY: CL

## 2019-09-02 NOTE — PROGRESS NOTES
Assessment completed. Pt is A/Ox4. Reports pain of 5/10, medicated per MAR. Denies numbness and tingling; nausea or vomiting. ALBIN, UE 5/5, LE 4/5. Pt is a x1 assist with FWW.     POC has been discussed and pt needs have all been met at this time. Call light, personal belongings, and bedside table are all within reach. Bed is in the lowest position and locked, and bed alarm is on.

## 2019-09-02 NOTE — PROGRESS NOTES
"Pulmonary Care Progress Note    Date of admission  8/28/2019      Hospital Course    \"79 y.o. female with past medical history of asthma, COPD, diabetes, hypothyroidism, emphysema, GERD, hypertension, hypercholesterolemia, CVA who presented 8/28/2019 for elective TLIF with Dr. Cabrales.  Postsurgically was doing well until 8/30/2019 when she became hypoxic and the hospitalist was consulted.  On the morning of 8/31/2019 the patient became confused, hypotensive and worsened hypoxia; the patient was given 0.4 mg of Narcan with good improvement in her symptoms.  This afternoon the patient became more confused and hypotensive she was given 500 mL of IVF with improvement in her hemodynamic.  Upon transfer the ICU her systolic blood pressure has again dropped to the 70s.  She is relatively asymptomatic with this and can carry conversations however is slightly.  At this time her only complaint is back pain.  She denies any lower extremity weakness, numbness, tingling, abdominal pain, diarrhea, fever or chills.\"      Interval Problem Update  Chart review from the past 24 hours includes imaging, laboratory studies, vital signs and notes available.  Pertinent data for today's visit includes hemoglobin 9.3, no leukocytosis or fever.  On room air sitting at bedside but uncomfortable with back pain.  No respiratory distress.  Ejection fraction was 65%.  RN at bedside.  Continue antibiotics, respiratory treatments.    Review of Systems  Review of Systems   Constitutional: Negative for chills and fever.   Respiratory: Negative for cough, sputum production, shortness of breath and stridor.    Cardiovascular: Negative for chest pain.   Gastrointestinal: Negative for abdominal pain, nausea and vomiting.   Genitourinary: Negative for dysuria.   Musculoskeletal: Positive for back pain. Negative for myalgias.   Skin: Negative for rash.   Neurological: Negative for dizziness, sensory change and focal weakness.   Back pain is similar to " prior, no new pattern but ongoing problem, discussed with RN and patient    Vital Signs for last 24 hours   Temp:  [36.3 °C (97.4 °F)-37 °C (98.6 °F)] 36.9 °C (98.4 °F)  Pulse:  [] 108  Resp:  [7-48] 16  BP: (113-164)/(54-98) 129/56  SpO2:  [84 %-96 %] 91 %    Hemodynamic parameters for last 24 hours       Respiratory Information for the last 24 hours       Physical Exam   Physical Exam   Constitutional: She is oriented to person, place, and time. She appears well-developed and well-nourished.   Chronically ill appearing   HENT:   Head: Normocephalic and atraumatic.   Right Ear: External ear normal.   Left Ear: External ear normal.   Nose: Nose normal.   Eyes: Pupils are equal, round, and reactive to light. Conjunctivae are normal.   Neck: Neck supple. No JVD present. No tracheal deviation present.   Cardiovascular: Normal rate, regular rhythm and intact distal pulses.   Pulmonary/Chest: Breath sounds normal. No accessory muscle usage. No respiratory distress.   Abdominal: Soft. Bowel sounds are normal. She exhibits no distension. There is no tenderness.   obese   Musculoskeletal: Normal range of motion. She exhibits no tenderness or deformity.   Neurological: She is alert and oriented to person, place, and time. She exhibits normal muscle tone. Coordination normal.   Skin: Skin is warm and dry. No rash noted.   Psychiatric: She has a normal mood and affect. Her behavior is normal.   Nursing note and vitals reviewed.  Sitting at bedside, on  room air does not have significant tachypnea or respiratory distress    Medications  Current Facility-Administered Medications   Medication Dose Route Frequency Provider Last Rate Last Dose   • oxyCODONE immediate-release (ROXICODONE) tablet 5-10 mg  5-10 mg Oral Q4HRS PRN SELWYN Dash.O.   10 mg at 09/02/19 0329   • acetaminophen (TYLENOL) tablet 650 mg  650 mg Oral Q6HRS BRUNO DashO.   Stopped at 09/01/19 1800   • acetaminophen (TYLENOL)  tablet 650 mg  650 mg Oral Q4HRS PRN Yuliana Gamble M.D.   650 mg at 09/01/19 2101   • cyclobenzaprine (FLEXERIL) tablet 10 mg  10 mg Oral TID PRN Yuliana Gamble M.D.   10 mg at 09/01/19 2100   • tramadol (ULTRAM) 50 MG tablet 50 mg  50 mg Oral Q6HRS PRN Yuliana Gamble M.D.   50 mg at 08/31/19 1947   • morphine (pf) 4 mg/ml injection 2 mg  2 mg Intravenous Q4HRS PRN Yuliana Gamble M.D.       • cefTRIAXone (ROCEPHIN) 2 g in  mL IVPB  2 g Intravenous Q24HRS Fred Groves M.D.   Stopped at 09/01/19 2129   • benzocaine-menthol (CEPACOL) lozenge 1 Lozenge  1 Lozenge Mouth/Throat Q2HRS PRN Naomy Salvador, A.P.N.       • doxycycline monohydrate (ADOXA) tablet 100 mg  100 mg Oral Q12HRS Fred Groves M.D.   100 mg at 09/01/19 1651   • lactobacillus rhamnosus (CULTURELLE) capsule 1 Cap  1 Cap Oral QDAY with Breakfast Fred Groves M.D.   1 Cap at 09/01/19 0743   • insulin regular (HUMULIN R) injection 1-6 Units  1-6 Units Subcutaneous 4X/DAY ACHS Fred Groves M.D.   3 Units at 09/01/19 2118    And   • glucose 4 g chewable tablet 16 g  16 g Oral Q15 MIN PRN rFed Groves M.D.        And   • DEXTROSE 10% BOLUS 250 mL  250 mL Intravenous Q15 MIN PRN Fred Groves M.D.       • diphenhydrAMINE (BENADRYL) tablet/capsule 25 mg  25 mg Oral Q6HRS PRN Shannon Salmeron, P.A.-C.        Or   • diphenhydrAMINE (BENADRYL) injection 25 mg  25 mg Intravenous Q6HRS PRN Shannon Salmeron, P.A.-C.       • albuterol inhaler 1 Puff  1 Puff Inhalation Q6HRS PRN Naomy Salvador, A.P.N.       • budesonide-formoterol (SYMBICORT) 160-4.5 MCG/ACT inhaler 2 Puff  2 Puff Inhalation BID Naomy Salvador A.P.N.   2 Puff at 09/01/19 1652   • dronedarone (MULTAQ) tablet 400 mg  400 mg Oral BID WITH MEALS Naomy Salvador A.P.N.   400 mg at 09/01/19 1652   • gabapentin (NEURONTIN) capsule 300 mg  300 mg Oral BID Naomy Salvador, A.P.N.   300 mg at 09/01/19 1651   •  levothyroxine (SYNTHROID) tablet 75 mcg  75 mcg Oral AM ES Naomy Salvador, A.P.N.   75 mcg at 09/01/19 0456   • omeprazole (PRILOSEC) capsule 40 mg  40 mg Oral DAILY Naomy Salvador, A.P.N.   40 mg at 09/01/19 0456   • simvastatin (ZOCOR) tablet 40 mg  40 mg Oral DAILY Naomy Salvador, A.P.N.   40 mg at 09/01/19 0456   • traZODone (DESYREL) tablet 100 mg  100 mg Oral Nightly Naomy Salvador, A.P.N.   100 mg at 09/01/19 2100   • MD ALERT...DO NOT ADMINISTER NSAIDS or ASPIRIN unless ORDERED By Neurosurgery 1 Each  1 Each Other PRN Naomy Salvador, A.P.N.       • docusate sodium (COLACE) capsule 100 mg  100 mg Oral BID Naomy Salvador, A.P.N.   100 mg at 09/01/19 0456   • senna-docusate (PERICOLACE or SENOKOT S) 8.6-50 MG per tablet 1 Tab  1 Tab Oral Nightly Naomy Salvador, A.P.N.   1 Tab at 09/01/19 2100   • senna-docusate (PERICOLACE or SENOKOT S) 8.6-50 MG per tablet 1 Tab  1 Tab Oral Q24HRS PRN Naomy Salvador, A.P.N.       • polyethylene glycol/lytes (MIRALAX) PACKET 1 Packet  1 Packet Oral BID PRN Naomy Salvador, A.P.N.   1 Packet at 09/01/19 0456   • magnesium hydroxide (MILK OF MAGNESIA) suspension 30 mL  30 mL Oral QDAY PRN Naomy Salvador, A.P.N.       • bisacodyl (DULCOLAX) suppository 10 mg  10 mg Rectal Q24HRS PRN Naomy Salvador, A.P.N.       • Respiratory Care per Protocol   Nebulization Continuous RT Naomy Salvador, A.P.N.       • ondansetron (ZOFRAN) syringe/vial injection 4 mg  4 mg Intravenous Q4HRS PRN Naomy Salvador, A.P.N.   4 mg at 08/30/19 1239   • ondansetron (ZOFRAN ODT) dispertab 4 mg  4 mg Oral Q4HRS PRN Naomy Salvador, A.P.N.       • hydrALAZINE (APRESOLINE) injection 10 mg  10 mg Intravenous Q HOUR PRN Naomy Salvador, A.P.N.       • benzocaine-menthol (CEPACOL) lozenge 1 Lozenge  1 Lozenge Mouth/Throat Q2HRS PRN Naomy  [place], A.P.N.   1 Lozenge at 08/29/19 1355   • calcium carbonate (TUMS) chewable tab 500 mg  500 mg Oral BID Naomy Salvador, A.P.N.   500 mg at 09/01/19 1652       Fluids    Intake/Output Summary (Last 24 hours) at 9/2/2019 0557  Last data filed at 9/1/2019 1900  Gross per 24 hour   Intake 240 ml   Output 1060 ml   Net -820 ml       Laboratory  Recent Labs     08/31/19 0320   XQNOD11B 7.36*   [id number] 36.9   [id number] 69.4   [id number] 92.4*   ARTHCO3 20   [id number] 4.0   ARTBE -5*         Recent Labs     08/30/19 2354 08/31/19 0320 09/01/19 0337 09/02/19  0356   SODIUM 130* 133* 135 137   POTASSIUM 5.2 5.6* 5.0 3.9   CHLORIDE 99 101 102 104   CO2 25 24 24 27   BUN 37* 37* 23* 22   CREATININE 1.65* 1.69* 0.95 0.91   PHOSPHORUS 4.6*  --   --   --    CALCIUM 8.7 8.4* 8.6 8.9     Recent Labs     08/30/19 2354 08/31/19 0320 09/01/19 0337 09/02/19  0356   ALTSGPT 9  --  10  --    ASTSGOT 26  --  24  --    ALKPHOSPHAT 59  --  58  --    TBILIRUBIN 0.6  --  0.5  --    GLUCOSE 137* 155* 195* 160*     Recent Labs     08/30/19 2350 08/30/19 2354 08/31/19 0320 09/01/19 0337 09/02/19  0356   WBC 10.9*  --  10.0 8.9 7.3   NEUTSPOLYS 79.30*  --  78.90*  --  75.60*   LYMPHOCYTES 9.50*  --  10.20*  --  12.60*   MONOCYTES 9.60  --  9.50  --  9.50   EOSINOPHILS 0.80  --  0.60  --  1.50   BASOPHILS 0.30  --  0.30  --  0.30   ASTSGOT  --  26  --  24  --    ALTSGPT  --  9  --  10  --    ALKPHOSPHAT  --  59  --  58  --    TBILIRUBIN  --  0.6  --  0.5  --      Recent Labs     08/31/19  0320  08/31/19  1900 09/01/19  0337 09/02/19  0356   RBC 2.91*  --   --  3.12* 3.29*   HEMOGLOBIN 8.3*   < > 8.1* 9.1* 9.3*   HEMATOCRIT 28.2*  --   --  29.4* 30.7*   PLATELETCT 165  --   --  159* 195    < > = values in this interval not displayed.       Imaging  no new images    Assessment/Plan  Acute kidney injury (HCC)  Assessment & Plan  Renal dose meds, avoid nephrotoxins  Strict I/Os  Follow renal  function    Hyperkalemia- (present on admission)  Assessment & Plan  Recheck  Consider medical treatment if.  Recheck potassium is elevated    Pneumonia- (present on admission)  Assessment & Plan  Possible  Continue Rocephin and doxycycline until procalcitonin improves    Pulmonary edema- (present on admission)  Assessment & Plan  Mild, continue IVF if needed  Echo: Likely normal left ventricular systolic function.  Left ventricular ejection fraction is visually estimated to be 50%. No evidence of valvular abnormality based on blind Doppler evaluation.     Anemia  Assessment & Plan  Improved with transfusion  Continue to monitor and transfuse for hemoglobin less than 7; or less than 8 if hemodynamically unstable    Hypotension- (present on admission)  Assessment & Plan  Appears to be related to intravascular volume depletion rather than overload and heart failure.    Improved following crystalloid resuscitation  Sepsis unlikely as patient is afebrile, normal white blood cell count and no source.  Low threshold to begin antibiotics    Acute on chronic systolic (congestive) heart failure (HCC)- (present on admission)  Assessment & Plan  On [place] in May 2019: LVEF 55%  Judicious IV fluid use, currently fluid resuscitation    PAF (paroxysmal atrial fibrillation) (HCC)- (present on admission)  Assessment & Plan  Chronic, on Multaq and Xarelto at home  Hold Xarelto  Continue multiple    Chronic anticoagulation- (present on admission)  Assessment & Plan  Restart Xarelto when okay with neurosurgery and anemia improved    Dyslipidemia- (present on admission)  Assessment & Plan  Continue statin    Chronic obstructive pulmonary disease (HCC)- (present on admission)  Assessment & Plan  RT/O2 Protocols  Titrate supplemental FiO2 to maintain SpO2 >88%  Continue Symbicort  Monitor for the need to start IV steroids, currently not in exacerbation    Essential hypertension- (present on admission)  Assessment & Plan  Hold  antihypertensives due to current hypotension    Type 2 diabetes mellitus with hyperglycemia, without long-term current use of insulin ([facility])- (present on admission)  Assessment & Plan  Goal blood glucose 120-180  sliding scale insulin, accuchecks  hypoglycemia protocol  A1c 6.9           I have performed a physical exam and reviewed and updated ROS and Plan today (9/2/2019). In review of yesterday's note (9/1/2019), there are no changes except as documented above.     On neurosurgical floor, stable pulmonary status, reviewed with patient and RN bedside.  To oral antibiotics soon.  Continue Symbicort and respiratory treatments    Maria Fernanda Cruz MD , FCCP, Pulmonary Service

## 2019-09-02 NOTE — PROGRESS NOTES
Hospital Medicine Daily Progress Note    Date of Service  9/2/2019    Chief Complaint  79 y.o. female admitted 8/28/2019 with back pain    Hospital Course    PMHx CHF 45%, lVH, AFib on rivaroxaban, PE, COPD, DM, Hypothyroidism, GERD, HTN, HLD, CVA.  Presented for a planned L spine laminectomy by Dr Cabrales on 8/29.  On 8/30 became increasingly SOB and hypotensive to SBPs in the 70s.  Given fluid boluses and narcan with some improvement.   It was felt she had been given too much pain medication.  Transferred to the ICU on 8/31 for hypotension.       Interval Problem Update  Pt c/o pain in her back reports it is constant.pain is less today.  Able to get up to ambulate to bathroom.   SBP stable  Tolerating po  No drips  Consultants/Specialty  Neurosurgery  CC    Code Status  full    Disposition  OK to floor    Review of Systems  Review of Systems   Constitutional: Negative for chills and fever.   HENT: Negative for nosebleeds and sore throat.    Eyes: Negative for blurred vision and double vision.   Respiratory: Negative for cough and shortness of breath.    Cardiovascular: Negative for chest pain, palpitations and leg swelling.   Gastrointestinal: Negative for abdominal pain, diarrhea, nausea and vomiting.   Genitourinary: Negative for dysuria and urgency.   Musculoskeletal: Positive for back pain.   Skin: Negative for rash.   Neurological: Negative for dizziness, sensory change, focal weakness, loss of consciousness and headaches.        Physical Exam  Temp:  [36.3 °C (97.4 °F)-37 °C (98.6 °F)] 36.9 °C (98.4 °F)  Pulse:  [] 108  Resp:  [7-48] 16  BP: (113-164)/(54-98) 129/56  SpO2:  [84 %-96 %] 91 %    Physical Exam   Constitutional: She is oriented to person, place, and time. She appears well-developed and well-nourished. No distress.   HENT:   Head: Normocephalic and atraumatic.   Nose: Nose normal.   Mouth/Throat: Oropharynx is clear and moist.   Eyes: Conjunctivae are normal. No scleral icterus.   Neck: No  JVD present.   Cardiovascular: Normal rate and regular rhythm.   Pulmonary/Chest: Effort normal. No stridor. No respiratory distress. She has no wheezes. She has no rales.   Abdominal: Soft. There is no tenderness. There is no rebound and no guarding.   Musculoskeletal: She exhibits no edema.   Neurological: She is alert and oriented to person, place, and time.   Sensation intact B legs.  Unable to get accurate strength exam as pain precludes max effort however pt has functional strength by observation   Skin: Skin is warm and dry. No rash noted. She is not diaphoretic.   Psychiatric: She has a normal mood and affect. Thought content normal.   Nursing note and vitals reviewed.      Fluids    Intake/Output Summary (Last 24 hours) at 9/2/2019 0551  Last data filed at 9/1/2019 1900  Gross per 24 hour   Intake 240 ml   Output 1060 ml   Net -820 ml       Laboratory  Recent Labs     08/31/19  0320 08/31/19 1900 09/01/19 0337 09/02/19  0356   WBC 10.0  --   --  8.9 7.3   RBC 2.91*  --   --  3.12* 3.29*   HEMOGLOBIN 8.3*   < > 8.1* 9.1* 9.3*   HEMATOCRIT 28.2*  --   --  29.4* 30.7*   MCV 96.9  --   --  94.2 93.3   MCH 28.5  --   --  29.2 28.3   MCHC 29.4*  --   --  31.0* 30.3*   RDW 63.2*  --   --  60.4* 59.5*   PLATELETCT 165  --   --  159* 195   MPV 10.7  --   --  10.7 10.0    < > = values in this interval not displayed.     Recent Labs     08/31/19  0320 09/01/19 0337 09/02/19  0356   SODIUM 133* 135 137   POTASSIUM 5.6* 5.0 3.9   CHLORIDE 101 102 104   CO2 24 24 27   GLUCOSE 155* 195* 160*   BUN 37* 23* 22   CREATININE 1.69* 0.95 0.91   CALCIUM 8.4* 8.6 8.9                   Imaging  EC-ECHOCARDIOGRAM COMPLETE W/O CONT   Final Result      CT-RENAL COLIC EVALUATION(A/P W/O)   Final Result         1.  No acute abnormality.   2.  Hepatomegaly   3.  Bibasilar atelectasis   4.  Bilateral fat-containing inguinal hernias   5.  Cardiomegaly   6.  Diverticulosis   7.  Atherosclerosis and atherosclerotic coronary artery  disease.      DX-CHEST-PORTABLE (1 VIEW)   Final Result      Left lung base atelectasis/possible pneumonitis or pneumonia.   Mild diffuse interstitial opacities consistent with atelectasis/edema.      DX-PORTABLE FLUORO > 1 HOUR   Final Result      Portable fluoroscopy utilized for 19 seconds.         INTERPRETING LOCATION: [address] [place] [address], [place], [address]      DX-LUMBAR SPINE-2 OR 3 VIEWS   Final Result      Intraoperative fluoroscopy spot images as described above.           Assessment/Plan  * Post-lumbar laminectomy hypoxia, dyspnea. PNA and edema on CXR- (present on admission)  Assessment & Plan  On chornic O2 of 3L at home.  Ordered and reviewed labs and CXR.  Treat PNA and pulmonary edema (below)  Resp and O2 per protocolDoubt PE given CXR findings, recent CTA Chest was negative for PE, recent chronic anticoagulation being held due to surgery.   cathy overmedicated as responded to narcan    Acute kidney injury (HCC)  Assessment & Plan    Creat is back to her baseline aroung 1 for GFR in upper 50's-60  No hydro on CT  Avoid nephrotoxins  Renally dose medications as appropriate  Follow daily BMP and  UOP    Hyperkalemia- (present on admission)  Assessment & Plan  Resolved  Cont to follow daily    Pneumonia- (present on admission)  Assessment & Plan  Possible on CT  Resp and O2 per protocol  Complete 5 days Doxy/Rocephin      Pulmonary edema- (present on admission)  Assessment & Plan  Seems to be chronic  Mild  DC IVFs  Consider low dose loop diuretic dependent on how she does over next few days    Hypotension- (present on admission)  Assessment & Plan  Pt has responded to narcan and IVFs  Dc IVFs now  Cont to follow closely    Acute on chronic systolic (congestive) heart failure (HCC)- (present on admission)  Assessment & Plan  compensated  LVEF unchanged to comparison studies  LVEF 50%  Telemetry, trend troponins, EKG, BNP  Sees Dr. Steven in the outpatient    PAF (paroxysmal atrial fibrillation) ([facility])- (present on  admission)  Assessment & Plan  Stable  Anticoagulation held  Cont Dronedarone    Chronic anticoagulation- (present on admission)  Assessment & Plan  Held because of Surgery  Discuss with Neurosurgery when to restart    Dyslipidemia- (present on admission)  Assessment & Plan  statin    Chronic obstructive pulmonary disease (HCC)- (present on admission)  Assessment & Plan  Resp and O2 per protocol    Essential hypertension- (present on admission)  Assessment & Plan  Resume home lisinopril as pressures dictate    Type 2 diabetes mellitus with hyperglycemia, without long-term current use of insulin ([facility])- (present on admission)  Assessment & Plan  Now that renal function has improved resume metformin  Cont SSI       VTE prophylaxis: none due to recent spine surgery

## 2019-09-02 NOTE — PROGRESS NOTES
2 RN skin assessment done completed with HARMONY Lanza    Noted surgical incision to back with Mepilex dressing, CD&I. Scratches to bilateral lower extremity (dog scratches per patient). Bruising to BUE, abdomen & left shin. NO skin breakdown noted.    Devices in place, Oxygen, SCD's &, FC, tele monitor.  Noted skin intact under devices.     No skin breakdown noted.

## 2019-09-02 NOTE — PROGRESS NOTES
Transferred to neurosurgery with RN. Report given to bedside RN. VSS and no acute issues prior to transfer. Belongings sent with pt. All policy and procedures followed per ICU protocol.

## 2019-09-02 NOTE — PROGRESS NOTES
1637 Report received from HARMONY Holloway ([facility]) over the phone.    1743 Received patient from [facility] via w/c accompanied by JOSSY RN and one female Tech. Patient ambulated to bed hand held assist. Bed alarm and SCD's initiated. Call light within reach.  Reminded patient to call for assist. Plan of care reviewed with the patient. Verbalized understanding. Dinner given.    1806 Tele monitor was placed as per order. Monitor Room was notified and verified.    1900 Patient's sitting up in bed. No changes in status. Bedside report given to Aliyah BARBOZA RN ([facility]).

## 2019-09-02 NOTE — PROGRESS NOTES
Pt lethargic after pain meds given this AM by NOC RN. Arouses to voice but falls asleep during conversation. Oriented x4. Will wait to give more pain meds when pt more alert. ALBIN 5/5. Montanez in place, patent. Dressing CDI. Reviewed poc with pt-verbalized understanding. Bed alarm in use. Call light in reach.

## 2019-09-02 NOTE — PROGRESS NOTES
Monitor summary: AFib:,  QRS:.08 with frequent PVCs, rare couplets, and rare triplets per strip from monitor room.

## 2019-09-02 NOTE — CARE PLAN
Problem: Communication  Goal: The ability to communicate needs accurately and effectively will improve  Outcome: PROGRESSING AS EXPECTED  Note:   Pt is able to effectively communicate her needs and concerns. Will continue to monitor for any additional needs and interventions.      Problem: Safety  Goal: Will remain free from injury  Outcome: PROGRESSING AS EXPECTED  Note:   Pt calls appropriately to ask for assistance. Fall precautions are in place. Will continue to monitor fall risk.

## 2019-09-03 ENCOUNTER — APPOINTMENT (OUTPATIENT)
Dept: RADIOLOGY | Facility: MEDICAL CENTER | Age: 79
DRG: 459 | End: 2019-09-03
Attending: INTERNAL MEDICINE
Payer: MEDICARE

## 2019-09-03 LAB
ANION GAP SERPL CALC-SCNC: 8 MMOL/L (ref 0–11.9)
BUN SERPL-MCNC: 25 MG/DL (ref 8–22)
CALCIUM SERPL-MCNC: 8.8 MG/DL (ref 8.5–10.5)
CHLORIDE SERPL-SCNC: 105 MMOL/L (ref 96–112)
CO2 SERPL-SCNC: 28 MMOL/L (ref 20–33)
CREAT SERPL-MCNC: 1.02 MG/DL (ref 0.5–1.4)
EKG IMPRESSION: NORMAL
GLUCOSE BLD-MCNC: 113 MG/DL (ref 65–99)
GLUCOSE BLD-MCNC: 157 MG/DL (ref 65–99)
GLUCOSE BLD-MCNC: 163 MG/DL (ref 65–99)
GLUCOSE BLD-MCNC: 164 MG/DL (ref 65–99)
GLUCOSE SERPL-MCNC: 122 MG/DL (ref 65–99)
POTASSIUM SERPL-SCNC: 4.1 MMOL/L (ref 3.6–5.5)
PROCALCITONIN SERPL-MCNC: 0.25 NG/ML
SODIUM SERPL-SCNC: 141 MMOL/L (ref 135–145)

## 2019-09-03 PROCEDURE — 97530 THERAPEUTIC ACTIVITIES: CPT

## 2019-09-03 PROCEDURE — 84145 PROCALCITONIN (PCT): CPT

## 2019-09-03 PROCEDURE — A9270 NON-COVERED ITEM OR SERVICE: HCPCS | Performed by: NURSE PRACTITIONER

## 2019-09-03 PROCEDURE — 51798 US URINE CAPACITY MEASURE: CPT

## 2019-09-03 PROCEDURE — 700102 HCHG RX REV CODE 250 W/ 637 OVERRIDE(OP): Performed by: INTERNAL MEDICINE

## 2019-09-03 PROCEDURE — 700105 HCHG RX REV CODE 258: Performed by: HOSPITALIST

## 2019-09-03 PROCEDURE — 82962 GLUCOSE BLOOD TEST: CPT | Mod: 91

## 2019-09-03 PROCEDURE — 700112 HCHG RX REV CODE 229: Performed by: NURSE PRACTITIONER

## 2019-09-03 PROCEDURE — 700111 HCHG RX REV CODE 636 W/ 250 OVERRIDE (IP): Performed by: HOSPITALIST

## 2019-09-03 PROCEDURE — 97535 SELF CARE MNGMENT TRAINING: CPT

## 2019-09-03 PROCEDURE — 700102 HCHG RX REV CODE 250 W/ 637 OVERRIDE(OP): Performed by: HOSPITALIST

## 2019-09-03 PROCEDURE — 80048 BASIC METABOLIC PNL TOTAL CA: CPT

## 2019-09-03 PROCEDURE — 36415 COLL VENOUS BLD VENIPUNCTURE: CPT

## 2019-09-03 PROCEDURE — 700102 HCHG RX REV CODE 250 W/ 637 OVERRIDE(OP): Performed by: NURSE PRACTITIONER

## 2019-09-03 PROCEDURE — 71046 X-RAY EXAM CHEST 2 VIEWS: CPT

## 2019-09-03 PROCEDURE — A9270 NON-COVERED ITEM OR SERVICE: HCPCS | Performed by: HOSPITALIST

## 2019-09-03 PROCEDURE — 99233 SBSQ HOSP IP/OBS HIGH 50: CPT | Performed by: INTERNAL MEDICINE

## 2019-09-03 PROCEDURE — 770020 HCHG ROOM/CARE - TELE (206)

## 2019-09-03 PROCEDURE — A9270 NON-COVERED ITEM OR SERVICE: HCPCS | Performed by: INTERNAL MEDICINE

## 2019-09-03 PROCEDURE — 93010 ELECTROCARDIOGRAM REPORT: CPT | Performed by: INTERNAL MEDICINE

## 2019-09-03 PROCEDURE — 700101 HCHG RX REV CODE 250: Performed by: INTERNAL MEDICINE

## 2019-09-03 PROCEDURE — 700111 HCHG RX REV CODE 636 W/ 250 OVERRIDE (IP): Performed by: NURSE PRACTITIONER

## 2019-09-03 PROCEDURE — 93005 ELECTROCARDIOGRAM TRACING: CPT | Performed by: INTERNAL MEDICINE

## 2019-09-03 RX ORDER — LABETALOL HYDROCHLORIDE 5 MG/ML
10 INJECTION, SOLUTION INTRAVENOUS EVERY 4 HOURS PRN
Status: DISCONTINUED | OUTPATIENT
Start: 2019-09-03 | End: 2019-09-06 | Stop reason: HOSPADM

## 2019-09-03 RX ORDER — KETOROLAC TROMETHAMINE 30 MG/ML
15 INJECTION, SOLUTION INTRAMUSCULAR; INTRAVENOUS EVERY 6 HOURS
Status: COMPLETED | OUTPATIENT
Start: 2019-09-03 | End: 2019-09-04

## 2019-09-03 RX ADMIN — OMEPRAZOLE 40 MG: 20 CAPSULE, DELAYED RELEASE ORAL at 05:30

## 2019-09-03 RX ADMIN — OXYCODONE HYDROCHLORIDE 10 MG: 5 TABLET ORAL at 07:49

## 2019-09-03 RX ADMIN — METFORMIN HYDROCHLORIDE 500 MG: 500 TABLET, FILM COATED ORAL at 07:49

## 2019-09-03 RX ADMIN — DOCUSATE SODIUM 100 MG: 100 CAPSULE, LIQUID FILLED ORAL at 05:29

## 2019-09-03 RX ADMIN — ACETAMINOPHEN 650 MG: 325 TABLET, FILM COATED ORAL at 05:29

## 2019-09-03 RX ADMIN — DOXYCYCLINE 100 MG: 100 TABLET, FILM COATED ORAL at 16:12

## 2019-09-03 RX ADMIN — OXYCODONE HYDROCHLORIDE 10 MG: 5 TABLET ORAL at 21:26

## 2019-09-03 RX ADMIN — LABETALOL HYDROCHLORIDE 10 MG: 5 INJECTION INTRAVENOUS at 09:56

## 2019-09-03 RX ADMIN — SIMVASTATIN 40 MG: 20 TABLET, FILM COATED ORAL at 05:31

## 2019-09-03 RX ADMIN — OXYCODONE HYDROCHLORIDE 10 MG: 5 TABLET ORAL at 02:59

## 2019-09-03 RX ADMIN — OXYCODONE HYDROCHLORIDE 10 MG: 5 TABLET ORAL at 15:15

## 2019-09-03 RX ADMIN — CYCLOBENZAPRINE HYDROCHLORIDE 10 MG: 10 TABLET, FILM COATED ORAL at 11:00

## 2019-09-03 RX ADMIN — ACETAMINOPHEN 650 MG: 325 TABLET, FILM COATED ORAL at 16:12

## 2019-09-03 RX ADMIN — GABAPENTIN 300 MG: 300 CAPSULE ORAL at 16:12

## 2019-09-03 RX ADMIN — INSULIN HUMAN 1 UNITS: 100 INJECTION, SOLUTION PARENTERAL at 16:13

## 2019-09-03 RX ADMIN — LEVOTHYROXINE SODIUM 75 MCG: 75 TABLET ORAL at 05:30

## 2019-09-03 RX ADMIN — ANTACID TABLETS 500 MG: 500 TABLET, CHEWABLE ORAL at 16:12

## 2019-09-03 RX ADMIN — DRONEDARONE 400 MG: 400 TABLET, FILM COATED ORAL at 07:50

## 2019-09-03 RX ADMIN — METOPROLOL TARTRATE 25 MG: 25 TABLET, FILM COATED ORAL at 09:56

## 2019-09-03 RX ADMIN — METOPROLOL TARTRATE 25 MG: 25 TABLET, FILM COATED ORAL at 16:12

## 2019-09-03 RX ADMIN — ANTACID TABLETS 500 MG: 500 TABLET, CHEWABLE ORAL at 05:31

## 2019-09-03 RX ADMIN — KETOROLAC TROMETHAMINE 15 MG: 30 INJECTION, SOLUTION INTRAMUSCULAR at 17:27

## 2019-09-03 RX ADMIN — OXYCODONE HYDROCHLORIDE 10 MG: 5 TABLET ORAL at 11:00

## 2019-09-03 RX ADMIN — TRAZODONE HYDROCHLORIDE 100 MG: 100 TABLET ORAL at 21:26

## 2019-09-03 RX ADMIN — BUDESONIDE AND FORMOTEROL FUMARATE DIHYDRATE 2 PUFF: 160; 4.5 AEROSOL RESPIRATORY (INHALATION) at 05:32

## 2019-09-03 RX ADMIN — DOCUSATE SODIUM 100 MG: 100 CAPSULE, LIQUID FILLED ORAL at 16:12

## 2019-09-03 RX ADMIN — ACETAMINOPHEN 650 MG: 325 TABLET, FILM COATED ORAL at 23:56

## 2019-09-03 RX ADMIN — KETOROLAC TROMETHAMINE 15 MG: 30 INJECTION, SOLUTION INTRAMUSCULAR at 23:56

## 2019-09-03 RX ADMIN — INSULIN HUMAN 1 UNITS: 100 INJECTION, SOLUTION PARENTERAL at 11:02

## 2019-09-03 RX ADMIN — CYCLOBENZAPRINE HYDROCHLORIDE 10 MG: 10 TABLET, FILM COATED ORAL at 18:24

## 2019-09-03 RX ADMIN — BENZOCAINE AND MENTHOL 1 LOZENGE: 15; 3.6 LOZENGE ORAL at 15:15

## 2019-09-03 RX ADMIN — ACETAMINOPHEN 650 MG: 325 TABLET, FILM COATED ORAL at 11:00

## 2019-09-03 RX ADMIN — Medication 1 CAPSULE: at 07:49

## 2019-09-03 RX ADMIN — CYCLOBENZAPRINE HYDROCHLORIDE 10 MG: 10 TABLET, FILM COATED ORAL at 05:31

## 2019-09-03 RX ADMIN — DOXYCYCLINE 100 MG: 100 TABLET, FILM COATED ORAL at 05:29

## 2019-09-03 RX ADMIN — INSULIN HUMAN 1 UNITS: 100 INJECTION, SOLUTION PARENTERAL at 21:30

## 2019-09-03 RX ADMIN — METFORMIN HYDROCHLORIDE 500 MG: 500 TABLET, FILM COATED ORAL at 16:12

## 2019-09-03 RX ADMIN — OXYCODONE HYDROCHLORIDE 10 MG: 5 TABLET ORAL at 18:24

## 2019-09-03 RX ADMIN — DRONEDARONE 400 MG: 400 TABLET, FILM COATED ORAL at 16:12

## 2019-09-03 RX ADMIN — GABAPENTIN 300 MG: 300 CAPSULE ORAL at 05:30

## 2019-09-03 RX ADMIN — CEFTRIAXONE SODIUM 2 G: 2 INJECTION, POWDER, FOR SOLUTION INTRAMUSCULAR; INTRAVENOUS at 21:26

## 2019-09-03 RX ADMIN — SENNOSIDES, DOCUSATE SODIUM 1 TABLET: 50; 8.6 TABLET, FILM COATED ORAL at 21:26

## 2019-09-03 RX ADMIN — BUDESONIDE AND FORMOTEROL FUMARATE DIHYDRATE 2 PUFF: 160; 4.5 AEROSOL RESPIRATORY (INHALATION) at 16:12

## 2019-09-03 ASSESSMENT — ENCOUNTER SYMPTOMS
NAUSEA: 0
VOMITING: 0
MYALGIAS: 0
SHORTNESS OF BREATH: 0
BACK PAIN: 1
BLURRED VISION: 0
FEVER: 0
NERVOUS/ANXIOUS: 0
CHILLS: 0
COUGH: 0
SPUTUM PRODUCTION: 0
DIAPHORESIS: 0
DEPRESSION: 0
HEADACHES: 0
ABDOMINAL PAIN: 0
PALPITATIONS: 0
DIARRHEA: 0
DIZZINESS: 0
SENSORY CHANGE: 0
STRIDOR: 0
FOCAL WEAKNESS: 0

## 2019-09-03 ASSESSMENT — LIFESTYLE VARIABLES
EVER FELT BAD OR GUILTY ABOUT YOUR DRINKING: NO
CONSUMPTION TOTAL: INCOMPLETE
TOTAL SCORE: 0
TOTAL SCORE: 0
EVER HAD A DRINK FIRST THING IN THE MORNING TO STEADY YOUR NERVES TO GET RID OF A HANGOVER: NO
ALCOHOL_USE: NO
HAVE PEOPLE ANNOYED YOU BY CRITICIZING YOUR DRINKING: NO
TOTAL SCORE: 0
HAVE YOU EVER FELT YOU SHOULD CUT DOWN ON YOUR DRINKING: NO

## 2019-09-03 ASSESSMENT — COGNITIVE AND FUNCTIONAL STATUS - GENERAL
HELP NEEDED FOR BATHING: A LOT
TOILETING: A LOT
SUGGESTED CMS G CODE MODIFIER DAILY ACTIVITY: CK
DAILY ACTIVITIY SCORE: 17
DRESSING REGULAR LOWER BODY CLOTHING: A LOT
DRESSING REGULAR UPPER BODY CLOTHING: A LITTLE

## 2019-09-03 NOTE — DISCHARGE SUMMARY
DATE OF ADMISSION:  08/28/2019    DATE OF POSSIBLE DISCHARGE:  09/03/2019    FINAL DIAGNOSES:  1.  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  2.  Neurogenic claudication related to stenosis.    SURGICAL PROCEDURE PERFORMED ON THIS ADMISSION:  L4-L5 decompression with L4   through sacral medial facetectomy and foraminotomies and a transforaminal   lumbar interbody fusion.    History of present illness and admit physical has been previously dictated.    HOSPITAL COURSE:  Following surgery, the patient was seen by myself and Dr. Cabrales.  She was doing well and comfortable on the PCA.  Her lower extremities   remain strong.  She had a drain in place.  H and H was 9 and 31 and white   count 10.    The plan, the patient was to mobilize with therapies.  I did transition her   from the PCA on to oral pain medication and the drain remained.  Second day   postoperatively, she indicated after removal of the catheter, she had   difficulty with voiding.  She indicated back discomfort and stated that her   legs felt achy.  Dressing was clean and dry.  She was wearing her brace.  She   did require placement of the Montanez catheter once again.  Internal medicine was   consulted on the 30th due to hypoxia as well as hypotension.  The patient was   transferred to the intensive care unit.  Third day postoperatively, she was   more awake and alert and cooperative.  She denied lower extremity symptoms.  H   and H was 8.3 and 28.2, white count 10.  The patient was transferred back to   the neurosurgery unit on the first.  This morning, she is found sitting in a   chair, eating breakfast.  The registered nurse indicated that she had little   desire to mobilize.  She remained with the Montanez catheter, but also expressed   the desire to be discharged home.  She had full strength to her lower   extremities.  No labs were obtained today.    The plan, I have reordered physical therapy and occupational therapy to work    with the patient and mobilize her.  Her Montanez catheter was removed this   morning.  We will monitor PVRs.  The patient normally wears oxygen at home and   the plan will be that she may be able to be discharged home possibly tomorrow   once she is up mobilizing and her pain remains under control and she is   saturating well.  Upon discharge, she is instructed to ambulate as much as   comfortable.  She is told to refrain from repetitive bending, twisting or   lifting; her weight limit is less than 15 pounds.  She is to avoid   nonsteroidal medication.  She will follow up with us in the office 2 weeks   after surgery.       ____________________________________     HYUN HOPKINS    CC / JANEY    DD:  09/03/2019 09:34:19  DT:  09/03/2019 10:44:07    D#:  [id number]  Job#:  [id number]    cc: IRIS SINGLETARY MD

## 2019-09-03 NOTE — PROGRESS NOTES
"Pulmonary Care Progress Note    Date of admission  8/28/2019      Hospital Course    \"79 y.o. female with past medical history of asthma, COPD, diabetes, hypothyroidism, emphysema, GERD, hypertension, hypercholesterolemia, CVA who presented 8/28/2019 for elective TLIF with Dr. Cabrales.  Postsurgically was doing well until 8/30/2019 when she became hypoxic and the hospitalist was consulted.  On the morning of 8/31/2019 the patient became confused, hypotensive and worsened hypoxia; the patient was given 0.4 mg of Narcan with good improvement in her symptoms.  This afternoon the patient became more confused and hypotensive she was given 500 mL of IVF with improvement in her hemodynamic.  Upon transfer the ICU her systolic blood pressure has again dropped to the 70s.  She is relatively asymptomatic with this and can carry conversations however is slightly.  At this time her only complaint is back pain.  She denies any lower extremity weakness, numbness, tingling, abdominal pain, diarrhea, fever or chills.\"      Interval Problem Update  Chart review from the past 24 hours includes imaging, laboratory studies, vital signs and notes available.  Pertinent data for today's visit includes afebrile, on room air.  Neurosurgical plan and recommendations reviewed.  Hemodynamics and blood pressure remained stable.  On IV antibiotics, recheck x-ray ordered today regarding lower lobe infiltrate.  Procalcitonin was mildly elevated previously.  Electrolytes today normal.    Review of Systems  Review of Systems   Constitutional: Negative for chills and fever.   Respiratory: Negative for cough, sputum production, shortness of breath and stridor.    Cardiovascular: Negative for chest pain.   Gastrointestinal: Negative for abdominal pain, nausea and vomiting.   Genitourinary: Negative for dysuria.   Musculoskeletal: Positive for back pain. Negative for myalgias.   Skin: Negative for rash.   Neurological: Negative for dizziness, sensory " change and focal weakness.   Back pain is similar to prior, no new pattern but ongoing problem, discussed with RN and patient    Vital Signs for last 24 hours   Temp:  [36.3 °C (97.3 °F)-36.9 °C (98.4 °F)] 36.9 °C (98.4 °F)  Pulse:  [] 127  Resp:  [16-18] 17  BP: (103-130)/(51-84) 118/51  SpO2:  [91 %-97 %] 93 %    Hemodynamic parameters for last 24 hours       Respiratory Information for the last 24 hours       Physical Exam   Physical Exam   Constitutional: She is oriented to person, place, and time. She appears well-developed and well-nourished.   Chronically ill appearing   HENT:   Head: Normocephalic and atraumatic.   Right Ear: External ear normal.   Left Ear: External ear normal.   Nose: Nose normal.   Eyes: Pupils are equal, round, and reactive to light. Conjunctivae are normal.   Neck: Neck supple. No JVD present. No tracheal deviation present.   Cardiovascular: Normal rate, regular rhythm and intact distal pulses.   Pulmonary/Chest: Breath sounds normal. No accessory muscle usage. No respiratory distress.   Abdominal: Soft. Bowel sounds are normal. She exhibits no distension. There is no tenderness.   obese   Musculoskeletal: Normal range of motion. She exhibits no tenderness or deformity.   Neurological: She is alert and oriented to person, place, and time. She exhibits normal muscle tone. Coordination normal.   Skin: Skin is warm and dry. No rash noted.   Psychiatric: She has a normal mood and affect. Her behavior is normal.   Nursing note and vitals reviewed.  Sitting at bedside, on  room air does not have significant tachypnea or respiratory distress  Sits up very carefully with logrolling technique  Medications  Current Facility-Administered Medications   Medication Dose Route Frequency Provider Last Rate Last Dose   • metoprolol (LOPRESSOR) tablet 25 mg  25 mg Oral TWICE DAILY Stephani Mcnair D.O.   25 mg at 09/03/19 0956   • labetalol (NORMODYNE,TRANDATE) injection 10 mg  10 mg Intravenous Q4HRS  PRN Stephani Mcnair D.O.   10 mg at 09/03/19 0956   • oxyCODONE immediate-release (ROXICODONE) tablet 5-10 mg  5-10 mg Oral Q3HRS PRN Bossman Darden D.O.   10 mg at 09/03/19 1100   • metFORMIN (GLUCOPHAGE) tablet 500 mg  500 mg Oral BID WITH MEALS Bossman Darden D.O.   500 mg at 09/03/19 0749   • acetaminophen (TYLENOL) tablet 650 mg  650 mg Oral Q6HRS Bossman Darden D.O.   650 mg at 09/03/19 1100   • acetaminophen (TYLENOL) tablet 650 mg  650 mg Oral Q4HRS PRN Yuliana Gamble M.D.   650 mg at 09/01/19 2101   • cyclobenzaprine (FLEXERIL) tablet 10 mg  10 mg Oral TID PRN Yuliana Gamble M.D.   10 mg at 09/03/19 1100   • tramadol (ULTRAM) 50 MG tablet 50 mg  50 mg Oral Q6HRS PRN Yuliana Gamble M.D.   50 mg at 08/31/19 1947   • morphine (pf) 4 mg/ml injection 2 mg  2 mg Intravenous Q4HRS PRN Yuliana Gamble M.D.   2 mg at 09/02/19 0625   • cefTRIAXone (ROCEPHIN) 2 g in  mL IVPB  2 g Intravenous Q24HRS Bossman Darden D.O.   Stopped at 09/02/19 2157   • benzocaine-menthol (CEPACOL) lozenge 1 Lozenge  1 Lozenge Mouth/Throat Q2HRS PRN Naomy Salvador, A.P.N.       • doxycycline monohydrate (ADOXA) tablet 100 mg  100 mg Oral Q12HRS Bossman Darden D.O.   100 mg at 09/03/19 0529   • lactobacillus rhamnosus (CULTURELLE) capsule 1 Cap  1 Cap Oral QDAY with Breakfast Fred Groves M.D.   1 Cap at 09/03/19 0749   • insulin regular (HUMULIN R) injection 1-6 Units  1-6 Units Subcutaneous 4X/DAY ACHS Fred Groves M.D.   1 Units at 09/03/19 1102    And   • glucose 4 g chewable tablet 16 g  16 g Oral Q15 MIN PRN Fred Groves M.D.        And   • DEXTROSE 10% BOLUS 250 mL  250 mL Intravenous Q15 MIN PRN Fred Groves M.D.       • diphenhydrAMINE (BENADRYL) tablet/capsule 25 mg  25 mg Oral Q6HRS PRN Shannon Salmeron P.A.-C.        Or   • diphenhydrAMINE (BENADRYL) injection 25 mg  25 mg Intravenous Q6HRS PRN Shannon Salmeron, P.A.-C.       • albuterol  inhaler 1 Puff  1 Puff Inhalation Q6HRS PRN Naomy Salvador A.P.N.       • budesonide-formoterol (SYMBICORT) 160-4.5 MCG/ACT inhaler 2 Puff  2 Puff Inhalation BID Naomy Salvador A.P.N.   2 Puff at 09/03/19 0532   • dronedarone (MULTAQ) tablet 400 mg  400 mg Oral BID WITH MEALS Naomy Salvador A.P.N.   400 mg at 09/03/19 0750   • gabapentin (NEURONTIN) capsule 300 mg  300 mg Oral BID Naomy Salvador A.P.N.   300 mg at 09/03/19 0530   • levothyroxine (SYNTHROID) tablet 75 mcg  75 mcg Oral AM ES Naomy Salvador A.P.N.   75 mcg at 09/03/19 0530   • omeprazole (PRILOSEC) capsule 40 mg  40 mg Oral DAILY Naomy Salvador, A.P.N.   40 mg at 09/03/19 0530   • simvastatin (ZOCOR) tablet 40 mg  40 mg Oral DAILY Naomy Salvador, A.P.N.   40 mg at 09/03/19 0531   • traZODone (DESYREL) tablet 100 mg  100 mg Oral Nightly Naomy Salvador, A.P.N.   100 mg at 09/02/19 2127   • MD ALERT...DO NOT ADMINISTER NSAIDS or ASPIRIN unless ORDERED By Neurosurgery 1 Each  1 Each Other PRN Naomy Salvador A.P.N.       • docusate sodium (COLACE) capsule 100 mg  100 mg Oral BID Naomy Salvador A.P.N.   100 mg at 09/03/19 0529   • senna-docusate (PERICOLACE or SENOKOT S) 8.6-50 MG per tablet 1 Tab  1 Tab Oral Nightly Naomy Salvador, A.P.N.   1 Tab at 09/02/19 2127   • senna-docusate (PERICOLACE or SENOKOT S) 8.6-50 MG per tablet 1 Tab  1 Tab Oral Q24HRS PRN Naomy Canner-Napoles, A.P.N.       • polyethylene glycol/lytes (MIRALAX) PACKET 1 Packet  1 Packet Oral BID PRN Namoy Salvador, A.P.N.   1 Packet at 09/01/19 0456   • magnesium hydroxide (MILK OF MAGNESIA) suspension 30 mL  30 mL Oral QDAY PRN Naomy Salvador, A.P.N.       • bisacodyl (DULCOLAX) suppository 10 mg  10 mg Rectal Q24HRS PRN Naomy Salvador, A.P.N.       • Respiratory Care per Protocol   Nebulization Continuous RT Naomy  [place], A.P.N.       • ondansetron (ZOFRAN) syringe/vial injection 4 mg  4 mg Intravenous Q4HRS PRN Naomy Salvador, A.P.N.   4 mg at 08/30/19 1239   • ondansetron (ZOFRAN ODT) dispertab 4 mg  4 mg Oral Q4HRS PRN Naomy Salvador, A.P.N.       • hydrALAZINE (APRESOLINE) injection 10 mg  10 mg Intravenous Q HOUR PRN Naomy Salvador, A.P.N.       • benzocaine-menthol (CEPACOL) lozenge 1 Lozenge  1 Lozenge Mouth/Throat Q2HRS PRN Naomy Salvador A.P.N.   1 Lozenge at 08/29/19 1355   • calcium carbonate (TUMS) chewable tab 500 mg  500 mg Oral BID Naomy Salvador, A.P.N.   500 mg at 09/03/19 0531       Fluids    Intake/Output Summary (Last 24 hours) at 9/3/2019 1135  Last data filed at 9/3/2019 1012  Gross per 24 hour   Intake 740 ml   Output 1600 ml   Net -860 ml       Laboratory          Recent Labs     09/01/19 0337 09/02/19 0356 09/03/19  0257   SODIUM 135 137 141   POTASSIUM 5.0 3.9 4.1   CHLORIDE 102 104 105   CO2 24 27 28   BUN 23* 22 25*   CREATININE 0.95 0.91 1.02   CALCIUM 8.6 8.9 8.8     Recent Labs     09/01/19 0337 09/02/19 0356 09/03/19  0257   ALTSGPT 10  --   --    ASTSGOT 24  --   --    ALKPHOSPHAT 58  --   --    TBILIRUBIN 0.5  --   --    GLUCOSE 195* 160* 122*     Recent Labs     09/01/19 0337 09/02/19 0356   WBC 8.9 7.3   NEUTSPOLYS  --  75.60*   LYMPHOCYTES  --  12.60*   MONOCYTES  --  9.50   EOSINOPHILS  --  1.50   BASOPHILS  --  0.30   ASTSGOT 24  --    ALTSGPT 10  --    ALKPHOSPHAT 58  --    TBILIRUBIN 0.5  --      Recent Labs     08/31/19  1900 09/01/19  0337 09/02/19  0356   RBC  --  3.12* 3.29*   HEMOGLOBIN 8.1* 9.1* 9.3*   HEMATOCRIT  --  29.4* 30.7*   PLATELETCT  --  159* 195       Imaging  no new images    Assessment/Plan  Acute kidney injury (HCC)  Assessment & Plan  Renal dose meds, avoid nephrotoxins  Strict I/Os  Follow renal function    Hyperkalemia- (present on admission)  Assessment & Plan  Recheck  Consider medical  treatment if.  Recheck potassium is elevated    Pneumonia- (present on admission)  Assessment & Plan  Possible  Continue Rocephin and doxycycline until procalcitonin improves    Pulmonary edema- (present on admission)  Assessment & Plan  Mild, continue IVF if needed  Echo: Likely normal left ventricular systolic function.  Left ventricular ejection fraction is visually estimated to be 50%. No evidence of valvular abnormality based on blind Doppler evaluation.     Anemia  Assessment & Plan  Improved with transfusion  Continue to monitor and transfuse for hemoglobin less than 7; or less than 8 if hemodynamically unstable    Hypotension- (present on admission)  Assessment & Plan  Appears to be related to intravascular volume depletion rather than overload and heart failure.    Improved following crystalloid resuscitation  Sepsis unlikely as patient is afebrile, normal white blood cell count and no source.  Low threshold to begin antibiotics    Acute on chronic systolic (congestive) heart failure (HCC)- (present on admission)  Assessment & Plan  On [place] in May 2019: LVEF 55%  Judicious IV fluid use, currently fluid resuscitation    PAF (paroxysmal atrial fibrillation) (HCC)- (present on admission)  Assessment & Plan  Chronic, on Multaq and Xarelto at home  Hold Xarelto  Continue multiple    Chronic anticoagulation- (present on admission)  Assessment & Plan  Restart Xarelto when okay with neurosurgery and anemia improved    Dyslipidemia- (present on admission)  Assessment & Plan  Continue statin    Chronic obstructive pulmonary disease (HCC)- (present on admission)  Assessment & Plan  RT/O2 Protocols  Titrate supplemental FiO2 to maintain SpO2 >88%  Continue Symbicort  Monitor for the need to start IV steroids, currently not in exacerbation    Essential hypertension- (present on admission)  Assessment & Plan  Hold antihypertensives due to current hypotension    Type 2 diabetes mellitus with hyperglycemia, without  long-term current use of insulin (HCC)- (present on admission)  Assessment & Plan  Goal blood glucose 120-180  sliding scale insulin, accuchecks  hypoglycemia protocol  A1c 6.9           I have performed a physical exam and reviewed and updated ROS and Plan today (9/3/2019). In review of yesterday's note (9/2/2019), there are no changes except as documented above.     On neurosurgical floor, stable pulmonary status, reviewed with patient and RN bedside.  To oral antibiotics soon.  Continue Symbicort and respiratory treatments; recheck chest x-ray.     at bedside,  62 years, very supportive.    Maria Fernanda Cruz MD , FCCP, Pulmonary Service

## 2019-09-03 NOTE — CARE PLAN
Problem: Communication  Goal: The ability to communicate needs accurately and effectively will improve  Outcome: PROGRESSING AS EXPECTED  Note:   Pt is able to communicate effectively. Pt utilizes her call light to call for help. Will continue to monitor for any additional communication needs.      Problem: Safety  Goal: Will remain free from injury  Outcome: PROGRESSING AS EXPECTED  Note:   Fall precautions are in place.

## 2019-09-03 NOTE — PROGRESS NOTES
Neurosurgery Progress Note    Subjective:  Sitting in chair having breakfast. Montanez remains. She has been up oob, but is apprehensive about moving. Denies le symptoms, back sore      Exam:  BLE str intact- CDI  A&O x3, GCS 15  PERRL, EOMI   Face symm, tongue midline  C/d/i    BP  Min: 103/53  Max: 130/84  Pulse  Av.2  Min: 92  Max: 118  Resp  Av.6  Min: 16  Max: 18  Temp  Av.4 °C (97.6 °F)  Min: 36.3 °C (97.3 °F)  Max: 36.7 °C (98 °F)  SpO2  Av.4 %  Min: 91 %  Max: 97 %    No data recorded    Recent Labs     19   WBC  --  8.9 7.3   RBC  --  3.12* 3.29*   HEMOGLOBIN 8.1* 9.1* 9.3*   HEMATOCRIT  --  29.4* 30.7*   MCV  --  94.2 93.3   MCH  --  29.2 28.3   MCHC  --  31.0* 30.3*   RDW  --  60.4* 59.5*   PLATELETCT  --  159* 195   MPV  --  10.7 10.0     Recent Labs     19  0257   SODIUM 135 137 141   POTASSIUM 5.0 3.9 4.1   CHLORIDE 102 104 105   CO2 24 27 28   GLUCOSE 195* 160* 122*   BUN 23* 22 25*   CREATININE 0.95 0.91 1.02   CALCIUM 8.6 8.9 8.8               Intake/Output       19 - 19 0659 19 - 19 0659       Total  Total       Intake    P.O.  980  -- 980  500  -- 500    P.O. 980 -- 980 500 -- 500    Total Intake 980 -- 980 500 -- 500       Output    Urine  800  -- 800  --  -- --    Output (mL) (Urethral Catheter Straight-tip) 800 -- 800 -- -- --    Total Output 800 -- 800 -- -- --       Net I/O     180 -- 180 500 -- 500            Intake/Output Summary (Last 24 hours) at 9/3/2019 0843  Last data filed at 9/3/2019 0800  Gross per 24 hour   Intake 1240 ml   Output 800 ml   Net 440 ml            • oxyCODONE immediate-release  5-10 mg Q3HRS PRN   • metFORMIN  500 mg BID WITH MEALS   • acetaminophen  650 mg Q6HRS   • acetaminophen  650 mg Q4HRS PRN   • cyclobenzaprine  10 mg TID PRN   • tramadol  50 mg Q6HRS PRN   • morphine injection  2 mg Q4HRS PRN    • cefTRIAXone (ROCEPHIN) IV  2 g Q24HRS   • benzocaine-menthol  1 Lozenge Q2HRS PRN   • doxycycline monohydrate  100 mg Q12HRS   • lactobacillus rhamnosus  1 Cap QDAY with Breakfast   • insulin regular  1-6 Units 4X/DAY ACHS    And   • glucose  16 g Q15 MIN PRN    And   • dextrose 10% bolus  250 mL Q15 MIN PRN   • diphenhydrAMINE  25 mg Q6HRS PRN    Or   • diphenhydrAMINE  25 mg Q6HRS PRN   • albuterol  1 Puff Q6HRS PRN   • budesonide-formoterol  2 Puff BID   • dronedarone  400 mg BID WITH MEALS   • gabapentin  300 mg BID   • levothyroxine  75 mcg AM ES   • omeprazole  40 mg DAILY   • simvastatin  40 mg DAILY   • traZODone  100 mg Nightly   • MD ALERT...DO NOT ADMINISTER NSAIDS or ASPIRIN unless ORDERED By Neurosurgery  1 Each PRN   • docusate sodium  100 mg BID   • senna-docusate  1 Tab Nightly   • senna-docusate  1 Tab Q24HRS PRN   • polyethylene glycol/lytes  1 Packet BID PRN   • magnesium hydroxide  30 mL QDAY PRN   • bisacodyl  10 mg Q24HRS PRN   • Respiratory Care per Protocol   Continuous RT   • ondansetron  4 mg Q4HRS PRN   • ondansetron  4 mg Q4HRS PRN   • hydrALAZINE  10 mg Q HOUR PRN   • benzocaine-menthol  1 Lozenge Q2HRS PRN   • calcium carbonate  500 mg BID       Assessment and Plan:  POD #6 L4-S1 TLIF  NM as above  Prophylactic anticoagulation: no         Start date/time: no need    Appreciate med hypotension  DC graham now & check pvr  Continue pain control  PT/OT  Pt wears O2 normally at home  DC planning

## 2019-09-03 NOTE — PROGRESS NOTES
Pt tachycardic to 129 this AM. Pt had just been up to BR and to chair for breakfast. Pain meds given. HR down to 107 after pain meds. Monitor room called this RN again with HR up to 129. Pt asleep in chair. Notified Dr. Mcnair, HR up to 144 while talking to MD, back down to 128. Order received for EKG. MD stated she would put in orders for medication as well.

## 2019-09-03 NOTE — PROGRESS NOTES
Monitor Summary: AFIB , [place] ---, QRS .08, QT --- with occasional/frequent PVC, rare couplet, frequent bigem per strip from monitor room

## 2019-09-03 NOTE — PROGRESS NOTES
Pt A&O x4. TALAMANTES 5/5. Montanez in place, patent. Dressing CDI. Up w/ x1 assist, steady gait with FWW. LSO on when OOB. Pt c/o back pain, Oxy and flexeril given PRN w/ + results. +BS, good appetite. Reviewed poc with pt-verbalized understanding. Bed alarm in use. Call light in reach.

## 2019-09-03 NOTE — PROGRESS NOTES
Monitor summary: AF , QRS .08, with O Coup, O PVC's, F PVC, and R COUP per strip from monitor room.

## 2019-09-03 NOTE — FACE TO FACE
Face to Face Supporting Documentation - Home Health    The encounter with this patient was in whole or in part the primary reason for home health admission.    Date of encounter:   Patient:                    MRN:                       YOB: 2017  Jessica Plaza  [id number]  1940     Home health to see patient for:  Skilled Nursing care for assessment, interventions & education, Physical Therapy evaluation and treatment and Occupational therapy evaluation and treatment    Skilled need for:  Exacerbation of Chronic Disease State low back pain s/p laminectomy    Skilled nursing interventions to include:  Comment: pt/ot    Homebound status evidenced by:  Need the aid of supportive devices such as crutches, canes, wheelchairs or walkers or Needs the assistance of another person in order to leave the home. Leaving home requires a considerable and taxing effort. There is a normal inability to leave the home.    Community Physician to provide follow up care: Anurag Moreno M.D.     Optional Interventions? No      I certify the face to face encounter for this home health care referral meets the CMS requirements and the encounter/clinical assessment with the patient was, in whole, or in part, for the medical condition(s) listed above, which is the primary reason for home health care. Based on my clinical findings: the service(s) are medically necessary, support the need for home health care, and the homebound criteria are met.  I certify that this patient has had a face to face encounter by myself.  Stephani Mcnair D.O. - NPI: [id number]

## 2019-09-03 NOTE — THERAPY
"Occupational Therapy Treatment completed with focus on ADLs, ADL transfers, patient education and caregiver training.  Functional Status: Supine > EOB min A, toileting mod A, standing grooming supv, transfers with FWW SBA  Plan of Care: Will benefit from Occupational Therapy 4 times per week  Discharge Recommendations:  Equipment Front-Wheel Walker. Post-acute therapy: Recommend post-acute placement for additional occupational therapy services prior to discharge home. Patient can tolerate post-acute therapies at a 5x/week frequency. (Pt reports she is not interested in transitional care setting OR HH. She is open to OP.)    See \"Rehab Therapy-Acute\" Patient Summary Report for complete documentation.     Pt seen for OT tx. Received in bed. Pt mobilized to EOB via logroll to [facility] with assist from spouse. Pt mobilized to bathroom for attempted BM and urination. Unable to void, but had BM smear. Stated she needed to wipe from from front. Educated on risks of anterior approach to BM hygiene and assisted with posterior approach. Pt washed hands at sink, then returned to bedside chair. States \"I can't stand much longer.\" Declined attempt at LB dressing stating her spouse would assist her until able to complete herself. Pt relied heavily on height of commode over toilet and L grab bar for sit to stand from toilet. Educated pt and spouse on recommendation for 3:1 commode to be used over toilet daytime, at bedside nighttime and as shower chair. Pt agrees to order from [employer]. Educated pt and spouse on safe management of oxygen line while using FWW (pt requires assist at this time to prevent line getting under foot or caught on walker wheels/legs). Pt is limited by: back pain, activity intolerance, limited BLE AROM (for LB ADL), spine precautions. Pt is progressing, but would benefit from continued acute OT to maximize functional independence and safety. Pt adamantly declines post-acute transitional care setting; also " "states she does not want HH. \"He (spouse) can help me\". Educated on role of HH therapies in progressing function (versus care giving). Will continue to follow.    "

## 2019-09-03 NOTE — PROGRESS NOTES
Hospital Medicine Daily Progress Note    Date of Service  9/3/2019    Chief Complaint  79 y.o. female admitted 8/28/2019 with back pain    Hospital Course    PMHx CHF 45%, lVH, AFib on rivaroxaban, PE, COPD, DM, Hypothyroidism, GERD, HTN, HLD, CVA.  Presented for a planned L spine laminectomy by Dr Cabrales on 8/29.  On 8/30 became increasingly SOB and hypotensive to SBPs in the 70s.  Given fluid boluses and narcan with some improvement.   It was felt she had been given too much pain medication.  Transferred to the ICU on 8/31 for hypotension.       Interval Problem Update    Back pain controlled  Denies palpitations  Wants to dc home with  assistance  Pain control    Consultants/Specialty  Neurosurgery  CC    Code Status  full    Disposition  To home with home health vs snf in 2-3 days  Encourage activity    Review of Systems  Review of Systems   Constitutional: Negative for chills, diaphoresis and fever.   HENT: Negative for nosebleeds.    Eyes: Negative for blurred vision.   Respiratory: Negative for cough and shortness of breath.    Cardiovascular: Negative for chest pain, palpitations and leg swelling.   Gastrointestinal: Negative for abdominal pain, diarrhea and nausea.   Genitourinary: Negative for dysuria and urgency.   Musculoskeletal: Positive for back pain.   Skin: Negative for itching and rash.   Neurological: Negative for sensory change, focal weakness and headaches.   Psychiatric/Behavioral: Negative for depression. The patient is not nervous/anxious.         Physical Exam  Temp:  [36.3 °C (97.3 °F)-37.1 °C (98.8 °F)] 37.1 °C (98.8 °F)  Pulse:  [] 104  Resp:  [16-18] 17  BP: (103-133)/(51-84) 133/59  SpO2:  [91 %-97 %] 97 %    Physical Exam   Constitutional: She is oriented to person, place, and time. She appears well-developed and well-nourished. No distress.   HENT:   Head: Normocephalic and atraumatic.   Nose: Nose normal.   Mouth/Throat: Oropharynx is clear and moist.   Eyes:  Conjunctivae are normal.   Neck: No JVD present.   Cardiovascular: Normal rate and regular rhythm.   Pulmonary/Chest: Effort normal. No respiratory distress. She has no wheezes. She has no rales.   Abdominal: Soft. She exhibits no distension. There is no tenderness.   Musculoskeletal: She exhibits no edema or tenderness.   Neurological: She is alert and oriented to person, place, and time. No cranial nerve deficit. Coordination normal.   Sensation intact B legs.  Unable to get accurate strength exam as pain precludes max effort however pt has functional strength by observation   Skin: Skin is warm and dry. No rash noted. No erythema.   Psychiatric: She has a normal mood and affect. Thought content normal.   Nursing note and vitals reviewed.      Fluids    Intake/Output Summary (Last 24 hours) at 9/3/2019 1249  Last data filed at 9/3/2019 1012  Gross per 24 hour   Intake 740 ml   Output 1600 ml   Net -860 ml       Laboratory  Recent Labs     08/31/19  1900 09/01/19  0337 09/02/19  0356   WBC  --  8.9 7.3   RBC  --  3.12* 3.29*   HEMOGLOBIN 8.1* 9.1* 9.3*   HEMATOCRIT  --  29.4* 30.7*   MCV  --  94.2 93.3   MCH  --  29.2 28.3   MCHC  --  31.0* 30.3*   RDW  --  60.4* 59.5*   PLATELETCT  --  159* 195   MPV  --  10.7 10.0     Recent Labs     09/01/19  0337 09/02/19  0356 09/03/19  0257   SODIUM 135 137 141   POTASSIUM 5.0 3.9 4.1   CHLORIDE 102 104 105   CO2 24 27 28   GLUCOSE 195* 160* 122*   BUN 23* 22 25*   CREATININE 0.95 0.91 1.02   CALCIUM 8.6 8.9 8.8                   Imaging  EC-ECHOCARDIOGRAM COMPLETE W/O CONT   Final Result      CT-RENAL COLIC EVALUATION(A/P W/O)   Final Result         1.  No acute abnormality.   2.  Hepatomegaly   3.  Bibasilar atelectasis   4.  Bilateral fat-containing inguinal hernias   5.  Cardiomegaly   6.  Diverticulosis   7.  Atherosclerosis and atherosclerotic coronary artery disease.      DX-CHEST-PORTABLE (1 VIEW)   Final Result      Left lung base atelectasis/possible pneumonitis or  pneumonia.   Mild diffuse interstitial opacities consistent with atelectasis/edema.      DX-PORTABLE FLUORO > 1 HOUR   Final Result      Portable fluoroscopy utilized for 19 seconds.         INTERPRETING LOCATION: [facility], [place], [address]      DX-LUMBAR SPINE-2 OR 3 VIEWS   Final Result      Intraoperative fluoroscopy spot images as described above.      DX-CHEST-2 VIEWS    (Results Pending)        Assessment/Plan  * Post-lumbar laminectomy hypoxia, dyspnea. PNA and edema on CXR- (present on admission)  Assessment & Plan  9/2 On chornic O2 of 3L at home.  Ordered and reviewed labs and CXR.  Treat PNA and pulmonary edema (below)  Resp and O2 per protocolDoubt PE given CXR findings, recent CTA Chest was negative for PE, recent chronic anticoagulation being held due to surgery.   cathy overmedicated as responded to narcan    9/3  POD #6  Pain control  Pt min assist  May need placement    Acute kidney injury (HCC)  Assessment & Plan  9/2  Creat is back to her baseline aroung 1 for GFR in upper 50's-60  No hydro on CT  Avoid nephrotoxins  Renally dose medications as appropriate  Follow daily BMP and  UOP    9/3 resolved    Hyperkalemia- (present on admission)  Assessment & Plan  Resolved  Cont to follow daily    Pneumonia- (present on admission)  Assessment & Plan  Possible on CT  Resp and O2 per protocol  Complete 5 days Doxy/Rocephin      Pulmonary edema- (present on admission)  Assessment & Plan  Seems to be chronic  Mild  DC IVFs  Consider low dose loop diuretic dependent on how she does over next few days    Hypotension- (present on admission)  Assessment & Plan  9/ 2Pt has responded to narcan and IVFs  Dc IVFs now  Cont to follow closely    9/3 resolved    Acute on chronic systolic (congestive) heart failure (HCC)- (present on admission)  Assessment & Plan  compensated  LVEF unchanged to comparison studies  LVEF 50%  Telemetry, trend troponins, EKG, BNP  Sees Dr. Steven in the outpatient    PAF (paroxysmal  atrial fibrillation) (HCC)- (present on admission)  Assessment & Plan  9/2 Stable  Anticoagulation held  Cont Dronedarone    9/3 cont multaq  afib with rvr, cardiac monitor  Rate control, add metoprolol  ?AC, to d/w pt    Chronic anticoagulation- (present on admission)  Assessment & Plan  Held because of Surgery  Discuss with Neurosurgery when to restart    Dyslipidemia- (present on admission)  Assessment & Plan  statin    Chronic obstructive pulmonary disease (HCC)- (present on admission)  Assessment & Plan  Resp and O2 per protocol    Essential hypertension- (present on admission)  Assessment & Plan  Resume home lisinopril as pressures dictate    Type 2 diabetes mellitus with hyperglycemia, without long-term current use of insulin (HCC)- (present on admission)  Assessment & Plan  Now that renal function has improved resume metformin  Cont SSI       VTE prophylaxis: none due to recent spine surgery

## 2019-09-03 NOTE — PROGRESS NOTES
Assessment completed. Pt is A/Ox4. Reports pain of 8/10, medicated per MAR. Denies numbness and tingling, nausea or vomiting. TALAMANTES, UE 5/5, LE 4/5. Pt is a x1 assist with FWW.     POC has been discussed and pt needs have all been met at this time. Call light, personal belongings, and bedside table are all within reach. Bed is in the lowest position and locked, and bed alarm is on.

## 2019-09-03 NOTE — THERAPY
"Physical Therapy Treatment completed.   Bed Mobility:  Supine to Sit: (NT up in chair pre tx)  Transfers: Sit to Stand: Minimal Assist  Gait: Level Of Assist: Minimal Assist with Front-Wheel Walker       Plan of Care: Will benefit from Physical Therapy 5 times per week  Discharge Recommendations: Equipment: Will Continue to Assess for Equipment Needs. Post-acute therapy Recommend post-acute placement for continued physical therapy services prior to discharge home. Patient can tolerate post-acute therapies at a 5x/week frequency.       See \"Rehab Therapy-Acute\" Patient Summary Report for complete documentation.     Pt presenting w/ improved functional mobility from previous tx. Pt still requires a lot of time to perform mobility. She was able to recall spinal precautions and log roll and maintain throughout mobility. Pt does fatigue quickly. She continues to fall asleep in the bed w/out stimulus. Pt currently is at a level in which she will require post acute therapy. Pt not wanting post acute therapy however has not been mobilizing much. Pt educated that she needs to start getting OOB more as she is not at a safe functional level for DC home if she can only tolerate 10 feet. Continue to recommend post acute therapy.  "

## 2019-09-04 ENCOUNTER — HOME HEALTH ADMISSION (OUTPATIENT)
Dept: HOME HEALTH SERVICES | Facility: HOME HEALTHCARE | Age: 79
End: 2019-09-04
Payer: MEDICARE

## 2019-09-04 LAB
ANION GAP SERPL CALC-SCNC: 9 MMOL/L (ref 0–11.9)
BUN SERPL-MCNC: 34 MG/DL (ref 8–22)
CALCIUM SERPL-MCNC: 8.5 MG/DL (ref 8.5–10.5)
CHLORIDE SERPL-SCNC: 103 MMOL/L (ref 96–112)
CO2 SERPL-SCNC: 25 MMOL/L (ref 20–33)
CREAT SERPL-MCNC: 1.3 MG/DL (ref 0.5–1.4)
GLUCOSE BLD-MCNC: 100 MG/DL (ref 65–99)
GLUCOSE BLD-MCNC: 106 MG/DL (ref 65–99)
GLUCOSE BLD-MCNC: 144 MG/DL (ref 65–99)
GLUCOSE BLD-MCNC: 205 MG/DL (ref 65–99)
GLUCOSE SERPL-MCNC: 122 MG/DL (ref 65–99)
POTASSIUM SERPL-SCNC: 4.2 MMOL/L (ref 3.6–5.5)
SODIUM SERPL-SCNC: 137 MMOL/L (ref 135–145)

## 2019-09-04 PROCEDURE — A9270 NON-COVERED ITEM OR SERVICE: HCPCS | Performed by: HOSPITALIST

## 2019-09-04 PROCEDURE — 700102 HCHG RX REV CODE 250 W/ 637 OVERRIDE(OP): Performed by: NURSE PRACTITIONER

## 2019-09-04 PROCEDURE — 700112 HCHG RX REV CODE 229: Performed by: NURSE PRACTITIONER

## 2019-09-04 PROCEDURE — 97530 THERAPEUTIC ACTIVITIES: CPT

## 2019-09-04 PROCEDURE — A9270 NON-COVERED ITEM OR SERVICE: HCPCS | Performed by: INTERNAL MEDICINE

## 2019-09-04 PROCEDURE — 36415 COLL VENOUS BLD VENIPUNCTURE: CPT

## 2019-09-04 PROCEDURE — A9270 NON-COVERED ITEM OR SERVICE: HCPCS | Performed by: NURSE PRACTITIONER

## 2019-09-04 PROCEDURE — 770020 HCHG ROOM/CARE - TELE (206)

## 2019-09-04 PROCEDURE — 700102 HCHG RX REV CODE 250 W/ 637 OVERRIDE(OP): Performed by: HOSPITALIST

## 2019-09-04 PROCEDURE — 82962 GLUCOSE BLOOD TEST: CPT | Mod: 91

## 2019-09-04 PROCEDURE — 700102 HCHG RX REV CODE 250 W/ 637 OVERRIDE(OP): Performed by: INTERNAL MEDICINE

## 2019-09-04 PROCEDURE — 700111 HCHG RX REV CODE 636 W/ 250 OVERRIDE (IP): Performed by: NURSE PRACTITIONER

## 2019-09-04 PROCEDURE — 80048 BASIC METABOLIC PNL TOTAL CA: CPT

## 2019-09-04 PROCEDURE — 700105 HCHG RX REV CODE 258: Performed by: HOSPITALIST

## 2019-09-04 PROCEDURE — 99232 SBSQ HOSP IP/OBS MODERATE 35: CPT | Performed by: INTERNAL MEDICINE

## 2019-09-04 PROCEDURE — 700111 HCHG RX REV CODE 636 W/ 250 OVERRIDE (IP): Performed by: HOSPITALIST

## 2019-09-04 PROCEDURE — 97535 SELF CARE MNGMENT TRAINING: CPT

## 2019-09-04 PROCEDURE — 700102 HCHG RX REV CODE 250 W/ 637 OVERRIDE(OP): Performed by: PHYSICIAN ASSISTANT

## 2019-09-04 PROCEDURE — A9270 NON-COVERED ITEM OR SERVICE: HCPCS | Performed by: PHYSICIAN ASSISTANT

## 2019-09-04 RX ORDER — OXYCODONE HYDROCHLORIDE 5 MG/1
5-10 TABLET ORAL EVERY 4 HOURS PRN
Status: DISCONTINUED | OUTPATIENT
Start: 2019-09-04 | End: 2019-09-06 | Stop reason: HOSPADM

## 2019-09-04 RX ORDER — TRAMADOL HYDROCHLORIDE 50 MG/1
50 TABLET ORAL EVERY 6 HOURS PRN
Status: DISCONTINUED | OUTPATIENT
Start: 2019-09-04 | End: 2019-09-06 | Stop reason: HOSPADM

## 2019-09-04 RX ORDER — FLUTICASONE PROPIONATE 50 MCG
2 SPRAY, SUSPENSION (ML) NASAL DAILY
Status: DISCONTINUED | OUTPATIENT
Start: 2019-09-04 | End: 2019-09-06 | Stop reason: HOSPADM

## 2019-09-04 RX ORDER — BETHANECHOL CHLORIDE 10 MG/1
10 TABLET ORAL 3 TIMES DAILY
Status: DISCONTINUED | OUTPATIENT
Start: 2019-09-04 | End: 2019-09-05

## 2019-09-04 RX ORDER — ACETAMINOPHEN 325 MG/1
650 TABLET ORAL EVERY 4 HOURS PRN
Status: DISCONTINUED | OUTPATIENT
Start: 2019-09-04 | End: 2019-09-06 | Stop reason: HOSPADM

## 2019-09-04 RX ADMIN — GABAPENTIN 300 MG: 300 CAPSULE ORAL at 16:48

## 2019-09-04 RX ADMIN — FLUTICASONE PROPIONATE 100 MCG: 50 SPRAY, METERED NASAL at 16:48

## 2019-09-04 RX ADMIN — DOXYCYCLINE 100 MG: 100 TABLET, FILM COATED ORAL at 05:18

## 2019-09-04 RX ADMIN — KETOROLAC TROMETHAMINE 15 MG: 30 INJECTION, SOLUTION INTRAMUSCULAR at 05:18

## 2019-09-04 RX ADMIN — DRONEDARONE 400 MG: 400 TABLET, FILM COATED ORAL at 08:13

## 2019-09-04 RX ADMIN — GABAPENTIN 300 MG: 300 CAPSULE ORAL at 05:17

## 2019-09-04 RX ADMIN — INSULIN HUMAN 2 UNITS: 100 INJECTION, SOLUTION PARENTERAL at 11:30

## 2019-09-04 RX ADMIN — BETHANECHOL CHLORIDE 10 MG: 10 TABLET ORAL at 11:25

## 2019-09-04 RX ADMIN — OXYCODONE HYDROCHLORIDE 10 MG: 5 TABLET ORAL at 05:17

## 2019-09-04 RX ADMIN — OMEPRAZOLE 40 MG: 20 CAPSULE, DELAYED RELEASE ORAL at 05:17

## 2019-09-04 RX ADMIN — SIMVASTATIN 40 MG: 20 TABLET, FILM COATED ORAL at 05:18

## 2019-09-04 RX ADMIN — METFORMIN HYDROCHLORIDE 500 MG: 500 TABLET, FILM COATED ORAL at 16:48

## 2019-09-04 RX ADMIN — METFORMIN HYDROCHLORIDE 500 MG: 500 TABLET, FILM COATED ORAL at 08:13

## 2019-09-04 RX ADMIN — OXYCODONE HYDROCHLORIDE 10 MG: 5 TABLET ORAL at 13:30

## 2019-09-04 RX ADMIN — BETHANECHOL CHLORIDE 10 MG: 10 TABLET ORAL at 09:20

## 2019-09-04 RX ADMIN — METOPROLOL TARTRATE 25 MG: 25 TABLET, FILM COATED ORAL at 16:48

## 2019-09-04 RX ADMIN — DRONEDARONE 400 MG: 400 TABLET, FILM COATED ORAL at 16:51

## 2019-09-04 RX ADMIN — BUDESONIDE AND FORMOTEROL FUMARATE DIHYDRATE 2 PUFF: 160; 4.5 AEROSOL RESPIRATORY (INHALATION) at 10:25

## 2019-09-04 RX ADMIN — DOCUSATE SODIUM 100 MG: 100 CAPSULE, LIQUID FILLED ORAL at 16:48

## 2019-09-04 RX ADMIN — BUDESONIDE AND FORMOTEROL FUMARATE DIHYDRATE 2 PUFF: 160; 4.5 AEROSOL RESPIRATORY (INHALATION) at 16:51

## 2019-09-04 RX ADMIN — ACETAMINOPHEN 650 MG: 325 TABLET, FILM COATED ORAL at 11:25

## 2019-09-04 RX ADMIN — Medication 1 CAPSULE: at 08:13

## 2019-09-04 RX ADMIN — TRAZODONE HYDROCHLORIDE 100 MG: 100 TABLET ORAL at 20:41

## 2019-09-04 RX ADMIN — ACETAMINOPHEN 650 MG: 325 TABLET, FILM COATED ORAL at 05:18

## 2019-09-04 RX ADMIN — OXYCODONE HYDROCHLORIDE 10 MG: 5 TABLET ORAL at 20:53

## 2019-09-04 RX ADMIN — SENNOSIDES, DOCUSATE SODIUM 1 TABLET: 50; 8.6 TABLET, FILM COATED ORAL at 20:41

## 2019-09-04 RX ADMIN — ANTACID TABLETS 500 MG: 500 TABLET, CHEWABLE ORAL at 05:23

## 2019-09-04 RX ADMIN — ACETAMINOPHEN 650 MG: 325 TABLET, FILM COATED ORAL at 16:48

## 2019-09-04 RX ADMIN — DIPHENHYDRAMINE HCL 25 MG: 25 TABLET ORAL at 16:49

## 2019-09-04 RX ADMIN — BETHANECHOL CHLORIDE 10 MG: 10 TABLET ORAL at 16:48

## 2019-09-04 RX ADMIN — LEVOTHYROXINE SODIUM 75 MCG: 75 TABLET ORAL at 05:17

## 2019-09-04 RX ADMIN — DOCUSATE SODIUM 100 MG: 100 CAPSULE, LIQUID FILLED ORAL at 05:17

## 2019-09-04 RX ADMIN — METOPROLOL TARTRATE 25 MG: 25 TABLET, FILM COATED ORAL at 05:18

## 2019-09-04 RX ADMIN — ANTACID TABLETS 500 MG: 500 TABLET, CHEWABLE ORAL at 16:48

## 2019-09-04 RX ADMIN — CEFTRIAXONE SODIUM 2 G: 2 INJECTION, POWDER, FOR SOLUTION INTRAMUSCULAR; INTRAVENOUS at 20:34

## 2019-09-04 ASSESSMENT — COGNITIVE AND FUNCTIONAL STATUS - GENERAL
MOVING FROM LYING ON BACK TO SITTING ON SIDE OF FLAT BED: A LITTLE
DAILY ACTIVITIY SCORE: 18
MOVING TO AND FROM BED TO CHAIR: A LITTLE
DRESSING REGULAR LOWER BODY CLOTHING: A LITTLE
SUGGESTED CMS G CODE MODIFIER DAILY ACTIVITY: CK
MOBILITY SCORE: 18
DRESSING REGULAR UPPER BODY CLOTHING: A LITTLE
CLIMB 3 TO 5 STEPS WITH RAILING: A LOT
SUGGESTED CMS G CODE MODIFIER MOBILITY: CK
TOILETING: A LOT
WALKING IN HOSPITAL ROOM: A LITTLE
TURNING FROM BACK TO SIDE WHILE IN FLAT BAD: A LITTLE
HELP NEEDED FOR BATHING: A LOT

## 2019-09-04 ASSESSMENT — ENCOUNTER SYMPTOMS
BACK PAIN: 1
FEVER: 0
MYALGIAS: 0
STRIDOR: 0
ABDOMINAL PAIN: 0
HEADACHES: 0
DIZZINESS: 0
SHORTNESS OF BREATH: 0
PALPITATIONS: 0
BLURRED VISION: 0
SENSORY CHANGE: 0
NERVOUS/ANXIOUS: 0
FOCAL WEAKNESS: 0
VOMITING: 0
COUGH: 0
CHILLS: 0
NAUSEA: 0
SPUTUM PRODUCTION: 0
MYALGIAS: 1
DEPRESSION: 0
MEMORY LOSS: 1
DOUBLE VISION: 0
DIAPHORESIS: 0

## 2019-09-04 ASSESSMENT — GAIT ASSESSMENTS
DISTANCE (FEET): 120
ASSISTIVE DEVICE: FRONT WHEEL WALKER
GAIT LEVEL OF ASSIST: STAND BY ASSIST
DEVIATION: INCREASED BASE OF SUPPORT

## 2019-09-04 NOTE — THERAPY
"Physical Therapy Treatment completed.   Bed Mobility:  Supine to Sit: Supervised (flat bed with railing)  Transfers: Sit to Stand: Supervised  Gait: Level Of Assist: Stand by Assist(for safety; pt reporting feeling dizzy from pain meds) with Front-Wheel Walker       Plan of Care: Will benefit from Physical Therapy 5 times per week  Discharge Recommendations: Equipment: Front-Wheel Walker. Post-acute therapy Discharge to home with home health for additional skilled therapy services.     Pt has progressed her mobility and today demonstrated bed mobility via log roll with utilization of railing only. She performed 2x120' of gait with FWW but did require a seated rest between bouts. Pt reports she feels her pain medicine limits her as it makes her feel \"dizzy\". While here, PT will follow to address gait deviations, balance impairments, and activity tolerance. Anticipate pt to be funcitonally capable of return home with HHPT services.     See \"Rehab Therapy-Acute\" Patient Summary Report for complete documentation.       "

## 2019-09-04 NOTE — PROGRESS NOTES
"Pulmonary Care Progress Note    Date of admission  8/28/2019      Hospital Course    \"79 y.o. female with past medical history of asthma, COPD, diabetes, hypothyroidism, emphysema, GERD, hypertension, hypercholesterolemia, CVA who presented 8/28/2019 for elective TLIF with Dr. Cabrales.  Postsurgically was doing well until 8/30/2019 when she became hypoxic and the hospitalist was consulted.  On the morning of 8/31/2019 the patient became confused, hypotensive and worsened hypoxia; the patient was given 0.4 mg of Narcan with good improvement in her symptoms.  This afternoon the patient became more confused and hypotensive she was given 500 mL of IVF with improvement in her hemodynamic.  Upon transfer the ICU her systolic blood pressure has again dropped to the 70s.  She is relatively asymptomatic with this and can carry conversations however is slightly.  At this time her only complaint is back pain.  She denies any lower extremity weakness, numbness, tingling, abdominal pain, diarrhea, fever or chills.\"      Interval Problem Update  9/3,  afebrile, on room air.  Neurosurgical plan and recommendations reviewed.  Hemodynamics and blood pressure remained stable.  On IV antibiotics, recheck x-ray ordered today regarding lower lobe infiltrate.  Procalcitonin was mildly elevated previously.  Electrolytes  normal.    9/4, Chart review from the past 24 hours includes imaging, laboratory studies, vital signs and notes available.  Pertinent data for today's visit includes personal review of x-ray done yesterday, still has left lower lobe infiltrate with tiny effusion.  Exam without significant consolidative findings.  Able to ambulate on oxygen without desaturation or tachypnea.  Remains afebrile on antibiotics, with repeat procalcitonin slightly lower.  Complete the 5-day course as planned.  I can recheck her in my office after discharge in 6 weeks with an x-ray on arrival to make certain this pneumonia has totally " cleared    Review of Systems  Review of Systems   Constitutional: Negative for chills and fever.   Respiratory: Negative for cough, sputum production, shortness of breath and stridor.    Cardiovascular: Negative for chest pain.   Gastrointestinal: Negative for abdominal pain, nausea and vomiting.   Genitourinary: Negative for dysuria.   Musculoskeletal: Positive for back pain. Negative for myalgias.   Skin: Negative for rash.   Neurological: Negative for dizziness, sensory change and focal weakness.   Back pain is similar to prior, no new pattern but ongoing problem, discussed with RN and patient  On nasal prong oxygen, able to ambulate to the bathroom, reviewed with bedside care team and patient  Vital Signs for last 24 hours   Temp:  [36.1 °C (97 °F)-37.1 °C (98.8 °F)] 37 °C (98.6 °F)  Pulse:  [] 84  Resp:  [16-17] 16  BP: (104-133)/(38-60) 104/55  SpO2:  [90 %-97 %] 90 %      Physical Exam   Constitutional: She is oriented to person, place, and time. She appears well-developed and well-nourished.   Chronically ill appearing   HENT:   Head: Normocephalic and atraumatic.   Right Ear: External ear normal.   Left Ear: External ear normal.   Nose: Nose normal.   Eyes: Pupils are equal, round, and reactive to light. Conjunctivae are normal.   Neck: Neck supple. No JVD present. No tracheal deviation present.   Cardiovascular: Normal rate, regular rhythm and intact distal pulses.   Pulmonary/Chest: Breath sounds normal. No accessory muscle usage. No respiratory distress.   Abdominal: Soft. Bowel sounds are normal. She exhibits no distension. There is no tenderness.   obese   Musculoskeletal: Normal range of motion. She exhibits no tenderness or deformity.   Neurological: She is alert and oriented to person, place, and time. She exhibits normal muscle tone. Coordination normal.   Skin: Skin is warm and dry. No rash noted.   Psychiatric: She has a normal mood and affect. Her behavior is normal.   Nursing note and  vitals reviewed.  Sitting at bedside, on  room air does not have significant tachypnea or respiratory distress  Sits up very carefully with logrolling technique  Ambulating on oxygen without desaturation or tachypnea  Medications  Current Facility-Administered Medications   Medication Dose Route Frequency Provider Last Rate Last Dose   • metoprolol (LOPRESSOR) tablet 25 mg  25 mg Oral TWICE DAILY SELWYN Noble.O.   25 mg at 09/04/19 0518   • labetalol (NORMODYNE,TRANDATE) injection 10 mg  10 mg Intravenous Q4HRS PRN Stephani Mcnair D.O.   10 mg at 09/03/19 0956   • oxyCODONE immediate-release (ROXICODONE) tablet 5-10 mg  5-10 mg Oral Q3HRS PRN Bossman Darden D.O.   10 mg at 09/04/19 0517   • metFORMIN (GLUCOPHAGE) tablet 500 mg  500 mg Oral BID WITH MEALS Bossman Darden D.OLiborio   500 mg at 09/03/19 1612   • acetaminophen (TYLENOL) tablet 650 mg  650 mg Oral Q6HRS Bossman Darden D.O.   650 mg at 09/04/19 0518   • acetaminophen (TYLENOL) tablet 650 mg  650 mg Oral Q4HRS PRN Yuliana Gamble M.D.   650 mg at 09/01/19 2101   • cyclobenzaprine (FLEXERIL) tablet 10 mg  10 mg Oral TID PRN Yuliana Gamble M.D.   10 mg at 09/03/19 1824   • tramadol (ULTRAM) 50 MG tablet 50 mg  50 mg Oral Q6HRS PRN Yuliana Gamble M.D.   50 mg at 08/31/19 1947   • cefTRIAXone (ROCEPHIN) 2 g in  mL IVPB  2 g Intravenous Q24HRS Bossman Darden D.O.   Stopped at 09/03/19 2156   • benzocaine-menthol (CEPACOL) lozenge 1 Lozenge  1 Lozenge Mouth/Throat Q2HRS PRN Naomy Salvador, A.P.N.       • lactobacillus rhamnosus (CULTURELLE) capsule 1 Cap  1 Cap Oral QDAY with Breakfast Fred Groves M.D.   1 Cap at 09/03/19 0749   • insulin regular (HUMULIN R) injection 1-6 Units  1-6 Units Subcutaneous 4X/DAY ACHS Fred Groves M.D.   1 Units at 09/03/19 2130    And   • glucose 4 g chewable tablet 16 g  16 g Oral Q15 MIN PRN Fred Groves M.D.        And   • DEXTROSE 10% BOLUS 250 mL  250 mL  Intravenous Q15 MIN PRN Fred Groves M.D.       • diphenhydrAMINE (BENADRYL) tablet/capsule 25 mg  25 mg Oral Q6HRS PRN BETZY CostaA.-C.        Or   • diphenhydrAMINE (BENADRYL) injection 25 mg  25 mg Intravenous Q6HRS PRN BETZY CostaA.-C.       • albuterol inhaler 1 Puff  1 Puff Inhalation Q6HRS PRN Naomy Salvador A.P.N.       • budesonide-formoterol (SYMBICORT) 160-4.5 MCG/ACT inhaler 2 Puff  2 Puff Inhalation BID Naomy Salvador A.P.N.   2 Puff at 09/03/19 1612   • dronedarone (MULTAQ) tablet 400 mg  400 mg Oral BID WITH MEALS Naomy Salvador, A.P.N.   400 mg at 09/03/19 1612   • gabapentin (NEURONTIN) capsule 300 mg  300 mg Oral BID Naomy Salvador, A.P.N.   300 mg at 09/04/19 0517   • levothyroxine (SYNTHROID) tablet 75 mcg  75 mcg Oral AM ES Naomy Salvador, A.P.N.   75 mcg at 09/04/19 0517   • omeprazole (PRILOSEC) capsule 40 mg  40 mg Oral DAILY Naomy Salvador, A.P.N.   40 mg at 09/04/19 0517   • simvastatin (ZOCOR) tablet 40 mg  40 mg Oral DAILY Naomy Salvador, A.P.N.   40 mg at 09/04/19 0518   • traZODone (DESYREL) tablet 100 mg  100 mg Oral Nightly Naomy Salvador, A.P.N.   100 mg at 09/03/19 2126   • MD ALERT...DO NOT ADMINISTER NSAIDS or ASPIRIN unless ORDERED By Neurosurgery 1 Each  1 Each Other PRN Naomy Salvador A.P.N.       • docusate sodium (COLACE) capsule 100 mg  100 mg Oral BID Naomy Salvador A.P.N.   100 mg at 09/04/19 0517   • senna-docusate (PERICOLACE or SENOKOT S) 8.6-50 MG per tablet 1 Tab  1 Tab Oral Nightly Naomy Salvador, A.P.N.   1 Tab at 09/03/19 2126   • senna-docusate (PERICOLACE or SENOKOT S) 8.6-50 MG per tablet 1 Tab  1 Tab Oral Q24HRS PRN Naomy Salvador, A.P.N.       • polyethylene glycol/lytes (MIRALAX) PACKET 1 Packet  1 Packet Oral BID PRN Naomy Salvador, A.P.N.   1 Packet at 09/01/19 9706   • magnesium  hydroxide (MILK OF MAGNESIA) suspension 30 mL  30 mL Oral QDAY PRN Naomy Salvador, A.P.N.       • bisacodyl (DULCOLAX) suppository 10 mg  10 mg Rectal Q24HRS PRN Naomy Salvador, A.P.N.       • Respiratory Care per Protocol   Nebulization Continuous RT Naomy Salvador, A.P.N.       • ondansetron (ZOFRAN) syringe/vial injection 4 mg  4 mg Intravenous Q4HRS PRN Naomy Salvador, A.P.N.   4 mg at 08/30/19 1239   • ondansetron (ZOFRAN ODT) dispertab 4 mg  4 mg Oral Q4HRS PRN Naomy Salvador, A.P.N.       • hydrALAZINE (APRESOLINE) injection 10 mg  10 mg Intravenous Q HOUR PRN Naomy Salvador, A.P.N.       • benzocaine-menthol (CEPACOL) lozenge 1 Lozenge  1 Lozenge Mouth/Throat Q2HRS PRN Naomy Salvador, A.P.N.   1 Lozenge at 09/03/19 1515   • calcium carbonate (TUMS) chewable tab 500 mg  500 mg Oral BID Naomy Salvador, A.P.N.   500 mg at 09/04/19 0523       Fluids    Intake/Output Summary (Last 24 hours) at 9/4/2019 0714  Last data filed at 9/3/2019 1800  Gross per 24 hour   Intake 980 ml   Output 800 ml   Net 180 ml       Laboratory          Recent Labs     09/02/19  0356 09/03/19  0257 09/04/19  0341   SODIUM 137 141 137   POTASSIUM 3.9 4.1 4.2   CHLORIDE 104 105 103   CO2 27 28 25   BUN 22 25* 34*   CREATININE 0.91 1.02 1.30   CALCIUM 8.9 8.8 8.5     Recent Labs     09/02/19  0356 09/03/19  0257 09/04/19  0341   GLUCOSE 160* 122* 122*     Recent Labs     09/02/19 0356   WBC 7.3   NEUTSPOLYS 75.60*   LYMPHOCYTES 12.60*   MONOCYTES 9.50   EOSINOPHILS 1.50   BASOPHILS 0.30     Recent Labs     09/02/19 0356   RBC 3.29*   HEMOGLOBIN 9.3*   HEMATOCRIT 30.7*   PLATELETCT 195       Imaging  no new images    Assessment/Plan  Acute kidney injury (HCC)  Assessment & Plan  Renal dose meds, avoid nephrotoxins  Strict I/Os  Follow renal function    Hyperkalemia- (present on admission)  Assessment & Plan  Recheck  Consider medical treatment if.   Recheck potassium is elevated    Pneumonia- (present on admission)  Assessment & Plan  Possible  Continue Rocephin and doxycycline until procalcitonin improves    Pulmonary edema- (present on admission)  Assessment & Plan  Mild, continue IVF if needed  Echo: Likely normal left ventricular systolic function.  Left ventricular ejection fraction is visually estimated to be 50%. No evidence of valvular abnormality based on blind Doppler evaluation.     Anemia  Assessment & Plan  Improved with transfusion  Continue to monitor and transfuse for hemoglobin less than 7; or less than 8 if hemodynamically unstable    Hypotension- (present on admission)  Assessment & Plan  Appears to be related to intravascular volume depletion rather than overload and heart failure.    Improved following crystalloid resuscitation  Sepsis unlikely as patient is afebrile, normal white blood cell count and no source.  Low threshold to begin antibiotics    Acute on chronic systolic (congestive) heart failure (HCC)- (present on admission)  Assessment & Plan  On [place] in May 2019: LVEF 55%  Judicious IV fluid use, currently fluid resuscitation    PAF (paroxysmal atrial fibrillation) (HCC)- (present on admission)  Assessment & Plan  Chronic, on Multaq and Xarelto at home  Hold Xarelto  Continue multiple    Chronic anticoagulation- (present on admission)  Assessment & Plan  Restart Xarelto when okay with neurosurgery and anemia improved    Dyslipidemia- (present on admission)  Assessment & Plan  Continue statin    Chronic obstructive pulmonary disease (HCC)- (present on admission)  Assessment & Plan  RT/O2 Protocols  Titrate supplemental FiO2 to maintain SpO2 >88%  Continue Symbicort  Monitor for the need to start IV steroids, currently not in exacerbation    Essential hypertension- (present on admission)  Assessment & Plan  Hold antihypertensives due to current hypotension    Type 2 diabetes mellitus with hyperglycemia, without long-term current use  of insulin ([facility])- (present on admission)  Assessment & Plan  Goal blood glucose 120-180  sliding scale insulin, accuchecks  hypoglycemia protocol  A1c 6.9           I have performed a physical exam and reviewed and updated ROS and Plan today (9/4/2019). In review of yesterday's note (9/3/2019), there are no changes except as documented above.       Maria Fernanda Cruz MD , FCCP, Pulmonary Service

## 2019-09-04 NOTE — PROGRESS NOTES
Surgery patient?: yes  Date of surgery: 8/28/19  Ambulated 50 ft on day of surgery? (N/A if today is not date of surgery): n/a  Number of times ambulated 50 feet or greater today:3  Patient has been up to chair, edge of bed or HOB 90 degrees for all meals?: yes  Goal met? (goal is ambulating at least 50 feet 2 times on day shift, one time on night shift): yes  If patient did not meet mobility goal, why?:

## 2019-09-04 NOTE — PROGRESS NOTES
0650 Patient's sitting up in bed. Bedside report receieved from NOC RN (Taty) at the beginning of the shift.    0800 Patient's sitting up in bed, having Breakfast. Educated on the importance/use of IS at least 10x every hour while awake, able to reach 1250. Fall Protocol in effect. Call light within reach. Reminded patient to call for assist. Assessment completed. No distress noted. Plan of care reviewed with the patient. Verbalized understanding.    0816 HYUN Correa visited. POC discussed with the patient,new order received & acknowledged (see MAR and straight cath order).    0948 Dr Mcnair visited. POC discussed with the patient    1045 Patient ambulated in the hallway with fWW accompanied b one female CNA.    1145 Patient voided 50 ml, bladder scan done - 509 ml.    1200 Notified Dr Mcnair that patient's TPVR - 509 ml, new order received & acknowledged to straight cath one time for the bladder scan result.    1210 Patient's sitting up in bed, having lunch. Spouse visiting.    1315 Straight cath as per order, noted 550 ml of clear yellow urine.    1330 Medicated with Oxycodone (see MAR) for c/o's back pain, rates pain 8/10.    1445 RADHA Queen worked with the patient.    1545 MIGUEL Viveros working with the patient.    1735 Patient's sitting up in bed, having Dinner. No distress noted.

## 2019-09-04 NOTE — PROGRESS NOTES
Monitor Summary: AFIB , AR .---, QRS .08, QT .--- with rare/occasional PVC, rare couplet, rate bigem per strip from monitor room

## 2019-09-04 NOTE — PROGRESS NOTES
Received Choice form at 8504  Agency/Facility Name: [facility]  Referral sent per Choice form @ 1419

## 2019-09-04 NOTE — PROGRESS NOTES
Pt up to BR to try to void, unable to void 6 hours after graham d/c'd. Bladder scan = 303mL. Notified Madeline PURVIS. Also notified APN that pt having severe pain with movement to back/ L hip. APN stated to cath for  >500mL. Order received for Toradol.

## 2019-09-04 NOTE — DISCHARGE PLANNING
Anticipated Discharge Disposition:   Home with home health    Action:    Explained home health.  Pt agreeable.  Choices obtained and form faxed to [facility].    Barriers to Discharge:    Home health acceptance  Medical clearance    Plan:    F/U on home health referrals.

## 2019-09-04 NOTE — PROGRESS NOTES
Hospital Medicine Daily Progress Note    Date of Service  9/4/2019    Chief Complaint  79 y.o. female admitted 8/28/2019 with back pain    Hospital Course    PMHx CHF 45%, lVH, AFib on rivaroxaban, PE, COPD, DM, Hypothyroidism, GERD, HTN, HLD, CVA.  Presented for a planned L spine laminectomy by Dr Cabrales on 8/29.  On 8/30 became increasingly SOB and hypotensive to SBPs in the 70s.  Given fluid boluses and narcan with some improvement.   It was felt she had been given too much pain medication.  Transferred to the ICU on 8/31 for hypotension.       Interval Problem Update    Patient reports left lower extremity weakness, improving  Minimal back pain  Anxious for discharge home    Consultants/Specialty  Neurosurgery  CC    Code Status  full    Disposition  To home with home health vs snf in 2-3 days  Encourage activity    PT/OT reevaluation  Home health ordered    Review of Systems  Review of Systems   Constitutional: Negative for diaphoresis and fever.   Eyes: Negative for blurred vision and double vision.   Respiratory: Negative for cough and shortness of breath.    Cardiovascular: Negative for palpitations and leg swelling.   Gastrointestinal: Negative for abdominal pain and nausea.   Genitourinary: Negative for dysuria and urgency.   Musculoskeletal: Positive for back pain and myalgias.   Skin: Negative for itching and rash.   Neurological: Negative for dizziness, focal weakness and headaches.   Psychiatric/Behavioral: Positive for memory loss. Negative for depression. The patient is not nervous/anxious.         Physical Exam  Temp:  [36.1 °C (97 °F)-37.1 °C (98.8 °F)] 36.4 °C (97.6 °F)  Pulse:  [] 105  Resp:  [16-17] 17  BP: (104-126)/(38-78) 126/78  SpO2:  [90 %-91 %] 91 %    Physical Exam   Constitutional: She is oriented to person, place, and time. She appears well-developed. No distress.   HENT:   Head: Normocephalic and atraumatic.   Mouth/Throat: No oropharyngeal exudate.   Eyes: Pupils are equal,  round, and reactive to light. EOM are normal.   Neck: No JVD present.   Cardiovascular: Normal rate, regular rhythm and intact distal pulses.   Pulmonary/Chest: Effort normal. No respiratory distress.   Abdominal: Soft. She exhibits no distension.   Musculoskeletal: She exhibits edema. She exhibits no tenderness.   Neurological: She is alert and oriented to person, place, and time. No cranial nerve deficit. She exhibits normal muscle tone.   Sensation intact B legs.  Unable to get accurate strength exam as pain precludes max effort however pt has functional strength by observation   Skin: Skin is warm and dry. No rash noted. She is not diaphoretic. No erythema.   Psychiatric: She has a normal mood and affect. Judgment and thought content normal.   Nursing note and vitals reviewed.      Fluids    Intake/Output Summary (Last 24 hours) at 9/4/2019 1146  Last data filed at 9/4/2019 0815  Gross per 24 hour   Intake 720 ml   Output --   Net 720 ml       Laboratory  Recent Labs     09/02/19  0356   WBC 7.3   RBC 3.29*   HEMOGLOBIN 9.3*   HEMATOCRIT 30.7*   MCV 93.3   MCH 28.3   MCHC 30.3*   RDW 59.5*   PLATELETCT 195   MPV 10.0     Recent Labs     09/02/19  0356 09/03/19  0257 09/04/19  0341   SODIUM 137 141 137   POTASSIUM 3.9 4.1 4.2   CHLORIDE 104 105 103   CO2 27 28 25   GLUCOSE 160* 122* 122*   BUN 22 25* 34*   CREATININE 0.91 1.02 1.30   CALCIUM 8.9 8.8 8.5                   Imaging  DX-CHEST-2 VIEWS   Final Result      1.  Small bilateral pleural effusions.   2.  Left lower lobe atelectasis and/or pneumonitis.   3.  Stable cardiomegaly.      EC-ECHOCARDIOGRAM COMPLETE W/O CONT   Final Result      CT-RENAL COLIC EVALUATION(A/P W/O)   Final Result         1.  No acute abnormality.   2.  Hepatomegaly   3.  Bibasilar atelectasis   4.  Bilateral fat-containing inguinal hernias   5.  Cardiomegaly   6.  Diverticulosis   7.  Atherosclerosis and atherosclerotic coronary artery disease.      DX-CHEST-PORTABLE (1 VIEW)   Final  Result      Left lung base atelectasis/possible pneumonitis or pneumonia.   Mild diffuse interstitial opacities consistent with atelectasis/edema.      DX-PORTABLE FLUORO > 1 HOUR   Final Result      Portable fluoroscopy utilized for 19 seconds.         INTERPRETING LOCATION: [address] [place] [address], [place], [id number]      DX-LUMBAR SPINE-2 OR 3 VIEWS   Final Result      Intraoperative fluoroscopy spot images as described above.           Assessment/Plan  * Post-lumbar laminectomy hypoxia, dyspnea. PNA and edema on CXR- (present on admission)  Assessment & Plan  9/2 On chornic O2 of 3L at home.  Ordered and reviewed labs and CXR.  Treat PNA and pulmonary edema (below)  Resp and O2 per protocolDoubt PE given CXR findings, recent CTA Chest was negative for PE, recent chronic anticoagulation being held due to surgery.   cathy overmedicated as responded to narcan    9/3  POD #6  Pain control  Pt min assist  May need placement    9/4 postop day #7  Continue therapy  Patient wants to go home, will follow up therapy notes  Pain controlled    Acute kidney injury (HCC)  Assessment & Plan  9/2  Creat is back to her baseline aroung 1 for GFR in upper 50's-60  No hydro on CT  Avoid nephrotoxins  Renally dose medications as appropriate  Follow daily BMP and  UOP    9/3 resolved    9/4 increasing renal insufficiency, will monitor  Continue metformin at current dose    Hyperkalemia- (present on admission)  Assessment & Plan  Resolved  Cont to follow daily    Pneumonia- (present on admission)  Assessment & Plan  Possible on CT  Resp and O2 per protocol  Complete 5 days Doxy/Rocephin      Pulmonary edema- (present on admission)  Assessment & Plan  Seems to be chronic  Mild  DC IVFs  Consider low dose loop diuretic dependent on how she does over next few days    Hypotension- (present on admission)  Assessment & Plan  9/ 2Pt has responded to narcan and IVFs  Dc IVFs now  Cont to follow closely    9/3 resolved    Acute on chronic systolic  (congestive) heart failure (HCC)- (present on admission)  Assessment & Plan  compensated  LVEF unchanged to comparison studies  LVEF 50%  Telemetry, trend troponins, EKG, BNP  Sees Dr. Steven in the outpatient    PAF (paroxysmal atrial fibrillation) ([facility])- (present on admission)  Assessment & Plan  9/2 Stable  Anticoagulation held  Cont Dronedarone    9/3 cont multaq  afib with rvr, cardiac monitor  Rate control, add metoprolol  ?AC, to d/w pt    9/4 no history of GI bleed or frequent falls  We will discuss with neurosurgery regarding clearance for initiation of full dose anticoagulation    Chronic anticoagulation- (present on admission)  Assessment & Plan  Held because of Surgery  Discuss with Neurosurgery when to restart    Dyslipidemia- (present on admission)  Assessment & Plan  statin    Chronic obstructive pulmonary disease (HCC)- (present on admission)  Assessment & Plan  Resp and O2 per protocol    Essential hypertension- (present on admission)  Assessment & Plan  Resume home lisinopril as pressures dictate    Type 2 diabetes mellitus with hyperglycemia, without long-term current use of insulin ([facility])- (present on admission)  Assessment & Plan  Now that renal function has improved resume metformin  Cont SSI    9/4 ongoing need for insulin coverage  On metformin 500 twice daily, will adjust with renal improvement  Continue to monitor  On Victoza at home       VTE prophylaxis: none due to recent spine surgery

## 2019-09-04 NOTE — CARE PLAN
Problem: Safety  Goal: Will remain free from injury  Outcome: PROGRESSING AS EXPECTED  Note:   Safety precaution in effect. Call light within reach. Reminded patient to call for assist. Hourly round sin effect. Verbalized understanding.     Problem: Infection  Goal: Will remain free from infection  Outcome: PROGRESSING AS EXPECTED  Note:   Implement standard precaution. Hand washing every encounter & before & after patient care. Verbalized understanding.     Problem: Knowledge Deficit  Goal: Knowledge of disease process/condition, treatment plan, diagnostic tests, and medications will improve  Outcome: PROGRESSING AS EXPECTED  Note:   Discussed Plan of care. Questions answered. Verbalized understanding.     Problem: Pain Management  Goal: Pain level will decrease to patient's comfort goal  Outcome: PROGRESSING AS EXPECTED  Note:   Educated on pain scale. Encouraged to verbalize pain. Will medicate as per MAR.

## 2019-09-04 NOTE — THERAPY
"Occupational Therapy Treatment completed with focus on ADLs, ADL transfers and patient education.  Functional Status:  Pt was seen for Occupational Therapy treatment today, see Therapy Kardex for details.Treatment included education in breath control with activity and at rest, self pacing techs and energy conservation for pain management. Educated pt in safety awareness techs as well.Psychosocial intervention addressed. Pt demo good knowledge of log roll. Pt demonstrated SBA for UB dressing, Supervision  for LB dressing seated EOB and use of AE and CGA while standing for clothing management up over hips using FWW for support. Pt upset stating her \"clothes were missing\". RN/CNA informed.  Pt called  to ask if he had taken clothes home and  stated, \"No\". Pt could not focus on any other tasks but her clothes. Pt able to don LSO seated base with set up only and verbal cues to don correctly. Pt also demonstrated  Min A for Ambulating ADL's with FWW. Pt refuse to get up to the chair this OT session wanting to get BTB. RN updated. Pt's back red and has a rash. HARMONY Benjamin informed as well. Discussed the need for a shower chair and demo seated shower tech to maintain NSP, Pt stated she \"is getting one\". Recommended and hand held shower head. Pt stated, \"No I don't need that; I'll figure it out\". Handout given to pt and reviewed for DME/AE needs for home. Pt was left up in bed , call light in reach, bed side table in reach and nursing is aware.  Continue Occupational Therapy services as per plan.    Plan of Care: Will benefit from Occupational Therapy 4 times per week  Discharge Recommendations:  Equipment Will Continue to Assess for Equipment Needs. Post-acute therapy: Recommend post-acute placement for additional Occupational Therapy services prior to discharge home. Patient can tolerate post-acute therapies at a 5x/week frequency. Pt did report she is not interested in transitional care setting or Home Health. " "Pt states she is open to Out Patient.     See \"Rehab Therapy-Acute\" Patient Summary Report for complete documentation.   "

## 2019-09-04 NOTE — PROGRESS NOTES
Neurosurgery Progress Note    Subjective:  Sitting up in bed having breakfast. Lisseth hawkins'kayleen yesterday, she and CNA indicate that she really has not voided. + small BM yesterday. She indicates her pain is much better after i gave her a few doses of Toradol. Don't see note from PT yesterday. Pt needs to mobilize more and void.Denies le symptoms, back sore      Exam:  BLE str intact- CDI  A&O x3, GCS 15  PERRL, EOMI       BP  Min: 104/55  Max: 133/59  Pulse  Av.7  Min: 80  Max: 127  Resp  Av.4  Min: 16  Max: 17  Temp  Av.9 °C (98.4 °F)  Min: 36.1 °C (97 °F)  Max: 37.1 °C (98.8 °F)  SpO2  Av %  Min: 90 %  Max: 97 %    No data recorded    Recent Labs     19  0356   WBC 7.3   RBC 3.29*   HEMOGLOBIN 9.3*   HEMATOCRIT 30.7*   MCV 93.3   MCH 28.3   MCHC 30.3*   RDW 59.5*   PLATELETCT 195   MPV 10.0     Recent Labs     19  0356 19  0257 19  0341   SODIUM 137 141 137   POTASSIUM 3.9 4.1 4.2   CHLORIDE 104 105 103   CO2 27 28 25   GLUCOSE 160* 122* 122*   BUN 22 25* 34*   CREATININE 0.91 1.02 1.30   CALCIUM 8.9 8.8 8.5               Intake/Output       19 - 1959 19 - 19 0659       Total  Total       Intake    P.O.  980  -- 980  --  -- --    P.O. 980 -- 980 -- -- --    Total Intake 980 -- 980 -- -- --       Output    Urine  800  -- 800  --  -- --    Output (mL) ([REMOVED] Urethral Catheter Straight-tip) 800 -- 800 -- -- --    Stool  --  -- --  --  -- --    Number of Times Stooled 1 x 1 x 2 x -- -- --    Total Output 800 -- 800 -- -- --       Net I/O     180 -- 180 -- -- --            Intake/Output Summary (Last 24 hours) at 2019 0756  Last data filed at 9/3/2019 1800  Gross per 24 hour   Intake 980 ml   Output 100 ml   Net 880 ml       $ Bladder Scan Results (mL): 304    • metoprolol  25 mg TWICE DAILY   • labetalol  10 mg Q4HRS PRN   • oxyCODONE immediate-release  5-10 mg Q3HRS PRN   • metFORMIN  500 mg BID  WITH MEALS   • acetaminophen  650 mg Q6HRS   • acetaminophen  650 mg Q4HRS PRN   • cyclobenzaprine  10 mg TID PRN   • tramadol  50 mg Q6HRS PRN   • cefTRIAXone (ROCEPHIN) IV  2 g Q24HRS   • benzocaine-menthol  1 Lozenge Q2HRS PRN   • lactobacillus rhamnosus  1 Cap QDAY with Breakfast   • insulin regular  1-6 Units 4X/DAY ACHS    And   • glucose  16 g Q15 MIN PRN    And   • dextrose 10% bolus  250 mL Q15 MIN PRN   • diphenhydrAMINE  25 mg Q6HRS PRN    Or   • diphenhydrAMINE  25 mg Q6HRS PRN   • albuterol  1 Puff Q6HRS PRN   • budesonide-formoterol  2 Puff BID   • dronedarone  400 mg BID WITH MEALS   • gabapentin  300 mg BID   • levothyroxine  75 mcg AM ES   • omeprazole  40 mg DAILY   • simvastatin  40 mg DAILY   • traZODone  100 mg Nightly   • MD ALERT...DO NOT ADMINISTER NSAIDS or ASPIRIN unless ORDERED By Neurosurgery  1 Each PRN   • docusate sodium  100 mg BID   • senna-docusate  1 Tab Nightly   • senna-docusate  1 Tab Q24HRS PRN   • polyethylene glycol/lytes  1 Packet BID PRN   • magnesium hydroxide  30 mL QDAY PRN   • bisacodyl  10 mg Q24HRS PRN   • Respiratory Care per Protocol   Continuous RT   • ondansetron  4 mg Q4HRS PRN   • ondansetron  4 mg Q4HRS PRN   • hydrALAZINE  10 mg Q HOUR PRN   • benzocaine-menthol  1 Lozenge Q2HRS PRN   • calcium carbonate  500 mg BID       Assessment and Plan:  POD # 7 L4-S1 TLIF  NM as above  Prophylactic anticoagulation: no         Start date/time: no need    Appreciate med hypotension  Continue to mobilize & check pvr  Continue pain control - try to minimize d/t urinary issues  PT/OT  Pt wears O2 normally at home  DC planning - SNF referral made yesterday  Don't see PT notes indicating that they saw her

## 2019-09-04 NOTE — PROGRESS NOTES
Per Dr. Mcnair on 9/3, patient is not in acute heart failure on this admission.    It appears that patient is admitted for back pain. Hospital medicine notes indicate that patient received pain medication, became hypotensive and received fluids.     These notes also indicate that that patient has acute on chronic systolic heart failure and follows with Dr. Steven outpatient. I reviewed Dr. Steven's last note and he did not diagnose HF. In fact, he wrote in his note that he felt that her edema was from her COPD in combination with drinking excess fluids.    Because attending notes are saying that there is no acute decompensation, patient does not require a 7day appointment.     Thank you, Ivone, Cardiovascular Nurse Navigator, RN, CH[facility] [id number]

## 2019-09-04 NOTE — DISCHARGE PLANNING
ATTN: Case Management  RE: Referral for Home Health    As of 9/4/2019, we have accepted the Home Health referral for the patient listed above.    A [facility] clinician will be out to see the patient within 48 hours. If you have any questions or concerns regarding the patient’s transition to Home Health, please do not hesitate to contact us at [phone].      We look forward to collaborating with you,  [facility] Home Health Team

## 2019-09-04 NOTE — CARE PLAN
Problem: Discharge Barriers/Planning  Goal: Patient's continuum of care needs will be met  Outcome: PROGRESSING AS EXPECTED     Problem: Pain Management  Goal: Pain level will decrease to patient's comfort goal  Outcome: PROGRESSING AS EXPECTED     Problem: Mobility  Goal: Risk for activity intolerance will decrease  Outcome: PROGRESSING AS EXPECTED

## 2019-09-05 ENCOUNTER — PATIENT OUTREACH (OUTPATIENT)
Dept: HEALTH INFORMATION MANAGEMENT | Facility: OTHER | Age: 79
End: 2019-09-05

## 2019-09-05 LAB
ANION GAP SERPL CALC-SCNC: 12 MMOL/L (ref 0–11.9)
BUN SERPL-MCNC: 39 MG/DL (ref 8–22)
CALCIUM SERPL-MCNC: 8.7 MG/DL (ref 8.5–10.5)
CHLORIDE SERPL-SCNC: 103 MMOL/L (ref 96–112)
CO2 SERPL-SCNC: 23 MMOL/L (ref 20–33)
CREAT SERPL-MCNC: 1.26 MG/DL (ref 0.5–1.4)
GLUCOSE BLD-MCNC: 132 MG/DL (ref 65–99)
GLUCOSE BLD-MCNC: 132 MG/DL (ref 65–99)
GLUCOSE BLD-MCNC: 138 MG/DL (ref 65–99)
GLUCOSE BLD-MCNC: 180 MG/DL (ref 65–99)
GLUCOSE SERPL-MCNC: 141 MG/DL (ref 65–99)
POTASSIUM SERPL-SCNC: 4.2 MMOL/L (ref 3.6–5.5)
SODIUM SERPL-SCNC: 138 MMOL/L (ref 135–145)

## 2019-09-05 PROCEDURE — 700102 HCHG RX REV CODE 250 W/ 637 OVERRIDE(OP): Performed by: INTERNAL MEDICINE

## 2019-09-05 PROCEDURE — 700112 HCHG RX REV CODE 229: Performed by: NURSE PRACTITIONER

## 2019-09-05 PROCEDURE — 770020 HCHG ROOM/CARE - TELE (206)

## 2019-09-05 PROCEDURE — A9270 NON-COVERED ITEM OR SERVICE: HCPCS | Performed by: INTERNAL MEDICINE

## 2019-09-05 PROCEDURE — 80048 BASIC METABOLIC PNL TOTAL CA: CPT

## 2019-09-05 PROCEDURE — 99232 SBSQ HOSP IP/OBS MODERATE 35: CPT | Performed by: INTERNAL MEDICINE

## 2019-09-05 PROCEDURE — A9270 NON-COVERED ITEM OR SERVICE: HCPCS | Performed by: HOSPITALIST

## 2019-09-05 PROCEDURE — 700102 HCHG RX REV CODE 250 W/ 637 OVERRIDE(OP): Performed by: NURSE PRACTITIONER

## 2019-09-05 PROCEDURE — 82962 GLUCOSE BLOOD TEST: CPT | Mod: 91

## 2019-09-05 PROCEDURE — A9270 NON-COVERED ITEM OR SERVICE: HCPCS | Performed by: NURSE PRACTITIONER

## 2019-09-05 PROCEDURE — 36415 COLL VENOUS BLD VENIPUNCTURE: CPT

## 2019-09-05 PROCEDURE — 700102 HCHG RX REV CODE 250 W/ 637 OVERRIDE(OP): Performed by: HOSPITALIST

## 2019-09-05 RX ORDER — BETHANECHOL CHLORIDE 10 MG/1
10 TABLET ORAL 3 TIMES DAILY
Qty: 21 TAB | Refills: 0 | Status: SHIPPED | OUTPATIENT
Start: 2019-09-05 | End: 2019-09-12

## 2019-09-05 RX ORDER — CYCLOBENZAPRINE HCL 10 MG
10 TABLET ORAL 3 TIMES DAILY PRN
Qty: 40 TAB | Refills: 0 | Status: SHIPPED | OUTPATIENT
Start: 2019-09-05 | End: 2019-11-19

## 2019-09-05 RX ORDER — FLUTICASONE PROPIONATE 50 MCG
2 SPRAY, SUSPENSION (ML) NASAL DAILY
Qty: 16 G | Refills: 0 | COMMUNITY
Start: 2019-09-06

## 2019-09-05 RX ORDER — OXYCODONE HYDROCHLORIDE 5 MG/1
5-10 TABLET ORAL EVERY 4 HOURS PRN
Qty: 50 TAB | Refills: 0 | Status: SHIPPED | OUTPATIENT
Start: 2019-09-05 | End: 2019-09-12

## 2019-09-05 RX ADMIN — ACETAMINOPHEN 650 MG: 325 TABLET, FILM COATED ORAL at 11:29

## 2019-09-05 RX ADMIN — DRONEDARONE 400 MG: 400 TABLET, FILM COATED ORAL at 17:16

## 2019-09-05 RX ADMIN — DOCUSATE SODIUM 100 MG: 100 CAPSULE, LIQUID FILLED ORAL at 04:53

## 2019-09-05 RX ADMIN — METOPROLOL TARTRATE 25 MG: 25 TABLET, FILM COATED ORAL at 17:15

## 2019-09-05 RX ADMIN — ANTACID TABLETS 500 MG: 500 TABLET, CHEWABLE ORAL at 04:53

## 2019-09-05 RX ADMIN — DRONEDARONE 400 MG: 400 TABLET, FILM COATED ORAL at 07:49

## 2019-09-05 RX ADMIN — TRAMADOL HYDROCHLORIDE 50 MG: 50 TABLET, FILM COATED ORAL at 05:01

## 2019-09-05 RX ADMIN — OXYCODONE HYDROCHLORIDE 5 MG: 5 TABLET ORAL at 17:40

## 2019-09-05 RX ADMIN — METFORMIN HYDROCHLORIDE 500 MG: 500 TABLET, FILM COATED ORAL at 07:50

## 2019-09-05 RX ADMIN — FLUTICASONE PROPIONATE 100 MCG: 50 SPRAY, METERED NASAL at 04:55

## 2019-09-05 RX ADMIN — OXYCODONE HYDROCHLORIDE 5 MG: 5 TABLET ORAL at 03:41

## 2019-09-05 RX ADMIN — BUDESONIDE AND FORMOTEROL FUMARATE DIHYDRATE 2 PUFF: 160; 4.5 AEROSOL RESPIRATORY (INHALATION) at 04:55

## 2019-09-05 RX ADMIN — Medication 1 CAPSULE: at 07:49

## 2019-09-05 RX ADMIN — OMEPRAZOLE 40 MG: 20 CAPSULE, DELAYED RELEASE ORAL at 04:53

## 2019-09-05 RX ADMIN — SIMVASTATIN 40 MG: 20 TABLET, FILM COATED ORAL at 04:52

## 2019-09-05 RX ADMIN — METFORMIN HYDROCHLORIDE 500 MG: 500 TABLET, FILM COATED ORAL at 17:12

## 2019-09-05 RX ADMIN — METOPROLOL TARTRATE 25 MG: 25 TABLET, FILM COATED ORAL at 04:54

## 2019-09-05 RX ADMIN — GABAPENTIN 300 MG: 300 CAPSULE ORAL at 04:53

## 2019-09-05 RX ADMIN — LEVOTHYROXINE SODIUM 75 MCG: 75 TABLET ORAL at 04:53

## 2019-09-05 RX ADMIN — INSULIN HUMAN 1 UNITS: 100 INJECTION, SOLUTION PARENTERAL at 20:01

## 2019-09-05 RX ADMIN — BUDESONIDE AND FORMOTEROL FUMARATE DIHYDRATE 2 PUFF: 160; 4.5 AEROSOL RESPIRATORY (INHALATION) at 17:12

## 2019-09-05 RX ADMIN — ACETAMINOPHEN 650 MG: 325 TABLET, FILM COATED ORAL at 17:12

## 2019-09-05 RX ADMIN — BETHANECHOL CHLORIDE 10 MG: 10 TABLET ORAL at 04:53

## 2019-09-05 RX ADMIN — DOCUSATE SODIUM 100 MG: 100 CAPSULE, LIQUID FILLED ORAL at 17:12

## 2019-09-05 RX ADMIN — ANTACID TABLETS 500 MG: 500 TABLET, CHEWABLE ORAL at 17:12

## 2019-09-05 RX ADMIN — OXYCODONE HYDROCHLORIDE 5 MG: 5 TABLET ORAL at 07:50

## 2019-09-05 RX ADMIN — GABAPENTIN 300 MG: 300 CAPSULE ORAL at 17:12

## 2019-09-05 RX ADMIN — ACETAMINOPHEN 650 MG: 325 TABLET, FILM COATED ORAL at 04:52

## 2019-09-05 RX ADMIN — TRAZODONE HYDROCHLORIDE 100 MG: 100 TABLET ORAL at 20:05

## 2019-09-05 RX ADMIN — TRAMADOL HYDROCHLORIDE 50 MG: 50 TABLET, FILM COATED ORAL at 20:11

## 2019-09-05 RX ADMIN — SENNOSIDES, DOCUSATE SODIUM 1 TABLET: 50; 8.6 TABLET, FILM COATED ORAL at 20:05

## 2019-09-05 RX ADMIN — BENZOCAINE AND MENTHOL 1 LOZENGE: 15; 3.6 LOZENGE ORAL at 10:20

## 2019-09-05 RX ADMIN — BETHANECHOL CHLORIDE 10 MG: 10 TABLET ORAL at 11:29

## 2019-09-05 ASSESSMENT — PATIENT HEALTH QUESTIONNAIRE - PHQ9
1. LITTLE INTEREST OR PLEASURE IN DOING THINGS: NOT AT ALL
2. FEELING DOWN, DEPRESSED, IRRITABLE, OR HOPELESS: NOT AT ALL
SUM OF ALL RESPONSES TO PHQ9 QUESTIONS 1 AND 2: 0

## 2019-09-05 ASSESSMENT — ENCOUNTER SYMPTOMS
SHORTNESS OF BREATH: 0
CHILLS: 0
DIZZINESS: 0
FEVER: 0
SENSORY CHANGE: 0
BACK PAIN: 1
FOCAL WEAKNESS: 0
COUGH: 0
STRIDOR: 0
NAUSEA: 0
MYALGIAS: 0
ABDOMINAL PAIN: 0
VOMITING: 0
SPUTUM PRODUCTION: 0

## 2019-09-05 ASSESSMENT — PAIN SCALES - WONG BAKER: WONGBAKER_NUMERICALRESPONSE: HURTS A LITTLE MORE

## 2019-09-05 NOTE — CARE PLAN
Problem: Safety  Goal: Will remain free from injury  Outcome: PROGRESSING AS EXPECTED  Note:   Safety precaution in effect. Call light within  reach. Reminded patient to call for assist.  Non skid socks in use. Verbalized understanding.      Problem: Infection  Goal: Will remain free from infection  Outcome: PROGRESSING AS EXPECTED  Note:   Implement Standard precaution. Hand washing every encounter & before & after patient care. Verbalized understanding.     Problem: Knowledge Deficit  Goal: Knowledge of disease process/condition, treatment plan, diagnostic tests, and medications will improve  Outcome: PROGRESSING AS EXPECTED  Note:   Discussed plan of care. Questions answered. Verbalized understanding.     Problem: Pain Management  Goal: Pain level will decrease to patient's comfort goal  Outcome: PROGRESSING AS EXPECTED  Note:   Educated pain management. Encouraged to verbalize pain. Will medicate as per MAR.

## 2019-09-05 NOTE — OP REPORT
DATE OF SERVICE:  09/04/2019    PREOPERATIVE DIAGNOSIS:  Lumbar stenosis with neurogenic claudication.    POSTOPERATIVE DIAGNOSIS:  Lumbar stenosis with neurogenic claudication.    PROCEDURES:  1.  L2 laminectomy with medial facetectomy and bilateral foraminotomy and   decompression of L2 nerve roots.  2.  Repair of dural tear on the right at L2-L3.  3.  L3 laminectomy with medial facetectomy and bilateral foraminotomy and   decompression of L3 nerve roots.  4.  L4 laminectomy with medial facetectomy and bilateral foraminotomy and   decompression of L4 nerve roots.  5.  Repair of dural tear on the right at L4-L5 with use of 6-0 Rawlins-Brennen.  6.  L5 laminectomy with medial facetectomy and bilateral foraminotomies.    SURGEON:  Grupo Cabrales MD    ASSISTANT:  NICHOLAS Henning    ANESTHESIA:  General anesthetic.    ANESTHESIOLOGIST:  Ramón Aden MD    PREPARATION:  Routine.    INDICATIONS:  The patient presents with significant lumbar degenerative   changes, severe lumbar stenosis, and neurogenic claudication.  She also has   back pain associated with this.  The indications and possible complications of   the procedure were explained to the patient and informed consent was   obtained.    DESCRIPTION OF PROCEDURE:  The patient was brought to the operating room, and   following induction, she was intubated and placed under general anesthetic.    Rolled prone onto the operating room table.  All pressure points were padded.    Sequential stockings were in place.  The back was prepped and draped in a   sterile fashion.  The proposed incision site was injected with Marcaine 0.5%   with epinephrine.  Midline incision was made from L2 down through L5 and   carried down to subcutaneous tissue.  Fascia was injected with Marcaine 0.5%   with epinephrine as well as the muscular layers.  Subperiosteal dissection was   carried out bilaterally at L2, L3, L4, and L5.  Retractors were placed to   maintain exposure.  We then  removed the spinous processes of L2, L3, L4, and   L5.  There was marked hypertrophy in midline facets.  We drilled the junction   of the lamina and facets to a thin shelf at L2, L3, L4, and L5.  Center   portion of bone was removed with Leksell, we were able to decompress from L4   through L2.  Undercutting the facets on the right hand side and performed a   foraminotomy around the L2 nerve root, the facet was dug into the thecal sac   and upon removing a portion of the facet, there was a small dural tear.  This   was repaired with 6-0 Paige-Brennen.  Carrying the decompression down to the L3-L4   level, we were able to perform a foraminotomy around the pedicle of L3 and   subsequently decompressing the marked facet hypertrophy at L4-L5.  We had to   drill this to a thin shelf and decompressed subsequently the medial facet.  We   then decompressed around the pedicle of L4.  We drilled the lamina of L5 to a   thin shelf, completed our decompression with a #5 Kerrison and used #3   Kerrison to perform a foraminotomy around the L5 nerve root and S1 nerve root.    Going to the left hand side, we were able to undercut the facet joint at   L2-L3 and perform a foraminotomy around the L3 nerve root.  Decompressing the   facet at L3-L4 by drilling it to a thin shelf, curetting the thecal sac free   and dissecting the scar tissue from the thecal sac, we were able to decompress   around the pedicle of L3.  At L4-L5 and decompressing, the facet was markedly   hypertrophied compressing into the thecal sac and decompressing this area   yielded again a dural tear.  The nerve root here appeared to be thickened, but   was intact certainly and this was not injured with our Kerrisons.  We   decompressed around the area and then sutured the dural tear with 6-0   Paige-Brennen.  Going inferiorly, we were able to again drill the medial facet at   L5-S1, decompressed around the pedicle of L5 and S1.  All nerve roots were   found to be free  bilaterally at L2, L3, L4, and L5.  S1 nerve roots were also   found to be free, but there was marked degenerative change in the facets and   disk hypertrophy at L4-L5 and L5-S1 was remarkable.  The lateral recess   stenosis at L4-L5 was found to be the worst especially on the left hand side.    The area was copiously irrigated.  We placed blue glue over the thecal sac   followed by Duragen 1x3.    A medium size Hemovac was placed.  We subsequently closed the fascia with #1   Vicryl suture, subcutaneous tissue with 2-0 Vicryl as well as the fascia.    Skin was closed with staples.  All sponge and needle counts were correct.    Estimated blood loss approximately 200 mL.       ____________________________________     MD BARBARA DYER / NTS    DD:  09/05/2019 04:13:23  DT:  09/05/2019 06:48:24    D#:  [id number]  Job#:  [id number]    cc: MONICA Driver DO, IRIS SINGLETARY MD

## 2019-09-05 NOTE — PROGRESS NOTES
1905 Patient's sitting up in bed. No changes in status. Bedside given to Oncoming TAMRA RN (Janel).

## 2019-09-05 NOTE — PROGRESS NOTES
0700 Patient's sitting up in bed. Bedside report received from NOC RN (Janel) at the beginning of the shift.    0740 HYUN Correa visited. POC discussed with the patient. Notified the said APN that patient's bladder scan results from & that patient was straight cath as per order per NOC RN. Also notified Primary nurse that she'll be calling Urology in regards to this.     0750 Patient's sitting up in ed. Educated o the importance/use of IS at least 10x every hour while awake, able to reach 1250. Fall Protocol in effect. Call light within reach. Reminded patient to call for assist. Assessment completed. No distress noted. Medicated with Oxycodone (see MAR) for c/o's  back pain 8/10. Plan of care reviewed with the patient. Verbalized understanding.    0816 New order received & acknowledged from HYUN Correa.    1020 Medicated with Cepacol (see MAR) for c/o's throat pain.    1215 Patient's sitting up in bed, having lunch. No distress noted.    1218 Received a call from HYUN Correa. New order receieved & acknowledged - if patient unable to void, teach patient to do in out cath of place graham catheter & follow up with dr Brooke (Urologist) and d/c Urecholine and Flomax prescription will be sent to patient's Pharmacy.    1355 Placed a call to Dr Mcnair, awaiting for response.    1405 Received a call from Dr Mcnair. Notified MD of the Bladder scan result - 484 ml, new order received & acknowledged to place graham catheter and that there's a d/c order from HYUN Correa per Dr Mcnair patient is not being dc'd today. New order received & acknowleded to place graham catheter and medications (see MAR).    1510 FC was placed aseptically as per MD's order.    1527 Spoke to Dr Mcnair that patient has allergy to Sulfa, MD ordered Flomax and Primary nurse discused it with Katrin Pharmacist. No new orders received.     1740 Patient's sitting up in bed, medicated with Oxycodone (see MAR) for c/o's back pain, rates pain 8/10.

## 2019-09-05 NOTE — PROGRESS NOTES
Hospital Medicine Daily Progress Note    Date of Service  9/6/2019    Chief Complaint  79 y.o. female admitted 8/28/2019 with back pain    Hospital Course    PMHx CHF 45%, lVH, AFib on rivaroxaban, PE, COPD, DM, Hypothyroidism, GERD, HTN, HLD, CVA.  Presented for a planned L spine laminectomy by Dr Cabrales on 8/29.  On 8/30 became increasingly SOB and hypotensive to SBPs in the 70s.  Given fluid boluses and narcan with some improvement.   It was felt she had been given too much pain medication.  Transferred to the ICU on 8/31 for hypotension.       Interval Problem Update    Now requiring Montanez catheter replacement due to urinary retention  Met with diabetic educator  Blood sugar controlled      Consultants/Specialty  Neurosurgery  CC    Code Status  full    Disposition  Medically cleared for discharge to home  Neurosurgery to arrange  Home health has been arranged  Would recommend discharge to home on metformin 500 twice daily, lower dose from home dosing  To resume Victoza    Review of Systems  Review of Systems   Constitutional: Negative for diaphoresis, fever and malaise/fatigue.   Eyes: Negative for blurred vision and double vision.   Respiratory: Negative for shortness of breath.    Cardiovascular: Negative for chest pain, palpitations and leg swelling.   Gastrointestinal: Negative for abdominal pain and nausea.   Genitourinary: Negative for dysuria and flank pain.   Musculoskeletal: Positive for back pain and myalgias.   Neurological: Negative for dizziness, sensory change, focal weakness and headaches.   Psychiatric/Behavioral: Positive for memory loss. Negative for depression. The patient is nervous/anxious.         Physical Exam  Temp:  [36.1 °C (97 °F)-36.7 °C (98.1 °F)] 36.7 °C (98 °F)  Pulse:  [83-90] 84  Resp:  [16-18] 16  BP: (115-142)/(48-78) 141/58  SpO2:  [93 %] 93 %    Physical Exam   Constitutional: She is oriented to person, place, and time. No distress.   HENT:   Head: Normocephalic and  atraumatic.   Eyes: Pupils are equal, round, and reactive to light. EOM are normal.   Cardiovascular: Normal rate, regular rhythm and intact distal pulses.   Pulmonary/Chest: Effort normal. No respiratory distress. She has no wheezes.   Abdominal: Soft. She exhibits no distension.   Musculoskeletal: She exhibits edema. She exhibits no tenderness.   Neurological: She is alert and oriented to person, place, and time. No cranial nerve deficit. She exhibits normal muscle tone.   Sensation intact B legs.  Unable to get accurate strength exam as pain precludes max effort however pt has functional strength by observation   Skin: Skin is warm and dry. She is not diaphoretic.   Psychiatric: She has a normal mood and affect. Judgment normal.   Nursing note and vitals reviewed.      Fluids    Intake/Output Summary (Last 24 hours) at 9/6/2019 0843  Last data filed at 9/6/2019 0400  Gross per 24 hour   Intake 480 ml   Output 1550 ml   Net -1070 ml       Laboratory      Recent Labs     09/04/19  0341 09/05/19  0415   SODIUM 137 138   POTASSIUM 4.2 4.2   CHLORIDE 103 103   CO2 25 23   GLUCOSE 122* 141*   BUN 34* 39*   CREATININE 1.30 1.26   CALCIUM 8.5 8.7                   Imaging  DX-CHEST-2 VIEWS   Final Result      1.  Small bilateral pleural effusions.   2.  Left lower lobe atelectasis and/or pneumonitis.   3.  Stable cardiomegaly.      EC-ECHOCARDIOGRAM COMPLETE W/O CONT   Final Result      CT-RENAL COLIC EVALUATION(A/P W/O)   Final Result         1.  No acute abnormality.   2.  Hepatomegaly   3.  Bibasilar atelectasis   4.  Bilateral fat-containing inguinal hernias   5.  Cardiomegaly   6.  Diverticulosis   7.  Atherosclerosis and atherosclerotic coronary artery disease.      DX-CHEST-PORTABLE (1 VIEW)   Final Result      Left lung base atelectasis/possible pneumonitis or pneumonia.   Mild diffuse interstitial opacities consistent with atelectasis/edema.      DX-PORTABLE FLUORO > 1 HOUR   Final Result      Portable  fluoroscopy utilized for 19 seconds.         INTERPRETING LOCATION: [address] [place] [address], ROBERTO MU[place]OZ, [id number]      DX-LUMBAR SPINE-2 OR 3 VIEWS   Final Result      Intraoperative fluoroscopy spot images as described above.           Assessment/Plan  * Post-lumbar laminectomy hypoxia, dyspnea. PNA and edema on CXR- (present on admission)  Assessment & Plan  9/2 On chornic O2 of 3L at home.  Ordered and reviewed labs and CXR.  Treat PNA and pulmonary edema (below)  Resp and O2 per protocolDoubt PE given CXR findings, recent CTA Chest was negative for PE, recent chronic anticoagulation being held due to surgery.   cathy overmedicated as responded to narcan    9/3  POD #6  Pain control  Pt min assist  May need placement    9/4 postop day #7  Continue therapy  Patient wants to go home, will follow up therapy notes  Pain controlled    9/6 postop day #9  Per neurosurgery okay for discharge home  Discharge home per second neurosurgery ,to arrange    Acute kidney injury (HCC)  Assessment & Plan  9/2  Creat is back to her baseline aroung 1 for GFR in upper 50's-60  No hydro on CT  Avoid nephrotoxins  Renally dose medications as appropriate  Follow daily BMP and  UOP    9/3 resolved    9/4 increasing renal insufficiency, will monitor  Continue metformin at current dose    9/5 improving renal function  Urinary retention requiring straight catheterization, may need Montanez catheter placement on discharge  Encourage activity  May need urology consult as an outpatient    9/6 resolving, but now with urinary retention  Montanez catheter placed  Patient states that she is unable to straight cath  Allergic to sulfa, unable to start Flomax  For outpatient neurology follow-up per neurosurgery to arrange  Continue education on Montanez catheter care  Home health arranged    Hyperkalemia- (present on admission)  Assessment & Plan  Resolved  Cont to follow daily    Pneumonia- (present on admission)  Assessment & Plan  Possible on CT  Resp and O2 per  protocol  Complete 5 days Doxy/Rocephin      Pulmonary edema- (present on admission)  Assessment & Plan  Seems to be chronic  Mild  DC IVFs  Consider low dose loop diuretic dependent on how she does over next few days    Hypotension- (present on admission)  Assessment & Plan  9/ 2Pt has responded to narcan and IVFs  Dc IVFs now  Cont to follow closely    9/3 resolved    Acute on chronic systolic (congestive) heart failure (HCC)- (present on admission)  Assessment & Plan  compensated  LVEF unchanged to comparison studies  LVEF 50%  Telemetry, trend troponins, EKG, BNP  Sees Dr. Steven in the outpatient    PAF (paroxysmal atrial fibrillation) (HCC)- (present on admission)  Assessment & Plan  9/2 Stable  Anticoagulation held  Cont Dronedarone    9/3 cont multaq  afib with rvr, cardiac monitor  Rate control, add metoprolol  ?AC, to d/w pt    9/4 no history of GI bleed or frequent falls  We will discuss with neurosurgery regarding clearance for initiation of full dose anticoagulation    9/5 to follow-up as an outpatient regarding initiation of full dose anticoagulation when cleared by neurosurgery    9/6 rate controlled  To review with neurosurgery regarding benefits of starting full dose anticoagulation  Recent laminectomy      Chronic anticoagulation- (present on admission)  Assessment & Plan  Held because of Surgery  Discuss with Neurosurgery when to restart    Dyslipidemia- (present on admission)  Assessment & Plan  statin    Chronic obstructive pulmonary disease (HCC)- (present on admission)  Assessment & Plan  Resp and O2 per protocol    Essential hypertension- (present on admission)  Assessment & Plan  Resume home lisinopril as pressures dictate    Type 2 diabetes mellitus with hyperglycemia, without long-term current use of insulin (HCC)- (present on admission)  Assessment & Plan  Now that renal function has improved resume metformin  Cont SSI    9/4 ongoing need for insulin coverage  On metformin 500 twice daily,  will adjust with renal improvement  Continue to monitor  On Victoza at home    9/5 diabetic educator made  May need sliding scale insulin coverage on discharge    9/7 seen by diabetic educator  Patient uses metformin and Victoza at home  Poor oral intake during this inpatient admission  Will resume home diet  Okay to resume toes and metformin, would continue at lowered metformin dose           VTE prophylaxis: none due to recent spine surgery

## 2019-09-05 NOTE — PROGRESS NOTES
Surgery patient?: yes  Date of surgery: 8/28/19  Ambulated 50 ft on day of surgery? (N/A if today is not date of surgery): n/a  Number of times ambulated 50 feet or greater today: 4  Patient has been up to chair, edge of bed or HOB 90 degrees for all meals?: yes  Goal met? (goal is ambulating at least 50 feet 2 times on day shift, one time on night shift): yes  If patient did not meet mobility goal, why?:

## 2019-09-05 NOTE — PROGRESS NOTES
Neurosurgery Progress Note    Subjective:  Seen by myself and Dr. Cabrales this am. She continues to have d/w voiding and required straight cath X2. She is mobilizing better and per CM, pts request was to go home with HH and not SNF. Denies le symptoms, back sore      Exam:  BLE str intact- CDI  A&O x3, GCS 15  PERRL, EOMI       BP  Min: 106/63  Max: 132/66  Pulse  Av.6  Min: 76  Max: 120  Resp  Av.8  Min: 16  Max: 18  Temp  Av.6 °C (97.8 °F)  Min: 36.1 °C (97 °F)  Max: 36.8 °C (98.2 °F)  SpO2  Av %  Min: 91 %  Max: 99 %    No data recorded        Recent Labs     19  0257 19  0341 19  0415   SODIUM 141 137 138   POTASSIUM 4.1 4.2 4.2   CHLORIDE 105 103 103   CO2 28 25 23   GLUCOSE 122* 122* 141*   BUN 25* 34* 39*   CREATININE 1.02 1.30 1.26   CALCIUM 8.8 8.5 8.7               Intake/Output       19 07 - 19 0659 19 07 - 19 0659       Total  Total       Intake    P.O.  240  -- 240  --  -- --    P.O. 240 -- 240 -- -- --    Total Intake 240 -- 240 -- -- --       Output    Urine  600  600 1200  --  -- --    Number of Times Voided 1 x -- 1 x -- -- --    Straight Catheter  -- -- --    Urine Void (mL) 50 -- 50 -- -- --    Stool  --  -- --  --  -- --    Number of Times Stooled 1 x 4 x 5 x -- -- --    Total Output  -- -- --       Net I/O     -360 -600 -960 -- -- --            Intake/Output Summary (Last 24 hours) at 2019 0801  Last data filed at 2019 0600  Gross per 24 hour   Intake 240 ml   Output 1200 ml   Net -960 ml       $ Bladder Scan Results (mL): 551    • bethanechol  10 mg TID   • oxyCODONE immediate-release  5-10 mg Q4HRS PRN   • tramadol  50 mg Q6HRS PRN   • acetaminophen  650 mg Q4HRS PRN   • fluticasone  2 Spray DAILY   • metoprolol  25 mg TWICE DAILY   • labetalol  10 mg Q4HRS PRN   • metFORMIN  500 mg BID WITH MEALS   • acetaminophen  650 mg Q6HRS   • cyclobenzaprine  10 mg TID  PRN   • benzocaine-menthol  1 Lozenge Q2HRS PRN   • lactobacillus rhamnosus  1 Cap QDAY with Breakfast   • insulin regular  1-6 Units 4X/DAY ACHS    And   • glucose  16 g Q15 MIN PRN    And   • dextrose 10% bolus  250 mL Q15 MIN PRN   • diphenhydrAMINE  25 mg Q6HRS PRN    Or   • diphenhydrAMINE  25 mg Q6HRS PRN   • albuterol  1 Puff Q6HRS PRN   • budesonide-formoterol  2 Puff BID   • dronedarone  400 mg BID WITH MEALS   • gabapentin  300 mg BID   • levothyroxine  75 mcg AM ES   • omeprazole  40 mg DAILY   • simvastatin  40 mg DAILY   • traZODone  100 mg Nightly   • MD ALERT...DO NOT ADMINISTER NSAIDS or ASPIRIN unless ORDERED By Neurosurgery  1 Each PRN   • docusate sodium  100 mg BID   • senna-docusate  1 Tab Nightly   • senna-docusate  1 Tab Q24HRS PRN   • polyethylene glycol/lytes  1 Packet BID PRN   • magnesium hydroxide  30 mL QDAY PRN   • bisacodyl  10 mg Q24HRS PRN   • Respiratory Care per Protocol   Continuous RT   • ondansetron  4 mg Q4HRS PRN   • ondansetron  4 mg Q4HRS PRN   • hydrALAZINE  10 mg Q HOUR PRN   • calcium carbonate  500 mg BID       Assessment and Plan:  POD # 8 L4-S1 TLIF  NM as above  Prophylactic anticoagulation: no         Start date/time: no need    Appreciate med hypotension  Continue to mobilize & check pvr  Continue pain control - try to minimize d/t urinary issues. May need to dc with cath - will call Urologists office for pt to f/u with them if need to dc with cath.  Pt wears O2 normally at home  DC planning - SNF referral made Tues.   Recommendations of HH upon dc

## 2019-09-05 NOTE — PROGRESS NOTES
"Pulmonary Care Progress Note    Date of admission  8/28/2019      Hospital Course    \"79 y.o. female with past medical history of asthma, COPD, diabetes, hypothyroidism, emphysema, GERD, hypertension, hypercholesterolemia, CVA who presented 8/28/2019 for elective TLIF with Dr. Cabrales.  Postsurgically was doing well until 8/30/2019 when she became hypoxic and the hospitalist was consulted.  On the morning of 8/31/2019 the patient became confused, hypotensive and worsened hypoxia; the patient was given 0.4 mg of Narcan with good improvement in her symptoms.  This afternoon the patient became more confused and hypotensive she was given 500 mL of IVF with improvement in her hemodynamic.  Upon transfer the ICU her systolic blood pressure has again dropped to the 70s.  She is relatively asymptomatic with this and can carry conversations however is slightly.  At this time her only complaint is back pain.  She denies any lower extremity weakness, numbness, tingling, abdominal pain, diarrhea, fever or chills.\"      Interval Problem Update  9/3,  afebrile, on room air.  Neurosurgical plan and recommendations reviewed.  Hemodynamics and blood pressure remained stable.  On IV antibiotics, recheck x-ray ordered today regarding lower lobe infiltrate.  Procalcitonin was mildly elevated previously.  Electrolytes  normal.    9/4,x-ray done yesterday, still has left lower lobe infiltrate with tiny effusion.  Exam without significant consolidative findings.  Able to ambulate on oxygen without desaturation or tachypnea.  Remains afebrile on antibiotics, with repeat procalcitonin slightly lower.  Complete the 5-day course as planned.  I can recheck her in my office after discharge in 6 weeks with an x-ray on arrival to make certain this pneumonia has totally cleared    9/5, Chart review from the past 24 hours includes imaging, laboratory studies, vital signs and notes available.  Pertinent data for today's visit includes no fever, " finishing antibiotics, remains on oxygen, in good spirits, anticipating discharge in possible 1 to 2 days.  We will arrange follow-up in my office    Review of Systems  Review of Systems   Constitutional: Negative for chills and fever.   Respiratory: Negative for cough, sputum production, shortness of breath and stridor.    Cardiovascular: Negative for chest pain.   Gastrointestinal: Negative for abdominal pain, nausea and vomiting.   Genitourinary: Negative for dysuria.   Musculoskeletal: Positive for back pain. Negative for myalgias.   Skin: Negative for rash.   Neurological: Negative for dizziness, sensory change and focal weakness.   Back pain is similar to prior, no new pattern but ongoing problem, discussed with RN and patient  On nasal prong oxygen, able to ambulate to the bathroom, reviewed with bedside care team and patient  Vital Signs for last 24 hours   Temp:  [36.1 °C (97 °F)-36.8 °C (98.2 °F)] 36.7 °C (98 °F)  Pulse:  [] 102  Resp:  [16-18] 16  BP: (106-132)/(63-78) 132/66  SpO2:  [91 %-99 %] 92 %      Physical Exam   Constitutional: She is oriented to person, place, and time. She appears well-developed and well-nourished.   Chronically ill appearing   HENT:   Head: Normocephalic and atraumatic.   Right Ear: External ear normal.   Left Ear: External ear normal.   Nose: Nose normal.   Eyes: Pupils are equal, round, and reactive to light. Conjunctivae are normal.   Neck: Neck supple. No JVD present. No tracheal deviation present.   Cardiovascular: Normal rate, regular rhythm and intact distal pulses.   Pulmonary/Chest: Breath sounds normal. No accessory muscle usage. No respiratory distress.   Abdominal: Soft. Bowel sounds are normal. She exhibits no distension. There is no tenderness.   obese   Musculoskeletal: Normal range of motion. She exhibits no tenderness or deformity.   Neurological: She is alert and oriented to person, place, and time. She exhibits normal muscle tone. Coordination normal.    Skin: Skin is warm and dry. No rash noted.   Psychiatric: She has a normal mood and affect. Her behavior is normal.   Nursing note and vitals reviewed.  Sitting at bedside, on  room air does not have significant tachypnea or respiratory distress  Sits up very carefully with logrolling technique  Ambulating on oxygen without desaturation or tachypnea  Medications  Current Facility-Administered Medications   Medication Dose Route Frequency Provider Last Rate Last Dose   • bethanechol (URECHOLINE) tablet 10 mg  10 mg Oral TID Naomy Leggett-Dony, A.P.N.   10 mg at 09/05/19 0453   • oxyCODONE immediate-release (ROXICODONE) tablet 5-10 mg  5-10 mg Oral Q4HRS PRN Naomy Salvador, A.P.N.   5 mg at 09/05/19 0341   • tramadol (ULTRAM) 50 MG tablet 50 mg  50 mg Oral Q6HRS PRN Naomy Salvador, A.P.N.   50 mg at 09/05/19 0501   • acetaminophen (TYLENOL) tablet 650 mg  650 mg Oral Q4HRS PRN Naomy Salvador, A.P.N.       • fluticasone (FLONASE) nasal spray 100 mcg  2 Spray Nasal DAILY Stephani Mcnair D.O.   100 mcg at 09/05/19 0455   • metoprolol (LOPRESSOR) tablet 25 mg  25 mg Oral TWICE DAILY Stephani Mcnair D.O.   25 mg at 09/05/19 0454   • labetalol (NORMODYNE,TRANDATE) injection 10 mg  10 mg Intravenous Q4HRS PRN Stephani Mcnair D.O.   10 mg at 09/03/19 0956   • metFORMIN (GLUCOPHAGE) tablet 500 mg  500 mg Oral BID WITH MEALS Bossman Darden D.O.   500 mg at 09/04/19 1648   • acetaminophen (TYLENOL) tablet 650 mg  650 mg Oral Q6HRS Bossman Darden D.O.   650 mg at 09/05/19 0452   • cyclobenzaprine (FLEXERIL) tablet 10 mg  10 mg Oral TID PRN Yuliana Gamble M.D.   10 mg at 09/03/19 1824   • benzocaine-menthol (CEPACOL) lozenge 1 Lozenge  1 Lozenge Mouth/Throat Q2HRS PRN MIRELA ToscanoPLiborioN.       • lactobacillus rhamnosus (CULTURELLE) capsule 1 Cap  1 Cap Oral QDAY with Breakfast Fred Groves M.D.   1 Cap at 09/04/19 0813   • insulin regular (HUMULIN R)  injection 1-6 Units  1-6 Units Subcutaneous 4X/DAY ACHS Fred Groves M.D.   Stopped at 09/04/19 1655    And   • glucose 4 g chewable tablet 16 g  16 g Oral Q15 MIN PRN Fred Groves M.D.        And   • DEXTROSE 10% BOLUS 250 mL  250 mL Intravenous Q15 MIN PRN Fred Groves M.D.       • diphenhydrAMINE (BENADRYL) tablet/capsule 25 mg  25 mg Oral Q6HRS PRN Shannon Salmeron P.A.-C.   25 mg at 09/04/19 1649    Or   • diphenhydrAMINE (BENADRYL) injection 25 mg  25 mg Intravenous Q6HRS PRN Shannon Salmeron P.A.-C.       • albuterol inhaler 1 Puff  1 Puff Inhalation Q6HRS PRN Naomy Salvador, A.P.N.       • budesonide-formoterol (SYMBICORT) 160-4.5 MCG/ACT inhaler 2 Puff  2 Puff Inhalation BID Naomy Salvador, A.P.N.   2 Puff at 09/05/19 0455   • dronedarone (MULTAQ) tablet 400 mg  400 mg Oral BID WITH MEALS Naomy Salvador, A.P.N.   400 mg at 09/05/19 0749   • gabapentin (NEURONTIN) capsule 300 mg  300 mg Oral BID Naomy Salvador, A.P.N.   300 mg at 09/05/19 0453   • levothyroxine (SYNTHROID) tablet 75 mcg  75 mcg Oral AM ES Naomy Salvador, A.P.N.   75 mcg at 09/05/19 0453   • omeprazole (PRILOSEC) capsule 40 mg  40 mg Oral DAILY Naomy Salvador, A.P.N.   40 mg at 09/05/19 0453   • simvastatin (ZOCOR) tablet 40 mg  40 mg Oral DAILY Naomy Salvador, A.P.N.   40 mg at 09/05/19 0452   • traZODone (DESYREL) tablet 100 mg  100 mg Oral Nightly Naomy Salvador, A.P.N.   100 mg at 09/04/19 2041   • MD ALERT...DO NOT ADMINISTER NSAIDS or ASPIRIN unless ORDERED By Neurosurgery 1 Each  1 Each Other PRN Naomy Salvador, A.P.N.       • docusate sodium (COLACE) capsule 100 mg  100 mg Oral BID Naomy Salvador A.P.N.   100 mg at 09/05/19 0453   • senna-docusate (PERICOLACE or SENOKOT S) 8.6-50 MG per tablet 1 Tab  1 Tab Oral Nightly Naomy Salvador, A.P.N.   1 Tab at 09/04/19 2041   • senna-docusate (PERICOLACE  or SENOKOT S) 8.6-50 MG per tablet 1 Tab  1 Tab Oral Q24HRS PRN Naomy Salvador, A.P.N.       • polyethylene glycol/lytes (MIRALAX) PACKET 1 Packet  1 Packet Oral BID PRN Naomy Salvador, A.P.N.   1 Packet at 09/01/19 0456   • magnesium hydroxide (MILK OF MAGNESIA) suspension 30 mL  30 mL Oral QDAY PRN Naomy Salvador, A.P.N.       • bisacodyl (DULCOLAX) suppository 10 mg  10 mg Rectal Q24HRS PRN Naomy Salvador, A.P.N.       • Respiratory Care per Protocol   Nebulization Continuous RT Naomy Salvador, A.P.N.       • ondansetron (ZOFRAN) syringe/vial injection 4 mg  4 mg Intravenous Q4HRS PRN Naomy Salvador, A.P.N.   4 mg at 08/30/19 1239   • ondansetron (ZOFRAN ODT) dispertab 4 mg  4 mg Oral Q4HRS PRN Naomy Salvador, A.P.N.       • hydrALAZINE (APRESOLINE) injection 10 mg  10 mg Intravenous Q HOUR PRN Naomy Salvador, A.P.N.       • calcium carbonate (TUMS) chewable tab 500 mg  500 mg Oral BID Naomy Salvador, A.P.N.   500 mg at 09/05/19 0453       Fluids    Intake/Output Summary (Last 24 hours) at 9/5/2019 0749  Last data filed at 9/5/2019 0600  Gross per 24 hour   Intake 240 ml   Output 1200 ml   Net -960 ml       Laboratory          Recent Labs     09/03/19  0257 09/04/19  0341 09/05/19  0415   SODIUM 141 137 138   POTASSIUM 4.1 4.2 4.2   CHLORIDE 105 103 103   CO2 28 25 23   BUN 25* 34* 39*   CREATININE 1.02 1.30 1.26   CALCIUM 8.8 8.5 8.7     Recent Labs     09/03/19  0257 09/04/19  0341 09/05/19  0415   GLUCOSE 122* 122* 141*         No results for input(s): RBC, HEMOGLOBIN, HEMATOCRIT, PLATELETCT, PROTHROMBTM, APTT, INR, IRON, FERRITIN, TOTIRONBC in the last 72 hours.    Imaging  no new images    Assessment/Plan  Acute kidney injury (HCC)  Assessment & Plan  Renal dose meds, avoid nephrotoxins  Strict I/Os  Follow renal function    Hyperkalemia- (present on admission)  Assessment & Plan  Recheck  Consider medical  treatment if.  Recheck potassium is elevated    Pneumonia- (present on admission)  Assessment & Plan  Possible  Continue Rocephin and doxycycline until procalcitonin improves    Pulmonary edema- (present on admission)  Assessment & Plan  Mild, continue IVF if needed  Echo: Likely normal left ventricular systolic function.  Left ventricular ejection fraction is visually estimated to be 50%. No evidence of valvular abnormality based on blind Doppler evaluation.     Anemia  Assessment & Plan  Improved with transfusion  Continue to monitor and transfuse for hemoglobin less than 7; or less than 8 if hemodynamically unstable    Hypotension- (present on admission)  Assessment & Plan  Appears to be related to intravascular volume depletion rather than overload and heart failure.    Improved following crystalloid resuscitation  Sepsis unlikely as patient is afebrile, normal white blood cell count and no source.  Low threshold to begin antibiotics    Acute on chronic systolic (congestive) heart failure (HCC)- (present on admission)  Assessment & Plan  On [place] in May 2019: LVEF 55%  Judicious IV fluid use, currently fluid resuscitation    PAF (paroxysmal atrial fibrillation) (HCC)- (present on admission)  Assessment & Plan  Chronic, on Multaq and Xarelto at home  Hold Xarelto  Continue multiple    Chronic anticoagulation- (present on admission)  Assessment & Plan  Restart Xarelto when okay with neurosurgery and anemia improved    Dyslipidemia- (present on admission)  Assessment & Plan  Continue statin    Chronic obstructive pulmonary disease (HCC)- (present on admission)  Assessment & Plan  RT/O2 Protocols  Titrate supplemental FiO2 to maintain SpO2 >88%  Continue Symbicort  Monitor for the need to start IV steroids, currently not in exacerbation    Essential hypertension- (present on admission)  Assessment & Plan  Hold antihypertensives due to current hypotension    Type 2 diabetes mellitus with hyperglycemia, without  long-term current use of insulin (HCC)- (present on admission)  Assessment & Plan  Goal blood glucose 120-180  sliding scale insulin, accuchecks  hypoglycemia protocol  A1c 6.9           I have performed a physical exam and reviewed and updated ROS and Plan today (9/5/2019). In review of yesterday's note (9/4/2019), there are no changes except as documented above.       Maria Fernanda Cruz MD , FCCP, Pulmonary Service

## 2019-09-05 NOTE — PROGRESS NOTES
Pt calling for assistance to restroom. Difficulty urination continues. Patient voided 200. Post-void bladder scan showed 551. Straight cath was performed per order. 600 urine out

## 2019-09-05 NOTE — PROGRESS NOTES
Telemetry Summary  Rhythm:  Sinus rhythm/atrial fibrillation  HR:    LA:  0  QRS:0.10  QT:0  Ectopy: occasional to frequent PVCs

## 2019-09-06 VITALS
OXYGEN SATURATION: 91 % | TEMPERATURE: 98.6 F | WEIGHT: 218.7 LBS | RESPIRATION RATE: 17 BRPM | BODY MASS INDEX: 34.33 KG/M2 | SYSTOLIC BLOOD PRESSURE: 128 MMHG | DIASTOLIC BLOOD PRESSURE: 58 MMHG | HEART RATE: 67 BPM | HEIGHT: 67 IN

## 2019-09-06 LAB
BACTERIA BLD CULT: NORMAL
GLUCOSE BLD-MCNC: 108 MG/DL (ref 65–99)
GLUCOSE BLD-MCNC: 115 MG/DL (ref 65–99)
SIGNIFICANT IND 70042: NORMAL
SITE SITE: NORMAL
SOURCE SOURCE: NORMAL

## 2019-09-06 PROCEDURE — 700102 HCHG RX REV CODE 250 W/ 637 OVERRIDE(OP): Performed by: INTERNAL MEDICINE

## 2019-09-06 PROCEDURE — 700102 HCHG RX REV CODE 250 W/ 637 OVERRIDE(OP): Performed by: NURSE PRACTITIONER

## 2019-09-06 PROCEDURE — 99232 SBSQ HOSP IP/OBS MODERATE 35: CPT | Performed by: INTERNAL MEDICINE

## 2019-09-06 PROCEDURE — 82962 GLUCOSE BLOOD TEST: CPT | Mod: 91

## 2019-09-06 PROCEDURE — A9270 NON-COVERED ITEM OR SERVICE: HCPCS | Performed by: NURSE PRACTITIONER

## 2019-09-06 PROCEDURE — 700102 HCHG RX REV CODE 250 W/ 637 OVERRIDE(OP): Performed by: HOSPITALIST

## 2019-09-06 PROCEDURE — 97530 THERAPEUTIC ACTIVITIES: CPT

## 2019-09-06 PROCEDURE — 700112 HCHG RX REV CODE 229: Performed by: NURSE PRACTITIONER

## 2019-09-06 PROCEDURE — A9270 NON-COVERED ITEM OR SERVICE: HCPCS | Performed by: INTERNAL MEDICINE

## 2019-09-06 PROCEDURE — A9270 NON-COVERED ITEM OR SERVICE: HCPCS | Performed by: HOSPITALIST

## 2019-09-06 RX ADMIN — METFORMIN HYDROCHLORIDE 500 MG: 500 TABLET, FILM COATED ORAL at 06:10

## 2019-09-06 RX ADMIN — LEVOTHYROXINE SODIUM 75 MCG: 75 TABLET ORAL at 06:09

## 2019-09-06 RX ADMIN — DRONEDARONE 400 MG: 400 TABLET, FILM COATED ORAL at 09:05

## 2019-09-06 RX ADMIN — Medication 1 CAPSULE: at 06:10

## 2019-09-06 RX ADMIN — OXYCODONE HYDROCHLORIDE 10 MG: 5 TABLET ORAL at 09:03

## 2019-09-06 RX ADMIN — BUDESONIDE AND FORMOTEROL FUMARATE DIHYDRATE 2 PUFF: 160; 4.5 AEROSOL RESPIRATORY (INHALATION) at 06:09

## 2019-09-06 RX ADMIN — GABAPENTIN 300 MG: 300 CAPSULE ORAL at 06:09

## 2019-09-06 RX ADMIN — METOPROLOL TARTRATE 25 MG: 25 TABLET, FILM COATED ORAL at 06:11

## 2019-09-06 RX ADMIN — ACETAMINOPHEN 650 MG: 325 TABLET, FILM COATED ORAL at 06:10

## 2019-09-06 RX ADMIN — OMEPRAZOLE 40 MG: 20 CAPSULE, DELAYED RELEASE ORAL at 06:10

## 2019-09-06 RX ADMIN — OXYCODONE HYDROCHLORIDE 10 MG: 5 TABLET ORAL at 03:44

## 2019-09-06 RX ADMIN — FLUTICASONE PROPIONATE 100 MCG: 50 SPRAY, METERED NASAL at 06:09

## 2019-09-06 RX ADMIN — OXYCODONE HYDROCHLORIDE 10 MG: 5 TABLET ORAL at 14:04

## 2019-09-06 RX ADMIN — DOCUSATE SODIUM 100 MG: 100 CAPSULE, LIQUID FILLED ORAL at 06:10

## 2019-09-06 RX ADMIN — ACETAMINOPHEN 650 MG: 325 TABLET, FILM COATED ORAL at 11:37

## 2019-09-06 RX ADMIN — SIMVASTATIN 40 MG: 20 TABLET, FILM COATED ORAL at 06:10

## 2019-09-06 RX ADMIN — BENZOCAINE AND MENTHOL 1 LOZENGE: 15; 3.6 LOZENGE ORAL at 09:03

## 2019-09-06 RX ADMIN — ANTACID TABLETS 500 MG: 500 TABLET, CHEWABLE ORAL at 06:10

## 2019-09-06 ASSESSMENT — ENCOUNTER SYMPTOMS
NERVOUS/ANXIOUS: 1
MEMORY LOSS: 1
PALPITATIONS: 0
MYALGIAS: 1
BLURRED VISION: 0
CHILLS: 0
SHORTNESS OF BREATH: 0
NAUSEA: 0
FEVER: 0
HEARTBURN: 0
STRIDOR: 0
FLANK PAIN: 0
ABDOMINAL PAIN: 0
FOCAL WEAKNESS: 0
BACK PAIN: 1
DOUBLE VISION: 0
SENSORY CHANGE: 0
VOMITING: 0
COUGH: 0
HEADACHES: 0
DIZZINESS: 0
DEPRESSION: 0
SPUTUM PRODUCTION: 0
DIAPHORESIS: 0
MYALGIAS: 0

## 2019-09-06 ASSESSMENT — PATIENT HEALTH QUESTIONNAIRE - PHQ9
2. FEELING DOWN, DEPRESSED, IRRITABLE, OR HOPELESS: NOT AT ALL
SUM OF ALL RESPONSES TO PHQ9 QUESTIONS 1 AND 2: 0
1. LITTLE INTEREST OR PLEASURE IN DOING THINGS: NOT AT ALL

## 2019-09-06 ASSESSMENT — COGNITIVE AND FUNCTIONAL STATUS - GENERAL
CLIMB 3 TO 5 STEPS WITH RAILING: A LITTLE
MOBILITY SCORE: 22
WALKING IN HOSPITAL ROOM: A LITTLE
SUGGESTED CMS G CODE MODIFIER MOBILITY: CJ

## 2019-09-06 ASSESSMENT — GAIT ASSESSMENTS
ASSISTIVE DEVICE: FRONT WHEEL WALKER
DISTANCE (FEET): 140
GAIT LEVEL OF ASSIST: SUPERVISED

## 2019-09-06 NOTE — PROGRESS NOTES
1532  Discharge instructions given to the patient & spouse. Questions answered. Verbalized understanding. Patient was discharged with patient's belongings, prescriptions for Oxycodone, Flexeril, fWW, leg bag (extra leg bag), LSO brace, Home Health to follow via w/c accompanied by one female CNA & spouse via Private car.

## 2019-09-06 NOTE — PROGRESS NOTES
Diabetes education: Met with pt and  this afternoon. Please see consult note.  Plan: No further needs at this time. Pt's main concern is her clothes and jacket. Please reorder diabetes education if needs change.

## 2019-09-06 NOTE — PROGRESS NOTES
"Pulmonary Care Progress Note    Date of admission  8/28/2019      Hospital Course    \"79 y.o. female with past medical history of asthma, COPD, diabetes, hypothyroidism, emphysema, GERD, hypertension, hypercholesterolemia, CVA who presented 8/28/2019 for elective TLIF with Dr. Cabrales.  Postsurgically was doing well until 8/30/2019 when she became hypoxic and the hospitalist was consulted.  On the morning of 8/31/2019 the patient became confused, hypotensive and worsened hypoxia; the patient was given 0.4 mg of Narcan with good improvement in her symptoms.  This afternoon the patient became more confused and hypotensive she was given 500 mL of IVF with improvement in her hemodynamic.  Upon transfer the ICU her systolic blood pressure has again dropped to the 70s.  She is relatively asymptomatic with this and can carry conversations however is slightly.  At this time her only complaint is back pain.  She denies any lower extremity weakness, numbness, tingling, abdominal pain, diarrhea, fever or chills.\"      Interval Problem Update  9/3,  afebrile, on room air.  Neurosurgical plan and recommendations reviewed.  Hemodynamics and blood pressure remained stable.  On IV antibiotics, recheck x-ray ordered today regarding lower lobe infiltrate.  Procalcitonin was mildly elevated previously.  Electrolytes  normal.    9/4,x-ray done yesterday, still has left lower lobe infiltrate with tiny effusion.  Exam without significant consolidative findings.  Able to ambulate on oxygen without desaturation or tachypnea.  Remains afebrile on antibiotics, with repeat procalcitonin slightly lower.  Complete the 5-day course as planned.  I can recheck her in my office after discharge in 6 weeks with an x-ray on arrival to make certain this pneumonia has totally cleared    9/5, Chart review from the past 24 hours includes imaging, laboratory studies, vital signs and notes available.  Pertinent data for today's visit includes no fever, " finishing antibiotics, remains on oxygen, in good spirits, anticipating discharge in possible 1 to 2 days.  We will arrange follow-up in my office    9/6, Home today on O2 finished ABs, f/u with my office 4-6 weeks with cxr, d/w pt    Review of Systems  Review of Systems   Constitutional: Negative for chills and fever.   Respiratory: Negative for cough, sputum production, shortness of breath and stridor.    Cardiovascular: Negative for chest pain.   Gastrointestinal: Negative for abdominal pain, nausea and vomiting.   Genitourinary: Negative for dysuria.   Musculoskeletal: Positive for back pain. Negative for myalgias.   Skin: Negative for rash.   Neurological: Negative for dizziness, sensory change and focal weakness.   Back pain is similar to prior, no new pattern but ongoing problem, discussed with RN and patient  On nasal prong oxygen, able to ambulate to the bathroom, reviewed with bedside care team and patient  Vital Signs for last 24 hours   Temp:  [36.1 °C (97 °F)-36.7 °C (98.1 °F)] 36.7 °C (98 °F)  Pulse:  [] 84  Resp:  [16-18] 16  BP: (115-142)/(48-78) 141/58  SpO2:  [92 %-93 %] 93 %      Physical Exam   Constitutional: She is oriented to person, place, and time. She appears well-developed and well-nourished.   Chronically ill appearing   HENT:   Head: Normocephalic and atraumatic.   Right Ear: External ear normal.   Left Ear: External ear normal.   Nose: Nose normal.   Eyes: Pupils are equal, round, and reactive to light. Conjunctivae are normal.   Neck: Neck supple. No JVD present. No tracheal deviation present.   Cardiovascular: Normal rate, regular rhythm and intact distal pulses.   Pulmonary/Chest: Breath sounds normal. No accessory muscle usage. No respiratory distress.   Abdominal: Soft. Bowel sounds are normal. She exhibits no distension. There is no tenderness.   obese   Musculoskeletal: Normal range of motion. She exhibits no tenderness or deformity.   Neurological: She is alert and  oriented to person, place, and time. She exhibits normal muscle tone. Coordination normal.   Skin: Skin is warm and dry. No rash noted.   Psychiatric: She has a normal mood and affect. Her behavior is normal.   Nursing note and vitals reviewed.  Sitting at bedside, on  room air does not have significant tachypnea or respiratory distress  Sits up very carefully with logrolling technique  Ambulating on oxygen without desaturation or tachypnea  Medications  Current Facility-Administered Medications   Medication Dose Route Frequency Provider Last Rate Last Dose   • oxyCODONE immediate-release (ROXICODONE) tablet 5-10 mg  5-10 mg Oral Q4HRS PRN Naomy Salvador, A.P.N.   10 mg at 09/06/19 0344   • tramadol (ULTRAM) 50 MG tablet 50 mg  50 mg Oral Q6HRS PRN Naomy Salvador, A.P.N.   50 mg at 09/05/19 2011   • acetaminophen (TYLENOL) tablet 650 mg  650 mg Oral Q4HRS PRN Naomy Salvador, A.P.N.       • fluticasone (FLONASE) nasal spray 100 mcg  2 Spray Nasal DAILY Stephani Mcnair D.O.   100 mcg at 09/06/19 0609   • metoprolol (LOPRESSOR) tablet 25 mg  25 mg Oral TWICE DAILY Stephani Mcnair D.O.   25 mg at 09/06/19 0611   • labetalol (NORMODYNE,TRANDATE) injection 10 mg  10 mg Intravenous Q4HRS PRN Stephani Mcnair D.O.   10 mg at 09/03/19 0956   • metFORMIN (GLUCOPHAGE) tablet 500 mg  500 mg Oral BID WITH MEALS Bossman Darden D.O.   500 mg at 09/06/19 0610   • acetaminophen (TYLENOL) tablet 650 mg  650 mg Oral Q6HRS Bossman Darden D.O.   650 mg at 09/06/19 0610   • cyclobenzaprine (FLEXERIL) tablet 10 mg  10 mg Oral TID PRN Yuliana Gamble M.D.   10 mg at 09/03/19 1824   • benzocaine-menthol (CEPACOL) lozenge 1 Lozenge  1 Lozenge Mouth/Throat Q2HRS PRN HUMERA Toscano   1 Lozenge at 09/05/19 1020   • lactobacillus rhamnosus (CULTURELLE) capsule 1 Cap  1 Cap Oral QDAY with Breakfast Fred Groves M.D.   1 Cap at 09/06/19 0610   • insulin regular (HUMULIN R)  injection 1-6 Units  1-6 Units Subcutaneous 4X/DAY ACHS Fred Groves M.D.   Stopped at 09/06/19 0700    And   • glucose 4 g chewable tablet 16 g  16 g Oral Q15 MIN PRN Fred Groves M.D.        And   • DEXTROSE 10% BOLUS 250 mL  250 mL Intravenous Q15 MIN PRN Fred Groves M.D.       • diphenhydrAMINE (BENADRYL) tablet/capsule 25 mg  25 mg Oral Q6HRS PRN Shannon Salmeron P.A.-C.   25 mg at 09/04/19 1649    Or   • diphenhydrAMINE (BENADRYL) injection 25 mg  25 mg Intravenous Q6HRS PRN Shannon Salmeron P.A.-C.       • albuterol inhaler 1 Puff  1 Puff Inhalation Q6HRS PRN Naomy Salvador, A.P.N.       • budesonide-formoterol (SYMBICORT) 160-4.5 MCG/ACT inhaler 2 Puff  2 Puff Inhalation BID Naomy Salvador, A.P.N.   2 Puff at 09/06/19 0609   • dronedarone (MULTAQ) tablet 400 mg  400 mg Oral BID WITH MEALS Naomy Salvador, A.P.N.   400 mg at 09/05/19 1716   • gabapentin (NEURONTIN) capsule 300 mg  300 mg Oral BID Naomy Salvador, A.P.N.   300 mg at 09/06/19 0609   • levothyroxine (SYNTHROID) tablet 75 mcg  75 mcg Oral AM ES Naomy Salvador, A.P.N.   75 mcg at 09/06/19 0609   • omeprazole (PRILOSEC) capsule 40 mg  40 mg Oral DAILY Naomy Salvador, A.P.N.   40 mg at 09/06/19 0610   • simvastatin (ZOCOR) tablet 40 mg  40 mg Oral DAILY Naomy Salvador, A.P.N.   40 mg at 09/06/19 0610   • traZODone (DESYREL) tablet 100 mg  100 mg Oral Nightly Naomy Salvador, A.P.N.   100 mg at 09/05/19 2005   • MD ALERT...DO NOT ADMINISTER NSAIDS or ASPIRIN unless ORDERED By Neurosurgery 1 Each  1 Each Other PRN Naomy Salvador, A.P.N.       • docusate sodium (COLACE) capsule 100 mg  100 mg Oral BID Naomy Salvador, A.P.N.   100 mg at 09/06/19 0610   • senna-docusate (PERICOLACE or SENOKOT S) 8.6-50 MG per tablet 1 Tab  1 Tab Oral Nightly Naomy Salvador, A.P.N.   1 Tab at 09/05/19 2005   • senna-docusate (PERICOLACE  or SENOKOT S) 8.6-50 MG per tablet 1 Tab  1 Tab Oral Q24HRS PRN Naomy Salvador, A.P.N.       • polyethylene glycol/lytes (MIRALAX) PACKET 1 Packet  1 Packet Oral BID PRN Naomy Salvador, A.P.N.   1 Packet at 09/01/19 0456   • magnesium hydroxide (MILK OF MAGNESIA) suspension 30 mL  30 mL Oral QDAY PRN Naomy Salvador, A.P.N.       • bisacodyl (DULCOLAX) suppository 10 mg  10 mg Rectal Q24HRS PRN Naomy Salvador, A.P.N.       • Respiratory Care per Protocol   Nebulization Continuous RT Naomy Salvador, A.P.N.       • ondansetron (ZOFRAN) syringe/vial injection 4 mg  4 mg Intravenous Q4HRS PRN Naomy Salvador, A.P.N.   4 mg at 08/30/19 1239   • ondansetron (ZOFRAN ODT) dispertab 4 mg  4 mg Oral Q4HRS PRN Naomy Salvador, A.P.N.       • hydrALAZINE (APRESOLINE) injection 10 mg  10 mg Intravenous Q HOUR PRN Naomy Salvador, A.P.N.       • calcium carbonate (TUMS) chewable tab 500 mg  500 mg Oral BID Naomy Salvador, A.P.N.   500 mg at 09/06/19 0610       Fluids    Intake/Output Summary (Last 24 hours) at 9/6/2019 0738  Last data filed at 9/6/2019 0400  Gross per 24 hour   Intake 720 ml   Output 1550 ml   Net -830 ml       Laboratory          Recent Labs     09/04/19  0341 09/05/19  0415   SODIUM 137 138   POTASSIUM 4.2 4.2   CHLORIDE 103 103   CO2 25 23   BUN 34* 39*   CREATININE 1.30 1.26   CALCIUM 8.5 8.7     Recent Labs     09/04/19  0341 09/05/19  0415   GLUCOSE 122* 141*         No results for input(s): RBC, HEMOGLOBIN, HEMATOCRIT, PLATELETCT, PROTHROMBTM, APTT, INR, IRON, FERRITIN, TOTIRONBC in the last 72 hours.    Imaging  no new images    Assessment/Plan  Acute kidney injury (HCC)  Assessment & Plan  Renal dose meds, avoid nephrotoxins  Strict I/Os  Follow renal function    Hyperkalemia- (present on admission)  Assessment & Plan  Recheck  Consider medical treatment if.  Recheck potassium is elevated    Pneumonia- (present  on admission)  Assessment & Plan  Possible  Continue Rocephin and doxycycline until procalcitonin improves    Pulmonary edema- (present on admission)  Assessment & Plan  Mild, continue IVF if needed  Echo: Likely normal left ventricular systolic function.  Left ventricular ejection fraction is visually estimated to be 50%. No evidence of valvular abnormality based on blind Doppler evaluation.     Anemia  Assessment & Plan  Improved with transfusion  Continue to monitor and transfuse for hemoglobin less than 7; or less than 8 if hemodynamically unstable    Hypotension- (present on admission)  Assessment & Plan  Appears to be related to intravascular volume depletion rather than overload and heart failure.    Improved following crystalloid resuscitation  Sepsis unlikely as patient is afebrile, normal white blood cell count and no source.  Low threshold to begin antibiotics    Acute on chronic systolic (congestive) heart failure (HCC)- (present on admission)  Assessment & Plan  On [place] in May 2019: LVEF 55%  Judicious IV fluid use, currently fluid resuscitation    PAF (paroxysmal atrial fibrillation) ([facility])- (present on admission)  Assessment & Plan  Chronic, on Multaq and Xarelto at home  Hold Xarelto  Continue multiple    Chronic anticoagulation- (present on admission)  Assessment & Plan  Restart Xarelto when okay with neurosurgery and anemia improved    Dyslipidemia- (present on admission)  Assessment & Plan  Continue statin    Chronic obstructive pulmonary disease (HCC)- (present on admission)  Assessment & Plan  RT/O2 Protocols  Titrate supplemental FiO2 to maintain SpO2 >88%  Continue Symbicort  Monitor for the need to start IV steroids, currently not in exacerbation    Essential hypertension- (present on admission)  Assessment & Plan  Hold antihypertensives due to current hypotension    Type 2 diabetes mellitus with hyperglycemia, without long-term current use of insulin ([facility])- (present on admission)  Assessment &  Plan  Goal blood glucose 120-180  sliding scale insulin, accuchecks  hypoglycemia protocol  A1c 6.9           I have performed a physical exam and reviewed and updated ROS and Plan today (9/6/2019). In review of yesterday's note (9/5/2019), there are no changes except as documented above.       Maria Fernanda Cruz MD , FCCP, Pulmonary Service

## 2019-09-06 NOTE — PROGRESS NOTES
Hospital Medicine Daily Progress Note    Date of Service  9/6/2019    Chief Complaint  79 y.o. female admitted 8/28/2019 with back pain    Hospital Course    PMHx CHF 45%, lVH, AFib on rivaroxaban, PE, COPD, DM, Hypothyroidism, GERD, HTN, HLD, CVA.  Presented for a planned L spine laminectomy by Dr Cabrales on 8/29.  On 8/30 became increasingly SOB and hypotensive to SBPs in the 70s.  Given fluid boluses and narcan with some improvement.   It was felt she had been given too much pain medication.  Transferred to the ICU on 8/31 for hypotension.       Interval Problem Update    No new complaints  Ambulating with PT  Educated on blood sugar monitoring and [facility]      Consultants/Specialty  Neurosurgery  CC    Code Status  full    Disposition  Medically cleared for discharge to home  Neurosurgery to arrange  Home health has been arranged  Would recommend discharge to home on metformin 500 twice daily, lower dose from home dosing  To resume Victoza    Review of Systems  Review of Systems   Constitutional: Negative for diaphoresis and fever.   Respiratory: Negative for cough and shortness of breath.    Cardiovascular: Negative for palpitations and leg swelling.   Gastrointestinal: Negative for abdominal pain and heartburn.   Genitourinary: Negative for flank pain.   Musculoskeletal: Positive for back pain and myalgias.   Neurological: Negative for focal weakness and headaches.   Psychiatric/Behavioral: Positive for memory loss. Negative for depression. The patient is nervous/anxious.         Physical Exam  Temp:  [36.1 °C (97 °F)-37 °C (98.6 °F)] 37 °C (98.6 °F)  Pulse:  [67-89] 67  Resp:  [16-18] 17  BP: (118-142)/(48-78) 128/58  SpO2:  [91 %-93 %] 91 %    Physical Exam   Constitutional: No distress.   Pulmonary/Chest: Effort normal. No respiratory distress. She has no wheezes.   Neurological: No cranial nerve deficit.   Skin: She is not diaphoretic.   Psychiatric: Her behavior is normal.       Fluids    Intake/Output  Summary (Last 24 hours) at 9/6/2019 1245  Last data filed at 9/6/2019 0400  Gross per 24 hour   Intake 240 ml   Output 1450 ml   Net -1210 ml       Laboratory      Recent Labs     09/04/19  0341 09/05/19  0415   SODIUM 137 138   POTASSIUM 4.2 4.2   CHLORIDE 103 103   CO2 25 23   GLUCOSE 122* 141*   BUN 34* 39*   CREATININE 1.30 1.26   CALCIUM 8.5 8.7                   Imaging  DX-CHEST-2 VIEWS   Final Result      1.  Small bilateral pleural effusions.   2.  Left lower lobe atelectasis and/or pneumonitis.   3.  Stable cardiomegaly.      EC-ECHOCARDIOGRAM COMPLETE W/O CONT   Final Result      CT-RENAL COLIC EVALUATION(A/P W/O)   Final Result         1.  No acute abnormality.   2.  Hepatomegaly   3.  Bibasilar atelectasis   4.  Bilateral fat-containing inguinal hernias   5.  Cardiomegaly   6.  Diverticulosis   7.  Atherosclerosis and atherosclerotic coronary artery disease.      DX-CHEST-PORTABLE (1 VIEW)   Final Result      Left lung base atelectasis/possible pneumonitis or pneumonia.   Mild diffuse interstitial opacities consistent with atelectasis/edema.      DX-PORTABLE FLUORO > 1 HOUR   Final Result      Portable fluoroscopy utilized for 19 seconds.         INTERPRETING LOCATION: [address] [place] [address], [id number]      DX-LUMBAR SPINE-2 OR 3 VIEWS   Final Result      Intraoperative fluoroscopy spot images as described above.           Assessment/Plan  * Post-lumbar laminectomy hypoxia, dyspnea. PNA and edema on CXR- (present on admission)  Assessment & Plan  9/2 On chornic O2 of 3L at home.  Ordered and reviewed labs and CXR.  Treat PNA and pulmonary edema (below)  Resp and O2 per protocolDoubt PE given CXR findings, recent CTA Chest was negative for PE, recent chronic anticoagulation being held due to surgery.   cathy overmedicated as responded to narcan    9/3  POD #6  Pain control  Pt min assist  May need placement    9/4 postop day #7  Continue therapy  Patient wants to go home, will follow up therapy notes  Pain  controlled    9/6 postop day #9  Per neurosurgery okay for discharge home  Discharge home per second neurosurgery ,to arrange    Acute kidney injury (HCC)  Assessment & Plan  9/2  Creat is back to her baseline aroung 1 for GFR in upper 50's-60  No hydro on CT  Avoid nephrotoxins  Renally dose medications as appropriate  Follow daily BMP and  UOP    9/3 resolved    9/4 increasing renal insufficiency, will monitor  Continue metformin at current dose    9/5 improving renal function  Urinary retention requiring straight catheterization, may need Montanez catheter placement on discharge  Encourage activity  May need urology consult as an outpatient    9/6 resolving, but now with urinary retention  Montanez catheter placed  Patient states that she is unable to straight cath  Allergic to sulfa, unable to start Flomax  For outpatient neurology follow-up per neurosurgery to arrange  Continue education on Montanez catheter care  Home health arranged    Hyperkalemia- (present on admission)  Assessment & Plan  Resolved  Cont to follow daily    Pneumonia- (present on admission)  Assessment & Plan  Possible on CT  Resp and O2 per protocol  Complete 5 days Doxy/Rocephin      Pulmonary edema- (present on admission)  Assessment & Plan  Seems to be chronic  Mild  DC IVFs  Consider low dose loop diuretic dependent on how she does over next few days    Hypotension- (present on admission)  Assessment & Plan  9/ 2Pt has responded to narcan and IVFs  Dc IVFs now  Cont to follow closely    9/3 resolved    Acute on chronic systolic (congestive) heart failure (HCC)- (present on admission)  Assessment & Plan  compensated  LVEF unchanged to comparison studies  LVEF 50%  Telemetry, trend troponins, EKG, BNP  Sees Dr. Steven in the outpatient    PAF (paroxysmal atrial fibrillation) (HCC)- (present on admission)  Assessment & Plan  9/2 Stable  Anticoagulation held  Cont Dronedarone    9/3 cont multaq  afib with rvr, cardiac monitor  Rate control, add  metoprolol  ?AC, to d/w pt    9/4 no history of GI bleed or frequent falls  We will discuss with neurosurgery regarding clearance for initiation of full dose anticoagulation    9/5 to follow-up as an outpatient regarding initiation of full dose anticoagulation when cleared by neurosurgery    9/6 rate controlled  To review with neurosurgery regarding benefits of starting full dose anticoagulation  Recent laminectomy      Chronic anticoagulation- (present on admission)  Assessment & Plan  Held because of Surgery  Discuss with Neurosurgery when to restart    Dyslipidemia- (present on admission)  Assessment & Plan  statin    Chronic obstructive pulmonary disease (HCC)- (present on admission)  Assessment & Plan  Resp and O2 per protocol    Essential hypertension- (present on admission)  Assessment & Plan  Resume home lisinopril as pressures dictate    Type 2 diabetes mellitus with hyperglycemia, without long-term current use of insulin (HCC)- (present on admission)  Assessment & Plan  Now that renal function has improved resume metformin  Cont SSI    9/4 ongoing need for insulin coverage  On metformin 500 twice daily, will adjust with renal improvement  Continue to monitor  On Victoza at home    9/5 diabetic educator made  May need sliding scale insulin coverage on discharge    9/7 seen by diabetic educator  Patient uses metformin and Victoza at home  Poor oral intake during this inpatient admission  Will resume home diet  Okay to resume toes and metformin, would continue at lowered metformin dose           VTE prophylaxis: none due to recent spine surgery

## 2019-09-06 NOTE — PROGRESS NOTES
Monitor summary: Aflutter , QRS:.10 with occasional PVCs, rare couplets, and rare bigem per strip from monitor room.

## 2019-09-06 NOTE — DISCHARGE INSTRUCTIONS
Spinal Fusion, Care After  Refer to this sheet in the next few weeks. These instructions provide you with information about caring for yourself after your procedure. Your health care provider may also give you more specific instructions. Your treatment has been planned according to current medical practices, but problems sometimes occur. Call your health care provider if you have any problems or questions after your procedure.  WHAT TO EXPECT AFTER THE PROCEDURE  After your procedure, it is common to have:  · Pain and stiffness in your back.  · Pain around your incision.  HOME CARE INSTRUCTIONS   Medicines   · Take over-the-counter and prescription medicines only as told by your health care provider. These include any pain medicines.  · Do not drive for 24 hours if you received a sedative.  · Do not drive or operate heavy machinery while taking prescription pain medicine.  · If you were prescribed an antibiotic medicine, take it as told by your health care provider. Do not stop taking the antibiotic even if you start to feel better.  Incision Care   · Follow instructions from your health care provider about how to take care of your incision. Make sure you:  ¨ Wash your hands with soap and water before you change your bandage (dressing). If soap and water are not available, use hand .  ¨ Change your dressing as told by your health care provider.  ¨ Leave stitches (sutures), skin glue, or adhesive strips in place. These skin closures may need to be in place for 2 weeks or longer. If adhesive strip edges start to loosen and curl up, you may trim the loose edges. Do not remove adhesive strips completely unless your health care provider tells you to do that.  · Keep your incision clean and dry. Do not take baths, swim, or use a hot tub until your health care provider approves.  · Check your incision and the surrounding area every day for redness, swelling, and leaking fluid.  Physical Activity   · Return to your  normal activities as told by your health care provider. Ask your health care provider what activities are safe for you. Rest and protect your back as much as possible.  · Follow instructions from your health care provider about how to move and use good posture to help your spine heal.  · Do not lift anything that is heavier than 8 lb (3.6 kg) or the limit that your health care provider tells you until he or she says that it is safe. Avoid lifting anything over your head.  · Do not twist or bend at the waist until your health care provider approves.  · Avoid pushing and pulling motions.  · Avoid sitting or lying down in the same position for long periods of time.  · Do not begin exercising until told by your health care provider. Ask your health care provider what kinds of exercise you can do to make your back stronger.  General Instructions   · If you were given a brace, use it as told by your health care provider.  · Wear compression stockings as told by your health care provider. These stockings help to prevent blood clots and reduce swelling in your legs.  · Do not use tobacco products, including cigarettes, chewing tobacco, or e-cigarettes. If you need help quitting, ask your health care provider.  · Keep all follow-up visits as told by your health care provider and, if necessary, your physical therapist. This is important.  SEEK MEDICAL CARE IF:  · You have pain that gets worse or does not get better with medicine.  · Your legs or feet become painful or swollen.  · You have redness, swelling, or pain at the site of your incision.  · You have fluid, blood, or pus coming from your incision.  · You vomit or feel nauseous.  · You have weakness or numbness in your legs that is new or getting worse.  · You have a fever.  · You have trouble controlling urination or bowel movements.  SEEK IMMEDIATE MEDICAL CARE IF:   · You have severe pain.  · You have chest pain.  · You have trouble breathing.  · You develop a  cough.  These symptoms may represent a serious problem that is an emergency. Do not wait to see if the symptoms will go away. Get medical help right away. Call your local emergency services (911 in the [place] Do not drive yourself to the hospital.   This information is not intended to replace advice given to you by your health care provider. Make sure you discuss any questions you have with your health care provider.  Document Released: 07/07/2006 Document Revised: 04/10/2017 Document Reviewed: 06/01/2016  [employer] Interactive Patient Education © 2017 [employer]      Oxycodone tablets or capsules  What is this medicine?  OXYCODONE (ox i KOE done) is a pain reliever. It is used to treat moderate to severe pain.  This medicine may be used for other purposes; ask your health care provider or pharmacist if you have questions.  COMMON BRAND NAME(S): Dazidox, Endocodone, Oxaydo, OXECTA, OxyIR, Percolone, Roxicodone, ROXYBOND  What should I tell my health care provider before I take this medicine?  They need to know if you have any of these conditions:  -[place]'s disease  -brain tumor  -head injury  -heart disease  -history of drug or alcohol abuse problem  -if you often drink alcohol  -kidney disease  -liver disease  -lung or breathing disease, like asthma  -mental illness  -pancreatic disease  -seizures  -thyroid disease  -an unusual or allergic reaction to oxycodone, codeine, hydrocodone, morphine, other medicines, foods, dyes, or preservatives  -pregnant or trying to get pregnant  -breast-feeding  How should I use this medicine?  Take this medicine by mouth with a glass of water. Follow the directions on the prescription label. You can take it with or without food. If it upsets your stomach, take it with food. Take your medicine at regular intervals. Do not take it more often than directed. Do not stop taking except on your doctor's advice.  Some brands of this medicine, like Oxecta, have special instructions. Ask  your doctor or pharmacist if these directions are for you: Do not cut, crush or chew this medicine. Swallow only one tablet at a time. Do not wet, soak, or lick the tablet before you take it.  A special MedGuide will be given to you by the pharmacist with each prescription and refill. Be sure to read this information carefully each time.  Talk to your pediatrician regarding the use of this medicine in children. Special care may be needed.  Overdosage: If you think you have taken too much of this medicine contact a poison control center or emergency room at once.  NOTE: This medicine is only for you. Do not share this medicine with others.  What if I miss a dose?  If you miss a dose, take it as soon as you can. If it is almost time for your next dose, take only that dose. Do not take double or extra doses.  What may interact with this medicine?  This medicine may interact with the following medications:  -alcohol  -antihistamines for allergy, cough and cold  -antiviral medicines for HIV or AIDS  -atropine  -certain antibiotics like clarithromycin, erythromycin, linezolid, rifampin  -certain medicines for anxiety or sleep  -certain medicines for bladder problems like oxybutynin, tolterodine  -certain medicines for depression like amitriptyline, fluoxetine, sertraline  -certain medicines for fungal infections like ketoconazole, itraconazole, voriconazole  -certain medicines for migraine headache like almotriptan, eletriptan, frovatriptan, naratriptan, rizatriptan, sumatriptan, zolmitriptan  -certain medicines for nausea or vomiting like dolasetron, ondansetron, palonosetron  -certain medicines for Parkinson's disease like benztropine, trihexyphenidyl  -certain medicines for seizures like phenobarbital, phenytoin, primidone  -certain medicines for stomach problems like dicyclomine, hyoscyamine  -certain medicines for travel sickness like scopolamine  -diuretics  -general anesthetics like halothane, isoflurane,  methoxyflurane, propofol  -ipratropium  -local anesthetics like lidocaine, pramoxine, tetracaine  -MAOIs like Carbex, Eldepryl, Marplan, Nardil, and Parnate  -medicines that relax muscles for surgery  -methylene blue  -nilotinib  -other narcotic medicines for pain or cough  -phenothiazines like chlorpromazine, mesoridazine, prochlorperazine, thioridazine  This list may not describe all possible interactions. Give your health care provider a list of all the medicines, herbs, non-prescription drugs, or dietary supplements you use. Also tell them if you smoke, drink alcohol, or use illegal drugs. Some items may interact with your medicine.  What should I watch for while using this medicine?  Tell your doctor or health care professional if your pain does not go away, if it gets worse, or if you have new or a different type of pain. You may develop tolerance to the medicine. Tolerance means that you will need a higher dose of the medicine for pain relief. Tolerance is normal and is expected if you take this medicine for a long time.  Do not suddenly stop taking your medicine because you may develop a severe reaction. Your body becomes used to the medicine. This does NOT mean you are addicted. Addiction is a behavior related to getting and using a drug for a non-medical reason. If you have pain, you have a medical reason to take pain medicine. Your doctor will tell you how much medicine to take. If your doctor wants you to stop the medicine, the dose will be slowly lowered over time to avoid any side effects.  There are different types of narcotic medicines (opiates). If you take more than one type at the same time or if you are taking another medicine that also causes drowsiness, you may have more side effects. Give your health care provider a list of all medicines you use. Your doctor will tell you how much medicine to take. Do not take more medicine than directed. Call emergency for help if you have problems breathing  or unusual sleepiness.  You may get drowsy or dizzy. Do not drive, use machinery, or do anything that needs mental alertness until you know how the medicine affects you. Do not stand or sit up quickly, especially if you are an older patient. This reduces the risk of dizzy or fainting spells. Alcohol may interfere with the effect of this medicine. Avoid alcoholic drinks.  This medicine will cause constipation. Try to have a bowel movement at least every 2 to 3 days. If you do not have a bowel movement for 3 days, call your doctor or health care professional.  Your mouth may get dry. Chewing sugarless gum or sucking hard candy, and drinking plenty of water may help. Contact your doctor if the problem does not go away or is severe.  What side effects may I notice from receiving this medicine?  Side effects that you should report to your doctor or health care professional as soon as possible:  -allergic reactions like skin rash, itching or hives, swelling of the face, lips, or tongue  -breathing problems  -confusion  -signs and symptoms of low blood pressure like dizziness; feeling faint or lightheaded, falls; unusually weak or tired  -trouble passing urine or change in the amount of urine  -trouble swallowing  Side effects that usually do not require medical attention (report to your doctor or health care professional if they continue or are bothersome):  -constipation  -dry mouth  -nausea, vomiting  -tiredness  This list may not describe all possible side effects. Call your doctor for medical advice about side effects. You may report side effects to FDA at 0-474-FDA-5267.  Where should I keep my medicine?  Keep out of the reach of children. This medicine can be abused. Keep your medicine in a safe place to protect it from theft. Do not share this medicine with anyone. Selling or giving away this medicine is dangerous and against the law.  Store at room temperature between 15 and 30 degrees C (59 and 86 degrees F).  Protect from light. Keep container tightly closed.  This medicine may cause accidental overdose and death if it is taken by other adults, children, or pets. Flush any unused medicine down the toilet to reduce the chance of harm. Do not use the medicine after the expiration date.  NOTE: This sheet is a summary. It may not cover all possible information. If you have questions about this medicine, talk to your doctor, pharmacist, or health care provider.  © 2018 [employer] (2016-11-01 16:55:57)      Discharge Instructions    Discharged to home by car with relative. Discharged via wheelchair, hospital escort: Yes.  Special equipment needed: front wheel Walker, graham catheter (leg bag), LSO brace    Be sure to schedule a follow-up appointment with your primary care doctor or any specialists as instructed.     Discharge Plan:   Influenza Vaccine Indication: Not indicated: Previously immunized this influenza season and > 8 years of age    I understand that a diet low in cholesterol, fat, and sodium is recommended for good health. Unless I have been given specific instructions below for another diet, I accept this instruction as my diet prescription.   Other diet: Diabetic as tolerated      Ambulate as much as comfortable   Ok to shower, pat incision dry, leave open to air- no dressing   No Aspirin or non-steroidal anti-inflammatory medications (aleve, motrin, ibuprofen, celebrex)   No driving for 2 weeks/ No driving while on narcotic medication   Over the counter stool softeners daily while on narcotics   No lifting greater than 15 pounds, no repetitive bending or twisting   Follow up at [facility] 2 weeks after surgery      Special Instructions: No bending, lifting, and twisting.                                                                              [facility] follow   Tel # [phone]                                                            Follow up with (Urologist - [place]rology  [place]), call for appointment regarding  [place] catheter.     · Is patient discharged on Warfarin / Coumadin?   No     Depression / Suicide Risk    As you are discharged from this [facility] facility, it is important to learn how to keep safe from harming yourself.    Recognize the warning signs:  · Abrupt changes in personality, positive or negative- including increase in energy   · Giving away possessions  · Change in eating patterns- significant weight changes-  positive or negative  · Change in sleeping patterns- unable to sleep or sleeping all the time   · Unwillingness or inability to communicate  · Depression  · Unusual sadness, discouragement and loneliness  · Talk of wanting to die  · Neglect of personal appearance   · Rebelliousness- reckless behavior  · Withdrawal from people/activities they love  · Confusion- inability to concentrate     If you or a loved one observes any of these behaviors or has concerns about self-harm, here's what you can do:  · Talk about it- your feelings and reasons for harming yourself  · Remove any means that you might use to hurt yourself (examples: pills, rope, extension cords, firearm)  · Get professional help from the community (Mental Health, Substance Abuse, psychological counseling)  · Do not be alone:Call your Safe Contact- someone whom you trust who will be there for you.  · Call your local CRISIS HOTLINE 852-5857 or 405-103-1483  · Call your local Children's Mobile Crisis Response Team [place] (814) 410-5014 or www.[address]  · Call the toll free National Suicide Prevention Hotlines   · National Suicide Prevention Lifeline 796-517-KVIJ (3042)  · National Hope Line Network 800-SUICIDE (912-6541)    Cyclobenzaprine tablets  What is this medicine?  CYCLOBENZAPRINE (sye kloe RAJ za preen) is a muscle relaxer. It is used to treat muscle pain, spasms, and stiffness.  This medicine may be used for other purposes; ask your health care provider or pharmacist if  you have questions.  COMMON BRAND NAME(S): Fexmid, Flexeril  What should I tell my health care provider before I take this medicine?  They need to know if you have any of these conditions:  -heart disease, irregular heartbeat, or previous heart attack  -liver disease  -thyroid problem  -an unusual or allergic reaction to cyclobenzaprine, tricyclic antidepressants, lactose, other medicines, foods, dyes, or preservatives  -pregnant or trying to get pregnant  -breast-feeding  How should I use this medicine?  Take this medicine by mouth with a glass of water. Follow the directions on the prescription label. If this medicine upsets your stomach, take it with food or milk. Take your medicine at regular intervals. Do not take it more often than directed.  Talk to your pediatrician regarding the use of this medicine in children. Special care may be needed.  Overdosage: If you think you have taken too much of this medicine contact a poison control center or emergency room at once.  NOTE: This medicine is only for you. Do not share this medicine with others.  What if I miss a dose?  If you miss a dose, take it as soon as you can. If it is almost time for your next dose, take only that dose. Do not take double or extra doses.  What may interact with this medicine?  Do not take this medicine with any of the following medications:  -certain medicines for fungal infections like fluconazole, itraconazole, ketoconazole, posaconazole, voriconazole  -cisapride  -dofetilide  -dronedarone  -halofantrine  -levomethadyl  -MAOIs like Carbex, Eldepryl, Marplan, Nardil, and Parnate  -narcotic medicines for cough  -pimozide  -thioridazine  -ziprasidone  This medicine may also interact with the following medications:  -alcohol  -antihistamines for allergy, cough and cold  -certain medicines for anxiety or sleep  -certain medicines for cancer  -certain medicines for depression like amitriptyline, fluoxetine, sertraline  -certain medicines for  infection like alfuzosin, chloroquine, clarithromycin, levofloxacin, mefloquine, pentamidine, troleandomycin  -certain medicines for irregular heart beat  -certain medicines for seizures like phenobarbital, primidone  -contrast dyes  -general anesthetics like halothane, isoflurane, methoxyflurane, propofol  -local anesthetics like lidocaine, pramoxine, tetracaine  -medicines that relax muscles for surgery  -narcotic medicines for pain  -other medicines that prolong the QT interval (cause an abnormal heart rhythm)  -phenothiazines like chlorpromazine, mesoridazine, prochlorperazine  This list may not describe all possible interactions. Give your health care provider a list of all the medicines, herbs, non-prescription drugs, or dietary supplements you use. Also tell them if you smoke, drink alcohol, or use illegal drugs. Some items may interact with your medicine.  What should I watch for while using this medicine?  Tell your doctor or health care professional if your symptoms do not start to get better or if they get worse.  You may get drowsy or dizzy. Do not drive, use machinery, or do anything that needs mental alertness until you know how this medicine affects you. Do not stand or sit up quickly, especially if you are an older patient. This reduces the risk of dizzy or fainting spells. Alcohol may interfere with the effect of this medicine. Avoid alcoholic drinks.  If you are taking another medicine that also causes drowsiness, you may have more side effects. Give your health care provider a list of all medicines you use. Your doctor will tell you how much medicine to take. Do not take more medicine than directed. Call emergency for help if you have problems breathing or unusual sleepiness.  Your mouth may get dry. Chewing sugarless gum or sucking hard candy, and drinking plenty of water may help. Contact your doctor if the problem does not go away or is severe.  What side effects may I notice from receiving  this medicine?  Side effects that you should report to your doctor or health care professional as soon as possible:  -allergic reactions like skin rash, itching or hives, swelling of the face, lips, or tongue  -breathing problems  -chest pain  -fast, irregular heartbeat  -hallucinations  -seizures  -unusually weak or tired  Side effects that usually do not require medical attention (report to your doctor or health care professional if they continue or are bothersome):  -headache  -nausea, vomiting  This list may not describe all possible side effects. Call your doctor for medical advice about side effects. You may report side effects to FDA at 3-854-FDA-2954.  Where should I keep my medicine?  Keep out of the reach of children.  Store at room temperature between 15 and 30 degrees C (59 and 86 degrees F). Keep container tightly closed. Throw away any unused medicine after the expiration date.  NOTE: This sheet is a summary. It may not cover all possible information. If you have questions about this medicine, talk to your doctor, pharmacist, or health care provider.  © 2018 [employer] (2016-09-27 12:05:46)

## 2019-09-06 NOTE — PROGRESS NOTES
Surgery patient?: yes  Date of surgery: 8/28/19  Ambulated 50 ft on day of surgery? (N/A if today is not date of surgery): n/a  Number of times ambulated 50 feet or greater today:s4  Patient has been up to chair, edge of bed or HOB 90 degrees for all meals?: yes  Goal met? (goal is ambulating at least 50 feet 2 times on day shift, one time on night shift): yes  If patient did not meet mobility goal, why?:

## 2019-09-06 NOTE — CARE PLAN
Problem: Safety  Goal: Will remain free from injury  Outcome: PROGRESSING AS EXPECTED  Note:   Safety precaution in effect. Non skid socks in use. Call light within reach. Hourly round sin effect. Verbalized understanding.     Problem: Infection  Goal: Will remain free from infection  Outcome: PROGRESSING AS EXPECTED  Note:   Implement standard precaution. Hand washing every encounter & before & after patient care. Verbalized understanding.     Problem: Knowledge Deficit  Goal: Knowledge of disease process/condition, treatment plan, diagnostic tests, and medications will improve  Outcome: PROGRESSING AS EXPECTED  Note:   Discussed [place] f care. Questions answered. Verbalized understanding.     Problem: Pain Management  Goal: Pain level will decrease to patient's comfort goal  Outcome: PROGRESSING AS EXPECTED  Note:   Educated on pain scale. Encouaraed to verbalize pain. Will medicate as per MAR.

## 2019-09-06 NOTE — PROGRESS NOTES
Neurosurgery Progress Note    Subjective:  Pt in bed, states she is doing ok, anxious to go home, some left sided surg site pain ,legs ok, on O2      Exam:  BLE str intact- CDI  A&O x3, GCS 15  PERRL, EOMI       BP  Min: 115/48  Max: 142/78  Pulse  Av.5  Min: 80  Max: 90  Resp  Av.5  Min: 16  Max: 18  Temp  Av.5 °C (97.7 °F)  Min: 36.1 °C (97 °F)  Max: 36.7 °C (98.1 °F)  SpO2  Av %  Min: 93 %  Max: 93 %    No data recorded        Recent Labs     19  0341 19  0415   SODIUM 137 138   POTASSIUM 4.2 4.2   CHLORIDE 103 103   CO2 25 23   GLUCOSE 122* 141*   BUN 34* 39*   CREATININE 1.30 1.26   CALCIUM 8.5 8.7               Intake/Output       19 - 1959 19 - 1959       7963-5035 Total  4924-5043 Total       Intake    P.O.  720  -- 720  --  -- --    P.O. 720 -- 720 -- -- --    Total Intake 720 -- 720 -- -- --       Output    Urine  850  700 1550  --  -- --    Number of Times Voided 1 x -- 1 x -- -- --    Urine Void (mL) 100 -- 100 -- -- --    Output (mL) (Urethral Catheter 16 Fr.)  -- -- --    Stool  --  -- --  --  -- --    Number of Times Stooled 3 x -- 3 x 1 x -- 1 x    Total Output  -- -- --       Net I/O     -130 -700 -830 -- -- --            Intake/Output Summary (Last 24 hours) at 2019 1035  Last data filed at 2019 0400  Gross per 24 hour   Intake 480 ml   Output 1550 ml   Net -1070 ml       $ Bladder Scan Results (mL): 484    • oxyCODONE immediate-release  5-10 mg Q4HRS PRN   • tramadol  50 mg Q6HRS PRN   • acetaminophen  650 mg Q4HRS PRN   • fluticasone  2 Spray DAILY   • metoprolol  25 mg TWICE DAILY   • labetalol  10 mg Q4HRS PRN   • metFORMIN  500 mg BID WITH MEALS   • acetaminophen  650 mg Q6HRS   • cyclobenzaprine  10 mg TID PRN   • benzocaine-menthol  1 Lozenge Q2HRS PRN   • lactobacillus rhamnosus  1 Cap QDAY with Breakfast   • insulin regular  1-6 Units 4X/DAY ACHS    And   • glucose  16  g Q15 MIN PRN    And   • dextrose 10% bolus  250 mL Q15 MIN PRN   • diphenhydrAMINE  25 mg Q6HRS PRN    Or   • diphenhydrAMINE  25 mg Q6HRS PRN   • albuterol  1 Puff Q6HRS PRN   • budesonide-formoterol  2 Puff BID   • dronedarone  400 mg BID WITH MEALS   • gabapentin  300 mg BID   • levothyroxine  75 mcg AM ES   • omeprazole  40 mg DAILY   • simvastatin  40 mg DAILY   • traZODone  100 mg Nightly   • MD ALERT...DO NOT ADMINISTER NSAIDS or ASPIRIN unless ORDERED By Neurosurgery  1 Each PRN   • docusate sodium  100 mg BID   • senna-docusate  1 Tab Nightly   • senna-docusate  1 Tab Q24HRS PRN   • polyethylene glycol/lytes  1 Packet BID PRN   • magnesium hydroxide  30 mL QDAY PRN   • bisacodyl  10 mg Q24HRS PRN   • Respiratory Care per Protocol   Continuous RT   • ondansetron  4 mg Q4HRS PRN   • ondansetron  4 mg Q4HRS PRN   • hydrALAZINE  10 mg Q HOUR PRN   • calcium carbonate  500 mg BID       Assessment and Plan:  POD #9 L4-S1 TLIF  NM as above  Prophylactic anticoagulation: no         Start date/time: no need    Appreciate IM management  Remove dressing, leave gwen  Continue to mobilize   Continue pain control  Pt wears O2 normally at home  Ok to dc home with HH from [facility] perspective  F/u with [facility]- existing patient

## 2019-09-06 NOTE — PROGRESS NOTES
FW walker was delivered and fitted to patient.  If any further assistance needed, please call extension [phone] or place order for Ortho Technician assistance as a communication order in [employer].

## 2019-09-06 NOTE — PROGRESS NOTES
1855  Patient's sitting up in bed. No changes in statu. Bedside report given to Aliyah BARBOZA RN (Janel).

## 2019-09-06 NOTE — THERAPY
"Physical Therapy Treatment completed.   Bed Mobility:  Supine to Sit: Supervised  Transfers: Sit to Stand: Supervised  Gait: Level Of Assist: Supervised with Front-Wheel Walker       Plan of Care: DC needs only  Discharge Recommendations: Equipment: Front-Wheel Walker. Post-acute therapy Discharge to home with home health for additional skilled therapy services.    Pt is now consistently performing gait with FWW without overt LOB. Today she performed x140' with FWW but limited distance due to having to use restroom. Pt reporting concern regarding her catheter but did not report pain during treatment today. Pt has met her functional goals in this setting and PT will follow at this time for DC needs only and can continue to mobilize with nursing staff. Pt appears functionally capable of return home with FWW and HHPT services.      See \"Rehab Therapy-Acute\" Patient Summary Report for complete documentation.       "

## 2019-09-06 NOTE — CONSULTS
"Diabetes education: Met with pt and  this afternoon. Pt has a hx of diabetes and uses Metformin and Victoza at home. Pt was admitted with blood sugar of 181 and Hg a1c of 6.9%. Pt was currently on regular insulin sliding scale coverage ac and hs with blood sugars of 132, 132, and 138.   DANIALE met wit pt and asked how she managed her diabetes. Pt got upset and stated she is angry because \"you guys lost my clothes and jacket\" Pt and  state before surgery, she placed her clothes and jacket in a plastic bag as asked and it was placed in a locker. Pt then when to surgery, was moved to S104, then [facility] 8, back to ICU ( S108,) and then S168. They state they have asked about these clothes but no one has told them anything. DANIALE informed pt's nurse who said she spoke with nursing supervisor yesterday and will do so again today. She states she told the patient this. CDE called [facility] \"lost and found\" and there was no bag there with pt's name. CDE left note for  as well.  Pt calmed down and said she does not eat food here ( as witness by tray with food untouched and she was eating outside sandwich). Pt states she eats 2 or 3 times a day and takes her metformin and Victoza without problem. States it has been about a week since last dose of Victoza. Pt states she has not had any low blood sugars.  Plan: No further needs at this time. Pt's main concern is her clothes and jacket. Please reorder diabetes education if needs change.       "

## 2019-09-06 NOTE — PROGRESS NOTES
0650 Patient's sitting up in bed. Bedside report received from NOC RN (Janel) at the beginning of the shift.    0830 Ambulated with RADHA Chino in the halway with FWW    0903 Patient's sitting up in bed. Educated on the importance/use of IS at least 10x every hour while awake, able to reach 1250. Medicated with Oxycodone (see MAR) for c/o's back pain, rates pain 8/10. FC to DD. Fall Protocol in effect. Call light within reach. Reminded patient to call for assist. Assessment completed. No distress noted. Plan of care reviewed with the patient. Verbalized understanding.    1130 Patient's sitting up in bed. Dr Mcnair visited. POC discussed with the patient. Verbalized understanding. Per the said MD to [facility] for d/c order & notify regarding Flomax.    1155 Patient's sitting up in bed, having lunch. No distress noted.    1201 Place a call to HY[facility] Decker, awaiting for response.     1211 Received a call from HYUN Redd. Notified the said HYUN regarding patient's allergy to Sulfa and that their office e prescribed Flomax for the patient.    1228 FWW was delivered to patient's room.    1404 Medicated with Oxycodone (see MAR) for c/o's back pain, rates pian 7/10.    1420 Graham bag changed to a leg bag. Instructed/educated  Patient and spouse, hand out given to the patient from [facility] on demand regarding graham catheter/care & emptying. Questions answered. Verbalized understanding.

## 2019-09-06 NOTE — DISCHARGE PLANNING
Anticipated Discharge Disposition:   Home with [facility] home health  DME FWW  Montanez with leg bags  Home oxygen (existing)    Action:    [facility] home health accepted.  Pt aware.  Verbal for DME walker obtained and ordered in epic.  FWW ordered through [employer].    Bedside RN Cassidy boudreaux ordered and acquired the leg bags and provided instruction.    Barriers to Discharge:    None    Plan:    DC today.

## 2019-09-09 ENCOUNTER — TELEPHONE (OUTPATIENT)
Dept: PULMONOLOGY | Facility: HOSPICE | Age: 79
End: 2019-09-09

## 2019-09-09 DIAGNOSIS — R93.89 ABNORMAL CXR: ICD-10-CM

## 2019-09-09 NOTE — OP REPORT
DATE OF SERVICE:  08/28/2019    PREOPERATIVE DIAGNOSIS:  Lumbar stenosis with neurogenic claudication.    POSTOPERATIVE DIAGNOSIS:  Lumbar stenosis with neurogenic claudication.    PROCEDURES:  1. L2 laminectomy with medial facetectomy and bilateral foraminotomy and   decompression of L2 nerve roots.  2. Repair of dural tear on the right at L2-L3.  3. L3 laminectomy with medial facetectomy and bilateral foraminotomy and   decompression of L3 nerve roots.  4. L4 laminectomy with medial facetectomy and bilateral foraminotomy and   decompression of L4 nerve roots.  5. Repair of dural tear on the right at L4-L5 with use of 6-0 [place]-Brennen.  6. L5 laminectomy with medial facetectomy and bilateral foraminotomies.    SURGEON:  Grupo Cabrales MD    ASSISTANT:  NICHOLAS Henning    ANESTHESIA:  General anesthetic.    ANESTHESIOLOGIST:  Ramón Aden MD    PREPARATION:  Routine.    INDICATIONS:  The patient presents with significant lumbar degenerative   changes, severe lumbar stenosis, and neurogenic claudication.  She also has   back pain associated with this.  The indications and possible complications of   the procedure were explained to the patient and informed consent was   obtained.    DESCRIPTION OF PROCEDURE:  The patient was brought to the operating room, and   following induction, she was intubated and placed under general anesthetic.    Rolled prone onto the operating room table.  All pressure points were padded.    Sequential stockings were in place.  The back was prepped and draped in a   sterile fashion.  The proposed incision site was injected with Marcaine 0.5%   with epinephrine.  Midline incision was made from L2 down through L5 and   carried down to subcutaneous tissue.  Fascia was injected with Marcaine 0.5%   with epinephrine as well as the muscular layers.  Subperiosteal dissection was   carried out bilaterally at L2, L3, L4, and L5.  Retractors were placed to   maintain exposure.  We then removed  the spinous processes of L2, L3, L4, and   L5.  There was marked hypertrophy in midline facets.  We drilled the junction   of the lamina and facets to a thin shelf at L2, L3, L4, and L5.  Center   portion of bone was removed with Le[place], we were able to decompress from L4   through L2.  Undercutting the facets on the right hand side and performed a   foraminotomy around the L2 nerve root, the facet was dug into the thecal sac   and upon removing a portion of the facet, there was a small dural tear.  This   was repaired with 6-0 [place]-Brennen.  Carrying the decompression down to the L3-L4   level, we were able to perform a foraminotomy around the pedicle of L3 and   subsequently decompressing the marked facet hypertrophy at L4-L5.  We had to   drill this to a thin shelf and decompressed subsequently the medial facet.  We   then decompressed around the pedicle of L4.  We drilled the lamina of L5 to a   thin shelf, completed our decompression with a #5 Kerrison and used #3   Kerrison to perform a foraminotomy around the L5 nerve root and S1 nerve root.    Going to the left hand side, we were able to undercut the facet joint at   L2-L3 and perform a foraminotomy around the L3 nerve root.  Decompressing the   facet at L3-L4 by drilling it to a thin shelf, curetting the thecal sac free   and dissecting the scar tissue from the thecal sac, we were able to decompress   around the pedicle of L3.  At L4-L5 and decompressing, the facet was markedly   hypertrophied compressing into the thecal sac and decompressing this area   yielded again a dural tear.  The nerve root here appeared to be thickened, but   was intact certainly and this was not injured with our Kerrisons.  We   decompressed around the area and then sutured the dural tear with 6-0   [place]-Brennen.  Going inferiorly, we were able to again drill the medial facet at   L5-S1, decompressed around the pedicle of L5 and S1.  All nerve roots were   found to be free bilaterally at  L2, L3, L4, and L5.  S1 nerve roots were also   found to be free, but there was marked degenerative change in the facets and   disk hypertrophy at L4-L5 and L5-S1 was remarkable.  The lateral recess   stenosis at L4-L5 was found to be the worst especially on the left hand side.    The area was copiously irrigated.  We placed blue glue over the thecal sac   followed by Duragen 1x3.    A medium size Hemovac was placed.  We subsequently closed the fascia with #1   Vicryl suture, subcutaneous tissue with 2-0 Vicryl as well as the fascia.    Skin was closed with staples.  All sponge and needle counts were correct.    Estimated blood loss approximately 200 mL.             ____________________________________     MD BARBARA DYER / NTS    DD:  09/05/2019 04:13:23  DT:  09/05/2019 06:48:24    D#:  [id number]  Job#:  [id number]    cc: MONICA Driver DO, IRIS SINGLETARY MD

## 2019-09-09 NOTE — TELEPHONE ENCOUNTER
order needed for CXR per Anthony   Received: Today   Message Contents   Garima Ram, Med Ass't  [facility]

## 2019-09-11 NOTE — OP REPORT
DATE OF SERVICE:  08/28/2019    PREOPERATIVE DIAGNOSES:  L4-L5, L5-S1 degenerative disk disease with low back   pain and lumbar radiculopathy.    POSTOPERATIVE DIAGNOSES:  L4-L5, L5-S1 degenerative disk disease with low back   pain and lumbar radiculopathy.    PROCEDURES:  1. L4 laminectomy with medial facetectomy, bilateral foraminotomy, and   decompression of L4 nerve roots.  2. L5 laminectomy with medial facetectomy, bilateral foraminotomy, and   decompression of L5 and S1 nerve roots.  3. Left L4 osteotomy with removal of inferior and superior facet and   transforaminal lumbar interbody fusion with use of Globus Rise cage 10-17 mm   in height and 26 mm in length augmented with bone morphogenic protein.  4. Left L5 osteotomy with removal of inferior facet of L5, superior facet of   S1, and transforaminal lumbar interbody fusion L5-S1 using Globus Rise cage   measuring 10-17 mm in height, 30 mm in length, and 15 degrees lordosis   augmented with locally harvested bone graft and bone morphogenic protein.  5. Instrumented stabilization, L4-L5 and L5-S1 with use of Medtronic Solera   instrumentation.    SURGEON:  Grupo Cabrales MD    ASSISTANT:  HYUN Toscano    ANESTHESIA:  General anesthetic.    ANESTHESIOLOGIST:  Anurag Baez MD    PREPARATION:  The patient had somatosensory evoked potentials and EMGs   monitored throughout the case.    DESCRIPTION OF PROCEDURE:  The patient was brought to the operating room and   following induction, the patient was intubated and placed under general   anesthetic.  Montanez catheter was sterilely placed.    She was prepared for somatosensory evoked potentials and EMGs.  Rolled prone   onto the OSI table using the chest, hip, thigh pads.  All pressure points were   padded, sequential stockings were started, and the back was prepped and   draped in sterile fashion followed by timeout.    We injected from L3 down to sacrum with Marcaine 0.25% with epinephrine 30  mL.    Midline incision was made and carried down to the subcutaneous tissue with   subperiosteal dissection carried out at L3, L4, L5, and S1.  Retractors were   placed to maintain exposure as we dissected out to the transverse processes   without violating the facets at L4, L5, and the ala of the sacrum.    Intraoperative x-ray confirmed the location.  We were able to remove the   spinous process of L4, L5, and S1.  I then drilled the junction of the lamina   facet at L4 bilaterally and L5 bilaterally.  We removed the center portion of   the bone, and did a wide decompression over the L4, L5, and S1 nerve roots.    On the left hand side, we were able to do a complete facetectomy of the   inferior and superior facet.  We then opened the disk space after drawing the   S1 nerve root slightly medial and coagulating the epidural vessels.  We   prepared the endplates with use of paddle erasto starting with a #5 and   moving to a #11.  Up and down biting curettes were utilized and we removed   degenerative disk material with pituitary rongeurs.  A small BMP-soaked sponge   was placed anteriorly followed by locally harvested bone graft ground to   smaller size and placed into the anterior aspect of vertebral disk space at   L5-S1.  A Globus Rise cage measuring 10x30 mm and 10-17 mm in height was   tamped in a countersunk position in a diagonal fashion and opened to   approximately 16 mm.    We then went to the L4-L5 level.  Drawing the thecal sac medially, we were   able to do a complete facetectomy of the inferior facet of L4, superior facet   of L5.  We coagulated the epidural vessels by drawing the L5 nerve root   medially and then opening the disk space with 11 bladed scalpel.  Endplates   were prepared with use of paddle erasto starting with a #7 and moving to a   #11.  Up and down biting curettes and Booker were used to further remove   cartilaginous endplate.  Small BMP-soaked sponge was placed anteriorly    followed by a 10x26 mm, 10-17 mm in height Globus cage, which was tamped   anteriorly and opened to approximately 16 mm.    We were then able to identify the pedicles superior, medial, and inferior at   L4, L5 and S1 on the left with a nerve hook and Greene.  We drilled down the   axis of the pedicle followed by the Med ePad navigation system and a 5.5 tap   with placement of 7.5x35 mm screw into L4, 6.5x45 mm screw into L5, and a   7.5x40 mm screw into S1.  Going to the contralateral side, we made sure the   foraminotomies were wide.  We decompressed around the pedicle of L4, L5, and   S1.  We were able to drill down the axis of the pedicle of L4, L5, and S1 with   the Midas Bhavik followed by the Med ePad navigation system and a 5.5 tap with   placement of 6.5x45 mm screw into L4, 6.5x45 mm screw into L5, and a 7.5x40 mm   screw into S1.    We were able to place 60 mm rods, which were bent into lordotic position into   the multiaxial heads bilaterally.  Set screws were secured and broken off at   appropriate torque.  Intraoperative x-ray confirmed good location in the AP   and lateral position.    Meticulous hemostasis was obtained and we placed a medium size Hemovac.  We   then closed after again a gram of vancomycin was sprinkled over the thecal sac   as well as the instrumentation, and a medium size Hemovac was placed.  Fascia   was closed with #1 Vicryl suture, subcutaneous tissue with 2-0 Vicryl, and   subcuticular layer with 3-0 Vicryl, skin with staples.  All sponge and needle   counts were correct.  Estimated blood loss approximately 450 mL, with no other   complications noted.    Note, cell saver was utilized during the case, and there was some cell saver   that was washed, and given back in a sterile fashion.             ____________________________________     MD BARBARA DYER / AJNEY    DD:  09/11/2019 13:33:00  DT:  09/11/2019 14:02:40    D#:  [id number]  Job#:  [id number]    cc: _____ _____

## 2019-09-25 NOTE — PROGRESS NOTES
Chief Complaint   Patient presents with   • Anticoagulation       Subjective:   Jessica Plaza is a 79 y.o. female patient of Dr. Lance Steven who presents today for follow up regarding her atrial fibrillation.     Patient was last seen by Dr. Steven on 8/22/19. Patient was started on Multaq to try and maintain NSR and was advised to follow up in one month.     Past medical history significant for COPD, diabetes, emphysema, hyperlipidemia, GERD, hypertension and previous CVA.    Today patient states that she was unable to tolerate the Multaq as it caused severe nausea and vomiting. She denies any significant cardiovascular complaints. She is still recovering from back surgery she had on 8/22/19, only complaint today is some minor tenderness from the surgery. Shortness of breath is at baseline for her.     Past Medical History:   Diagnosis Date   • Anemia    • Arthritis    • Asthma     Inhaler use daily and PRN   • Castro esophagus    • Bronchitis    • Chronic anticoagulation 4/29/2019   • COPD    • COPD (chronic obstructive pulmonary disease) ([facility])    • Dental disorder     dentures upper and lower   • Diabetes     oral meds   • Disorder of thyroid    • Emphysema of lung ([facility])     Uses oxygen PRN.  Patient not sure of liters/minute.   • Encounter for medication refill 2/5/2018   • GERD (gastroesophageal reflux disease)    • Heart burn    • Hemorrhagic disorder     hx internal bleeding   • High cholesterol    • Hypertension    • Indigestion    • Pain     left shoulder   • Pneumonia 2013.   • Stroke ([facility]) 2013   • UTI (urinary tract infection)      Past Surgical History:   Procedure Laterality Date   • LUMBAR FUSION POSTERIOR N/A 8/28/2019    Procedure: FUSION, SPINE, LUMBAR, PLIF - L4-5, L5-S1 TLIF;  Surgeon: Grupo Cabrales M.D.;  Location: SURGERY [facility];  Service: Neurosurgery   • LUMBAR DECOMPRESSION N/A 8/28/2019    Procedure: DECOMPRESSION, SPINE, LUMBAR;  Surgeon: Grupo Cabrales M.D.;  Location:  SURGERY [facility];  Service: Neurosurgery   • SHOULDER DECOMPRESSION ARTHROSCOPIC Left 10/11/2016    Procedure: SHOULDER DECOMPRESSION ARTHROSCOPIC  with   debridment of the labrum and biceps tendon stump;  Surgeon: Bill Ponce M.D.;  Location: SURGERY [facility];  Service:    • CLAVICLE RESECTION Left 10/11/2016    Procedure: CLAVICLE RESECTION distal open  ;  Surgeon: Bill Ponce M.D.;  Location: SURGERY [facility];  Service:    • CHONDROPLASTY Left 10/11/2016    Procedure: Arthroscopic CHONDROPLASTY of the joint  ;  Surgeon: Bill Ponce M.D.;  Location: SURGERY [facility];  Service:    • SHOULDER DECOMPRESSION ARTHROSCOPIC Left 2016    Procedure: SHOULDER DECOMPRESSION ARTHROSCOPIC SUBACROMIAL and biceps tenotomy;  Surgeon: Johan Roberts M.D.;  Location: SURGERY [facility];  Service:    • GASTROSCOPY-ENDO  2014    Performed by Yonas Grimes Jr., M.D. at ENDOSCOPY [facility]   • TONSILLECTOMY     • PB ANESTH, DELIVERY ONLY      X 2     Family History   Problem Relation Age of Onset   • Cancer Mother         breast   • Lung Disease Mother         COPD/Emphysema/Smoker   • Stroke Father    • Hypertension Father    • Hyperlipidemia Father    • Heart Attack Father    • Cancer Sister         Pancriatic   • Arrythmia Sister    • Lung Disease Sister         COPD/Emphysema/Smoker   • Bipolar disorder Son    • Bipolar disorder Son    • Bipolar disorder Son    • Cancer Maternal Aunt         stomach     Social History     Socioeconomic History   • Marital status:      Spouse name: Not on file   • Number of children: Not on file   • Years of education: Not on file   • Highest education level: Not on file   Occupational History   • Not on file   Social Needs   • Financial resource strain: Not on file   • Food insecurity:     Worry: Not on file     Inability: Not on file   • Transportation needs:     Medical: Not on file     Non-medical: Not on  file   Tobacco Use   • Smoking status: Former Smoker     Packs/day: 0.50     Years: 52.00     Pack years: 26.00     Types: Cigarettes     Last attempt to quit: 2013     Years since quittin.7   • Smokeless tobacco: Never Used   • Tobacco comment: started smoking at age 21   Substance and Sexual Activity   • Alcohol use: Yes     Alcohol/week: 8.4 oz     Types: 7 Shots of liquor, 7 Standard drinks or equivalent per week     Frequency: 2-3 times a week     Comment: Once or twice a week.   • Drug use: No   • Sexual activity: Never     Partners: Male     Birth control/protection: Post-Menopausal   Lifestyle   • Physical activity:     Days per week: Not on file     Minutes per session: Not on file   • Stress: Not on file   Relationships   • Social connections:     Talks on phone: Not on file     Gets together: Not on file     Attends [employer] service: Not on file     Active member of club or organization: Not on file     Attends meetings of clubs or organizations: Not on file     Relationship status: Not on file   • Intimate partner violence:     Fear of current or ex partner: Not on file     Emotionally abused: Not on file     Physically abused: Not on file     Forced sexual activity: Not on file   Other Topics Concern   • Not on file   Social History Narrative   • Not on file     Allergies   Allergen Reactions   • Bactrim Hives   • Demerol Shortness of Breath and Swelling   • Pcn [Penicillins] Hives     Tolerates ceftriaxone and cefazolin   • Sulfa Drugs Hives   • Ferrous Sulfate Rash     itching   • Latex Contact Dermatitis     Outpatient Encounter Medications as of 2019   Medication Sig Dispense Refill   • HYDROcodone-acetaminophen (NORCO) 5-325 MG Tab per tablet TAKE 1 TABLET(S) EVERY 4 TO 6 HOURS BY ORAL ROUTE AS NEEDED FOR 7 DAYS.  0   • metoprolol (LOPRESSOR) 25 MG Tab Take 1 Tab by mouth 2 times a day. 60 Tab 11   • PROAIR  (90 Base) MCG/ACT Aero Soln inhalation aerosol INHALE 1 PUFF BY MOUTH  "EVERY 6 HOURS AS NEEDED FOR SHORTNESS OF BREATH. 17 Inhaler 2   • metFORMIN (GLUCOPHAGE) 500 MG Tab Take 1 Tab by mouth 2 times a day, with meals. 60 Tab 0   • cyclobenzaprine (FLEXERIL) 10 MG Tab Take 1 Tab by mouth 3 times a day as needed for Muscle Spasms (spasms). 40 Tab 0   • fluticasone (FLONASE) 50 MCG/ACT nasal spray Spray 2 Sprays in nose every day. 16 g 0   • gabapentin (NEURONTIN) 300 MG Cap Take 300 mg by mouth 2 Times a Day.     • omeprazole (PRILOSEC) 40 MG delayed-release capsule Take 40 mg by mouth every day.     • rivaroxaban (XARELTO) 15 MG Tab tablet Take 15 mg by mouth with dinner.     • traZODone (DESYREL) 100 MG Tab Take 100 mg by mouth every evening.     • lisinopril (PRINIVIL) 20 MG Tab Take 1 Tab by mouth every day. 90 Tab 3   • budesonide-formoterol (SYMBICORT) 160-4.5 MCG/ACT Aerosol Inhale 2 Puffs by mouth 2 Times a Day. 1 Inhaler 11   • liraglutide (VICTOZA) 18 MG/3ML Solution Pen-injector injection Inject 1.2 mg as instructed every evening.     • levothyroxine (SYNTHROID) 75 MCG Tab TAKE 1 TAB BY MOUTH EVERY MORNING ON AN EMPTY STOMACH. 90 Tab 3   • simvastatin (ZOCOR) 40 MG Tab TAKE 1 TAB BY MOUTH EVERY DAY. 30 Tab 5   • [DISCONTINUED] dronedarone (MULTAQ) 400 MG Tab Take 1 Tab by mouth 2 times a day, with meals. 60 Tab 3     No facility-administered encounter medications on file as of 9/26/2019.      Review of Systems   Constitutional: Negative for malaise/fatigue and weight loss.   Respiratory: Positive for shortness of breath (At baseline ).    Cardiovascular: Negative for chest pain, palpitations, orthopnea, claudication, leg swelling and PND.   Musculoskeletal: Positive for back pain (S/P lumbar back surgery).   Neurological: Negative for dizziness and weakness.   All other systems reviewed and are negative.       Objective:   /80 (BP Location: Left arm, Patient Position: Sitting, BP Cuff Size: Adult)   Pulse (!) 122   Ht 1.702 m (5' 7\")   Wt 88.5 kg (195 lb)   SpO2 96%  "  BMI 30.54 kg/m²     Physical Exam   Constitutional: She is oriented to person, place, and time. She appears well-developed and well-nourished. No distress.   HENT:   Head: Normocephalic.   Eyes: EOM are normal.   Neck: No JVD present.   Cardiovascular: Normal heart sounds. An irregularly irregular rhythm present. Tachycardia present.   Pulmonary/Chest: Effort normal. No respiratory distress. She has decreased breath sounds.   Abdominal: Soft. There is no tenderness.   Musculoskeletal: She exhibits no edema.   Neurological: She is alert and oriented to person, place, and time.   Skin: Skin is warm and dry.   Psychiatric: She has a normal mood and affect. Her behavior is normal.     TTE 4/30/19:  Technically difficult study - adequate information is obtained.   Left ventricular systolic function is low normal.  Left ventricular ejection fraction is visually estimated to be 45%.  Global hypokinesis.  Mild concentric left ventricular hypertrophy.  Normal estimated left ventricular filling pressures in the setting of   atrial fibrillation.  Normal right ventricular size.  Mildly reduced right ventricular systolic function.  Enlarged right atrium.  Aortic sclerosis without stenosis.  Right atrial pressure is estimated to be 15 mmHg, severely elevated.   Right ventricular systolic pressure is estimated to be 40 mmHg,   consistent with mild pulmonary hypertension.     [place] 5/1/19:  Left Ventricle  The left ventricle was normal in size and function.    Right Ventricle  The right ventricle was normal in size and function.    TTE 9/1/19:  Tachycardia.  Technically difficult study - adequate information is obtained.   Likely normal left ventricular systolic function.   Left ventricular ejection fraction is visually estimated to be 50%.  No evidence of valvular abnormality based on blind Doppler evaluation.     NM Cardiac Stress Test 7/16/15:  IMPRESSIONS  No evidence of significant jeopardized viable myocardium or prior  myocardial infarction.  Normal left ventricular size, ejection fraction, and wall motion.         Assessment:     1. PAF (paroxysmal atrial fibrillation) ([facility])     2. Chronic anticoagulation     3. Pulmonary HTN (H[facility])     4. Essential hypertension     5. Dyslipidemia         Medical Decision Making:  Today's Assessment / Status / Plan:     Atrial fibrillation:  -In atrial fibrillation today, rate elevated in the 120's.  -Patient is asymptomatic and is not interested in aggressive management, will focus on rate control (unable to tolerate Amiodarone or Multaq, no recent stress test to safely start Flecainide).  -Start Lopressor 25 mg daily.  -OAC with Xarelto 15 mg daily (patient is not sure why she is on reduced dosing or who prescribed it). Possibly due to her recent back surgery??   -Repeat EKG at next apt.     Pulmonary hypertension:  -RVSP 40 mmHg.  -Significant history of tobacco use and COPD.   -Stable, on room air during her appointment today.   -Continue to use home O2.    Hypertension:  -Stable.    HLD:  -Stable.  -LDL on 2/15/19 was 81.  -Continue Simvastatin 40 mg daily.     Patient will follow up with myself in one month for rate check. Encouraged patient to contact office should any questions or concerns arise in the mean time. Patient understands and agrees with the plan of care.     Collaborating Provider: Dr. Prashant Mijares        Future Appointments   Date Time Provider Department Center   10/30/2019 12:45 PM VERITO Valenzuela. [facility] None   11/26/2019  9:45 AM PULMONARY DX [address]   11/26/2019 10:00 AM Maria Fernanda Cruz M.D. PULM None   2/26/2020  1:20 PM Anurag Moreno M.D. [address]     Please note that this dictation was created using voice recognition software. I have made every reasonable attempt to correct obvious errors, but I expect that there are errors of grammar and possibly content I did not discover before finalizing the note.

## 2019-09-26 ENCOUNTER — TELEPHONE (OUTPATIENT)
Dept: PULMONOLOGY | Facility: HOSPICE | Age: 79
End: 2019-09-26

## 2019-09-26 ENCOUNTER — OFFICE VISIT (OUTPATIENT)
Dept: CARDIOLOGY | Facility: MEDICAL CENTER | Age: 79
End: 2019-09-26
Payer: MEDICARE

## 2019-09-26 VITALS
BODY MASS INDEX: 30.61 KG/M2 | HEART RATE: 122 BPM | OXYGEN SATURATION: 96 % | DIASTOLIC BLOOD PRESSURE: 80 MMHG | HEIGHT: 67 IN | SYSTOLIC BLOOD PRESSURE: 140 MMHG | WEIGHT: 195 LBS

## 2019-09-26 DIAGNOSIS — I27.20 PULMONARY HTN (HCC): ICD-10-CM

## 2019-09-26 DIAGNOSIS — I10 ESSENTIAL HYPERTENSION: Chronic | ICD-10-CM

## 2019-09-26 DIAGNOSIS — Z79.01 CHRONIC ANTICOAGULATION: Chronic | ICD-10-CM

## 2019-09-26 DIAGNOSIS — E78.5 DYSLIPIDEMIA: Chronic | ICD-10-CM

## 2019-09-26 DIAGNOSIS — I48.0 PAF (PAROXYSMAL ATRIAL FIBRILLATION) (HCC): ICD-10-CM

## 2019-09-26 PROBLEM — I95.9 HYPOTENSION: Status: RESOLVED | Noted: 2019-08-31 | Resolved: 2019-09-26

## 2019-09-26 PROBLEM — I50.23 ACUTE ON CHRONIC SYSTOLIC (CONGESTIVE) HEART FAILURE (HCC): Status: RESOLVED | Noted: 2019-08-30 | Resolved: 2019-09-26

## 2019-09-26 PROBLEM — D64.9 ANEMIA: Status: RESOLVED | Noted: 2019-08-31 | Resolved: 2019-09-26

## 2019-09-26 PROCEDURE — 99214 OFFICE O/P EST MOD 30 MIN: CPT | Performed by: NURSE PRACTITIONER

## 2019-09-26 RX ORDER — HYDROCODONE BITARTRATE AND ACETAMINOPHEN 5; 325 MG/1; MG/1
TABLET ORAL
Refills: 0 | COMMUNITY
Start: 2019-09-11 | End: 2019-11-19

## 2019-09-26 ASSESSMENT — ENCOUNTER SYMPTOMS
DIZZINESS: 0
WEIGHT LOSS: 0
CLAUDICATION: 0
SHORTNESS OF BREATH: 1
PND: 0
PALPITATIONS: 0
WEAKNESS: 0
BACK PAIN: 1
ORTHOPNEA: 0

## 2019-09-26 NOTE — TELEPHONE ENCOUNTER
Pt called states will need to reschedule appt with Dr. Cruz. States her  is having surgery and she is still recovering from her hospital stay. Requesting to reschedule irina ending of November. Pt rescheduled per her request. Pt aware of change of appt.

## 2019-10-14 RX ORDER — RIVAROXABAN 15 MG/1
TABLET, FILM COATED ORAL
Qty: 90 TAB | Refills: 3 | Status: SHIPPED | OUTPATIENT
Start: 2019-10-14 | End: 2019-11-19

## 2019-10-18 ENCOUNTER — TELEPHONE (OUTPATIENT)
Dept: MEDICAL GROUP | Facility: MEDICAL CENTER | Age: 79
End: 2019-10-18

## 2019-10-18 RX ORDER — CEPHALEXIN 250 MG/1
250 CAPSULE ORAL 4 TIMES DAILY
Qty: 20 CAP | Refills: 0 | Status: SHIPPED | OUTPATIENT
Start: 2019-10-18 | End: 2019-10-23

## 2019-10-18 NOTE — TELEPHONE ENCOUNTER
1. Caller Name: Jessica                      Call Back Number: [phone] (home)       2. Message: Patient states her UTI is back, she has tried to contact [place]rology [place] and has received no call back after 3 days. Her  just had knee surgery and she is unable to come in at the moment. She would like a script of keflex called in so her symptoms do not get worse.     3. Patient approves office to leave a detailed voicemail/MyChart message: yes

## 2019-10-21 NOTE — DOCUMENTATION QUERY
"                                                                         [facility]                                                                       Query Response Note      PATIENT:               MARILIN MCKEON  ACCT #:                  [id number]  MRN:                     [id number]  :                      1940  ADMIT DATE:       2019 7:39 AM  DISCH DATE:        2019 3:32 PM  RESPONDING  PROVIDER #:        [id number]           QUERY TEXT:    \"Pneumonia\" has developed in the postoperative period.  Can the relationship be further specified.     NOTE:  If an appropriate response is not listed below, please respond with a new note.      The patient's Clinical Indicators include:   Admitted for lumbar decompression and fusion.    Pneumonia possible on CT scan. Antibiotics ordered. Follow procalcitonin  WBC 13.3 Procalcitonin 1.29    Treatment:   Resp and O2 protocol  IV antibiotics and  follow labs.    Risks:   Age, Acute on Chronic Systolic Heart Failure, Hyperkalemia, Pulmonary edema, Pneumonia, Type 2 DM.  Options provided:   -- Pneumonia is due to complication of the procedure   -- Pneumonia is not due to a complication of the procedure   -- Pneumonia is integral to the procedure   -- Findings of no clinical significance   -- Unable to determine      Query created by: Kaylee Osuna on 10/21/2019 11:58 AM    RESPONSE TEXT:    Unable to determine          Electronically signed by:  MONICA MANZANO 10/21/2019 12:33 PM              "

## 2019-11-14 DIAGNOSIS — F51.01 PRIMARY INSOMNIA: ICD-10-CM

## 2019-11-14 RX ORDER — TRAZODONE HYDROCHLORIDE 100 MG/1
TABLET ORAL
Qty: 90 TAB | Refills: 3 | Status: SHIPPED | OUTPATIENT
Start: 2019-11-14 | End: 2019-11-19

## 2019-11-19 ENCOUNTER — OFFICE VISIT (OUTPATIENT)
Dept: CARDIOLOGY | Facility: MEDICAL CENTER | Age: 79
End: 2019-11-19
Payer: MEDICARE

## 2019-11-19 VITALS
HEIGHT: 67 IN | SYSTOLIC BLOOD PRESSURE: 118 MMHG | DIASTOLIC BLOOD PRESSURE: 60 MMHG | WEIGHT: 193 LBS | BODY MASS INDEX: 30.29 KG/M2 | HEART RATE: 108 BPM | OXYGEN SATURATION: 91 %

## 2019-11-19 DIAGNOSIS — J43.8 OTHER EMPHYSEMA (HCC): Chronic | ICD-10-CM

## 2019-11-19 DIAGNOSIS — I27.20 PULMONARY HTN (HCC): ICD-10-CM

## 2019-11-19 DIAGNOSIS — E78.5 DYSLIPIDEMIA: Chronic | ICD-10-CM

## 2019-11-19 DIAGNOSIS — Z79.01 CHRONIC ANTICOAGULATION: Chronic | ICD-10-CM

## 2019-11-19 DIAGNOSIS — I51.89 SYSTOLIC DYSFUNCTION WITHOUT HEART FAILURE: ICD-10-CM

## 2019-11-19 DIAGNOSIS — I48.91 ATRIAL FIBRILLATION, UNSPECIFIED TYPE (HCC): ICD-10-CM

## 2019-11-19 DIAGNOSIS — I10 ESSENTIAL HYPERTENSION: Chronic | ICD-10-CM

## 2019-11-19 PROBLEM — I48.0 PAF (PAROXYSMAL ATRIAL FIBRILLATION) (HCC): Status: RESOLVED | Noted: 2019-05-15 | Resolved: 2019-11-19

## 2019-11-19 PROBLEM — J18.9 PNEUMONIA: Status: RESOLVED | Noted: 2019-08-30 | Resolved: 2019-11-19

## 2019-11-19 PROBLEM — E87.5 HYPERKALEMIA: Status: RESOLVED | Noted: 2019-08-30 | Resolved: 2019-11-19

## 2019-11-19 PROBLEM — N17.9 ACUTE KIDNEY INJURY (HCC): Status: RESOLVED | Noted: 2019-08-30 | Resolved: 2019-11-19

## 2019-11-19 PROBLEM — J81.1 PULMONARY EDEMA: Status: RESOLVED | Noted: 2019-08-30 | Resolved: 2019-11-19

## 2019-11-19 LAB — EKG IMPRESSION: NORMAL

## 2019-11-19 PROCEDURE — 93000 ELECTROCARDIOGRAM COMPLETE: CPT | Performed by: INTERNAL MEDICINE

## 2019-11-19 PROCEDURE — 99214 OFFICE O/P EST MOD 30 MIN: CPT | Performed by: NURSE PRACTITIONER

## 2019-11-19 RX ORDER — METOPROLOL TARTRATE 50 MG/1
50 TABLET, FILM COATED ORAL 2 TIMES DAILY
Qty: 60 TAB | Refills: 11 | Status: SHIPPED | OUTPATIENT
Start: 2019-11-19 | End: 2020-01-02

## 2019-11-19 NOTE — PROGRESS NOTES
Chief Complaint   Patient presents with   • HTN (Controlled)   • Atrial Fibrillation   • CHF (Systolic)       Subjective:   Jessica Plaza is a 79 y.o. female patient of Dr. Lance Steven who presents today for follow up regarding her atrial fibrillation.     Patient was last seen by myself on 9/26/19. She reported at that time that she was not able to tolerate the Multaq as it caused significant nauea and vomiting. EKG showed A-Fib, rate in the 120's. Patient was not interested in aggressive management and was relatively asymptomatic. It was decided to focus on rate control. She was started on Lopressor 25 mg BID.     Past medical history significant for COPD, diabetes, emphysema, hyperlipidemia, GERD, hypertension and previous CVA.    Today patient states that her primary complaint is her back pain. She tells me that she may need to have another back surgery.     She restarted her Multaq approximately one week ago. States that her pulse continues to be elevated in the 100's-one teens.     Denies chest pain, palpitations, shortness of breath or dizziness/syncope.     Past Medical History:   Diagnosis Date   • Anemia    • Arthritis    • Asthma     Inhaler use daily and PRN   • Castro esophagus    • Bronchitis    • Chronic anticoagulation 4/29/2019   • COPD    • COPD (chronic obstructive pulmonary disease) (HCC)    • Dental disorder     dentures upper and lower   • Diabetes     oral meds   • Disorder of thyroid    • Emphysema of lung (HCC)     Uses oxygen PRN.  Patient not sure of liters/minute.   • Encounter for medication refill 2/5/2018   • GERD (gastroesophageal reflux disease)    • Heart burn    • Hemorrhagic disorder     hx internal bleeding   • High cholesterol    • Hypertension    • Indigestion    • Pain     left shoulder   • Pneumonia 2013.   • Stroke (HCC) 2013   • UTI (urinary tract infection)      Past Surgical History:   Procedure Laterality Date   • LUMBAR FUSION POSTERIOR N/A 8/28/2019     Procedure: FUSION, SPINE, LUMBAR, PLIF - L4-5, L5-S1 TLIF;  Surgeon: Grupo Cabrales M.D.;  Location: SUR[facility]ERY [facility];  Service: Neurosurgery   • LUMBAR DECOMPRESSION N/A 2019    Procedure: DECOMPRESSION, SPINE, LUMBAR;  Surgeon: Grupo Cabrales M.D.;  Location: SUR[facility]ERY [facility];  Service: Neurosurgery   • SHOULDER DECOMPRESSION ARTHROSCOPIC Left 10/11/2016    Procedure: SHOULDER DECOMPRESSION ARTHROSCOPIC  with   debridment of the labrum and biceps tendon stump;  Surgeon: Bill Ponce M.D.;  Location: SURGERY [facility];  Service:    • CLAVICLE RESECTION Left 10/11/2016    Procedure: CLAVICLE RESECTION distal open  ;  Surgeon: Bill Ponce M.D.;  Location: SURGERY [facility];  Service:    • CHONDROPLASTY Left 10/11/2016    Procedure: Arthroscopic CHONDROPLASTY of the joint  ;  Surgeon: Bill Ponce M.D.;  Location: SURGERY [facility];  Service:    • SHOULDER DECOMPRESSION ARTHROSCOPIC Left 2016    Procedure: SHOULDER DECOMPRESSION ARTHROSCOPIC SUBACROMIAL and biceps tenotomy;  Surgeon: Johan Roberts M.D.;  Location: SURGERY [facility];  Service:    • GASTROSCOPY-ENDO  2014    Performed by Yonas Grimes Jr., M.D. at ENDOSCOPY [facility]   • TONSILLECTOMY     • PB ANESTH, DELIVERY ONLY      X 2     Family History   Problem Relation Age of Onset   • Cancer Mother         breast   • Lung Disease Mother         COPD/Emphysema/Smoker   • Stroke Father    • Hypertension Father    • Hyperlipidemia Father    • Heart Attack Father    • Cancer Sister         Pancriatic   • Arrythmia Sister    • Lung Disease Sister         COPD/Emphysema/Smoker   • Bipolar disorder Son    • Bipolar disorder Son    • Bipolar disorder Son    • Cancer Maternal Aunt         stomach     Social History     Socioeconomic History   • Marital status:      Spouse name: Not on file   • Number of children: Not on file   • Years of education: Not on file   • Highest  education level: Not on file   Occupational History   • Not on file   Social Needs   • Financial resource strain: Not on file   • Food insecurity:     Worry: Not on file     Inability: Not on file   • Transportation needs:     Medical: Not on file     Non-medical: Not on file   Tobacco Use   • Smoking status: Former Smoker     Packs/day: 0.50     Years: 52.00     Pack years: 26.00     Types: Cigarettes     Last attempt to quit: 2013     Years since quittin.8   • Smokeless tobacco: Never Used   • Tobacco comment: started smoking at age 21   Substance and Sexual Activity   • Alcohol use: Yes     Alcohol/week: 8.4 oz     Types: 7 Shots of liquor, 7 Standard drinks or equivalent per week     Frequency: 2-3 times a week     Comment: Once or twice a week.   • Drug use: No   • Sexual activity: Never     Partners: Male     Birth control/protection: Post-Menopausal   Lifestyle   • Physical activity:     Days per week: Not on file     Minutes per session: Not on file   • Stress: Not on file   Relationships   • Social connections:     Talks on phone: Not on file     Gets together: Not on file     Attends L[employer] day service: Not on file     Active member of club or organization: Not on file     Attends meetings of clubs or organizations: Not on file     Relationship status: Not on file   • Intimate partner violence:     Fear of current or ex partner: Not on file     Emotionally abused: Not on file     Physically abused: Not on file     Forced sexual activity: Not on file   Other Topics Concern   • Not on file   Social History Narrative   • Not on file     Allergies   Allergen Reactions   • Bactrim Hives   • Demerol Shortness of Breath and Swelling   • Pcn [Penicillins] Hives     Tolerates ceftriaxone and cefazolin   • Sulfa Drugs Hives   • Ferrous Sulfate Rash     itching   • Latex Contact Dermatitis     Outpatient Encounter Medications as of 2019   Medication Sig Dispense Refill   • metoprolol (LOPRESSOR) 50 MG  Tab Take 1 Tab by mouth 2 times a day. 60 Tab 11   • PROAIR  (90 Base) MCG/ACT Aero Soln inhalation aerosol INHALE 1 PUFF BY MOUTH EVERY 6 HOURS AS NEEDED FOR SHORTNESS OF BREATH. 17 Inhaler 2   • metFORMIN (GLUCOPHAGE) 500 MG Tab Take 1 Tab by mouth 2 times a day, with meals. 60 Tab 0   • fluticasone (FLONASE) 50 MCG/ACT nasal spray Spray 2 Sprays in nose every day. 16 g 0   • gabapentin (NEURONTIN) 300 MG Cap Take 300 mg by mouth 2 Times a Day.     • omeprazole (PRILOSEC) 40 MG delayed-release capsule Take 40 mg by mouth every day.     • rivaroxaban (XARELTO) 15 MG Tab tablet Take 15 mg by mouth with dinner.     • traZODone (DESYREL) 100 MG Tab Take 100 mg by mouth every evening.     • lisinopril (PRINIVIL) 20 MG Tab Take 1 Tab by mouth every day. 90 Tab 3   • budesonide-formoterol (SYMBICORT) 160-4.5 MCG/ACT Aerosol Inhale 2 Puffs by mouth 2 Times a Day. 1 Inhaler 11   • liraglutide (VICTOZA) 18 MG/3ML Solution Pen-injector injection Inject 1.2 mg as instructed every evening.     • levothyroxine (SYNTHROID) 75 MCG Tab TAKE 1 TAB BY MOUTH EVERY MORNING ON AN EMPTY STOMACH. 90 Tab 3   • simvastatin (ZOCOR) 40 MG Tab TAKE 1 TAB BY MOUTH EVERY DAY. 30 Tab 5   • [DISCONTINUED] metoprolol (LOPRESSOR) 25 MG Tab Take 1 Tab by mouth 2 times a day. 60 Tab 11   • [DISCONTINUED] metoprolol (LOPRESSOR) 25 MG Tab Take 2 Tabs by mouth 2 times a day. 60 Tab 11   • [DISCONTINUED] traZODone (DESYREL) 100 MG Tab TAKE 1 TABLET BY MOUTH EVERYDAY AT BEDTIME (Patient not taking: Reported on 11/19/2019) 90 Tab 3   • [DISCONTINUED] XARELTO 15 MG Tab tablet TAKE 1 TABLET BY MOUTH WITH DINNER. (Patient not taking: Reported on 11/19/2019) 90 Tab 3   • [DISCONTINUED] HYDROcodone-acetaminophen (NORCO) 5-325 MG Tab per tablet TAKE 1 TABLET(S) EVERY 4 TO 6 HOURS BY ORAL ROUTE AS NEEDED FOR 7 DAYS.  0   • [DISCONTINUED] metoprolol (LOPRESSOR) 25 MG Tab Take 1 Tab by mouth 2 times a day. 60 Tab 11   • [DISCONTINUED] cyclobenzaprine  "(FLEXERIL) 10 MG Tab Take 1 Tab by mouth 3 times a day as needed for Muscle Spasms (spasms). (Patient not taking: Reported on 11/19/2019) 40 Tab 0     No facility-administered encounter medications on file as of 11/19/2019.      ROS     Objective:   /60 (BP Location: Left arm, Patient Position: Sitting, BP Cuff Size: Adult)   Pulse (!) 108   Ht 1.702 m (5' 7\")   Wt 87.5 kg (193 lb)   SpO2 91%   BMI 30.23 kg/m²     Physical Exam  TTE 4/30/19:  Technically difficult study - adequate information is obtained.   Left ventricular systolic function is low normal.  Left ventricular ejection fraction is visually estimated to be 45%.  Global hypokinesis.  Mild concentric left ventricular hypertrophy.  Normal estimated left ventricular filling pressures in the setting of   atrial fibrillation.  Normal right ventricular size.  Mildly reduced right ventricular systolic function.  Enlarged right atrium.  Aortic sclerosis without stenosis.  Right atrial pressure is estimated to be 15 mmHg, severely elevated.   Right ventricular systolic pressure is estimated to be 40 mmHg,   consistent with mild pulmonary hypertension.     [place] 5/1/19:  Left Ventricle  The left ventricle was normal in size and function.    Right Ventricle  The right ventricle was normal in size and function.    TTE 9/1/19:  Tachycardia.  Technically difficult study - adequate information is obtained.   Likely normal left ventricular systolic function.   Left ventricular ejection fraction is visually estimated to be 50%.  No evidence of valvular abnormality based on blind Doppler evaluation.     NM Cardiac Stress Test 7/16/15:  IMPRESSIONS  No evidence of significant jeopardized viable myocardium or prior myocardial infarction.  Normal left ventricular size, ejection fraction, and wall motion.         Assessment:     1. Atrial fibrillation, unspecified type (HCC)  EKG   2. Pulmonary HTN (HCC)     3. Essential hypertension     4. Systolic dysfunction " without heart failure     5. Chronic anticoagulation     6. Other emphysema (HCC)     7. Dyslipidemia         Medical Decision Making:  Today's Assessment / Status / Plan:     Persistent Atrial fibrillation:  -EKG today shows persistent A-flutter/fibrillation, rate 90 (atrial rate 211).   -Multaq contraindicated due to persistent A-Fib.  -Increase Metoprolol to 50 mg BID (cardioselective) if rate remains elevated will consider transitioning to CCB or digoxin due to her COPD.   -Consider Digoxin if rate continues to be elevated due to patients lung disease.   -OAC with Xarelto 15 mg daily (patient is not sure why she is on reduced dosing or who prescribed it).     Systolic Dysfunction:  -TTE in September showed near normalization of EF (50%).   -No evidence of acute heart failure.  -Will consider transitioning to Toprol XL in future.     Pulmonary hypertension:  -RVSP 40 mmHg in setting of COPD.  -Stable on RA, uses home O2 as needed.     Hypertension:  -Stable.    HLD:  -Stable.  -LDL on 2/15/19 was 81.  -Continue Simvastatin 40 mg daily.   -Repeat lipid panel at next apt.     Patient will follow up with myself in one month for rate check. Encouraged patient to contact office should any questions or concerns arise in the mean time. Patient understands and agrees with the plan of care.     Collaborating Provider: Dr. Sahil Don        Future Appointments   Date Time Provider Department Center   11/26/2019  9:45 AM PULMONARY DX 1 [address]   11/26/2019 10:00 AM Maria Fernanda Cruz M.D. PULM None   1/2/2020  1:45 PM VERITO Valenzuela. [facility] None   2/26/2020  1:20 PM Anurag Moreno M.D. [address]       Please note that this dictation was created using voice recognition software. I have made every reasonable attempt to correct obvious errors, but I expect that there are errors of grammar and possibly content I did not discover before finalizing the note.

## 2019-11-26 ENCOUNTER — APPOINTMENT (OUTPATIENT)
Dept: RADIOLOGY | Facility: IMAGING CENTER | Age: 79
End: 2019-11-26
Attending: NURSE PRACTITIONER
Payer: MEDICARE

## 2019-11-26 ENCOUNTER — OFFICE VISIT (OUTPATIENT)
Dept: PULMONOLOGY | Facility: HOSPICE | Age: 79
End: 2019-11-26
Payer: MEDICARE

## 2019-11-26 VITALS
OXYGEN SATURATION: 90 % | BODY MASS INDEX: 29.25 KG/M2 | HEIGHT: 68 IN | WEIGHT: 193 LBS | HEART RATE: 114 BPM | DIASTOLIC BLOOD PRESSURE: 98 MMHG | SYSTOLIC BLOOD PRESSURE: 146 MMHG

## 2019-11-26 DIAGNOSIS — J96.11 CHRONIC RESPIRATORY FAILURE WITH HYPOXIA (HCC): ICD-10-CM

## 2019-11-26 DIAGNOSIS — R93.89 ABNORMAL CXR: ICD-10-CM

## 2019-11-26 DIAGNOSIS — Z86.73 HISTORY OF CVA (CEREBROVASCULAR ACCIDENT): ICD-10-CM

## 2019-11-26 DIAGNOSIS — R09.02 POSTOPERATIVE HYPOXIA: ICD-10-CM

## 2019-11-26 DIAGNOSIS — Z98.890 POSTOPERATIVE HYPOXIA: ICD-10-CM

## 2019-11-26 DIAGNOSIS — J43.8 OTHER EMPHYSEMA (HCC): Chronic | ICD-10-CM

## 2019-11-26 DIAGNOSIS — R91.1 PULMONARY NODULE: ICD-10-CM

## 2019-11-26 PROCEDURE — 71046 X-RAY EXAM CHEST 2 VIEWS: CPT | Mod: TC | Performed by: NURSE PRACTITIONER

## 2019-11-26 PROCEDURE — 99214 OFFICE O/P EST MOD 30 MIN: CPT | Performed by: INTERNAL MEDICINE

## 2019-11-26 NOTE — PATIENT INSTRUCTIONS
Jessica comes in today for follow-up of her left lower lobe dense pneumonia.  I met her in the hospital in September, she is undergone back surgery and subsequently developed a left lower lobe pneumonia, with elevated pro-Hudson and had come in today for follow-up of that process.  She finished the antibiotics, does not have sputum or fever, and clinically it has resolved.  An x-ray done today shows complete resolution of the left lower lobe posterior infiltrate, I showed her and her  those films and this is a relief.    She does have underlying COPD prior smoking is counseled against and has no plans to resume.  She takes Symbicort and Pro Air for bronchospasm, and understands their use.    Health maintenance issues include current on flu vaccine Prevnar and Pneumovax.    She does have underlying COPD and still wears oxygen at night, occasionally daytime and this will be continued.    Finally the left lower lobe nodule that was seen in April was 6 mm, with her smoking history and her family history of cancer we will repeat this film in March 2020 and I will see her back to compare.  Sooner for problems.

## 2019-11-26 NOTE — PROGRESS NOTES
Jessica Plaza is a 79 y.o. female here for left lower lobe pneumonia following prior back surgery with underlying COPD on oxygen. Patient was referred by primary care.    History of Present Illness:      Jessica comes in today for follow-up of her left lower lobe dense pneumonia.  I met her in the hospital in September, she is undergone back surgery and subsequently developed a left lower lobe pneumonia, with elevated pro-Hudson and had come in today for follow-up of that process.  She finished the antibiotics, does not have sputum or fever, and clinically it has resolved.  An x-ray done today shows complete resolution of the left lower lobe posterior infiltrate, I showed her and her  those films and this is a relief.    She does have underlying COPD prior smoking is counseled against and has no plans to resume.  She takes Symbicort and Pro Air for bronchospasm, and understands their use.    Health maintenance issues include current on flu vaccine Prevnar and Pneumovax.    She does have underlying COPD and still wears oxygen at night, occasionally daytime and this will be continued.    Finally the left lower lobe nodule that was seen in April was 6 mm, with her smoking history and her family history of cancer we will repeat this film in March 2020 and I will see her back to compare.  Sooner for problems.    Constitutional ROS: No unexpected change in weight, No unexplained fevers  Eyes: No change in vision or blurring or double vision  Mouth/Throat ROS: No sore throat, No recent change in voice or hoarseness  Pulmonary ROS: See present history for pertinent positives  Cardiovascular ROS: No chest pain to suggest acute coronary syndrome  Gastrointestinal ROS: No abdominal pain to suggest peptic disease  Musculoskeletal/Extremities ROS: no acute artritis or unusual swelling; back discomfort, recent epidural  Hematologic/Lymphatic ROS: No easy bleeding or unusual lymph node swelling  Neurologic ROS: No new or  unusual weakness  Psychiatric ROS: No hallucinations  Allergic/Immunologic: No  urticaria or allergic rash      Current Outpatient Medications   Medication Sig Dispense Refill   • metoprolol (LOPRESSOR) 50 MG Tab Take 1 Tab by mouth 2 times a day. 60 Tab 11   • PROAIR  (90 Base) MCG/ACT Aero Soln inhalation aerosol INHALE 1 PUFF BY MOUTH EVERY 6 HOURS AS NEEDED FOR SHORTNESS OF BREATH. 17 Inhaler 2   • metFORMIN (GLUCOPHAGE) 500 MG Tab Take 1 Tab by mouth 2 times a day, with meals. 60 Tab 0   • fluticasone (FLONASE) 50 MCG/ACT nasal spray Spray 2 Sprays in nose every day. 16 g 0   • gabapentin (NEURONTIN) 300 MG Cap Take 300 mg by mouth 2 Times a Day.     • omeprazole (PRILOSEC) 40 MG delayed-release capsule Take 40 mg by mouth every day.     • rivaroxaban (XARELTO) 15 MG Tab tablet Take 15 mg by mouth with dinner.     • traZODone (DESYREL) 100 MG Tab Take 100 mg by mouth every evening.     • lisinopril (PRINIVIL) 20 MG Tab Take 1 Tab by mouth every day. 90 Tab 3   • budesonide-formoterol (SYMBICORT) 160-4.5 MCG/ACT Aerosol Inhale 2 Puffs by mouth 2 Times a Day. 1 Inhaler 11   • liraglutide (VICTOZA) 18 MG/3ML Solution Pen-injector injection Inject 1.2 mg as instructed every evening.     • levothyroxine (SYNTHROID) 75 MCG Tab TAKE 1 TAB BY MOUTH EVERY MORNING ON AN EMPTY STOMACH. 90 Tab 3   • simvastatin (ZOCOR) 40 MG Tab TAKE 1 TAB BY MOUTH EVERY DAY. 30 Tab 5     No current facility-administered medications for this visit.        Social History     Tobacco Use   • Smoking status: Former Smoker     Packs/day: 0.50     Years: 52.00     Pack years: 26.00     Types: Cigarettes     Last attempt to quit: 2013     Years since quittin.9   • Smokeless tobacco: Never Used   • Tobacco comment: started smoking at age 21   Substance Use Topics   • Alcohol use: Yes     Alcohol/week: 8.4 oz     Types: 7 Shots of liquor, 7 Standard drinks or equivalent per week     Frequency: 2-3 times a week     Comment: Once or  twice a week.   • Drug use: No        Past Medical History:   Diagnosis Date   • Anemia    • Arthritis    • Asthma     Inhaler use daily and PRN   • Atrial fibrillation ([facility])    • Castro esophagus    • Bronchitis    • Chickenpox    • Chronic anticoagulation 4/29/2019   • COPD    • COPD (chronic obstructive pulmonary disease) ([facility])    • Dental disorder     dentures upper and lower   • Diabetes     oral meds   • Disorder of thyroid    • Emphysema of lung ([facility])     Uses oxygen PRN.  Patient not sure of liters/minute.   • Encounter for medication refill 2/5/2018   • GERD (gastroesophageal reflux disease)    • Dominican measles    • Heart burn    • Hemorrhagic disorder     hx internal bleeding   • High cholesterol    • Hypertension    • Indigestion    • Pain     left shoulder   • Pneumonia 2013.   • Stroke ([facility]) 2013   • Tonsillitis    • UTI (urinary tract infection)        Past Surgical History:   Procedure Laterality Date   • LUMBAR FUSION POSTERIOR N/A 8/28/2019    Procedure: FUSION, SPINE, LUMBAR, PLIF - L4-5, L5-S1 TLIF;  Surgeon: Grupo Cabrales M.D.;  Location: [facility];  Service: Neurosurgery   • LUMBAR DECOMPRESSION N/A 8/28/2019    Procedure: DECOMPRESSION, SPINE, LUMBAR;  Surgeon: Grupo Cabrales M.D.;  Location: [facility];  Service: Neurosurgery   • SHOULDER DECOMPRESSION ARTHROSCOPIC Left 10/11/2016    Procedure: SHOULDER DECOMPRESSION ARTHROSCOPIC  with   debridment of the labrum and biceps tendon stump;  Surgeon: Bill Ponce M.D.;  Location: [facility];  Service:    • CLAVICLE RESECTION Left 10/11/2016    Procedure: CLAVICLE RESECTION distal open  ;  Surgeon: Bill Ponce M.D.;  Location: [facility];  Service:    • CHONDROPLASTY Left 10/11/2016    Procedure: Arthroscopic CHONDROPLASTY of the joint  ;  Surgeon: Bill Ponce M.D.;  Location: [facility];  Service:    • SHOULDER DECOMPRESSION ARTHROSCOPIC Left 2/18/2016    Procedure: SHOULDER  "DECOMPRESSION ARTHROSCOPIC SUBACROMIAL and biceps tenotomy;  Surgeon: Johan Roberts M.D.;  Location: SURGERY [facility];  Service:    • GASTROSCOPY-ENDO  2014    Performed by Yonas Grimes Jr., M.D. at ENDOSCOPY [facility]   • TONSILLECTOMY     • LAMINOTOMY     • PB ANESTH, DELIVERY ONLY      X 2       Allergies: Bactrim; Demerol; Pcn [penicillins]; Sulfa drugs; Ferrous sulfate; and Latex    Family History   Problem Relation Age of Onset   • Cancer Mother         breast   • Lung Disease Mother         COPD/Emphysema/Smoker   • Stroke Father    • Hypertension Father    • Hyperlipidemia Father    • Heart Attack Father    • Cancer Sister         Pancriatic   • Arrythmia Sister    • Lung Disease Sister         COPD/Emphysema/Smoker   • Bipolar disorder Son    • Bipolar disorder Son    • Bipolar disorder Son    • Cancer Maternal Aunt         stomach       Physical Examination    Vitals:    19 0936 19 0941   Height: 1.727 m (5' 8\")    Weight: 87.5 kg (193 lb)    Weight % change since last entry.: 0 %    BP: 146/98    Pulse: (!) 114    BMI (Calculated): 29.35    O2 sat % room air:  (!) 90 %       General Appearance: alert, no distress  Skin: Skin color, texture, turgor normal. No rashes or lesions.  Eyes: negative  Oropharynx: Lips, mucosa, and tongue normal. Teeth and gums normal. Oropharynx moist and without lesion  Lungs: positive findings: Quiet and clear without wheezes or rhonchi  Heart: negative. RRR without murmur, gallop, or rubs.  No ectopy.  Abdomen: Abdomen soft, non-tender. . No masses,  No organomegaly  Extremities:  No deformities, edema, or skin discoloration  Joints: No acute arthritis  Peripheral Pulses:perfused  Neurologic: intact grossly  No cervical adenopathy, no oral thrush    II (soft palate, uvula, fauces visible)    Imaging: Chest x-ray today personally reviewed and shown to patient, left lower lobe infiltrate has resolved    PFTS: None " needed      Assessment and Plan  1. Chronic respiratory failure with hypoxia (HCC)  Nighttime and exercise oxygen    2. Other emphysema (HCC)  No smoking    3. Pulmonary nodule  Recheck 6 mm left lower lobe nodule at follow-up  - CT-CHEST (THORAX) W/O; Future    4. Post-lumbar laminectomy hypoxia, dyspnea. PNA and edema on CXR  August and September 2019    5. History of CVA (cerebrovascular accident)  2013    6. BMI 29.0-29.9,adult  Patient's body mass index is 29.35 kg/m². Exercise and nutrition counseling were performed at this visit.  - OBESITY COUNSELING (No Charge): Patient identified as having weight management issue.  Appropriate orders and counseling given.      Followup Return in about 6 months (around 5/26/2020) for follow up visit with Dr. Maria Fernanda Cruz.

## 2019-12-30 ENCOUNTER — TELEPHONE (OUTPATIENT)
Dept: CARDIOLOGY | Facility: MEDICAL CENTER | Age: 79
End: 2019-12-30

## 2019-12-30 DIAGNOSIS — J43.8 OTHER EMPHYSEMA (HCC): ICD-10-CM

## 2019-12-30 DIAGNOSIS — E78.5 DYSLIPIDEMIA: ICD-10-CM

## 2019-12-30 NOTE — TELEPHONE ENCOUNTER
Called patient. She did not do lipid lab work. She did not get an order when she was here. She will go to [facility] to do.

## 2020-01-02 ENCOUNTER — OFFICE VISIT (OUTPATIENT)
Dept: CARDIOLOGY | Facility: MEDICAL CENTER | Age: 80
End: 2020-01-02
Payer: MEDICARE

## 2020-01-02 VITALS
OXYGEN SATURATION: 90 % | SYSTOLIC BLOOD PRESSURE: 138 MMHG | DIASTOLIC BLOOD PRESSURE: 74 MMHG | WEIGHT: 198.2 LBS | BODY MASS INDEX: 31.11 KG/M2 | HEART RATE: 108 BPM | HEIGHT: 67 IN

## 2020-01-02 DIAGNOSIS — N28.9 RENAL INSUFFICIENCY: ICD-10-CM

## 2020-01-02 PROCEDURE — 99214 OFFICE O/P EST MOD 30 MIN: CPT | Performed by: NURSE PRACTITIONER

## 2020-01-02 RX ORDER — POTASSIUM CHLORIDE 600 MG/1
8 TABLET, FILM COATED, EXTENDED RELEASE ORAL DAILY
Qty: 30 TAB | Refills: 11 | Status: SHIPPED | OUTPATIENT
Start: 2020-01-02 | End: 2020-08-19

## 2020-01-02 RX ORDER — FUROSEMIDE 40 MG/1
20 TABLET ORAL DAILY
Qty: 30 TAB | Refills: 11 | Status: SHIPPED | OUTPATIENT
Start: 2020-01-02 | End: 2020-08-19

## 2020-01-02 RX ORDER — DILTIAZEM HYDROCHLORIDE 120 MG/1
120 CAPSULE, COATED, EXTENDED RELEASE ORAL DAILY
Qty: 30 CAP | Refills: 11 | Status: SHIPPED | OUTPATIENT
Start: 2020-01-02 | End: 2020-08-19 | Stop reason: SDUPTHER

## 2020-01-02 RX ORDER — CEFUROXIME AXETIL 500 MG/1
500 TABLET ORAL 2 TIMES DAILY
COMMUNITY
End: 2020-02-26

## 2020-01-02 ASSESSMENT — ENCOUNTER SYMPTOMS
DIZZINESS: 0
CLAUDICATION: 0
PND: 0
SHORTNESS OF BREATH: 1
ORTHOPNEA: 0
BACK PAIN: 1
WEIGHT LOSS: 0
PALPITATIONS: 1
WEAKNESS: 0

## 2020-01-02 NOTE — PROGRESS NOTES
Chief Complaint   Patient presents with   • Atrial Fibrillation       Subjective:   Jessica Plaza is a 79 y.o. female patient of Dr. Lance Steven who presents today for follow up regarding her atrial fibrillation.     Patient was last seen by myself on 11/19/19. Her A-Fib was not rate controlled so her Metoprolol was increased to 50 mg BID.     Past medical history significant for COPD, diabetes, emphysema, hyperlipidemia, GERD, hypertension and previous CVA.    Today patient states that she continue to have significant back pain despite going to PT 2/week as advised by Dr. Chris Cabrales. She has had approximately 8 epidurals, none of which have provided pain relief for her. States that she has an apt with Dr. Cabrales to discuss possible back surgery on the 17th.     From a cardiology standpoint, she has occasional palpitations. She developed pedal edema approximately 2 days ago. Shortness of breath is at baseline for her. Denies chest pain or dizziness.     Past Medical History:   Diagnosis Date   • Anemia    • Arthritis    • Asthma     Inhaler use daily and PRN   • Atrial fibrillation (HCC)    • Castro esophagus    • Bronchitis    • Chickenpox    • Chronic anticoagulation 4/29/2019   • COPD    • COPD (chronic obstructive pulmonary disease) ([facility])    • Dental disorder     dentures upper and lower   • Diabetes     oral meds   • Disorder of thyroid    • Emphysema of lung ([facility])     Uses oxygen PRN.  Patient not sure of liters/minute.   • Encounter for medication refill 2/5/2018   • GERD (gastroesophageal reflux disease)    • Italian measles    • Heart burn    • Hemorrhagic disorder     hx internal bleeding   • High cholesterol    • Hypertension    • Indigestion    • Pain     left shoulder   • Pneumonia 2013.   • Stroke (HCC) 2013   • Tonsillitis    • UTI (urinary tract infection)      Past Surgical History:   Procedure Laterality Date   • LUMBAR FUSION POSTERIOR N/A 8/28/2019    Procedure: FUSION, SPINE,  LUMBAR, PLIF - L4-5, L5-S1 TLIF;  Surgeon: Grupo Cabrales M.D.;  Location: SURGERY [facility];  Service: Neurosurgery   • LUMBAR DECOMPRESSION N/A 2019    Procedure: DECOMPRESSION, SPINE, LUMBAR;  Surgeon: Grupo Cabrales M.D.;  Location: SURGERY [facility];  Service: Neurosurgery   • SHOULDER DECOMPRESSION ARTHROSCOPIC Left 10/11/2016    Procedure: SHOULDER DECOMPRESSION ARTHROSCOPIC  with   debridment of the labrum and biceps tendon stump;  Surgeon: Bill Ponce M.D.;  Location: SURGERY [facility];  Service:    • CLAVICLE RESECTION Left 10/11/2016    Procedure: CLAVICLE RESECTION distal open  ;  Surgeon: Bill Ponce M.D.;  Location: SURGERY [facility];  Service:    • CHONDROPLASTY Left 10/11/2016    Procedure: Arthroscopic CHONDROPLASTY of the joint  ;  Surgeon: Bill Ponce M.D.;  Location: SURGERY [facility];  Service:    • SHOULDER DECOMPRESSION ARTHROSCOPIC Left 2016    Procedure: SHOULDER DECOMPRESSION ARTHROSCOPIC SUBACROMIAL and biceps tenotomy;  Surgeon: Johan Roberts M.D.;  Location: SURGERY [facility];  Service:    • GASTROSCOPY-ENDO  2014    Performed by Yonas Grimes Jr., M.D. at ENDOSCOPY [facility]   • TONSILLECTOMY     • LAMINOTOMY     • PB ANESTH, DELIVERY ONLY      X 2     Family History   Problem Relation Age of Onset   • Cancer Mother         breast   • Lung Disease Mother         COPD/Emphysema/Smoker   • Stroke Father    • Hypertension Father    • Hyperlipidemia Father    • Heart Attack Father    • Cancer Sister         Pancriatic   • Arrythmia Sister    • Lung Disease Sister         COPD/Emphysema/Smoker   • Bipolar disorder Son    • Bipolar disorder Son    • Bipolar disorder Son    • Cancer Maternal Aunt         stomach     Social History     Socioeconomic History   • Marital status:      Spouse name: Not on file   • Number of children: Not on file   • Years of education: Not on file   • Highest education  level: Not on file   Occupational History   • Not on file   Social Needs   • Financial resource strain: Not on file   • Food insecurity:     Worry: Not on file     Inability: Not on file   • Transportation needs:     Medical: Not on file     Non-medical: Not on file   Tobacco Use   • Smoking status: Former Smoker     Packs/day: 0.50     Years: 52.00     Pack years: 26.00     Types: Cigarettes     Last attempt to quit: 2013     Years since quittin.0   • Smokeless tobacco: Never Used   • Tobacco comment: started smoking at age 21   Substance and Sexual Activity   • Alcohol use: Yes     Alcohol/week: 8.4 oz     Types: 7 Shots of liquor, 7 Standard drinks or equivalent per week     Frequency: 2-3 times a week     Comment: Once or twice a week.   • Drug use: No   • Sexual activity: Never     Partners: Male     Birth control/protection: Post-Menopausal   Lifestyle   • Physical activity:     Days per week: Not on file     Minutes per session: Not on file   • Stress: Not on file   Relationships   • Social connections:     Talks on phone: Not on file     Gets together: Not on file     Attends Mosque service: Not on file     Active member of club or organization: Not on file     Attends meetings of clubs or organizations: Not on file     Relationship status: Not on file   • Intimate partner violence:     Fear of current or ex partner: Not on file     Emotionally abused: Not on file     Physically abused: Not on file     Forced sexual activity: Not on file   Other Topics Concern   • Not on file   Social History Narrative   • Not on file     Allergies   Allergen Reactions   • Bactrim Hives   • Demerol Shortness of Breath and Swelling   • Pcn [Penicillins] Hives     Tolerates ceftriaxone and cefazolin   • Sulfa Drugs Hives   • Ferrous Sulfate Rash     itching   • Tramadol Itching     rash   • Latex Contact Dermatitis     Outpatient Encounter Medications as of 2020   Medication Sig Dispense Refill   • cefUROXime  "(CEFTIN) 500 MG Tab Take 500 mg by mouth 2 times a day.     • metoprolol (LOPRESSOR) 50 MG Tab Take 1 Tab by mouth 2 times a day. 60 Tab 11   • PROAIR  (90 Base) MCG/ACT Aero Soln inhalation aerosol INHALE 1 PUFF BY MOUTH EVERY 6 HOURS AS NEEDED FOR SHORTNESS OF BREATH. 17 Inhaler 2   • metFORMIN (GLUCOPHAGE) 500 MG Tab Take 1 Tab by mouth 2 times a day, with meals. 60 Tab 0   • fluticasone (FLONASE) 50 MCG/ACT nasal spray Spray 2 Sprays in nose every day. 16 g 0   • gabapentin (NEURONTIN) 300 MG Cap Take 300 mg by mouth 2 Times a Day.     • omeprazole (PRILOSEC) 40 MG delayed-release capsule Take 40 mg by mouth every day.     • rivaroxaban (XARELTO) 15 MG Tab tablet Take 15 mg by mouth with dinner.     • traZODone (DESYREL) 100 MG Tab Take 100 mg by mouth every evening.     • lisinopril (PRINIVIL) 20 MG Tab Take 1 Tab by mouth every day. 90 Tab 3   • budesonide-formoterol (SYMBICORT) 160-4.5 MCG/ACT Aerosol Inhale 2 Puffs by mouth 2 Times a Day. 1 Inhaler 11   • liraglutide (VICTOZA) 18 MG/3ML Solution Pen-injector injection Inject 1.2 mg as instructed every evening.     • levothyroxine (SYNTHROID) 75 MCG Tab TAKE 1 TAB BY MOUTH EVERY MORNING ON AN EMPTY STOMACH. 90 Tab 3   • simvastatin (ZOCOR) 40 MG Tab TAKE 1 TAB BY MOUTH EVERY DAY. 30 Tab 5     No facility-administered encounter medications on file as of 1/2/2020.      Review of Systems   Constitutional: Positive for malaise/fatigue. Negative for weight loss.   Respiratory: Positive for shortness of breath.    Cardiovascular: Positive for palpitations and leg swelling. Negative for chest pain, orthopnea, claudication and PND.   Musculoskeletal: Positive for back pain.   Neurological: Negative for dizziness and weakness.   All other systems reviewed and are negative.       Objective:   /74 (BP Location: Left arm, Patient Position: Sitting, BP Cuff Size: Adult)   Pulse (!) 108   Ht 1.702 m (5' 7\")   Wt 89.9 kg (198 lb 3.2 oz)   SpO2 90%   BMI " 31.04 kg/m²     Physical Exam   Constitutional: She is oriented to person, place, and time. She appears well-developed and well-nourished. No distress.   HENT:   Head: Normocephalic.   Eyes: EOM are normal.   Neck: No JVD present.   Cardiovascular: Normal heart sounds and intact distal pulses. An irregularly irregular rhythm present. Tachycardia present.   Pulmonary/Chest: Breath sounds normal. No respiratory distress. She has no decreased breath sounds.   Abdominal: Soft. There is no tenderness.   Musculoskeletal:         General: Edema present.   Neurological: She is alert and oriented to person, place, and time.   Skin: Skin is warm and dry.   Psychiatric: She has a normal mood and affect. Her behavior is normal.     TTE 4/30/19:  Technically difficult study - adequate information is obtained.   Left ventricular systolic function is low normal.  Left ventricular ejection fraction is visually estimated to be 45%.  Global hypokinesis.  Mild concentric left ventricular hypertrophy.  Normal estimated left ventricular filling pressures in the setting of   atrial fibrillation.  Normal right ventricular size.  Mildly reduced right ventricular systolic function.  Enlarged right atrium.  Aortic sclerosis without stenosis.  Right atrial pressure is estimated to be 15 mmHg, severely elevated.   Right ventricular systolic pressure is estimated to be 40 mmHg,   consistent with mild pulmonary hypertension.     [place] 5/1/19:  Left Ventricle  The left ventricle was normal in size and function.    Right Ventricle  The right ventricle was normal in size and function.    TTE 9/1/19:  Tachycardia.  Technically difficult study - adequate information is obtained.   Likely normal left ventricular systolic function.   Left ventricular ejection fraction is visually estimated to be 50%.  No evidence of valvular abnormality based on blind Doppler evaluation.     NM Cardiac Stress Test 7/16/15:  IMPRESSIONS  No evidence of significant  jeopardized viable myocardium or prior myocardial infarction.  Normal left ventricular size, ejection fraction, and wall motion.         Assessment:     No diagnosis found.    Medical Decision Making:  Today's Assessment / Status / Plan:     Persistent Atrial fibrillation:  -Rate remains poorly controlled.  -Hesitant to increase her Metoprolol with her significant lung disease.   -Start Cardizem  mg daily.  -Consider Digoxin if repeat BMP shows stable renal function.   -OAC with Xarelto 15 mg daily (patient is not sure why she is on reduced dosing or who prescribed it).     Systolic Dysfunction:  -TTE in September showed near normalization of EF (50%).     Pulmonary hypertension:  -RVSP 40 mmHg in setting of COPD.  -Stable on RA, uses home O2 as needed.   -BLE edema, start Lasix 20 mg daily for one week (with KCL 8 MEQ) and then decrease to as needed.   -BMP in two weeks.     Hypertension:  -Stable.    HLD:  -Stable.  -LDL on 2/15/19 was 81.  -Change Simvastatin to Pravastatin 40 mg daily due to interaction of Simvastatin with Cardizem.     Patient will follow up with myself as scheduled below for rate and BP check or earlier if needed. Encouraged patient to contact office should any questions or concerns arise in the mean time. Patient and her  understand and agree with the plan of care.     Collaborating Provider: Dr. Elda Burnett       Future Appointments   Date Time Provider Department Center   1/20/2020  3:00 PM VERITO Valenzuela. [facility] None   2/26/2020  1:20 PM Anurag Moreno M.D. [address]   5/28/2020 12:45 PM Maria Fernanda Cruz M.D. [facility] None       Please note that this dictation was created using voice recognition software. I have made every reasonable attempt to correct obvious errors, but I expect that there are errors of grammar and possibly content I did not discover before finalizing the note.

## 2020-01-02 NOTE — PATIENT INSTRUCTIONS
Stop Metoprolol.  Start Cardizem 120 mg daily.   Take Lasix 20 mg daily with a potassium pill for one week and then reduce to as needed for leg swelling.  Lab work in 2 weeks.

## 2020-01-03 ENCOUNTER — TELEPHONE (OUTPATIENT)
Dept: CARDIOLOGY | Facility: MEDICAL CENTER | Age: 80
End: 2020-01-03

## 2020-01-03 DIAGNOSIS — E78.5 DYSLIPIDEMIA: ICD-10-CM

## 2020-01-03 NOTE — TELEPHONE ENCOUNTER
Attempted to call pt, no answer, LM for her to call back to discuss.    To Lesley - please advise regarding medication interaction between simvastatin and Cardizem. Thanks!

## 2020-01-03 NOTE — TELEPHONE ENCOUNTER
STEPHNA    Pt left voicemail, has question about medication JS prescribed yesterday. States she got call from pharmacy and one of the new medication's interacts with Simvastatin. Please call pt to advise at [phone].

## 2020-01-06 RX ORDER — PRAVASTATIN SODIUM 40 MG
40 TABLET ORAL DAILY
Qty: 30 TAB | Refills: 11 | Status: SHIPPED | OUTPATIENT
Start: 2020-01-06 | End: 2020-01-22

## 2020-01-06 RX ORDER — PRAVASTATIN SODIUM 40 MG
40 TABLET ORAL DAILY
Qty: 90 TAB | Refills: 0 | Status: SHIPPED | OUTPATIENT
Start: 2020-01-06 | End: 2020-01-06 | Stop reason: CLARIF

## 2020-01-06 NOTE — TELEPHONE ENCOUNTER
VERITO Valenzuela.  You 3 days ago      Pravastatin would work as well. May be cheaper. Pravastatin 40 mg daily.    Routing comment       VERITO Valenzuela.  You 3 days ago      The only station that has no known interaction is Crestor. I know that medications are very expensive for her. We can transition her to Crestor 10 mg daily. It would be good to get her off of Simvastatin as it has many drug interactions.          Called pt back and got a hold of her. Discussed switching Simvastatin to Pravastatin, verbalized understanding. Simvastatin discontinued, Pravastatin 40mg daily ordered, MAR updated.

## 2020-01-20 ENCOUNTER — APPOINTMENT (OUTPATIENT)
Dept: CARDIOLOGY | Facility: MEDICAL CENTER | Age: 80
End: 2020-01-20
Payer: MEDICARE

## 2020-01-21 DIAGNOSIS — E78.5 DYSLIPIDEMIA: ICD-10-CM

## 2020-01-23 RX ORDER — PRAVASTATIN SODIUM 40 MG
TABLET ORAL
Qty: 90 TAB | Refills: 3 | Status: SHIPPED
Start: 2020-01-23 | End: 2020-04-08 | Stop reason: SDUPTHER

## 2020-01-30 DIAGNOSIS — E07.9 THYROID CONDITION: ICD-10-CM

## 2020-01-30 DIAGNOSIS — J44.9 CHRONIC OBSTRUCTIVE PULMONARY DISEASE, UNSPECIFIED COPD TYPE (HCC): Chronic | ICD-10-CM

## 2020-01-30 RX ORDER — LEVOTHYROXINE SODIUM 0.07 MG/1
75 TABLET ORAL
Qty: 90 TAB | Refills: 3 | Status: SHIPPED | OUTPATIENT
Start: 2020-01-30 | End: 2020-09-25 | Stop reason: SDUPTHER

## 2020-01-30 RX ORDER — BUDESONIDE AND FORMOTEROL FUMARATE DIHYDRATE 160; 4.5 UG/1; UG/1
2 AEROSOL RESPIRATORY (INHALATION) 2 TIMES DAILY
Qty: 1 INHALER | Refills: 11 | Status: SHIPPED | OUTPATIENT
Start: 2020-01-30 | End: 2020-09-25 | Stop reason: SDUPTHER

## 2020-02-16 DIAGNOSIS — E11.9 TYPE 2 DIABETES MELLITUS WITHOUT COMPLICATION, UNSPECIFIED WHETHER LONG TERM INSULIN USE (HCC): ICD-10-CM

## 2020-02-26 ENCOUNTER — OFFICE VISIT (OUTPATIENT)
Dept: MEDICAL GROUP | Facility: MEDICAL CENTER | Age: 80
End: 2020-02-26
Payer: MEDICARE

## 2020-02-26 VITALS
TEMPERATURE: 97 F | OXYGEN SATURATION: 91 % | HEART RATE: 101 BPM | BODY MASS INDEX: 31.71 KG/M2 | DIASTOLIC BLOOD PRESSURE: 70 MMHG | WEIGHT: 202 LBS | HEIGHT: 67 IN | SYSTOLIC BLOOD PRESSURE: 114 MMHG

## 2020-02-26 DIAGNOSIS — I10 ESSENTIAL HYPERTENSION: Chronic | ICD-10-CM

## 2020-02-26 DIAGNOSIS — E11.65 TYPE 2 DIABETES MELLITUS WITH HYPERGLYCEMIA, WITHOUT LONG-TERM CURRENT USE OF INSULIN (HCC): Chronic | ICD-10-CM

## 2020-02-26 DIAGNOSIS — J43.8 OTHER EMPHYSEMA (HCC): Chronic | ICD-10-CM

## 2020-02-26 DIAGNOSIS — J96.11 CHRONIC RESPIRATORY FAILURE WITH HYPOXIA (HCC): ICD-10-CM

## 2020-02-26 LAB
HBA1C MFR BLD: 7.4 % (ref 0–5.6)
INT CON NEG: NEGATIVE
INT CON POS: POSITIVE

## 2020-02-26 PROCEDURE — 99214 OFFICE O/P EST MOD 30 MIN: CPT | Performed by: INTERNAL MEDICINE

## 2020-02-26 PROCEDURE — 83036 HEMOGLOBIN GLYCOSYLATED A1C: CPT | Performed by: INTERNAL MEDICINE

## 2020-02-26 RX ORDER — DULAGLUTIDE 1.5 MG/.5ML
1.5 INJECTION, SOLUTION SUBCUTANEOUS
Qty: 4 PEN | Refills: 11 | Status: SHIPPED | OUTPATIENT
Start: 2020-02-26 | End: 2020-09-25 | Stop reason: SDUPTHER

## 2020-02-26 NOTE — PROGRESS NOTES
CC: Follow-up diabetes, hypertension, COPD.                                                                                                                                      HPI:   Jessica presents today with the following.    1. Type 2 diabetes mellitus with hyperglycemia, without long-term current use of insulin ([facility])  A1c has climbed slightly to 7.4 she reports less activity because of chronic back pain.  She is status post procedure and planning getting epidurals in the near future.  Denying any hypoglycemia she is interested in weekly injectables as opposed to daily Victoza.    2. Essential hypertension  Well-controlled on current regimen denying any dizziness    3. Other emphysema ([facility])  Reports breathing is at baseline but she does struggle to breathe ambulation.    4. Chronic respiratory failure with hypoxia ([facility])  She does have portable O2 tanks difficulty transporting them because of her back she is looking into her company for portable oxygen.      Patient Active Problem List    Diagnosis Date Noted   • Post-lumbar laminectomy hypoxia, dyspnea. 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 08/31/2016   • Primary insomnia 07/29/2016   • Hypothyroidism due to acquired atrophy of thyroid 11/29/2015   • Lung nodules 06/09/2015   • Type 2 diabetes mellitus with hyperglycemia, without long-term current use of insulin ([facility]) 04/30/2014   • Essential hypertension 04/30/2014   • Chronic obstructive pulmonary disease (HCC) 04/30/2014   • Dyslipidemia 04/30/2014   • History of CVA (cerebrovascular accident) 06/02/2013       Current Outpatient Medications    Medication Sig Dispense Refill   • Dulaglutide (TRULICITY) 1.5 MG/0.5ML Solution Pen-injector Inject 1.5 mg as instructed every 7 days. 4 PEN 11   • metformin (GLUCOPHAGE) 1000 MG tablet TAKE 1 TABLET BY MOUTH TWICE A DAY WITH MEALS 180 Tab 2   • liraglutide (VICTOZA) 18 MG/3ML Solution Pen-injector INJECT 1.8 MG AS INSTRUCTED EVERY DAY. 9 PEN 5   • levothyroxine (SYNTHROID) 75 MCG Tab Take 1 Tab by mouth Every morning on an empty stomach. 90 Tab 3   • budesonide-formoterol (SYMBICORT) 160-4.5 MCG/ACT Aerosol Inhale 2 Puffs by mouth 2 Times a Day. 1 Inhaler 11   • pravastatin (PRAVACHOL) 40 MG tablet TAKE 1 TABLET BY MOUTH EVERY DAY 90 Tab 3   • PROAIR  (90 Base) MCG/ACT Aero Soln inhalation aerosol INHALE 1 PUFF BY MOUTH EVERY 6 HOURS AS NEEDED FOR SHORTNESS OF BREATH. 8.5 Inhaler 2   • furosemide (LASIX) 40 MG Tab Take 0.5 Tabs by mouth every day. 30 Tab 11   • potassium chloride (KLOR-CON) 8 MEQ tablet Take 1 Tab by mouth every day. 30 Tab 11   • DILTIAZem CD (CARDIZEM CD) 120 MG CAPSULE SR 24 HR Take 1 Cap by mouth every day. 30 Cap 11   • fluticasone (FLONASE) 50 MCG/ACT nasal spray Spray 2 Sprays in nose every day. 16 g 0   • gabapentin (NEURONTIN) 300 MG Cap Take 300 mg by mouth 2 Times a Day.     • omeprazole (PRILOSEC) 40 MG delayed-release capsule Take 40 mg by mouth every day.     • rivaroxaban (XARELTO) 15 MG Tab tablet Take 15 mg by mouth with dinner.     • traZODone (DESYREL) 100 MG Tab Take 100 mg by mouth every evening.     • lisinopril (PRINIVIL) 20 MG Tab Take 1 Tab by mouth every day. 90 Tab 3     No current facility-administered medications for this visit.          Allergies as of 02/26/2020 - Reviewed 02/26/2020   Allergen Reaction Noted   • Bactrim Hives 02/14/2013   • Demerol Shortness of Breath and Swelling 02/14/2013   • Pcn [penicillins] Hives 02/14/2013   • Sulfa drugs Hives 02/14/2013   • Ferrous sulfate Rash 09/08/2014   • Tramadol Itching 02/11/2016   • Latex Contact Dermatitis  "02/14/2013        ROS: Denies Chest pain, SOB, LE edema.    /70 (BP Location: Right arm, Patient Position: Sitting)   Pulse (!) 101   Temp 36.1 °C (97 °F)   Ht 1.702 m (5' 7\")   Wt 91.6 kg (202 lb)   SpO2 91%   BMI 31.64 kg/m²     Physical Exam:  Gen:         Alert and oriented, No apparent distress.  Neck:        No Lymphadenopathy or Bruits.  Lungs:     Clear to auscultation bilaterally  CV:          Regular rate and rhythm. No murmurs, rubs or gallops.               Ext:          No clubbing, cyanosis, edema.      Assessment and Plan.   79 y.o. female with the following issues.    1. Type 2 diabetes mellitus with hyperglycemia, without long-term current use of insulin (HCC)  Discussion about diet exercise switching to Trulicity at 1.5 mg caution about side effects recheck in 3 months  - POCT Hemoglobin A1C  - Comp Metabolic Panel; Future  - Lipid Profile; Future  - HEMOGLOBIN A1C; Future  - MICROALBUMIN CREAT RATIO URINE; Future    2. Essential hypertension  Currently well controlled, Discuss diet, exercise and salt restriction.  No change to medication therapy.    3. Other emphysema (HCC)  No change to therapy    4. Chronic respiratory failure with hypoxia (HCC)  She will look into portable oxygen concentrator.      "

## 2020-04-10 DIAGNOSIS — E78.5 DYSLIPIDEMIA: ICD-10-CM

## 2020-04-14 RX ORDER — GABAPENTIN 300 MG/1
300 CAPSULE ORAL 2 TIMES DAILY
Qty: 180 CAP | Refills: 3 | Status: SHIPPED | OUTPATIENT
Start: 2020-04-14 | End: 2020-09-25 | Stop reason: SDUPTHER

## 2020-04-23 NOTE — ADDENDUM NOTE
Encounter addended by: Janel Branham R.N. on: 4/23/2020 11:34 AM   Actions taken: Flowsheet accepted

## 2020-05-28 ENCOUNTER — APPOINTMENT (OUTPATIENT)
Dept: PULMONOLOGY | Facility: HOSPICE | Age: 80
End: 2020-05-28
Payer: MEDICARE

## 2020-06-03 ENCOUNTER — TELEPHONE (OUTPATIENT)
Dept: CARDIOLOGY | Facility: MEDICAL CENTER | Age: 80
End: 2020-06-03

## 2020-06-03 NOTE — TELEPHONE ENCOUNTER
FU apt and holding Xarelto for Colonoscopy   Received: Yesterday   Message Contents   ALEK Valenzuela R.N.               Patient was due for follow up in the end of January, would you please schedule her with me within the next month or so?     Also, I signed her clearance form for colonoscopy but want to make sure patient is aware of her risk for stroke by holding her Xarelto for 2 days as asked by [facility]. If she understands and is willing to assume the risk, I am ok with her proceeding.     STEPHAN          Called pt to discuss above. She verbalized understanding for risk of stroke and monitoring symptoms while holding Xarelto for 2 days prior to her procedure.    Advised follow up visit with STEPHAN within the next month or so. Verbalized understanding and appreciative of call, transferred call to schedulers.    Faxed clearance form to [facility] at [phone], received receipt for confirmation.

## 2020-06-11 LAB
ALBUMIN SERPL-MCNC: 4.2 G/DL (ref 3.7–4.7)
ALBUMIN/CREAT UR: 22 MG/G CREAT (ref 0–29)
ALBUMIN/GLOB SERPL: 1.8 {RATIO} (ref 1.2–2.2)
ALP SERPL-CCNC: 76 IU/L (ref 39–117)
ALT SERPL-CCNC: 36 IU/L (ref 0–32)
AST SERPL-CCNC: 25 IU/L (ref 0–40)
BILIRUB SERPL-MCNC: 0.7 MG/DL (ref 0–1.2)
BUN SERPL-MCNC: 15 MG/DL (ref 8–27)
BUN/CREAT SERPL: 14 (ref 12–28)
CALCIUM SERPL-MCNC: 9.2 MG/DL (ref 8.7–10.3)
CHLORIDE SERPL-SCNC: 100 MMOL/L (ref 96–106)
CHOLEST SERPL-MCNC: 153 MG/DL (ref 100–199)
CO2 SERPL-SCNC: 23 MMOL/L (ref 20–29)
CREAT SERPL-MCNC: 1.06 MG/DL (ref 0.57–1)
CREAT UR-MCNC: 99.3 MG/DL
GLOBULIN SER CALC-MCNC: 2.3 G/DL (ref 1.5–4.5)
GLUCOSE SERPL-MCNC: 163 MG/DL (ref 65–99)
HBA1C MFR BLD: 7.1 % (ref 4.8–5.6)
HDLC SERPL-MCNC: 57 MG/DL
LABORATORY COMMENT REPORT: ABNORMAL
LDLC SERPL CALC-MCNC: 63 MG/DL (ref 0–99)
MICROALBUMIN UR-MCNC: 21.4 UG/ML
POTASSIUM SERPL-SCNC: 4.6 MMOL/L (ref 3.5–5.2)
PROT SERPL-MCNC: 6.5 G/DL (ref 6–8.5)
SODIUM SERPL-SCNC: 139 MMOL/L (ref 134–144)
TRIGL SERPL-MCNC: 166 MG/DL (ref 0–149)
VLDLC SERPL CALC-MCNC: 33 MG/DL (ref 5–40)

## 2020-06-15 ENCOUNTER — OFFICE VISIT (OUTPATIENT)
Dept: MEDICAL GROUP | Facility: MEDICAL CENTER | Age: 80
End: 2020-06-15
Payer: MEDICARE

## 2020-06-15 VITALS
HEIGHT: 67 IN | OXYGEN SATURATION: 89 % | TEMPERATURE: 97.6 F | SYSTOLIC BLOOD PRESSURE: 132 MMHG | DIASTOLIC BLOOD PRESSURE: 86 MMHG | HEART RATE: 105 BPM | WEIGHT: 209 LBS | BODY MASS INDEX: 32.8 KG/M2

## 2020-06-15 DIAGNOSIS — I10 ESSENTIAL HYPERTENSION: Chronic | ICD-10-CM

## 2020-06-15 DIAGNOSIS — E78.5 DYSLIPIDEMIA: Chronic | ICD-10-CM

## 2020-06-15 DIAGNOSIS — E11.65 TYPE 2 DIABETES MELLITUS WITH HYPERGLYCEMIA, WITHOUT LONG-TERM CURRENT USE OF INSULIN (HCC): Chronic | ICD-10-CM

## 2020-06-15 PROCEDURE — 99214 OFFICE O/P EST MOD 30 MIN: CPT | Performed by: INTERNAL MEDICINE

## 2020-06-15 RX ORDER — NYSTATIN 100000 U/G
1 CREAM TOPICAL 2 TIMES DAILY
Qty: 60 G | Refills: 11 | Status: ON HOLD | OUTPATIENT
Start: 2020-06-15 | End: 2022-12-25

## 2020-06-15 ASSESSMENT — FIBROSIS 4 INDEX: FIB4 SCORE: 1.666666666666666667

## 2020-06-15 NOTE — PROGRESS NOTES
CC: Follow-up diabetes, hypertension, dyslipidemia                                                                                                                                      HPI:   Jessica presents today with the following.    1. Type 2 diabetes mellitus with hyperglycemia, without long-term current use of insulin ([facility])  History of diabetes recently switched to a weekly injectable as opposed to daily A1c climbed slightly but still at 7.1.  Tolerating medication well she is less active because of her back and has follow-up therapy.    2. Essential hypertension  Slight elevated today again weight is up she does not monitor at home    3. Dyslipidemia  Well-controlled on current medication denying any myalgia      Patient Active Problem List    Diagnosis Date Noted   • Post-lumbar laminectomy hypoxia, dyspnea. 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 08/31/2016   • Primary insomnia 07/29/2016   • Hypothyroidism due to acquired atrophy of thyroid 11/29/2015   • Lung nodules 06/09/2015   • Type 2 diabetes mellitus with hyperglycemia, without long-term current use of insulin ([facility]) 04/30/2014   • Essential hypertension 04/30/2014   • Chronic obstructive pulmonary disease (HCC) 04/30/2014   • Dyslipidemia 04/30/2014   • History of CVA (cerebrovascular accident) 06/02/2013       Current Outpatient Medications   Medication Sig Dispense Refill   • nystatin (MYCOSTATIN) 349907 UNIT/GM Cream topical cream Apply 1 g to affected area(s) 2 times a day. 60 g 11   • gabapentin (NEURONTIN) 300 MG Cap Take 1 Cap by mouth 2 Times a Day. 180 Cap 3   • pravastatin  "(PRAVACHOL) 40 MG tablet Take 1 Tab by mouth every day. 90 Tab 0   • Dulaglutide (TRULICITY) 1.5 MG/0.5ML Solution Pen-injector Inject 1.5 mg as instructed every 7 days. 4 PEN 11   • metformin (GLUCOPHAGE) 1000 MG tablet TAKE 1 TABLET BY MOUTH TWICE A DAY WITH MEALS 180 Tab 2   • levothyroxine (SYNTHROID) 75 MCG Tab Take 1 Tab by mouth Every morning on an empty stomach. 90 Tab 3   • budesonide-formoterol (SYMBICORT) 160-4.5 MCG/ACT Aerosol Inhale 2 Puffs by mouth 2 Times a Day. 1 Inhaler 11   • PROAIR  (90 Base) MCG/ACT Aero Soln inhalation aerosol INHALE 1 PUFF BY MOUTH EVERY 6 HOURS AS NEEDED FOR SHORTNESS OF BREATH. 8.5 Inhaler 2   • furosemide (LASIX) 40 MG Tab Take 0.5 Tabs by mouth every day. 30 Tab 11   • potassium chloride (KLOR-CON) 8 MEQ tablet Take 1 Tab by mouth every day. 30 Tab 11   • DILTIAZem CD (CARDIZEM CD) 120 MG CAPSULE SR 24 HR Take 1 Cap by mouth every day. 30 Cap 11   • fluticasone (FLONASE) 50 MCG/ACT nasal spray Spray 2 Sprays in nose every day. 16 g 0   • omeprazole (PRILOSEC) 40 MG delayed-release capsule Take 40 mg by mouth every day.     • rivaroxaban (XARELTO) 15 MG Tab tablet Take 15 mg by mouth with dinner.     • traZODone (DESYREL) 100 MG Tab Take 100 mg by mouth every evening.     • lisinopril (PRINIVIL) 20 MG Tab Take 1 Tab by mouth every day. 90 Tab 3     No current facility-administered medications for this visit.          Allergies as of 06/15/2020 - Reviewed 06/15/2020   Allergen Reaction Noted   • Bactrim Hives 02/14/2013   • Demerol Shortness of Breath and Swelling 02/14/2013   • Pcn [penicillins] Hives 02/14/2013   • Sulfa drugs Hives 02/14/2013   • Ferrous sulfate Rash 09/08/2014   • Tramadol Itching 02/11/2016   • Latex Contact Dermatitis 02/14/2013        ROS: Denies Chest pain, SOB, LE edema.    /86 (BP Location: Left arm, Patient Position: Sitting)   Pulse (!) 105   Temp 36.4 °C (97.6 °F)   Ht 1.702 m (5' 7\")   Wt 94.8 kg (209 lb)   SpO2 89%   BMI " 32.73 kg/m²     Physical Exam:  Gen:         Alert and oriented, No apparent distress.  Neck:        No Lymphadenopathy or Bruits.  Lungs:     Clear to auscultation bilaterally  CV:          Regular rate and rhythm. No murmurs, rubs or gallops.               Ext:          No clubbing, cyanosis, edema.      Assessment and Plan.   80 y.o. female with the following issues.    1. Type 2 diabetes mellitus with hyperglycemia, without long-term current use of insulin (HCC)  Currently well controlled no changes. Discussed diet and exercise recheck A1c in 6 months. Reminded about yearly eye exam.    2. Essential hypertension  Encouraged to monitor blood pressure more closely    3. Dyslipidemia  Lipids currently well controlled. Discussed continued diet and exercise recheck 6 months to 1 year.

## 2020-06-26 ENCOUNTER — PATIENT OUTREACH (OUTPATIENT)
Dept: SCHEDULING | Facility: IMAGING CENTER | Age: 80
End: 2020-06-26

## 2020-07-02 DIAGNOSIS — E78.5 DYSLIPIDEMIA: ICD-10-CM

## 2020-07-02 RX ORDER — PRAVASTATIN SODIUM 40 MG
TABLET ORAL
Qty: 90 TAB | Refills: 3 | Status: SHIPPED | OUTPATIENT
Start: 2020-07-02 | End: 2021-06-29

## 2020-07-21 ENCOUNTER — TELEPHONE (OUTPATIENT)
Dept: CARDIOLOGY | Facility: MEDICAL CENTER | Age: 80
End: 2020-07-21

## 2020-07-21 NOTE — TELEPHONE ENCOUNTER
Chart Prep    Spoke to patient regarding labs for BMP ordered by JS at last OV. Patient completed a fasting lipid panel 6/10 and [facility] Patient stated she will send a my chart message to the office when she completes the lab so results can be requested at that time.     Confirmed appt date and time for 8/3 at 1:45pm with STEPHAN.

## 2020-07-24 ENCOUNTER — TELEPHONE (OUTPATIENT)
Dept: CARDIOLOGY | Facility: MEDICAL CENTER | Age: 80
End: 2020-07-24

## 2020-07-24 NOTE — TELEPHONE ENCOUNTER
Chart Prep    Spoke to Radha at [facility] who confirmed patient's most recent lab work is a CMP. Lipid, Hemoglobin, and Urine from 6/10/2020. Available under media tab.

## 2020-07-30 LAB
BUN SERPL-MCNC: 17 MG/DL (ref 8–27)
BUN/CREAT SERPL: 15 (ref 12–28)
CALCIUM SERPL-MCNC: 10 MG/DL (ref 8.7–10.3)
CHLORIDE SERPL-SCNC: 99 MMOL/L (ref 96–106)
CO2 SERPL-SCNC: 25 MMOL/L (ref 20–29)
CREAT SERPL-MCNC: 1.13 MG/DL (ref 0.57–1)
GLUCOSE SERPL-MCNC: 138 MG/DL (ref 65–99)
POTASSIUM SERPL-SCNC: 5.2 MMOL/L (ref 3.5–5.2)
SODIUM SERPL-SCNC: 139 MMOL/L (ref 134–144)

## 2020-08-19 ENCOUNTER — OFFICE VISIT (OUTPATIENT)
Dept: CARDIOLOGY | Facility: MEDICAL CENTER | Age: 80
End: 2020-08-19
Payer: MEDICARE

## 2020-08-19 VITALS
HEIGHT: 68 IN | OXYGEN SATURATION: 91 % | BODY MASS INDEX: 31.07 KG/M2 | WEIGHT: 205 LBS | SYSTOLIC BLOOD PRESSURE: 140 MMHG | HEART RATE: 100 BPM | DIASTOLIC BLOOD PRESSURE: 80 MMHG

## 2020-08-19 DIAGNOSIS — I10 ESSENTIAL HYPERTENSION: Chronic | ICD-10-CM

## 2020-08-19 DIAGNOSIS — I27.20 PULMONARY HTN (HCC): ICD-10-CM

## 2020-08-19 DIAGNOSIS — J43.8 OTHER EMPHYSEMA (HCC): Chronic | ICD-10-CM

## 2020-08-19 DIAGNOSIS — E78.5 DYSLIPIDEMIA: Chronic | ICD-10-CM

## 2020-08-19 DIAGNOSIS — Z79.01 CHRONIC ANTICOAGULATION: Chronic | ICD-10-CM

## 2020-08-19 DIAGNOSIS — I51.89 SYSTOLIC DYSFUNCTION WITHOUT HEART FAILURE: ICD-10-CM

## 2020-08-19 PROCEDURE — 99214 OFFICE O/P EST MOD 30 MIN: CPT | Performed by: NURSE PRACTITIONER

## 2020-08-19 RX ORDER — DILTIAZEM HYDROCHLORIDE 240 MG/1
240 CAPSULE, COATED, EXTENDED RELEASE ORAL DAILY
Qty: 90 CAP | Refills: 3 | Status: SHIPPED | OUTPATIENT
Start: 2020-08-19 | End: 2020-09-25 | Stop reason: SDUPTHER

## 2020-08-19 ASSESSMENT — ENCOUNTER SYMPTOMS
WEIGHT LOSS: 0
PND: 0
SHORTNESS OF BREATH: 0
BACK PAIN: 1
CLAUDICATION: 0
WEAKNESS: 0
ORTHOPNEA: 0
DIZZINESS: 0
PALPITATIONS: 0

## 2020-08-19 ASSESSMENT — FIBROSIS 4 INDEX: FIB4 SCORE: 1.666666666666666667

## 2020-08-19 NOTE — PROGRESS NOTES
"Chief Complaint   Patient presents with   • Atrial Fibrillation     follow up       Subjective:   Jessica Plaza is a 79 y.o. female patient of Dr. Lance Steven who presents today for follow up regarding her atrial fibrillation.     Patient was last seen by myself on 1/2/2020. She was c/o pedal edema, she was started on Lasix 20 mg daily for one week and then prn thereafter. Her A-Fib was poorly rate controlled, she was started on Cardizem 120 mg daily. She was scheduled to follow up in 3 weeks.     Past medical history significant for COPD, diabetes, emphysema, hyperlipidemia, GERD, hypertension and previous CVA.    Today patient states that he is feeling well overall.  She states that she was unable to tolerate the Lasix as it caused her \"bladder to hurt\", but her lower extremity edema improved shortly after her appointment and starting the Cardizem.  Denies any significant concerns or complaints from cardiac perspective.    ontinues to have chronic back pain that she is seeing a back specialist for.    Past Medical History:   Diagnosis Date   • Anemia    • Arthritis    • Asthma     Inhaler use daily and PRN   • Atrial fibrillation (HCC)    • Castro esophagus    • Bronchitis    • Chickenpox    • Chronic anticoagulation 4/29/2019   • COPD    • COPD (chronic obstructive pulmonary disease) (HCC)    • Dental disorder     dentures upper and lower   • Diabetes     oral meds   • Disorder of thyroid    • Emphysema of lung (HCC)     Uses oxygen PRN.  Patient not sure of liters/minute.   • Encounter for medication refill 2/5/2018   • GERD (gastroesophageal reflux disease)    • Pashto measles    • Heart burn    • Hemorrhagic disorder     hx internal bleeding   • High cholesterol    • Hypertension    • Indigestion    • Pain     left shoulder   • Pneumonia 2013.   • Stroke (HCC) 2013   • Tonsillitis    • UTI (urinary tract infection)      Past Surgical History:   Procedure Laterality Date   • LUMBAR FUSION POSTERIOR " N/A 2019    Procedure: FUSION, SPINE, LUMBAR, PLIF - L4-5, L5-S1 TLIF;  Surgeon: Grupo Cabrales M.D.;  Location: SURGERY [facility];  Service: Neurosurgery   • LUMBAR DECOMPRESSION N/A 2019    Procedure: DECOMPRESSION, SPINE, LUMBAR;  Surgeon: Grupo Cabrales M.D.;  Location: SURGERY [facility];  Service: Neurosurgery   • SHOULDER DECOMPRESSION ARTHROSCOPIC Left 10/11/2016    Procedure: SHOULDER DECOMPRESSION ARTHROSCOPIC  with   debridment of the labrum and biceps tendon stump;  Surgeon: Bill Ponce M.D.;  Location: SURGERY [facility];  Service:    • CLAVICLE RESECTION Left 10/11/2016    Procedure: CLAVICLE RESECTION distal open  ;  Surgeon: Bill Ponce M.D.;  Location: SURGERY [facility];  Service:    • CHONDROPLASTY Left 10/11/2016    Procedure: Arthroscopic CHONDROPLASTY of the joint  ;  Surgeon: Bill Ponce M.D.;  Location: SURGERY [facility];  Service:    • SHOULDER DECOMPRESSION ARTHROSCOPIC Left 2016    Procedure: SHOULDER DECOMPRESSION ARTHROSCOPIC SUBACROMIAL and biceps tenotomy;  Surgeon: Johan Roberts M.D.;  Location: SURGERY [facility];  Service:    • GASTROSCOPY-ENDO  2014    Performed by Yonas Grimes Jr., M.D. at ENDOSCOPY [facility]   • TONSILLECTOMY     • LAMINOTOMY     • PB ANESTH, DELIVERY ONLY      X 2     Family History   Problem Relation Age of Onset   • Cancer Mother         breast   • Lung Disease Mother         COPD/Emphysema/Smoker   • Stroke Father    • Hypertension Father    • Hyperlipidemia Father    • Heart Attack Father    • Cancer Sister         Pancriatic   • Arrythmia Sister    • Lung Disease Sister         COPD/Emphysema/Smoker   • Bipolar disorder Son    • Bipolar disorder Son    • Bipolar disorder Son    • Cancer Maternal Aunt         stomach     Social History     Socioeconomic History   • Marital status:      Spouse name: Not on file   • Number of children: Not on file   • Years of  education: Not on file   • Highest education level: Not on file   Occupational History   • Not on file   Social Needs   • Financial resource strain: Not on file   • Food insecurity     Worry: Not on file     Inability: Not on file   • Transportation needs     Medical: Not on file     Non-medical: Not on file   Tobacco Use   • Smoking status: Former Smoker     Packs/day: 0.50     Years: 52.00     Pack years: 26.00     Types: Cigarettes     Quit date: 2013     Years since quittin.6   • Smokeless tobacco: Never Used   • Tobacco comment: started smoking at age 21   Substance and Sexual Activity   • Alcohol use: Yes     Alcohol/week: 8.4 oz     Types: 7 Shots of liquor, 7 Standard drinks or equivalent per week     Frequency: 2-3 times a week     Comment: Once or twice a week.   • Drug use: No   • Sexual activity: Never     Partners: Male     Birth control/protection: Post-Menopausal   Lifestyle   • Physical activity     Days per week: Not on file     Minutes per session: Not on file   • Stress: Not on file   Relationships   • Social connections     Talks on phone: Not on file     Gets together: Not on file     Attends Denominational service: Not on file     Active member of club or organization: Not on file     Attends meetings of clubs or organizations: Not on file     Relationship status: Not on file   • Intimate partner violence     Fear of current or ex partner: Not on file     Emotionally abused: Not on file     Physically abused: Not on file     Forced sexual activity: Not on file   Other Topics Concern   • Not on file   Social History Narrative   • Not on file     Allergies   Allergen Reactions   • Bactrim Hives   • Demerol Shortness of Breath and Swelling   • Pcn [Penicillins] Hives     Tolerates ceftriaxone and cefazolin   • Sulfa Drugs Hives   • Ferrous Sulfate Rash     itching   • Tramadol Itching     rash   • Latex Contact Dermatitis     Outpatient Encounter Medications as of 2020   Medication Sig  Dispense Refill   • DILTIAZem CD (CARDIZEM CD) 240 MG CAPSULE SR 24 HR Take 1 Cap by mouth every day. 90 Cap 3   • pravastatin (PRAVACHOL) 40 MG tablet TAKE 1 TABLET BY MOUTH EVERY DAY 90 Tab 3   • nystatin (MYCOSTATIN) 744942 UNIT/GM Cream topical cream Apply 1 g to affected area(s) 2 times a day. 60 g 11   • gabapentin (NEURONTIN) 300 MG Cap Take 1 Cap by mouth 2 Times a Day. 180 Cap 3   • Dulaglutide (TRULICITY) 1.5 MG/0.5ML Solution Pen-injector Inject 1.5 mg as instructed every 7 days. 4 PEN 11   • metformin (GLUCOPHAGE) 1000 MG tablet TAKE 1 TABLET BY MOUTH TWICE A DAY WITH MEALS 180 Tab 2   • levothyroxine (SYNTHROID) 75 MCG Tab Take 1 Tab by mouth Every morning on an empty stomach. 90 Tab 3   • budesonide-formoterol (SYMBICORT) 160-4.5 MCG/ACT Aerosol Inhale 2 Puffs by mouth 2 Times a Day. 1 Inhaler 11   • PROAIR  (90 Base) MCG/ACT Aero Soln inhalation aerosol INHALE 1 PUFF BY MOUTH EVERY 6 HOURS AS NEEDED FOR SHORTNESS OF BREATH. 8.5 Inhaler 2   • fluticasone (FLONASE) 50 MCG/ACT nasal spray Spray 2 Sprays in nose every day. 16 g 0   • omeprazole (PRILOSEC) 40 MG delayed-release capsule Take 40 mg by mouth every day.     • rivaroxaban (XARELTO) 15 MG Tab tablet Take 15 mg by mouth with dinner.     • traZODone (DESYREL) 100 MG Tab Take 100 mg by mouth every evening.     • lisinopril (PRINIVIL) 20 MG Tab Take 1 Tab by mouth every day. 90 Tab 3   • [DISCONTINUED] furosemide (LASIX) 40 MG Tab Take 0.5 Tabs by mouth every day. (Patient not taking: Reported on 8/19/2020) 30 Tab 11   • [DISCONTINUED] potassium chloride (KLOR-CON) 8 MEQ tablet Take 1 Tab by mouth every day. (Patient not taking: Reported on 8/19/2020) 30 Tab 11   • [DISCONTINUED] DILTIAZem CD (CARDIZEM CD) 120 MG CAPSULE SR 24 HR Take 1 Cap by mouth every day. 30 Cap 11     No facility-administered encounter medications on file as of 8/19/2020.      Review of Systems   Constitutional: Negative for malaise/fatigue and weight loss.  "  Respiratory: Negative for shortness of breath.    Cardiovascular: Negative for chest pain, palpitations, orthopnea, claudication, leg swelling and PND.   Musculoskeletal: Positive for back pain.   Neurological: Negative for dizziness and weakness.   All other systems reviewed and are negative.       Objective:   /80 (BP Location: Left arm, Patient Position: Sitting)   Pulse 100   Ht 1.727 m (5' 8\")   Wt 93 kg (205 lb)   SpO2 91%   BMI 31.17 kg/m²     Physical Exam   Constitutional: She is oriented to person, place, and time. She appears well-developed and well-nourished. No distress.   HENT:   Head: Normocephalic.   Eyes: EOM are normal.   Neck: No JVD present.   Cardiovascular: Normal heart sounds. An irregularly irregular rhythm present. Tachycardia present.   Pulmonary/Chest: Breath sounds normal. No respiratory distress.   Abdominal: Soft. There is no abdominal tenderness.   Musculoskeletal:         General: No edema.   Neurological: She is alert and oriented to person, place, and time.   Skin: Skin is warm and dry.   Psychiatric: She has a normal mood and affect. Her behavior is normal.     TTE 4/30/19:  Technically difficult study - adequate information is obtained.   Left ventricular systolic function is low normal.  Left ventricular ejection fraction is visually estimated to be 45%.  Global hypokinesis.  Mild concentric left ventricular hypertrophy.  Normal estimated left ventricular filling pressures in the setting of   atrial fibrillation.  Normal right ventricular size.  Mildly reduced right ventricular systolic function.  Enlarged right atrium.  Aortic sclerosis without stenosis.  Right atrial pressure is estimated to be 15 mmHg, severely elevated.   Right ventricular systolic pressure is estimated to be 40 mmHg,   consistent with mild pulmonary hypertension.     [place] 5/1/19:  Left Ventricle  The left ventricle was normal in size and function.    Right Ventricle  The right ventricle was " normal in size and function.    TTE 9/1/19:  Tachycardia.  Technically difficult study - adequate information is obtained.   Likely normal left ventricular systolic function.   Left ventricular ejection fraction is visually estimated to be 50%.  No evidence of valvular abnormality based on blind Doppler evaluation.     NM Cardiac Stress Test 7/16/15:  IMPRESSIONS  No evidence of significant jeopardized viable myocardium or prior myocardial infarction.  Normal left ventricular size, ejection fraction, and wall motion.         Assessment:     1. Chronic anticoagulation     2. Other emphysema (HCC)     3. Dyslipidemia     4. Essential hypertension     5. Systolic dysfunction without heart failure     6. Pulmonary HTN (HCC)         Medical Decision Making:  Today's Assessment / Status / Plan:     Persistent Atrial fibrillation:  -Rate has improved however does remain slightly elevated in the 100s.  -Cardizem CD increased to 240 mg daily.  -OAC with Xarelto 15 mg daily, reduced dosing due to CrCl of <50.   -Avoid BB due to h/o COPD.     Systolic Dysfunction:  -TTE in September showed near normalization of EF (50%).     Pulmonary hypertension:  -RVSP 40 mmHg in setting of COPD.  -Stable on RA, uses home O2 as needed.   -Appears euvolemic on exam.     Hypertension:  -Slightly elevated, Diltiazem increased as above.     HLD:  -Stable.  -LDL on 6/10/20 was 63.  -Continue pravastatin 40 mg daily.    Patient will follow up with myself in 1 month for rate and BP check or earlier if needed. Encouraged patient to contact office should any questions or concerns arise in the mean time. Patient and her  understand and agree with the plan of care.       Collaborating Provider: Dr. Elda Burnett       Please note that this dictation was created using voice recognition software. I have made every reasonable attempt to correct obvious errors, but I expect that there are errors of grammar and possibly content I did not discover  before finalizing the note.

## 2020-08-29 DIAGNOSIS — I10 ESSENTIAL HYPERTENSION: Chronic | ICD-10-CM

## 2020-09-01 RX ORDER — LISINOPRIL 20 MG/1
TABLET ORAL
Qty: 90 TAB | Refills: 3 | Status: SHIPPED | OUTPATIENT
Start: 2020-09-01 | End: 2021-11-09

## 2020-09-25 ENCOUNTER — OFFICE VISIT (OUTPATIENT)
Dept: MEDICAL GROUP | Facility: MEDICAL CENTER | Age: 80
End: 2020-09-25
Payer: MEDICARE

## 2020-09-25 VITALS
SYSTOLIC BLOOD PRESSURE: 126 MMHG | WEIGHT: 205 LBS | DIASTOLIC BLOOD PRESSURE: 86 MMHG | HEART RATE: 103 BPM | BODY MASS INDEX: 31.07 KG/M2 | HEIGHT: 68 IN | TEMPERATURE: 97 F | OXYGEN SATURATION: 93 %

## 2020-09-25 DIAGNOSIS — E11.9 TYPE 2 DIABETES MELLITUS WITHOUT COMPLICATION, UNSPECIFIED WHETHER LONG TERM INSULIN USE (HCC): ICD-10-CM

## 2020-09-25 DIAGNOSIS — E03.4 HYPOTHYROIDISM DUE TO ACQUIRED ATROPHY OF THYROID: ICD-10-CM

## 2020-09-25 DIAGNOSIS — F51.01 PRIMARY INSOMNIA: ICD-10-CM

## 2020-09-25 DIAGNOSIS — I10 ESSENTIAL HYPERTENSION: Chronic | ICD-10-CM

## 2020-09-25 DIAGNOSIS — E78.5 DYSLIPIDEMIA: Chronic | ICD-10-CM

## 2020-09-25 DIAGNOSIS — I51.89 SYSTOLIC DYSFUNCTION WITHOUT HEART FAILURE: ICD-10-CM

## 2020-09-25 DIAGNOSIS — E11.65 TYPE 2 DIABETES MELLITUS WITH HYPERGLYCEMIA, WITHOUT LONG-TERM CURRENT USE OF INSULIN (HCC): Chronic | ICD-10-CM

## 2020-09-25 DIAGNOSIS — I27.20 PULMONARY HTN (HCC): ICD-10-CM

## 2020-09-25 DIAGNOSIS — J96.11 CHRONIC RESPIRATORY FAILURE WITH HYPOXIA (HCC): ICD-10-CM

## 2020-09-25 DIAGNOSIS — E07.9 THYROID CONDITION: ICD-10-CM

## 2020-09-25 DIAGNOSIS — Z00.00 MEDICARE ANNUAL WELLNESS VISIT, SUBSEQUENT: ICD-10-CM

## 2020-09-25 DIAGNOSIS — J43.8 OTHER EMPHYSEMA (HCC): Chronic | ICD-10-CM

## 2020-09-25 DIAGNOSIS — J44.9 CHRONIC OBSTRUCTIVE PULMONARY DISEASE, UNSPECIFIED COPD TYPE (HCC): Chronic | ICD-10-CM

## 2020-09-25 DIAGNOSIS — Z86.73 HISTORY OF CVA (CEREBROVASCULAR ACCIDENT): ICD-10-CM

## 2020-09-25 PROBLEM — Z98.890 POSTOPERATIVE HYPOXIA: Status: RESOLVED | Noted: 2019-08-30 | Resolved: 2020-09-25

## 2020-09-25 PROBLEM — R09.02 POSTOPERATIVE HYPOXIA: Status: RESOLVED | Noted: 2019-08-30 | Resolved: 2020-09-25

## 2020-09-25 LAB
HBA1C MFR BLD: 6.9 % (ref 0–5.6)
INT CON NEG: NEGATIVE
INT CON POS: POSITIVE

## 2020-09-25 PROCEDURE — 83036 HEMOGLOBIN GLYCOSYLATED A1C: CPT | Performed by: INTERNAL MEDICINE

## 2020-09-25 PROCEDURE — 99213 OFFICE O/P EST LOW 20 MIN: CPT | Mod: 25 | Performed by: INTERNAL MEDICINE

## 2020-09-25 PROCEDURE — G0439 PPPS, SUBSEQ VISIT: HCPCS | Performed by: INTERNAL MEDICINE

## 2020-09-25 RX ORDER — LEVOTHYROXINE SODIUM 0.07 MG/1
75 TABLET ORAL
Qty: 90 TAB | Refills: 3 | Status: ON HOLD | OUTPATIENT
Start: 2020-09-25 | End: 2022-12-25

## 2020-09-25 RX ORDER — DULAGLUTIDE 1.5 MG/.5ML
0.5 INJECTION, SOLUTION SUBCUTANEOUS
Qty: 12 EACH | Refills: 3 | Status: SHIPPED | OUTPATIENT
Start: 2020-09-25

## 2020-09-25 RX ORDER — TRAZODONE HYDROCHLORIDE 100 MG/1
100 TABLET ORAL
Qty: 90 TAB | Refills: 3 | Status: SHIPPED | OUTPATIENT
Start: 2020-09-25

## 2020-09-25 RX ORDER — GABAPENTIN 300 MG/1
300 CAPSULE ORAL 2 TIMES DAILY
Qty: 180 CAP | Refills: 3 | Status: SHIPPED | OUTPATIENT
Start: 2020-09-25

## 2020-09-25 RX ORDER — MIRABEGRON 25 MG/1
25 TABLET, FILM COATED, EXTENDED RELEASE ORAL DAILY
COMMUNITY
Start: 2020-09-25

## 2020-09-25 RX ORDER — DILTIAZEM HYDROCHLORIDE 240 MG/1
240 CAPSULE, COATED, EXTENDED RELEASE ORAL DAILY
Qty: 90 CAP | Refills: 3 | Status: ON HOLD | OUTPATIENT
Start: 2020-09-25 | End: 2022-12-25

## 2020-09-25 RX ORDER — ALBUTEROL SULFATE 90 UG/1
1 AEROSOL, METERED RESPIRATORY (INHALATION) EVERY 6 HOURS PRN
Qty: 1 EACH | Refills: 11 | Status: ON HOLD | OUTPATIENT
Start: 2020-09-25 | End: 2022-12-25

## 2020-09-25 RX ORDER — BUDESONIDE AND FORMOTEROL FUMARATE DIHYDRATE 160; 4.5 UG/1; UG/1
2 AEROSOL RESPIRATORY (INHALATION) 2 TIMES DAILY
Qty: 3 EACH | Refills: 3 | Status: SHIPPED | OUTPATIENT
Start: 2020-09-25 | End: 2023-06-18

## 2020-09-25 ASSESSMENT — FIBROSIS 4 INDEX: FIB4 SCORE: 1.666666666666666667

## 2020-09-25 ASSESSMENT — ACTIVITIES OF DAILY LIVING (ADL): BATHING_REQUIRES_ASSISTANCE: 0

## 2020-09-25 ASSESSMENT — PATIENT HEALTH QUESTIONNAIRE - PHQ9: CLINICAL INTERPRETATION OF PHQ2 SCORE: 0

## 2020-09-25 ASSESSMENT — ENCOUNTER SYMPTOMS: GENERAL WELL-BEING: FAIR

## 2020-09-25 NOTE — PROGRESS NOTES
CC: Follow-up diabetes due for wellness exam.    HPI:   Jessica presents today with the following.      1. Medicare annual wellness visit, subsequent  Screenings performed below information given on advanced directives    2. Type 2 diabetes mellitus with hyperglycemia, without long-term current use of insulin (HCC)  Is due for repeat of A1c trending down from 7.1-6.9 on check today in office.  Denying any hypoglycemia or side effects from medication.  She is current with her eye exam no complaints related to diabetes requesting refill        Information for advance directives given to patient or instructed to bring in advance directives into to office to put in chart.      POLST/AD    Does the patient have a POLST or Advanced Directive?  No    Depression Screening    Little interest or pleasure in doing things?  0 - not at all  Feeling down, depressed , or hopeless? 0 - not at all  Patient Health Questionnaire Score: 0     If depressive symptoms identified deferred to follow up visit unless specifically addressed in assessment and plan.    Interpretation of PHQ-9 Total Score   Score Severity   1-4 No Depression   5-9 Mild Depression   10-14 Moderate Depression   15-19 Moderately Severe Depression   20-27 Severe Depression    Screening for Cognitive Impairment    Three Minute Recall (river, nation, finger) 3/3    Edwardo clock face with all 12 numbers and set the hands to show 10 past 11.  Yes 5/5  Cognitive concerns identified deferred for follow up unless specifically addressed in assessment and plan.    Fall Risk Assessment    Has the patient had two or more falls in the last year or any fall with injury in the last year?  No    Safety Assessment    Throw rugs on floor.  Yes  Handrails on all stairs.  Yes  Good lighting in all hallways.  Yes  Difficulty hearing.  No  Patient counseled about all safety risks that were identified.    Functional Assessment ADLs    Are there any barriers preventing you from cooking for  yourself or meeting nutritional needs?  No.    Are there any barriers preventing you from driving safely or obtaining transportation?  No.    Are there any barriers preventing you from using a telephone or calling for help?  No.    Are there any barriers preventing you from shopping?  No.    Are there any barriers preventing you from taking care of your own finances?  No.    Are there any barriers preventing you from managing your medications?  No.    Are there any barriers preventing you from showering, bathing or dressing yourself?  No.    Are you currently engaging in any exercise or physical activity?  No.     What is your perception of your health?  Fair.      Health Maintenance Summary                Annual Pulmonary Function Test / Spirometry Overdue 3/7/2014      Done 3/7/2013 PFT DICTATED RESULTS    Annual Wellness Visit Overdue 8/21/2020      Done 8/21/2019 Visit Dx: Medicare annual wellness visit, subsequent     Patient has more history with this topic...    DIABETES MONOFILAMENT / LE EXAM Overdue 8/21/2020      Done 8/21/2019 AMB DIABETIC MONOFILAMENT LOWER EXTREMITY EXAM     Patient has more history with this topic...    IMM INFLUENZA Overdue 9/1/2020      Done 10/1/2019 Imm Admin: Influenza Vaccine Adult HD     Patient has more history with this topic...    IMM DTaP/Tdap/Td Vaccine Postponed 5/10/2023 Originally 5/15/1959. Patient Refused    IMM ZOSTER VACCINES Postponed 2/8/2035 Originally 5/15/1992. Insurance/Financial    IMM HEP B VACCINE Postponed 2/16/2035 Originally 5/15/1959. Insurance/Financial    A1C SCREENING Next Due 12/10/2020      Done 6/10/2020 HEMOGLOBIN A1C     Patient has more history with this topic...    RETINAL SCREENING Next Due 3/11/2021      Done 9/11/2020 REFERRAL FOR RETINAL SCREENING EXAM     Patient has more history with this topic...    FASTING LIPID PROFILE Next Due 6/10/2021      Done 6/10/2020 LIPID PANEL     Patient has more history with this topic...    URINE ACR /  MICROALBUMIN Next Due 6/10/2021      Done 6/10/2020 MICROALB/CREAT RATIO RAND. UR     Patient has more history with this topic...    SERUM CREATININE Next Due 7/29/2021      Done 7/29/2020 BASIC METABOLIC PANEL (8)     Patient has more history with this topic...    COLONOSCOPY Next Due 8/2/2024      Previously completed 8/2/2014           Patient Care Team:  Anurag Moreno M.D. as PCP - General (Internal Medicine)  Carlo Sanchez D.O. as Consulting Physician (Gastroenterology)  HUDSON Cook as Consulting Physician (Urology)  Carlo Li M.D. as Consulting Physician (Ophthalmology)  [employer] as Respiratory (DME Supplier)            Health Care Screening: Age-appropriate preventive services that Medicare covers were discussed today and ordered as indicated and agreed upon by the patient, and as recommended by USPTF and ACIP.     Patient Active Problem List    Diagnosis Date Noted   • Systolic dysfunction without heart failure 05/15/2019   • Chronic respiratory failure with hypoxia (HCC) 05/08/2019   • Pulmonary HTN (HCC) 04/29/2019   • Pulmonary nodule 04/29/2019   • Chronic anticoagulation 04/29/2019   • Iron deficiency anemia, unspecified 09/14/2016   • Castro's esophagus without dysplasia 08/31/2016   • Primary insomnia 07/29/2016   • Hypothyroidism due to acquired atrophy of thyroid 11/29/2015   • Lung nodules 06/09/2015   • Type 2 diabetes mellitus with hyperglycemia, without long-term current use of insulin (HCC) 04/30/2014   • Essential hypertension 04/30/2014   • Chronic obstructive pulmonary disease (HCC) 04/30/2014   • Dyslipidemia 04/30/2014   • History of CVA (cerebrovascular accident) 06/02/2013       Current Outpatient Medications   Medication Sig Dispense Refill   • DILTIAZem CD (CARDIZEM CD) 240 MG CAPSULE SR 24 HR Take 1 Cap by mouth every day. 90 Cap 3   • traZODone (DESYREL) 100 MG Tab Take 1 Tab by mouth every bedtime. 90 Tab 3   • albuterol (PROAIR HFA) 108 (90 Base) MCG/ACT Aero  Soln inhalation aerosol Inhale 1 Puff by mouth every 6 hours as needed. FOR SHORTNESS OF BREATH. 1 Each 11   • budesonide-formoterol (SYMBICORT) 160-4.5 MCG/ACT Aerosol Inhale 2 Puffs by mouth 2 Times a Day. 3 Each 3   • levothyroxine (SYNTHROID) 75 MCG Tab Take 1 Tab by mouth Every morning on an empty stomach. 90 Tab 3   • metformin (GLUCOPHAGE) 1000 MG tablet Take 1 Tab by mouth 2 times a day. 180 Tab 2   • Dulaglutide (TRULICITY) 1.5 MG/0.5ML Solution Pen-injector Inject 0.5 mL as instructed every 7 days. 12 Each 3   • gabapentin (NEURONTIN) 300 MG Cap Take 1 Cap by mouth 2 Times a Day. 180 Cap 3   • lisinopril (PRINIVIL) 20 MG Tab TAKE 1 TABLET BY MOUTH EVERY DAY 90 Tab 3   • pravastatin (PRAVACHOL) 40 MG tablet TAKE 1 TABLET BY MOUTH EVERY DAY 90 Tab 3   • nystatin (MYCOSTATIN) 509289 UNIT/GM Cream topical cream Apply 1 g to affected area(s) 2 times a day. 60 g 11   • fluticasone (FLONASE) 50 MCG/ACT nasal spray Spray 2 Sprays in nose every day. 16 g 0   • omeprazole (PRILOSEC) 40 MG delayed-release capsule Take 40 mg by mouth every day.     • rivaroxaban (XARELTO) 15 MG Tab tablet Take 15 mg by mouth with dinner.     • MYRBETRIQ 25 MG TABLET SR 24 HR      • metoprolol (LOPRESSOR) 25 MG Tab TAKE 1 TABLET BY MOUTH TWICE A DAY       No current facility-administered medications for this visit.        Family History   Problem Relation Age of Onset   • Cancer Mother         breast   • Lung Disease Mother         COPD/Emphysema/Smoker   • Stroke Father    • Hypertension Father    • Hyperlipidemia Father    • Heart Attack Father    • Cancer Sister         Pancriatic   • Arrythmia Sister    • Lung Disease Sister         COPD/Emphysema/Smoker   • Bipolar disorder Son    • Bipolar disorder Son    • Bipolar disorder Son    • Cancer Maternal Aunt         stomach       Social History     Socioeconomic History   • Marital status:      Spouse name: Not on file   • Number of children: Not on file   • Years of education:  Not on file   • Highest education level: Not on file   Occupational History   • Not on file   Social Needs   • Financial resource strain: Not on file   • Food insecurity     Worry: Not on file     Inability: Not on file   • Transportation needs     Medical: Not on file     Non-medical: Not on file   Tobacco Use   • Smoking status: Former Smoker     Packs/day: 0.50     Years: 52.00     Pack years: 26.00     Types: Cigarettes     Quit date: 2013     Years since quittin.7   • Smokeless tobacco: Never Used   • Tobacco comment: started smoking at age 21   Substance and Sexual Activity   • Alcohol use: Yes     Alcohol/week: 8.4 oz     Types: 7 Shots of liquor, 7 Standard drinks or equivalent per week     Frequency: 2-3 times a week     Comment: Once or twice a week.   • Drug use: No   • Sexual activity: Never     Partners: Male     Birth control/protection: Post-Menopausal   Lifestyle   • Physical activity     Days per week: Not on file     Minutes per session: Not on file   • Stress: Not on file   Relationships   • Social connections     Talks on phone: Not on file     Gets together: Not on file     Attends Denominational service: Not on file     Active member of club or organization: Not on file     Attends meetings of clubs or organizations: Not on file     Relationship status: Not on file   • Intimate partner violence     Fear of current or ex partner: Not on file     Emotionally abused: Not on file     Physically abused: Not on file     Forced sexual activity: Not on file   Other Topics Concern   • Not on file   Social History Narrative   • Not on file       Past Surgical History:   Procedure Laterality Date   • LUMBAR FUSION POSTERIOR N/A 2019    Procedure: FUSION, SPINE, LUMBAR, PLIF - L4-5, L5-S1 TLIF;  Surgeon: Grupo Cabrales M.D.;  Location: SURGERY [facility];  Service: Neurosurgery   • LUMBAR DECOMPRESSION N/A 2019    Procedure: DECOMPRESSION, SPINE, LUMBAR;  Surgeon: Grupo Cabrales M.D.;   "Location: SURGERY [facility];  Service: Neurosurgery   • SHOULDER DECOMPRESSION ARTHROSCOPIC Left 10/11/2016    Procedure: SHOULDER DECOMPRESSION ARTHROSCOPIC  with   debridment of the labrum and biceps tendon stump;  Surgeon: Bill Ponce M.D.;  Location: SURGERY [facility];  Service:    • CLAVICLE RESECTION Left 10/11/2016    Procedure: CLAVICLE RESECTION distal open  ;  Surgeon: Bill Ponce M.D.;  Location: SURGERY [facility];  Service:    • CHONDROPLASTY Left 10/11/2016    Procedure: Arthroscopic CHONDROPLASTY of the joint  ;  Surgeon: Bill Ponce M.D.;  Location: SURGERY [facility];  Service:    • SHOULDER DECOMPRESSION ARTHROSCOPIC Left 2016    Procedure: SHOULDER DECOMPRESSION ARTHROSCOPIC SUBACROMIAL and biceps tenotomy;  Surgeon: Johan Roberts M.D.;  Location: SURGERY [facility];  Service:    • GASTROSCOPY-ENDO  2014    Performed by Yonas Grimes Jr., M.D. at ENDOSCOPY [facility]   • TONSILLECTOMY     • LAMINOTOMY     • PB ANESTH, DELIVERY ONLY      X 2       Allergies as of 2020 - Reviewed 2020   Allergen Reaction Noted   • Bactrim Hives 2013   • Demerol Shortness of Breath and Swelling 2013   • Pcn [penicillins] Hives 2013   • Sulfa drugs Hives 2013   • Ferrous sulfate Rash 2014   • Tramadol Itching 2016   • Latex Contact Dermatitis 2013        ROS: Denies Chest pain, SOB, LE edema.    /86 (BP Location: Right arm, Patient Position: Sitting)   Pulse (!) 103   Temp 36.1 °C (97 °F)   Ht 1.727 m (5' 8\")   Wt 93 kg (205 lb)   SpO2 93%   BMI 31.17 kg/m²     Physical Exam:  Gen:         Alert and oriented, No apparent distress.  Neck:        No Lymphadenopathy or Bruits.  Lungs:     Clear to auscultation bilaterally  CV:          Regular rate and rhythm. No murmurs, rubs or gallops.  Abd:         Soft non tender, non distended. Normal active bowel sounds.  No  " Hepatosplenomegaly, No pulsatile masses.                   Ext:          No clubbing, cyanosis, edema.      Assessment and Plan.   80 y.o. female with the following issues.    1. Medicare annual wellness visit, subsequent  Discussed healthy lifestyle habits as well as screening regimens.  Discussion about safe lifestyle practices, seatbelts, sunscreen, dietary recommendations.  - Subsequent Annual Wellness Visit - Includes PPPS ()    2. Type 2 diabetes mellitus with hyperglycemia, without long-term current use of insulin (HCC)  Currently well controlled no changes. Discussed diet and exercise recheck A1c in 6 months. Reminded about yearly eye exam.  - POCT Hemoglobin A1C  - Diabetic Monofilament Lower Extremity Exam    3. Essential hypertension  Currently well controlled, Discuss diet, exercise and salt restriction.  No change to medication therapy.  - Subsequent Annual Wellness Visit - Includes PPPS ()    4. Dyslipidemia  Lipids currently well controlled. Discussed continued diet and exercise recheck 6 months to 1 year.  - Subsequent Annual Wellness Visit - Includes PPPS ()    5. Other emphysema (HCC)  Stable no worsening symptoms  - Subsequent Annual Wellness Visit - Includes PPPS ()    6. Chronic respiratory failure with hypoxia (HCC)  Use therapy at night  - Subsequent Annual Wellness Visit - Includes PPPS ()    7. Pulmonary HTN (HCC)  Follow-up with cardiology  - Subsequent Annual Wellness Visit - Includes PPPS ()    8. Systolic dysfunction without heart failure  Denying any progressive edema  - Subsequent Annual Wellness Visit - Includes PPPS ()    9. Primary insomnia  Stable  - Subsequent Annual Wellness Visit - Includes PPPS ()    10. Hypothyroidism due to acquired atrophy of thyroid  Currently adequately replaced, recheck 6 months to one year.  - Subsequent Annual Wellness Visit - Includes PPPS ()    11. History of CVA (cerebrovascular accident)  No signs of  residual  - Subsequent Annual Wellness Visit - Includes PPPS ()    12. Chronic obstructive pulmonary disease, unspecified COPD type (HCC)  Stable  - budesonide-formoterol (SYMBICORT) 160-4.5 MCG/ACT Aerosol; Inhale 2 Puffs by mouth 2 Times a Day.  Dispense: 3 Each; Refill: 3    13. Thyroid condition  Refilled medication  - levothyroxine (SYNTHROID) 75 MCG Tab; Take 1 Tab by mouth Every morning on an empty stomach.  Dispense: 90 Tab; Refill: 3        Referrals offered: Community-based lifestyle interventions to reduce health risks and promote self-management and wellness, fall prevention, nutrition, physical activity, tobacco-use cessation, weight loss, and mental health services as per orders if indicated.    Discussion today about general wellness and lifestyle habits:    · Prevent falls and reduce trip hazards; Cautioned about securing or removing rugs.  · Have a working fire alarm and carbon monoxide detector;   · Engage in regular physical activity and social activities

## 2020-11-13 ENCOUNTER — TELEPHONE (OUTPATIENT)
Dept: CARDIOLOGY | Facility: MEDICAL CENTER | Age: 80
End: 2020-11-13

## 2020-11-13 DIAGNOSIS — Z79.01 CHRONIC ANTICOAGULATION: ICD-10-CM

## 2020-11-13 DIAGNOSIS — I48.91 ATRIAL FIBRILLATION, UNSPECIFIED TYPE (HCC): ICD-10-CM

## 2020-11-16 NOTE — TELEPHONE ENCOUNTER
S/w pt - advised her to HOLD Xarelto 24 hours prior to procedure per package insert, then restart IMMEDIATELY after procedure. Pt verbalized understanding    Kellie Gunderson, UrielD

## 2020-11-16 NOTE — TELEPHONE ENCOUNTER
Discussed clearance with JS. Pt is a very high risk for if holding medication and does not see benefit in bridging medication. Pt's total CHADS2 vascular score 7. Recommends discussing risks with pt and referring to Anticoagulation Clinic to re-evaluate risk.    Called pt to discuss risks and she is agreeable to referral to anticoagulation clinic for further re-evaluation. Pt says she is very eager to have procedure due to pain. Referral order placed.    To Anticoagulation Clinic

## 2020-11-20 NOTE — TELEPHONE ENCOUNTER
Revised clearance letter drafted per STEPHAN, faxed to [facility] at [phone], received receipt for confirmation.

## 2021-01-11 DIAGNOSIS — Z23 NEED FOR VACCINATION: ICD-10-CM

## 2021-04-05 ENCOUNTER — HOSPITAL ENCOUNTER (OUTPATIENT)
Dept: LAB | Facility: MEDICAL CENTER | Age: 81
End: 2021-04-05
Attending: INTERNAL MEDICINE
Payer: MEDICARE

## 2021-04-05 LAB
ALBUMIN SERPL BCP-MCNC: 4.2 G/DL (ref 3.2–4.9)
ALBUMIN/GLOB SERPL: 1.4 G/DL
ALP SERPL-CCNC: 81 U/L (ref 30–99)
ALT SERPL-CCNC: 29 U/L (ref 2–50)
ANION GAP SERPL CALC-SCNC: 11 MMOL/L (ref 7–16)
AST SERPL-CCNC: 26 U/L (ref 12–45)
BILIRUB SERPL-MCNC: 0.8 MG/DL (ref 0.1–1.5)
BUN SERPL-MCNC: 19 MG/DL (ref 8–22)
CALCIUM SERPL-MCNC: 9.8 MG/DL (ref 8.5–10.5)
CHLORIDE SERPL-SCNC: 102 MMOL/L (ref 96–112)
CHOLEST SERPL-MCNC: 156 MG/DL (ref 100–199)
CO2 SERPL-SCNC: 29 MMOL/L (ref 20–33)
CREAT SERPL-MCNC: 1.12 MG/DL (ref 0.5–1.4)
CREAT UR-MCNC: 38.36 MG/DL
EST. AVERAGE GLUCOSE BLD GHB EST-MCNC: 154 MG/DL
FASTING STATUS PATIENT QL REPORTED: NORMAL
GLOBULIN SER CALC-MCNC: 3 G/DL (ref 1.9–3.5)
GLUCOSE SERPL-MCNC: 164 MG/DL (ref 65–99)
HBA1C MFR BLD: 7 % (ref 4–5.6)
HDLC SERPL-MCNC: 62 MG/DL
LDLC SERPL CALC-MCNC: 64 MG/DL
MICROALBUMIN UR-MCNC: <1.2 MG/DL
MICROALBUMIN/CREAT UR: NORMAL MG/G (ref 0–30)
POTASSIUM SERPL-SCNC: 5 MMOL/L (ref 3.6–5.5)
PROT SERPL-MCNC: 7.2 G/DL (ref 6–8.2)
SODIUM SERPL-SCNC: 142 MMOL/L (ref 135–145)
TRIGL SERPL-MCNC: 148 MG/DL (ref 0–149)

## 2021-04-05 PROCEDURE — 36415 COLL VENOUS BLD VENIPUNCTURE: CPT

## 2021-04-05 PROCEDURE — 82570 ASSAY OF URINE CREATININE: CPT

## 2021-04-05 PROCEDURE — 84443 ASSAY THYROID STIM HORMONE: CPT

## 2021-04-05 PROCEDURE — 80061 LIPID PANEL: CPT

## 2021-04-05 PROCEDURE — 80053 COMPREHEN METABOLIC PANEL: CPT

## 2021-04-05 PROCEDURE — 82043 UR ALBUMIN QUANTITATIVE: CPT

## 2021-04-05 PROCEDURE — 83036 HEMOGLOBIN GLYCOSYLATED A1C: CPT | Mod: GA

## 2021-04-11 LAB — TEST NAME 95000: NORMAL

## 2021-06-29 DIAGNOSIS — E78.5 DYSLIPIDEMIA: ICD-10-CM

## 2021-06-30 RX ORDER — PRAVASTATIN SODIUM 40 MG
TABLET ORAL
Qty: 30 TABLET | Refills: 0 | Status: SHIPPED | OUTPATIENT
Start: 2021-06-30 | End: 2021-07-27

## 2021-07-25 DIAGNOSIS — E78.5 DYSLIPIDEMIA: ICD-10-CM

## 2021-07-27 RX ORDER — PRAVASTATIN SODIUM 40 MG
40 TABLET ORAL
Qty: 14 TABLET | Refills: 0 | Status: SHIPPED | OUTPATIENT
Start: 2021-07-27

## 2021-11-07 DIAGNOSIS — I10 ESSENTIAL HYPERTENSION: Chronic | ICD-10-CM

## 2021-11-09 RX ORDER — LISINOPRIL 20 MG/1
20 TABLET ORAL
Qty: 90 TABLET | Refills: 0 | Status: ON HOLD | OUTPATIENT
Start: 2021-11-09 | End: 2022-12-29

## 2021-11-11 ENCOUNTER — TELEPHONE (OUTPATIENT)
Dept: CARDIOLOGY | Facility: MEDICAL CENTER | Age: 81
End: 2021-11-11

## 2021-11-11 NOTE — TELEPHONE ENCOUNTER
----- Message from Candi Mckeon R.N. sent at 11/9/2021  5:45 PM PST -----  Regarding: needs appt  Please call pt for follow up appointment (1yr FV for further evaluation and med refills). Thank you!

## 2022-01-26 ENCOUNTER — HOSPITAL ENCOUNTER (OUTPATIENT)
Dept: RADIOLOGY | Facility: MEDICAL CENTER | Age: 82
End: 2022-01-26
Attending: ANESTHESIOLOGY
Payer: MEDICARE

## 2022-01-26 DIAGNOSIS — Z01.89 ENCOUNTER FOR OTHER SPECIFIED SPECIAL EXAMINATIONS: ICD-10-CM

## 2022-01-26 PROCEDURE — 71046 X-RAY EXAM CHEST 2 VIEWS: CPT

## 2022-01-29 DIAGNOSIS — I10 ESSENTIAL HYPERTENSION: Chronic | ICD-10-CM

## 2022-02-03 RX ORDER — LISINOPRIL 20 MG/1
TABLET ORAL
Qty: 90 TABLET | Refills: 0 | OUTPATIENT
Start: 2022-02-03

## 2022-02-03 NOTE — TELEPHONE ENCOUNTER
Ivone Dobbins       11/11/21 3:36 PM  Note  Called PT- she is no longer with [facility] and is going to another cardiology practice.   SLC

## 2022-11-09 ENCOUNTER — PATIENT MESSAGE (OUTPATIENT)
Dept: HEALTH INFORMATION MANAGEMENT | Facility: OTHER | Age: 82
End: 2022-11-09

## 2022-12-25 ENCOUNTER — APPOINTMENT (OUTPATIENT)
Dept: RADIOLOGY | Facility: MEDICAL CENTER | Age: 82
DRG: 872 | End: 2022-12-25
Attending: EMERGENCY MEDICINE
Payer: MEDICARE

## 2022-12-25 ENCOUNTER — HOSPITAL ENCOUNTER (INPATIENT)
Facility: MEDICAL CENTER | Age: 82
LOS: 4 days | DRG: 872 | End: 2022-12-29
Attending: EMERGENCY MEDICINE | Admitting: STUDENT IN AN ORGANIZED HEALTH CARE EDUCATION/TRAINING PROGRAM
Payer: MEDICARE

## 2022-12-25 DIAGNOSIS — R78.81 BACTEREMIA: ICD-10-CM

## 2022-12-25 DIAGNOSIS — I48.91 ATRIAL FIBRILLATION, UNSPECIFIED TYPE (HCC): ICD-10-CM

## 2022-12-25 DIAGNOSIS — N39.0 URINARY TRACT INFECTION WITHOUT HEMATURIA, SITE UNSPECIFIED: ICD-10-CM

## 2022-12-25 DIAGNOSIS — E03.4 HYPOTHYROIDISM DUE TO ACQUIRED ATROPHY OF THYROID: ICD-10-CM

## 2022-12-25 DIAGNOSIS — A41.9 ACUTE SEPSIS (HCC): ICD-10-CM

## 2022-12-25 LAB
ALBUMIN SERPL BCP-MCNC: 3.3 G/DL (ref 3.2–4.9)
ALBUMIN/GLOB SERPL: 0.9 G/DL
ALP SERPL-CCNC: 118 U/L (ref 30–99)
ALT SERPL-CCNC: 25 U/L (ref 2–50)
ANION GAP SERPL CALC-SCNC: 11 MMOL/L (ref 7–16)
APPEARANCE UR: ABNORMAL
AST SERPL-CCNC: 20 U/L (ref 12–45)
BACTERIA #/AREA URNS HPF: ABNORMAL /HPF
BASOPHILS # BLD AUTO: 0.7 % (ref 0–1.8)
BASOPHILS # BLD: 0.13 K/UL (ref 0–0.12)
BILIRUB SERPL-MCNC: 0.8 MG/DL (ref 0.1–1.5)
BILIRUB UR QL STRIP.AUTO: NEGATIVE
BUN SERPL-MCNC: 15 MG/DL (ref 8–22)
CALCIUM ALBUM COR SERPL-MCNC: 9.4 MG/DL (ref 8.5–10.5)
CALCIUM SERPL-MCNC: 8.8 MG/DL (ref 8.5–10.5)
CHLORIDE SERPL-SCNC: 97 MMOL/L (ref 96–112)
CO2 SERPL-SCNC: 24 MMOL/L (ref 20–33)
COLOR UR: YELLOW
CREAT SERPL-MCNC: 0.87 MG/DL (ref 0.5–1.4)
EKG IMPRESSION: NORMAL
EKG IMPRESSION: NORMAL
EOSINOPHIL # BLD AUTO: 0.02 K/UL (ref 0–0.51)
EOSINOPHIL NFR BLD: 0.1 % (ref 0–6.9)
EPI CELLS #/AREA URNS HPF: ABNORMAL /HPF
ERYTHROCYTE [DISTWIDTH] IN BLOOD BY AUTOMATED COUNT: 50.9 FL (ref 35.9–50)
FLUAV RNA SPEC QL NAA+PROBE: NEGATIVE
FLUBV RNA SPEC QL NAA+PROBE: NEGATIVE
GFR SERPLBLD CREATININE-BSD FMLA CKD-EPI: 67 ML/MIN/1.73 M 2
GLOBULIN SER CALC-MCNC: 3.6 G/DL (ref 1.9–3.5)
GLUCOSE SERPL-MCNC: 293 MG/DL (ref 65–99)
GLUCOSE UR STRIP.AUTO-MCNC: 250 MG/DL
HCT VFR BLD AUTO: 39.6 % (ref 37–47)
HGB BLD-MCNC: 13.2 G/DL (ref 12–16)
HYALINE CASTS #/AREA URNS LPF: ABNORMAL /LPF
IMM GRANULOCYTES # BLD AUTO: 0.17 K/UL (ref 0–0.11)
IMM GRANULOCYTES NFR BLD AUTO: 0.9 % (ref 0–0.9)
INR PPP: 1.09 (ref 0.87–1.13)
KETONES UR STRIP.AUTO-MCNC: NEGATIVE MG/DL
LACTATE SERPL-SCNC: 1.3 MMOL/L (ref 0.5–2)
LEUKOCYTE ESTERASE UR QL STRIP.AUTO: ABNORMAL
LYMPHOCYTES # BLD AUTO: 0.58 K/UL (ref 1–4.8)
LYMPHOCYTES NFR BLD: 2.9 % (ref 22–41)
MCH RBC QN AUTO: 32.6 PG (ref 27–33)
MCHC RBC AUTO-ENTMCNC: 33.3 G/DL (ref 33.6–35)
MCV RBC AUTO: 97.8 FL (ref 81.4–97.8)
MICRO URNS: ABNORMAL
MONOCYTES # BLD AUTO: 0.83 K/UL (ref 0–0.85)
MONOCYTES NFR BLD AUTO: 4.2 % (ref 0–13.4)
NEUTROPHILS # BLD AUTO: 18.06 K/UL (ref 2–7.15)
NEUTROPHILS NFR BLD: 91.2 % (ref 44–72)
NITRITE UR QL STRIP.AUTO: POSITIVE
NRBC # BLD AUTO: 0 K/UL
NRBC BLD-RTO: 0 /100 WBC
PH UR STRIP.AUTO: 6 [PH] (ref 5–8)
PLATELET # BLD AUTO: 184 K/UL (ref 164–446)
PMV BLD AUTO: 10.3 FL (ref 9–12.9)
POTASSIUM SERPL-SCNC: 4.4 MMOL/L (ref 3.6–5.5)
PROCALCITONIN SERPL-MCNC: 0.16 NG/ML
PROT SERPL-MCNC: 6.9 G/DL (ref 6–8.2)
PROT UR QL STRIP: 100 MG/DL
PROTHROMBIN TIME: 13.9 SEC (ref 12–14.6)
RBC # BLD AUTO: 4.05 M/UL (ref 4.2–5.4)
RBC # URNS HPF: ABNORMAL /HPF
RBC UR QL AUTO: ABNORMAL
RSV RNA SPEC QL NAA+PROBE: NEGATIVE
SARS-COV-2 RNA RESP QL NAA+PROBE: NOTDETECTED
SODIUM SERPL-SCNC: 132 MMOL/L (ref 135–145)
SP GR UR STRIP.AUTO: 1.02
SPECIMEN SOURCE: NORMAL
TSH SERPL DL<=0.005 MIU/L-ACNC: 2.64 UIU/ML (ref 0.38–5.33)
UROBILINOGEN UR STRIP.AUTO-MCNC: 1 MG/DL
WBC # BLD AUTO: 19.8 K/UL (ref 4.8–10.8)
WBC #/AREA URNS HPF: ABNORMAL /HPF

## 2022-12-25 PROCEDURE — 93005 ELECTROCARDIOGRAM TRACING: CPT | Performed by: EMERGENCY MEDICINE

## 2022-12-25 PROCEDURE — 83605 ASSAY OF LACTIC ACID: CPT

## 2022-12-25 PROCEDURE — A9270 NON-COVERED ITEM OR SERVICE: HCPCS | Performed by: STUDENT IN AN ORGANIZED HEALTH CARE EDUCATION/TRAINING PROGRAM

## 2022-12-25 PROCEDURE — 87077 CULTURE AEROBIC IDENTIFY: CPT

## 2022-12-25 PROCEDURE — 700102 HCHG RX REV CODE 250 W/ 637 OVERRIDE(OP): Performed by: EMERGENCY MEDICINE

## 2022-12-25 PROCEDURE — C9803 HOPD COVID-19 SPEC COLLECT: HCPCS | Performed by: EMERGENCY MEDICINE

## 2022-12-25 PROCEDURE — 700117 HCHG RX CONTRAST REV CODE 255: Performed by: EMERGENCY MEDICINE

## 2022-12-25 PROCEDURE — 81001 URINALYSIS AUTO W/SCOPE: CPT

## 2022-12-25 PROCEDURE — 87086 URINE CULTURE/COLONY COUNT: CPT

## 2022-12-25 PROCEDURE — 85025 COMPLETE CBC W/AUTO DIFF WBC: CPT

## 2022-12-25 PROCEDURE — A9270 NON-COVERED ITEM OR SERVICE: HCPCS | Performed by: EMERGENCY MEDICINE

## 2022-12-25 PROCEDURE — 87186 SC STD MICRODIL/AGAR DIL: CPT

## 2022-12-25 PROCEDURE — 71045 X-RAY EXAM CHEST 1 VIEW: CPT

## 2022-12-25 PROCEDURE — 80053 COMPREHEN METABOLIC PANEL: CPT

## 2022-12-25 PROCEDURE — 700102 HCHG RX REV CODE 250 W/ 637 OVERRIDE(OP)

## 2022-12-25 PROCEDURE — 74177 CT ABD & PELVIS W/CONTRAST: CPT

## 2022-12-25 PROCEDURE — 85610 PROTHROMBIN TIME: CPT

## 2022-12-25 PROCEDURE — 99223 1ST HOSP IP/OBS HIGH 75: CPT | Mod: AI | Performed by: STUDENT IN AN ORGANIZED HEALTH CARE EDUCATION/TRAINING PROGRAM

## 2022-12-25 PROCEDURE — 87040 BLOOD CULTURE FOR BACTERIA: CPT | Mod: 91

## 2022-12-25 PROCEDURE — 96374 THER/PROPH/DIAG INJ IV PUSH: CPT

## 2022-12-25 PROCEDURE — 770020 HCHG ROOM/CARE - TELE (206)

## 2022-12-25 PROCEDURE — 84443 ASSAY THYROID STIM HORMONE: CPT

## 2022-12-25 PROCEDURE — 99285 EMERGENCY DEPT VISIT HI MDM: CPT

## 2022-12-25 PROCEDURE — 36415 COLL VENOUS BLD VENIPUNCTURE: CPT

## 2022-12-25 PROCEDURE — A9270 NON-COVERED ITEM OR SERVICE: HCPCS

## 2022-12-25 PROCEDURE — 84145 PROCALCITONIN (PCT): CPT

## 2022-12-25 PROCEDURE — 0241U HCHG SARS-COV-2 COVID-19 NFCT DS RESP RNA 4 TRGT MIC: CPT

## 2022-12-25 PROCEDURE — 87150 DNA/RNA AMPLIFIED PROBE: CPT

## 2022-12-25 PROCEDURE — 700111 HCHG RX REV CODE 636 W/ 250 OVERRIDE (IP): Performed by: EMERGENCY MEDICINE

## 2022-12-25 PROCEDURE — 700105 HCHG RX REV CODE 258: Performed by: STUDENT IN AN ORGANIZED HEALTH CARE EDUCATION/TRAINING PROGRAM

## 2022-12-25 PROCEDURE — 700102 HCHG RX REV CODE 250 W/ 637 OVERRIDE(OP): Performed by: STUDENT IN AN ORGANIZED HEALTH CARE EDUCATION/TRAINING PROGRAM

## 2022-12-25 PROCEDURE — 93005 ELECTROCARDIOGRAM TRACING: CPT

## 2022-12-25 RX ORDER — IPRATROPIUM BROMIDE AND ALBUTEROL SULFATE 2.5; .5 MG/3ML; MG/3ML
3 SOLUTION RESPIRATORY (INHALATION)
Status: DISCONTINUED | OUTPATIENT
Start: 2022-12-25 | End: 2022-12-29 | Stop reason: HOSPADM

## 2022-12-25 RX ORDER — CHOLECALCIFEROL (VITAMIN D3) 125 MCG
5 CAPSULE ORAL ONCE
Status: COMPLETED | OUTPATIENT
Start: 2022-12-25 | End: 2022-12-25

## 2022-12-25 RX ORDER — ACETAMINOPHEN 325 MG/1
650 TABLET ORAL ONCE
Status: COMPLETED | OUTPATIENT
Start: 2022-12-25 | End: 2022-12-25

## 2022-12-25 RX ORDER — ACETAMINOPHEN 325 MG/1
650 TABLET ORAL EVERY 6 HOURS PRN
Status: DISCONTINUED | OUTPATIENT
Start: 2022-12-25 | End: 2022-12-29 | Stop reason: HOSPADM

## 2022-12-25 RX ORDER — GABAPENTIN 300 MG/1
300 CAPSULE ORAL 2 TIMES DAILY
Status: DISCONTINUED | OUTPATIENT
Start: 2022-12-25 | End: 2022-12-29 | Stop reason: HOSPADM

## 2022-12-25 RX ORDER — BUDESONIDE AND FORMOTEROL FUMARATE DIHYDRATE 160; 4.5 UG/1; UG/1
2 AEROSOL RESPIRATORY (INHALATION) 2 TIMES DAILY
Status: DISCONTINUED | OUTPATIENT
Start: 2022-12-25 | End: 2022-12-29 | Stop reason: HOSPADM

## 2022-12-25 RX ORDER — OMEPRAZOLE 20 MG/1
40 CAPSULE, DELAYED RELEASE ORAL DAILY
Status: DISCONTINUED | OUTPATIENT
Start: 2022-12-25 | End: 2022-12-29 | Stop reason: HOSPADM

## 2022-12-25 RX ORDER — SODIUM CHLORIDE 9 MG/ML
INJECTION, SOLUTION INTRAVENOUS CONTINUOUS
Status: ACTIVE | OUTPATIENT
Start: 2022-12-25 | End: 2022-12-26

## 2022-12-25 RX ORDER — LISINOPRIL 20 MG/1
20 TABLET ORAL
Status: DISCONTINUED | OUTPATIENT
Start: 2022-12-25 | End: 2022-12-29 | Stop reason: HOSPADM

## 2022-12-25 RX ORDER — DILTIAZEM HYDROCHLORIDE 240 MG/1
240 CAPSULE, COATED, EXTENDED RELEASE ORAL DAILY
Status: DISCONTINUED | OUTPATIENT
Start: 2022-12-26 | End: 2022-12-29 | Stop reason: HOSPADM

## 2022-12-25 RX ORDER — AMOXICILLIN 250 MG
2 CAPSULE ORAL 2 TIMES DAILY
Status: DISCONTINUED | OUTPATIENT
Start: 2022-12-26 | End: 2022-12-29 | Stop reason: HOSPADM

## 2022-12-25 RX ORDER — DILTIAZEM HYDROCHLORIDE 240 MG/1
240 CAPSULE, COATED, EXTENDED RELEASE ORAL DAILY
Status: DISCONTINUED | OUTPATIENT
Start: 2022-12-25 | End: 2022-12-25

## 2022-12-25 RX ORDER — PRAVASTATIN SODIUM 20 MG
40 TABLET ORAL
Status: DISCONTINUED | OUTPATIENT
Start: 2022-12-25 | End: 2022-12-29 | Stop reason: HOSPADM

## 2022-12-25 RX ORDER — CEFTRIAXONE 2 G/1
2000 INJECTION, POWDER, FOR SOLUTION INTRAMUSCULAR; INTRAVENOUS ONCE
Status: COMPLETED | OUTPATIENT
Start: 2022-12-25 | End: 2022-12-25

## 2022-12-25 RX ORDER — ALBUTEROL SULFATE 90 UG/1
1 AEROSOL, METERED RESPIRATORY (INHALATION) EVERY 6 HOURS PRN
Status: DISCONTINUED | OUTPATIENT
Start: 2022-12-25 | End: 2022-12-25

## 2022-12-25 RX ORDER — BISACODYL 10 MG
10 SUPPOSITORY, RECTAL RECTAL
Status: DISCONTINUED | OUTPATIENT
Start: 2022-12-25 | End: 2022-12-29 | Stop reason: HOSPADM

## 2022-12-25 RX ORDER — POLYETHYLENE GLYCOL 3350 17 G/17G
1 POWDER, FOR SOLUTION ORAL
Status: DISCONTINUED | OUTPATIENT
Start: 2022-12-25 | End: 2022-12-29 | Stop reason: HOSPADM

## 2022-12-25 RX ORDER — LEVOTHYROXINE SODIUM 0.07 MG/1
75 TABLET ORAL
Status: DISCONTINUED | OUTPATIENT
Start: 2022-12-26 | End: 2022-12-29 | Stop reason: HOSPADM

## 2022-12-25 RX ADMIN — SODIUM CHLORIDE: 9 INJECTION, SOLUTION INTRAVENOUS at 16:10

## 2022-12-25 RX ADMIN — GABAPENTIN 300 MG: 300 CAPSULE ORAL at 21:40

## 2022-12-25 RX ADMIN — IOHEXOL 100 ML: 350 INJECTION, SOLUTION INTRAVENOUS at 15:00

## 2022-12-25 RX ADMIN — LISINOPRIL 20 MG: 20 TABLET ORAL at 21:40

## 2022-12-25 RX ADMIN — PRAVASTATIN SODIUM 40 MG: 20 TABLET ORAL at 21:40

## 2022-12-25 RX ADMIN — Medication 5 MG: at 21:40

## 2022-12-25 RX ADMIN — ACETAMINOPHEN 650 MG: 325 TABLET, FILM COATED ORAL at 12:35

## 2022-12-25 RX ADMIN — RIVAROXABAN 15 MG: 15 TABLET, FILM COATED ORAL at 21:40

## 2022-12-25 RX ADMIN — CEFTRIAXONE SODIUM 2000 MG: 2 INJECTION, POWDER, FOR SOLUTION INTRAMUSCULAR; INTRAVENOUS at 13:16

## 2022-12-25 RX ADMIN — OMEPRAZOLE 40 MG: 20 CAPSULE, DELAYED RELEASE ORAL at 21:40

## 2022-12-25 ASSESSMENT — ENCOUNTER SYMPTOMS
NAUSEA: 0
BACK PAIN: 0
ABDOMINAL PAIN: 0
HEADACHES: 0
SENSORY CHANGE: 0
COUGH: 0
FLANK PAIN: 0
PALPITATIONS: 0
EYE PAIN: 0
TREMORS: 1
CHILLS: 0
DIZZINESS: 0
BLURRED VISION: 0
FEVER: 0
VOMITING: 0
FALLS: 0
LOSS OF CONSCIOUSNESS: 0
INSOMNIA: 0
FOCAL WEAKNESS: 0
SHORTNESS OF BREATH: 0

## 2022-12-25 ASSESSMENT — LIFESTYLE VARIABLES
TOTAL SCORE: 0
EVER FELT BAD OR GUILTY ABOUT YOUR DRINKING: NO
CONSUMPTION TOTAL: NEGATIVE
SUBSTANCE_ABUSE: 0
ALCOHOL_USE: NO
EVER HAD A DRINK FIRST THING IN THE MORNING TO STEADY YOUR NERVES TO GET RID OF A HANGOVER: NO
HAVE PEOPLE ANNOYED YOU BY CRITICIZING YOUR DRINKING: NO
HAVE YOU EVER FELT YOU SHOULD CUT DOWN ON YOUR DRINKING: NO
AVERAGE NUMBER OF DAYS PER WEEK YOU HAVE A DRINK CONTAINING ALCOHOL: 2
ON A TYPICAL DAY WHEN YOU DRINK ALCOHOL HOW MANY DRINKS DO YOU HAVE: 2
DOES PATIENT WANT TO STOP DRINKING: NO
HOW MANY TIMES IN THE PAST YEAR HAVE YOU HAD 5 OR MORE DRINKS IN A DAY: 0

## 2022-12-25 ASSESSMENT — COGNITIVE AND FUNCTIONAL STATUS - GENERAL
WALKING IN HOSPITAL ROOM: A LITTLE
SUGGESTED CMS G CODE MODIFIER DAILY ACTIVITY: CH
STANDING UP FROM CHAIR USING ARMS: A LITTLE
MOVING TO AND FROM BED TO CHAIR: A LITTLE
MOVING FROM LYING ON BACK TO SITTING ON SIDE OF FLAT BED: A LITTLE
MOBILITY SCORE: 18
CLIMB 3 TO 5 STEPS WITH RAILING: A LOT
SUGGESTED CMS G CODE MODIFIER MOBILITY: CK
DAILY ACTIVITIY SCORE: 24

## 2022-12-25 ASSESSMENT — PAIN DESCRIPTION - PAIN TYPE: TYPE: ACUTE PAIN

## 2022-12-25 ASSESSMENT — FIBROSIS 4 INDEX: FIB4 SCORE: 1.74

## 2022-12-25 NOTE — ED TRIAGE NOTES
Chief Complaint   Patient presents with    Dizziness     Pt BIBA for dizzy spells as well as generalized weakness over the past few weeks. Pt's  also reports pt has episodes of right hand shaking that last approx. 1 min and pt was seen by her PCP who recommended she come to the ER for evaluation of her hand shaking. EMS also reports pt was in afib for them and patient does not recall ever being diagnosed with afib.

## 2022-12-25 NOTE — ASSESSMENT & PLAN NOTE
Patient with history of UTI's  Currently denies any urinary symptoms  UA is positive for infection  Patient with fever 102.2 in ER, tachycardia- improving  CT abdomen pelvis negative for acute abnormalities  Urine culture - prelim lactose fermenting gm neg rods - following  B/c so far negative  Continue iv ceftriaxone  following

## 2022-12-25 NOTE — ED PROVIDER NOTES
ED Provider Note    CHIEF COMPLAINT  Chief Complaint   Patient presents with    Dizziness     Pt BIBA for dizzy spells as well as generalized weakness over the past few weeks. Pt's  also reports pt has episodes of right hand shaking that last approx. 1 min and pt was seen by her PCP who recommended she come to the ER for evaluation of her hand shaking. EMS also reports pt was in afib for them and patient does not recall ever being diagnosed with afib.         LIMITATION TO HISTORY   Select: : None    HPI  Jessica Plaza is a 82 y.o. female who presents complaining of feeling dizzy and shaky over the last few weeks.  She just does not feel well.  She cannot put a finger on it.  She was not aware that she had a fever.  She has had a cough with some congestion.  Her  points out that that is not different than usual however.  Patient for her part has no shortness of breath does not believe she has an abnormal cough.  Her stomach is little bit upset, but both she and her  point out that is a longstanding issue with her, and this is not seem to be new either.  She has had no change in urine.  I do note from outpatient records that she has been seen multiple times for UTI.  She has had no stool changes.  She denies any rashes.  No earache or sore throat.  No known sick contacts.  No exposures.  There is no other complaint.    OUTSIDE HISTORIAN(S):  Select: Family augmented by her  as above    EXTERNAL RECORDS REVIEWED  Select: Outpatient Notes as noted above    REVIEW OF SYSTEMS  See HPI for further details. All other systems are negative.     PAST MEDICAL HISTORY   has a past medical history of Anemia, Arthritis, Asthma, Atrial fibrillation ([facility]), Castro esophagus, Bronchitis, Chickenpox, Chronic anticoagulation (4/29/2019), COPD, COPD (chronic obstructive pulmonary disease) ([facility]), Dental disorder, Diabetes, Disorder of thyroid, Emphysema of lung ([facility]), Encounter for medication refill  (2018), GERD (gastroesophageal reflux disease), [place] measles, Heart burn, Hemorrhagic disorder, High cholesterol, Hypertension, Indigestion, Pain, Pneumonia (2013.), Stroke (HCC) (), Tonsillitis, and UTI (urinary tract infection).    SURGICAL HISTORY   has a past surgical history that includes gastroscopy-endo (2014); tonsillectomy (1957); anesth, delivery only; shoulder decompression arthroscopic (Left, 2016); shoulder decompression arthroscopic (Left, 10/11/2016); clavicle resection (Left, 10/11/2016); chondroplasty (Left, 10/11/2016); fusion, spine, lumbar, plif (N/A, 2019); lumbar decompression (N/A, 2019); and laminotomy.    FAMILY HISTORY  Family History   Problem Relation Age of Onset    Cancer Mother         breast    Lung Disease Mother         COPD/Emphysema/Smoker    Stroke Father     Hypertension Father     Hyperlipidemia Father     Heart Attack Father     Cancer Sister         Pancriatic    Arrythmia Sister     Lung Disease Sister         COPD/Emphysema/Smoker    Bipolar disorder Son     Bipolar disorder Son     Bipolar disorder Son     Cancer Maternal Aunt         stomach       SOCIAL HISTORY  Social History     Tobacco Use    Smoking status: Former     Packs/day: 0.50     Years: 52.00     Pack years: 26.00     Types: Cigarettes     Quit date: 2013     Years since quittin.9    Smokeless tobacco: Never    Tobacco comments:     started smoking at age 21   Vaping Use    Vaping Use: Never used   Substance and Sexual Activity    Alcohol use: Yes     Alcohol/week: 8.4 oz     Types: 7 Shots of liquor, 7 Standard drinks or equivalent per week     Comment: Once or twice a week.    Drug use: No    Sexual activity: Never     Partners: Male     Birth control/protection: Post-Menopausal       CURRENT MEDICATIONS  Home Medications    **Home medications have not yet been reviewed for this encounter**         ALLERGIES  Allergies   Allergen Reactions    Bactrim Hives     "Demerol Shortness of Breath and Swelling    Pcn [Penicillins] Hives     Tolerates ceftriaxone and cefazolin    Sulfa Drugs Hives    Ferrous Sulfate Rash     itching    Tramadol Itching     rash    Latex Contact Dermatitis       PHYSICAL EXAM  VITAL SIGNS: BP (!) 169/74   Pulse (!) 125   Temp (!) 39 °C (102.2 °F) (Oral)   Resp 20   Ht 1.727 m (5' 8\")   Wt 82.1 kg (181 lb)   SpO2 96%   BMI 27.52 kg/m²    Constitutional: Frail, somewhat tired but not toxic in appearance.  Skin is warm to the touch.  Occasional dry cough.  HENT: Head is without trauma.  Oropharynx is clear.  Mucous membranes are moist.  Eyes: Sclerae are nonicteric, pupils are equally round.  Neck: Supple with grossly normal range of motion.  Cardiovascular: Mildly tachycardic, in the 1 teens on the monitor.  Peripheral pulses are intact and symmetric throughout.  Thorax & Lungs: Breathing easily.  Good air movement.  There is no wheeze, rhonchi or rales.  Abdomen: Bowel sounds normal, soft, non-distended.  She has mild diffuse tenderness with deep palpation.  I do not appreciate a mass or any hepatosplenomegaly.  No clinical Greene sign.  Skin: No apparent rash.  I do not see petechiae or purpura.  Extremities: No evidence of acute trauma.  No clubbing, cyanosis, edema, no Homans or cords.  Neurologic: Alert. Moving all extremities.  Intact strength throughout.  Gait is not assessed.  Psychiatric: Normal for situation      DIAGNOSTIC STUDIES / PROCEDURES  EKG    To my interpretation this is a 12-lead study showing a rhythm which is likely atrial flutter also possible atrial fibrillation.  The rate is 123.  There are no ST segment or T wave changes.  Impression: Suspect atrial flutter.  This appears similar to prior tracings in our computer system.    LABS  Abnormal Labs Reviewed - No abnormal labs to display    RADIOLOGY  I have independently interpreted the diagnostic imaging associated with this visit and am waiting the final reading from the " radiologist. Select: EKG(s) noted, X-ray(s) noted, and CT scan(s) noted  CT-ABDOMEN-PELVIS WITH   Final Result      1.  Cholelithiasis appears to be present.      2.  Otherwise no acute abnormalities are identified in the abdomen or pelvis.      DX-CHEST-PORTABLE (1 VIEW)   Final Result         Mild blunting of left costophrenic angle could be due to small left pleural fluid collection versus opacification or atelectasis in the region.      No other infiltrates or consolidations identified.            COURSE & MEDICAL DECISION MAKING  Pertinent Labs & Imaging studies reviewed. (See chart for details)    Differential Diagnosis Considered  Sepsis, severe sepsis, UTI, pneumonia, diverticulitis,  Escalation of care considered, and ultimately not performed: IV fluids, blood analysis, and diagnostic imaging.    Diagnostic tests and prescription drugs considered including, but not limited to: Select: Antibiotics ceftriaxone .      This patient presents with shakiness, dizziness.  She is found to be febrile and tachycardic.  Although she has a cough and belly pain, these things appear chronic for her.  She has a history of frequent UTIs it appears.  Although on the other hand she denies any urinary symptoms.  At this point, sepsis protocol is initiated.  We will give her antipyretics.  We will give her ceftriaxone per guidelines to cover sepsis with uncertain primary source as we work her up.  Viral testing is also sent in addition to a procalcitonin in the usual laboratory studies.    Trays returned showing a leukocytosis.  Viral panel is negative.  Basic imaging unrevealing.  Lactic acid is normal, and her procalcitonin.  Heart rate is coming down.  Still not have a urinalysis back yet so I ordered a CT scan looking for intra-abdominal pathology.  Subsequently her urine returned showing pyuria and bacteriuria.  I suspect this is the etiology of her symptoms.  She had already been given ceftriaxone previously which will  cover this.    CT scan does show gallstones, but no acute inflammatory changes.    I discussed the patient's case with the [facility] hospitalist, Dr. Rai, who will be admitting her.    FINAL IMPRESSION  1. Acute sepsis (HCC)    2. Urinary tract infection without hematuria, site unspecified           Electronically signed by: Benjie Schneider M.D., 12/25/2022 12:42 PM

## 2022-12-25 NOTE — ED NOTES
Pt admitted to hospital, vss, no apparent distress, all belongings and chart sent with patient, pt transported to T823/02 with HARMONY Brandon on monitor.

## 2022-12-25 NOTE — ASSESSMENT & PLAN NOTE
This is Sepsis Present on admission  SIRS criteria identified on my evaluation include: Fever, with temperature greater than 101 deg F  Source is urinary  Sepsis protocol initiated  Fluid resuscitation ordered per protocol  Crystalloid Fluid Administration: Fluid resuscitation ordered per standard protocol - 30 mL/kg per current or ideal body weight  IV antibiotics as appropriate for source of sepsis  Reassessment: I have reassessed the patient's hemodynamic status  Preliminary UA growing lactose fermenting gm neg kristian - b/c so far negative - following  Continue ceftriaxone  Cbc remains elevated at 19  following

## 2022-12-25 NOTE — H&P
Hospital Medicine History & Physical Note    Date of Service  12/25/2022    Primary Care Physician  Anurag Moreno M.D.      Code Status  Full Code    Chief Complaint  Chief Complaint   Patient presents with    Dizziness     Pt BIBA for dizzy spells as well as generalized weakness over the past few weeks. Pt's  also reports pt has episodes of right hand shaking that last approx. 1 min and pt was seen by her PCP who recommended she come to the ER for evaluation of her hand shaking. EMS also reports pt was in afib for them and patient does not recall ever being diagnosed with afib.         History of Presenting Illness  Jessica Plaza is a 82 y.o. female who presented 12/25/2022 with shaking. Patient with history of atrial fibrillation on xarelto, hypertension, COPD, diabetes, who presents for shaking. Patient developed right hand shaking yesterday which is unusual for her, she has also had general tiredness and unwell feeling for past two weeks as well. She denies any other focal symptoms including chest pain, dyspnea, cough, abdominal pain, nausea, vomiting, fever, chills, sweats, dysuria, urinary frequency, diarrhea, leg pain or swelling. Patient describes resting tremor of right hand. Denies memory deficits, extremity stiffness, difficulty ambulating or falls.  In the ED, T 102.2, , RR 20, /74, on 4L oxygen NC. WBC 19.8, glucose 293, UA with signs of infection, procal normal. CXR shows mild blunting left costophrenic angle. CT abdomen pelvis with no acute abnormalities. Patient will be admitted for treatment of sepsis due to suspected UTI.    I discussed the plan of care with patient and family.    Review of Systems  Review of Systems   Constitutional:  Negative for chills and fever.   Eyes:  Negative for blurred vision and pain.   Respiratory:  Negative for cough and shortness of breath.    Cardiovascular:  Negative for chest pain, palpitations and leg swelling.   Gastrointestinal:   Negative for abdominal pain, nausea and vomiting.   Genitourinary:  Negative for dysuria, flank pain and urgency.   Musculoskeletal:  Negative for back pain and falls.   Skin:  Negative for itching and rash.   Neurological:  Positive for tremors. Negative for dizziness, sensory change, focal weakness, loss of consciousness and headaches.   Psychiatric/Behavioral:  Negative for substance abuse. The patient does not have insomnia.      Past Medical History   has a past medical history of Anemia, Arthritis, Asthma, Atrial fibrillation ([facility]), Castro esophagus, Bronchitis, Chickenpox, Chronic anticoagulation (2019), COPD, COPD (chronic obstructive pulmonary disease) ([facility]), Dental disorder, Diabetes, Disorder of thyroid, Emphysema of lung ([facility]), Encounter for medication refill (2018), GERD (gastroesophageal reflux disease), [place] measles, Heart burn, Hemorrhagic disorder, High cholesterol, Hypertension, Indigestion, Pain, Pneumonia (.), Stroke ([facility]) (), Tonsillitis, and UTI (urinary tract infection).    Surgical History   has a past surgical history that includes gastroscopy-endo (2014); tonsillectomy (); pr anesth, delivery only; shoulder decompression arthroscopic (Left, 2016); shoulder decompression arthroscopic (Left, 10/11/2016); clavicle resection (Left, 10/11/2016); chondroplasty (Left, 10/11/2016); fusion, spine, lumbar, plif (N/A, 2019); lumbar decompression (N/A, 2019); and laminotomy.     Family History  family history includes Arrythmia in her sister; Bipolar disorder in her son, son, and son; Cancer in her maternal aunt, mother, and sister; Heart Attack in her father; Hyperlipidemia in her father; Hypertension in her father; Lung Disease in her mother and sister; Stroke in her father.   Family history reviewed with patient. There is no family history that is pertinent to the chief complaint.     Social History   reports that she quit smoking about 9 years  ago. Her smoking use included cigarettes. She has a 26.00 pack-year smoking history. She has never used smokeless tobacco. She reports current alcohol use of about 8.4 oz per week. She reports that she does not use drugs.    Allergies  Allergies   Allergen Reactions    Bactrim Hives    Demerol Shortness of Breath and Swelling    Pcn [Penicillins] Hives     Tolerates ceftriaxone and cefazolin    Sulfa Drugs Hives    Ferrous Sulfate Rash     itching    Tramadol Itching     rash    Latex Contact Dermatitis       Medications  Prior to Admission Medications   Prescriptions Last Dose Informant Patient Reported? Taking?   DILTIAZem CD (CARDIZEM CD) 240 MG CAPSULE SR 24 HR   No No   Sig: Take 1 Cap by mouth every day.   Dulaglutide (TRULICITY) 1.5 MG/0.5ML Solution Pen-injector   No No   Sig: Inject 0.5 mL as instructed every 7 days.   MYRBETRIQ 25 MG TABLET SR 24 HR   Yes No   albuterol (PROAIR HFA) 108 (90 Base) MCG/ACT Aero Soln inhalation aerosol   No No   Sig: Inhale 1 Puff by mouth every 6 hours as needed. FOR SHORTNESS OF BREATH.   budesonide-formoterol (SYMBICORT) 160-4.5 MCG/ACT Aerosol   No No   Sig: Inhale 2 Puffs by mouth 2 Times a Day.   fluticasone (FLONASE) 50 MCG/ACT nasal spray   Yes No   Sig: Spray 2 Sprays in nose every day.   gabapentin (NEURONTIN) 300 MG Cap   No No   Sig: Take 1 Cap by mouth 2 Times a Day.   levothyroxine (SYNTHROID) 75 MCG Tab   No No   Sig: Take 1 Tab by mouth Every morning on an empty stomach.   lisinopril (PRINIVIL) 20 MG Tab   No No   Sig: Take 1 Tablet by mouth every day. PLEASE CALL [phone] AND SCHEDULE A FOLLOW UP APPOINTMENT FOR FURTHER REFILLS. THANK YOU!   metformin (GLUCOPHAGE) 1000 MG tablet   No No   Sig: Take 1 Tab by mouth 2 times a day.   metoprolol (LOPRESSOR) 25 MG Tab   Yes No   Sig: TAKE 1 TABLET BY MOUTH TWICE A DAY   nystatin (MYCOSTATIN) 145824 UNIT/GM Cream topical cream   No No   Sig: Apply 1 g to affected area(s) 2 times a day.   omeprazole (PRILOSEC) 40  MG delayed-release capsule  Patient Yes No   Sig: Take 40 mg by mouth every day.   pravastatin (PRAVACHOL) 40 MG tablet   No No   Sig: Take 1 tablet by mouth every day. Please call to schedule follow up appointment for further refills. Please call [phone]. Thank you.   rivaroxaban (XARELTO) 15 MG Tab tablet  Patient Yes No   Sig: Take 15 mg by mouth with dinner.   traZODone (DESYREL) 100 MG Tab   No No   Sig: Take 1 Tab by mouth every bedtime.      Facility-Administered Medications: None       Physical Exam  Temp:  [37.7 °C (99.8 °F)-39 °C (102.2 °F)] 37.7 °C (99.8 °F)  Pulse:  [103-125] 103  Resp:  [20-26] 26  BP: (130-169)/(62-74) 130/62  SpO2:  [96 %-97 %] 96 %  Blood Pressure : 130/62   Temperature: 37.7 °C (99.8 °F)   Pulse: (!) 103   Respiration: (!) 26   Pulse Oximetry: 96 %       Physical Exam  Vitals reviewed.   Constitutional:       General: She is not in acute distress.     Appearance: She is not diaphoretic.   HENT:      Head: Normocephalic and atraumatic.      Right Ear: External ear normal.      Left Ear: External ear normal.      Nose: Nose normal. No congestion.      Mouth/Throat:      Pharynx: No oropharyngeal exudate or posterior oropharyngeal erythema.   Eyes:      Extraocular Movements: Extraocular movements intact.      Pupils: Pupils are equal, round, and reactive to light.   Cardiovascular:      Rate and Rhythm: Normal rate and regular rhythm.   Pulmonary:      Effort: Pulmonary effort is normal. No respiratory distress.      Breath sounds: Normal breath sounds. No wheezing or rales.   Abdominal:      General: Bowel sounds are normal. There is no distension.      Palpations: Abdomen is soft.      Tenderness: There is no abdominal tenderness. There is no guarding.   Musculoskeletal:         General: No swelling. Normal range of motion.      Cervical back: Normal range of motion and neck supple.      Right lower leg: No edema.      Left lower leg: No edema.   Skin:     General: Skin is warm  Heterosexual and dry.   Neurological:      General: No focal deficit present.      Mental Status: She is alert and oriented to person, place, and time.      Cranial Nerves: No cranial nerve deficit.      Sensory: No sensory deficit.      Motor: No weakness.   Psychiatric:         Mood and Affect: Mood normal.         Behavior: Behavior normal.       Laboratory:  Recent Labs     12/25/22  1240   WBC 19.8*   RBC 4.05*   HEMOGLOBIN 13.2   HEMATOCRIT 39.6   MCV 97.8   MCH 32.6   MCHC 33.3*   RDW 50.9*   PLATELETCT 184   MPV 10.3     Recent Labs     12/25/22  1240   SODIUM 132*   POTASSIUM 4.4   CHLORIDE 97   CO2 24   GLUCOSE 293*   BUN 15   CREATININE 0.87   CALCIUM 8.8     Recent Labs     12/25/22  1240   ALTSGPT 25   ASTSGOT 20   ALKPHOSPHAT 118*   TBILIRUBIN 0.8   GLUCOSE 293*         No results for input(s): NTPROBNP in the last 72 hours.      No results for input(s): TROPONINT in the last 72 hours.    Imaging:  CT-ABDOMEN-PELVIS WITH   Final Result      1.  Cholelithiasis appears to be present.      2.  Otherwise no acute abnormalities are identified in the abdomen or pelvis.      DX-CHEST-PORTABLE (1 VIEW)   Final Result         Mild blunting of left costophrenic angle could be due to small left pleural fluid collection versus opacification or atelectasis in the region.      No other infiltrates or consolidations identified.      EC-ECHOCARDIOGRAM COMPLETE W/O CONT    (Results Pending)       X-Ray:  I have personally reviewed the images and compared with prior images.    Assessment/Plan:  Justification for Admission Status  I anticipate this patient will require at least two midnights for appropriate medical management, necessitating inpatient admission because treatment of sepsis is expected to take >2 midnights.    Patient will need a Telemetry bed on MEDICAL service .  The need is secondary to continued IV antibiotics and treatment of sepsis.    * Sepsis (HCC)- (present on admission)  Assessment & Plan  This is Sepsis  Present on admission  SIRS criteria identified on my evaluation include: Fever, with temperature greater than 101 deg F  Source is urinary  Sepsis protocol initiated  Fluid resuscitation ordered per protocol  Crystalloid Fluid Administration: Fluid resuscitation ordered per standard protocol - 30 mL/kg per current or ideal body weight  IV antibiotics as appropriate for source of sepsis  Reassessment: I have reassessed the patient's hemodynamic status    UTI (urinary tract infection)  Assessment & Plan  Patient with history of UTI's  Currently denies any urinary symptoms  UA is positive for infection  Patient with fever 102.2 in ER, tachycardic, elevated BP  CT abdomen pelvis negative for acute abnormalities  Will empirically treat UTI as source of sepsis given presentation  Follow up urine culture    Atrial fibrillation (HCC)  Assessment & Plan  Continue home xarelto  Need to complete med rec; patient reports being on metoprolol at home; metoprolol discontinued by cardiology 2 years ago but no notes since then  Also on diltiazem at home?  Resume diltiazem for now, monitor heart rate    Hypothyroidism due to acquired atrophy of thyroid- (present on admission)  Assessment & Plan  Continue home thyroid medicine  Check thyroid levels    Chronic obstructive pulmonary disease (HCC)- (present on admission)  Assessment & Plan  Unclear baseline oxygen, on room air usually?  On 4L oxygen in ER, wean as able  No wheezing or dyspnea  Goal oxygen saturation 88-92%  Inhalers, duonebs prn        VTE prophylaxis: therapeutic anticoagulation with xarelto

## 2022-12-25 NOTE — ASSESSMENT & PLAN NOTE
No evidence of exacerbation  At baseline she is on 4L  On 4L oxygen in ER  following  No wheezing or dyspnea  Goal oxygen saturation 88-92%  Inhalers, duonebs prn

## 2022-12-26 ENCOUNTER — APPOINTMENT (OUTPATIENT)
Dept: CARDIOLOGY | Facility: MEDICAL CENTER | Age: 82
DRG: 872 | End: 2022-12-26
Attending: STUDENT IN AN ORGANIZED HEALTH CARE EDUCATION/TRAINING PROGRAM
Payer: MEDICARE

## 2022-12-26 PROBLEM — D72.829 LEUKOCYTOSIS: Status: ACTIVE | Noted: 2022-12-26

## 2022-12-26 LAB
ANION GAP SERPL CALC-SCNC: 7 MMOL/L (ref 7–16)
BUN SERPL-MCNC: 17 MG/DL (ref 8–22)
CALCIUM SERPL-MCNC: 8.6 MG/DL (ref 8.5–10.5)
CHLORIDE SERPL-SCNC: 96 MMOL/L (ref 96–112)
CO2 SERPL-SCNC: 27 MMOL/L (ref 20–33)
CREAT SERPL-MCNC: 1.16 MG/DL (ref 0.5–1.4)
ERYTHROCYTE [DISTWIDTH] IN BLOOD BY AUTOMATED COUNT: 52.8 FL (ref 35.9–50)
GFR SERPLBLD CREATININE-BSD FMLA CKD-EPI: 47 ML/MIN/1.73 M 2
GLUCOSE BLD STRIP.AUTO-MCNC: 296 MG/DL (ref 65–99)
GLUCOSE BLD STRIP.AUTO-MCNC: 302 MG/DL (ref 65–99)
GLUCOSE SERPL-MCNC: 271 MG/DL (ref 65–99)
HCT VFR BLD AUTO: 36.1 % (ref 37–47)
HGB BLD-MCNC: 11.8 G/DL (ref 12–16)
LV EJECT FRACT  99904: 65
LV EJECT FRACT MOD 4C 99902: 71.22
MCH RBC QN AUTO: 32.6 PG (ref 27–33)
MCHC RBC AUTO-ENTMCNC: 32.7 G/DL (ref 33.6–35)
MCV RBC AUTO: 99.7 FL (ref 81.4–97.8)
PLATELET # BLD AUTO: 178 K/UL (ref 164–446)
PMV BLD AUTO: 10.3 FL (ref 9–12.9)
POTASSIUM SERPL-SCNC: 4.1 MMOL/L (ref 3.6–5.5)
RBC # BLD AUTO: 3.62 M/UL (ref 4.2–5.4)
SODIUM SERPL-SCNC: 130 MMOL/L (ref 135–145)
WBC # BLD AUTO: 19.5 K/UL (ref 4.8–10.8)

## 2022-12-26 PROCEDURE — 93306 TTE W/DOPPLER COMPLETE: CPT

## 2022-12-26 PROCEDURE — 770020 HCHG ROOM/CARE - TELE (206)

## 2022-12-26 PROCEDURE — 85027 COMPLETE CBC AUTOMATED: CPT

## 2022-12-26 PROCEDURE — 700102 HCHG RX REV CODE 250 W/ 637 OVERRIDE(OP): Performed by: STUDENT IN AN ORGANIZED HEALTH CARE EDUCATION/TRAINING PROGRAM

## 2022-12-26 PROCEDURE — 700102 HCHG RX REV CODE 250 W/ 637 OVERRIDE(OP)

## 2022-12-26 PROCEDURE — 94760 N-INVAS EAR/PLS OXIMETRY 1: CPT

## 2022-12-26 PROCEDURE — 99233 SBSQ HOSP IP/OBS HIGH 50: CPT | Performed by: HOSPITALIST

## 2022-12-26 PROCEDURE — A9270 NON-COVERED ITEM OR SERVICE: HCPCS

## 2022-12-26 PROCEDURE — 700111 HCHG RX REV CODE 636 W/ 250 OVERRIDE (IP): Performed by: STUDENT IN AN ORGANIZED HEALTH CARE EDUCATION/TRAINING PROGRAM

## 2022-12-26 PROCEDURE — 82962 GLUCOSE BLOOD TEST: CPT | Mod: 91

## 2022-12-26 PROCEDURE — 36415 COLL VENOUS BLD VENIPUNCTURE: CPT

## 2022-12-26 PROCEDURE — 80048 BASIC METABOLIC PNL TOTAL CA: CPT

## 2022-12-26 PROCEDURE — 700102 HCHG RX REV CODE 250 W/ 637 OVERRIDE(OP): Performed by: HOSPITALIST

## 2022-12-26 PROCEDURE — 93306 TTE W/DOPPLER COMPLETE: CPT | Mod: 26 | Performed by: INTERNAL MEDICINE

## 2022-12-26 PROCEDURE — 700105 HCHG RX REV CODE 258: Performed by: STUDENT IN AN ORGANIZED HEALTH CARE EDUCATION/TRAINING PROGRAM

## 2022-12-26 PROCEDURE — A9270 NON-COVERED ITEM OR SERVICE: HCPCS | Performed by: STUDENT IN AN ORGANIZED HEALTH CARE EDUCATION/TRAINING PROGRAM

## 2022-12-26 RX ORDER — CHOLECALCIFEROL (VITAMIN D3) 125 MCG
5 CAPSULE ORAL ONCE
Status: COMPLETED | OUTPATIENT
Start: 2022-12-26 | End: 2022-12-26

## 2022-12-26 RX ADMIN — GABAPENTIN 300 MG: 300 CAPSULE ORAL at 18:05

## 2022-12-26 RX ADMIN — LEVOTHYROXINE SODIUM 75 MCG: 0.07 TABLET ORAL at 05:44

## 2022-12-26 RX ADMIN — INSULIN HUMAN 4 UNITS: 100 INJECTION, SOLUTION PARENTERAL at 20:14

## 2022-12-26 RX ADMIN — INSULIN HUMAN 3 UNITS: 100 INJECTION, SOLUTION PARENTERAL at 16:52

## 2022-12-26 RX ADMIN — OMEPRAZOLE 40 MG: 20 CAPSULE, DELAYED RELEASE ORAL at 05:44

## 2022-12-26 RX ADMIN — GABAPENTIN 300 MG: 300 CAPSULE ORAL at 05:44

## 2022-12-26 RX ADMIN — RIVAROXABAN 15 MG: 15 TABLET, FILM COATED ORAL at 18:04

## 2022-12-26 RX ADMIN — BUDESONIDE AND FORMOTEROL FUMARATE DIHYDRATE 2 PUFF: 160; 4.5 AEROSOL RESPIRATORY (INHALATION) at 05:44

## 2022-12-26 RX ADMIN — PRAVASTATIN SODIUM 40 MG: 20 TABLET ORAL at 18:04

## 2022-12-26 RX ADMIN — CEFTRIAXONE SODIUM 1000 MG: 10 INJECTION, POWDER, FOR SOLUTION INTRAVENOUS at 05:38

## 2022-12-26 RX ADMIN — SENNOSIDES AND DOCUSATE SODIUM 2 TABLET: 50; 8.6 TABLET ORAL at 18:05

## 2022-12-26 RX ADMIN — ACETAMINOPHEN 650 MG: 325 TABLET, FILM COATED ORAL at 10:45

## 2022-12-26 RX ADMIN — DILTIAZEM HYDROCHLORIDE 240 MG: 240 CAPSULE, COATED, EXTENDED RELEASE ORAL at 05:44

## 2022-12-26 RX ADMIN — ACETAMINOPHEN 650 MG: 325 TABLET, FILM COATED ORAL at 18:09

## 2022-12-26 RX ADMIN — ACETAMINOPHEN 650 MG: 325 TABLET, FILM COATED ORAL at 00:40

## 2022-12-26 RX ADMIN — SODIUM CHLORIDE: 9 INJECTION, SOLUTION INTRAVENOUS at 02:53

## 2022-12-26 RX ADMIN — Medication 5 MG: at 21:18

## 2022-12-26 ASSESSMENT — ENCOUNTER SYMPTOMS
DEPRESSION: 0
CHILLS: 0
COUGH: 0
BRUISES/BLEEDS EASILY: 0
HEADACHES: 0
EYES NEGATIVE: 1
SORE THROAT: 0
ABDOMINAL PAIN: 0
VOMITING: 0
GASTROINTESTINAL NEGATIVE: 1
WEAKNESS: 0
MYALGIAS: 1
CONSTITUTIONAL NEGATIVE: 1
RESPIRATORY NEGATIVE: 1
PALPITATIONS: 0
FOCAL WEAKNESS: 0
DIZZINESS: 0
SHORTNESS OF BREATH: 0
DIAPHORESIS: 0
NEUROLOGICAL NEGATIVE: 1
NAUSEA: 0
CARDIOVASCULAR NEGATIVE: 1
BLURRED VISION: 0
PSYCHIATRIC NEGATIVE: 1
FEVER: 0
WEIGHT LOSS: 0

## 2022-12-26 ASSESSMENT — LIFESTYLE VARIABLES: SUBSTANCE_ABUSE: 0

## 2022-12-26 ASSESSMENT — PAIN DESCRIPTION - PAIN TYPE
TYPE: CHRONIC PAIN
TYPE: ACUTE PAIN

## 2022-12-26 ASSESSMENT — FIBROSIS 4 INDEX: FIB4 SCORE: 1.8

## 2022-12-26 NOTE — PROGRESS NOTES
[facility] Medicine Daily Progress Note    Date of Service  12/26/2022    Chief Complaint  Jessica Plaza is a 82 y.o. female admitted 12/25/2022 with malaise and shaking    Hospital Course  Patient is an 82 year old female with history of atrial fibrillation on xarelto, hypertension, COPD, and diabetes. She presented to [facility] on 12/2522 with malaise and right hand shaking along with generalized tiredness and unwell feeling for two weeks. She denied any other focal symptoms including chest pain, dyspnea, cough, abdominal pain, nausea, vomiting, fever, chills, sweats, dysuria, urinary frequency, diarrhea, leg pain or swelling. Patient describes resting tremor of right hand. Denies memory deficits, extremity stiffness, difficulty ambulating or falls.  In the ED, she was found to be febrile with a T 102.2, tachycardiac with a , RR 20, /74, on 4L oxygen NC. WBC 19.8, glucose 293, UA with signs of infection, procal normal. CXR shows mild blunting left costophrenic angle. CT abdomen pelvis with no acute abnormalities. She was admitted for treatment of sepsis due to suspected UTI.    Interval Problem Update  Axox3,  at bedside, she reports she is feeling much better, shaking has resolved and malaise improving. Fever of 102 overnight but now afebrile. She denies sob, she is stable on her baseline 4L of oxygen. WBC remains elevated at 19. Chronic afib is rate controlled - she reports she does not take anticoagulation at home only 81 mg asa. ROS otherwise negative, cultures pending    I have discussed this patient's plan of care and discharge plan at IDT rounds today with Case Management, Nursing, Nursing leadership, and other members of the IDT team.    Consultants/Specialty    Code Status  Full Code    Disposition  Patient is not medically cleared for discharge.   Anticipate discharge to to home with close outpatient follow-up.  I have placed the appropriate orders for post-discharge  needs.    Review of Systems  Review of Systems   Constitutional: Negative.  Negative for chills, diaphoresis, fever, malaise/fatigue and weight loss.   HENT: Negative.  Negative for sore throat.    Eyes: Negative.  Negative for blurred vision.   Respiratory: Negative.  Negative for cough and shortness of breath.    Cardiovascular: Negative.  Negative for chest pain, palpitations and leg swelling.   Gastrointestinal: Negative.  Negative for abdominal pain, nausea and vomiting.   Genitourinary: Negative.  Negative for dysuria.   Musculoskeletal:  Positive for myalgias.   Skin: Negative.  Negative for itching and rash.   Neurological: Negative.  Negative for dizziness, focal weakness, weakness and headaches.   Endo/Heme/Allergies: Negative.  Does not bruise/bleed easily.   Psychiatric/Behavioral: Negative.  Negative for depression, substance abuse and suicidal ideas.    All other systems reviewed and are negative.     Physical Exam  Temp:  [36.5 °C (97.7 °F)-37.8 °C (100 °F)] 36.5 °C (97.7 °F)  Pulse:  [] 76  Resp:  [17-26] 17  BP: ()/(52-82) 102/52  SpO2:  [93 %-97 %] 93 %    Physical Exam  Vitals and nursing note reviewed. Exam conducted with a chaperone present.   Constitutional:       General: She is not in acute distress.     Appearance: Normal appearance. She is not diaphoretic.   HENT:      Head: Normocephalic.      Nose: Nose normal.      Mouth/Throat:      Mouth: Mucous membranes are moist.   Eyes:      Pupils: Pupils are equal, round, and reactive to light.   Cardiovascular:      Rate and Rhythm: Normal rate and regular rhythm.      Pulses: Normal pulses.      Heart sounds: Normal heart sounds.   Pulmonary:      Effort: Pulmonary effort is normal.      Breath sounds: Normal breath sounds.      Comments: Bs decreased at bases  Abdominal:      General: Abdomen is flat. Bowel sounds are normal.      Palpations: Abdomen is soft.   Musculoskeletal:         General: No swelling or deformity. Normal  range of motion.   Skin:     General: Skin is warm and dry.      Capillary Refill: Capillary refill takes less than 2 seconds.   Neurological:      General: No focal deficit present.      Mental Status: She is alert and oriented to person, place, and time.      Cranial Nerves: No cranial nerve deficit.   Psychiatric:         Mood and Affect: Mood normal.         Behavior: Behavior normal.       Fluids  No intake or output data in the 24 hours ending 12/26/22 1325    Laboratory  Recent Labs     12/25/22  1240 12/26/22  0120   WBC 19.8* 19.5*   RBC 4.05* 3.62*   HEMOGLOBIN 13.2 11.8*   HEMATOCRIT 39.6 36.1*   MCV 97.8 99.7*   MCH 32.6 32.6   MCHC 33.3* 32.7*   RDW 50.9* 52.8*   PLATELETCT 184 178   MPV 10.3 10.3     Recent Labs     12/25/22  1240 12/26/22  0120   SODIUM 132* 130*   POTASSIUM 4.4 4.1   CHLORIDE 97 96   CO2 24 27   GLUCOSE 293* 271*   BUN 15 17   CREATININE 0.87 1.16   CALCIUM 8.8 8.6     Recent Labs     12/25/22  1240   INR 1.09               Imaging  EC-ECHOCARDIOGRAM COMPLETE W/O CONT   Final Result      CT-ABDOMEN-PELVIS WITH   Final Result      1.  Cholelithiasis appears to be present.      2.  Otherwise no acute abnormalities are identified in the abdomen or pelvis.      DX-CHEST-PORTABLE (1 VIEW)   Final Result         Mild blunting of left costophrenic angle could be due to small left pleural fluid collection versus opacification or atelectasis in the region.      No other infiltrates or consolidations identified.           Assessment/Plan  * Sepsis (HCC)- (present on admission)  Assessment & Plan  This is Sepsis Present on admission  SIRS criteria identified on my evaluation include: Fever, with temperature greater than 101 deg F  Source is urinary  Sepsis protocol initiated  Fluid resuscitation ordered per protocol  Crystalloid Fluid Administration: Fluid resuscitation ordered per standard protocol - 30 mL/kg per current or ideal body weight  IV antibiotics as appropriate for source of  sepsis  Reassessment: I have reassessed the patient's hemodynamic status  Preliminary UA growing lactose fermenting gm neg kristian - b/c so far negative - following  Continue ceftriaxone  Cbc remains elevated at 19  following    Leukocytosis  Assessment & Plan  Due to infection  Remains high   following    UTI (urinary tract infection)  Assessment & Plan  Patient with history of UTI's  Currently denies any urinary symptoms  UA is positive for infection  Patient with fever 102.2 in ER, tachycardia- improving  CT abdomen pelvis negative for acute abnormalities  Urine culture - prelim lactose fermenting gm neg rods - following  B/c so far negative  Continue iv ceftriaxone  following    Chronic anticoagulation- (present on admission)  Assessment & Plan  States she is normally not on it, only 81 mg asa    Atrial fibrillation (HCC)  Assessment & Plan  She reports she is not on anticoagulation normally   Rate controlled on dilt and BB  following    Hypothyroidism due to acquired atrophy of thyroid- (present on admission)  Assessment & Plan  Continue home thyroid medicine      Chronic obstructive pulmonary disease (HCC)- (present on admission)  Assessment & Plan  No evidence of exacerbation  At baseline she is on 4L  On 4L oxygen in ER  following  No wheezing or dyspnea  Goal oxygen saturation 88-92%  Inhalers, duonebs prn    Type 2 diabetes mellitus with hyperglycemia, without long-term current use of insulin ([facility])- (present on admission)  Assessment & Plan  No dka  Will change to diabetic diet  Will start SSI  following         VTE prophylaxis: Xarelto 10 mg daily as prophylaxis    I have performed a physical exam and reviewed and updated ROS and Plan today (12/26/2022). In review of yesterday's note (12/25/2022), there are no changes except as documented above.

## 2022-12-26 NOTE — CARE PLAN
The patient is Stable - Low risk of patient condition declining or worsening    Shift Goals  Clinical Goals: Monitor heart rate and admin abx as ordered  Patient Goals: Go home in the morning    Progress made toward(s) clinical / shift goals: Heart rate stayed controlled afib     Patient is not progressing towards the following goals: continue to monitor labs       Problem: Hemodynamics  Goal: Patient's hemodynamics, fluid balance and neurologic status will be stable or improve  Outcome: Progressing     Problem: Fluid Volume  Goal: Fluid volume balance will be maintained  Outcome: Progressing     Problem: Respiratory  Goal: Patient will achieve/maintain optimum respiratory ventilation and gas exchange  Outcome: Progressing  Flowsheets (Taken 12/25/2022 2322 by Radha Salazar, C.N.A.)  O2 Delivery Device: Nasal Cannula  Note: Assess and monitor pulmonary status. Adjust oxygen as needed to maintain adequate saturation. Encourage ambulation, turning, coughing and deep breathing. Educate and encourage use of IS as needed. Continue to monitor.       Problem: Knowledge Deficit - Standard  Goal: Patient and family/care givers will demonstrate understanding of plan of care, disease process/condition, diagnostic tests and medications  Outcome: Progressing  Note: Discuss and review POC with patient/family. Re-educate as needed.

## 2022-12-26 NOTE — PROGRESS NOTES
Patient picked up from ED via stretcher and transported on zoll monitor. Aox4, denying pain. Remains on b/l 4L NC. Patient and spouse do not have a medication list to verify for patient's med rec; pharmacy also not open to verify medications. MD updated on incomplete med rec.

## 2022-12-26 NOTE — PROGRESS NOTES
Med Rec reviewed and completed with pt at bedside. Able to tell me last time she took each med and which ones she is not taking anymore.

## 2022-12-26 NOTE — DISCHARGE PLANNING
Case Management Discharge Planning    Admission Date: 12/25/2022  GMLOS: 3.5  ALOS: 1    6-Clicks ADL Score: 24  6-Clicks Mobility Score: 18      Anticipated Discharge Dispo: Discharge Disposition: Discharged to home/self care (01)    DME Needed: No    Action(s) Taken: Pt pending medical clearance, currently on 4 liters O2, pt uses O2 at baseline through [facility]. Case management needs pending clinical course.    Escalations Completed: None    Medically Clear: No    Next Steps: f/u with medical team and pt to discuss dc needs and barriers.    Barriers to Discharge: Medical clearance

## 2022-12-26 NOTE — PROGRESS NOTES
4 Eyes Skin Assessment Completed by Prashant Madison, RN and Shanta Bodwen, HARMONY.    Head WDL  Ears Blanching  Nose WDL  Mouth WDL  Neck WDL  Breast/Chest WDL  Shoulder Blades WDL  Spine WDL  (R) Arm/Elbow/Hand Blanching  (L) Arm/Elbow/Hand Blanching  Abdomen WDL  Groin WDL  Scrotum/Coccyx/Buttocks Scar (sacral/lumbar area from prior surgery)  (R) Leg WDL  (L) Leg WDL  (R) Heel/Foot/Toe Redness and Blanching  (L) Heel/Foot/Toe Redness and Blanching          Devices In Places Tele Box, Pulse Ox, and Nasal Cannula      Interventions In Place N/A    Possible Skin Injury No    Pictures Uploaded Into Epic N/A  Wound Consult Placed N/A  RN Wound Prevention Protocol Ordered No

## 2022-12-27 ENCOUNTER — APPOINTMENT (OUTPATIENT)
Dept: RADIOLOGY | Facility: MEDICAL CENTER | Age: 82
DRG: 872 | End: 2022-12-27
Attending: STUDENT IN AN ORGANIZED HEALTH CARE EDUCATION/TRAINING PROGRAM
Payer: MEDICARE

## 2022-12-27 PROBLEM — N17.9 AKI (ACUTE KIDNEY INJURY) (HCC): Status: ACTIVE | Noted: 2022-12-27

## 2022-12-27 LAB
ANION GAP SERPL CALC-SCNC: 10 MMOL/L (ref 7–16)
BACTERIA UR CULT: ABNORMAL
BACTERIA UR CULT: ABNORMAL
BASOPHILS # BLD AUTO: 0.5 % (ref 0–1.8)
BASOPHILS # BLD: 0.07 K/UL (ref 0–0.12)
BUN SERPL-MCNC: 28 MG/DL (ref 8–22)
CALCIUM SERPL-MCNC: 8.4 MG/DL (ref 8.5–10.5)
CHLORIDE SERPL-SCNC: 98 MMOL/L (ref 96–112)
CO2 SERPL-SCNC: 21 MMOL/L (ref 20–33)
CREAT SERPL-MCNC: 1.85 MG/DL (ref 0.5–1.4)
EOSINOPHIL # BLD AUTO: 0.15 K/UL (ref 0–0.51)
EOSINOPHIL NFR BLD: 1 % (ref 0–6.9)
ERYTHROCYTE [DISTWIDTH] IN BLOOD BY AUTOMATED COUNT: 54.6 FL (ref 35.9–50)
GFR SERPLBLD CREATININE-BSD FMLA CKD-EPI: 27 ML/MIN/1.73 M 2
GLUCOSE BLD STRIP.AUTO-MCNC: 185 MG/DL (ref 65–99)
GLUCOSE BLD STRIP.AUTO-MCNC: 189 MG/DL (ref 65–99)
GLUCOSE BLD STRIP.AUTO-MCNC: 240 MG/DL (ref 65–99)
GLUCOSE BLD STRIP.AUTO-MCNC: 240 MG/DL (ref 65–99)
GLUCOSE BLD STRIP.AUTO-MCNC: 245 MG/DL (ref 65–99)
GLUCOSE SERPL-MCNC: 197 MG/DL (ref 65–99)
HCT VFR BLD AUTO: 35.2 % (ref 37–47)
HGB BLD-MCNC: 11.1 G/DL (ref 12–16)
IMM GRANULOCYTES # BLD AUTO: 0.09 K/UL (ref 0–0.11)
IMM GRANULOCYTES NFR BLD AUTO: 0.6 % (ref 0–0.9)
LYMPHOCYTES # BLD AUTO: 1.01 K/UL (ref 1–4.8)
LYMPHOCYTES NFR BLD: 6.9 % (ref 22–41)
MCH RBC QN AUTO: 31.8 PG (ref 27–33)
MCHC RBC AUTO-ENTMCNC: 31.5 G/DL (ref 33.6–35)
MCV RBC AUTO: 100.9 FL (ref 81.4–97.8)
MONOCYTES # BLD AUTO: 0.84 K/UL (ref 0–0.85)
MONOCYTES NFR BLD AUTO: 5.8 % (ref 0–13.4)
NEUTROPHILS # BLD AUTO: 12.44 K/UL (ref 2–7.15)
NEUTROPHILS NFR BLD: 85.2 % (ref 44–72)
NRBC # BLD AUTO: 0 K/UL
NRBC BLD-RTO: 0 /100 WBC
PLATELET # BLD AUTO: 176 K/UL (ref 164–446)
PMV BLD AUTO: 10.6 FL (ref 9–12.9)
POTASSIUM SERPL-SCNC: 4 MMOL/L (ref 3.6–5.5)
RBC # BLD AUTO: 3.49 M/UL (ref 4.2–5.4)
SIGNIFICANT IND 70042: ABNORMAL
SITE SITE: ABNORMAL
SODIUM SERPL-SCNC: 129 MMOL/L (ref 135–145)
SOURCE SOURCE: ABNORMAL
WBC # BLD AUTO: 14.6 K/UL (ref 4.8–10.8)

## 2022-12-27 PROCEDURE — A9270 NON-COVERED ITEM OR SERVICE: HCPCS | Performed by: STUDENT IN AN ORGANIZED HEALTH CARE EDUCATION/TRAINING PROGRAM

## 2022-12-27 PROCEDURE — 700102 HCHG RX REV CODE 250 W/ 637 OVERRIDE(OP): Performed by: STUDENT IN AN ORGANIZED HEALTH CARE EDUCATION/TRAINING PROGRAM

## 2022-12-27 PROCEDURE — 85025 COMPLETE CBC W/AUTO DIFF WBC: CPT

## 2022-12-27 PROCEDURE — 82962 GLUCOSE BLOOD TEST: CPT | Mod: 91

## 2022-12-27 PROCEDURE — 770020 HCHG ROOM/CARE - TELE (206)

## 2022-12-27 PROCEDURE — 87040 BLOOD CULTURE FOR BACTERIA: CPT | Mod: 91

## 2022-12-27 PROCEDURE — 700101 HCHG RX REV CODE 250: Performed by: STUDENT IN AN ORGANIZED HEALTH CARE EDUCATION/TRAINING PROGRAM

## 2022-12-27 PROCEDURE — 99233 SBSQ HOSP IP/OBS HIGH 50: CPT | Performed by: STUDENT IN AN ORGANIZED HEALTH CARE EDUCATION/TRAINING PROGRAM

## 2022-12-27 PROCEDURE — 700111 HCHG RX REV CODE 636 W/ 250 OVERRIDE (IP): Performed by: STUDENT IN AN ORGANIZED HEALTH CARE EDUCATION/TRAINING PROGRAM

## 2022-12-27 PROCEDURE — 36415 COLL VENOUS BLD VENIPUNCTURE: CPT

## 2022-12-27 PROCEDURE — 76775 US EXAM ABDO BACK WALL LIM: CPT

## 2022-12-27 PROCEDURE — 80048 BASIC METABOLIC PNL TOTAL CA: CPT

## 2022-12-27 RX ORDER — CEFDINIR 300 MG/1
300 CAPSULE ORAL DAILY
Status: DISCONTINUED | OUTPATIENT
Start: 2022-12-28 | End: 2022-12-29

## 2022-12-27 RX ADMIN — INSULIN HUMAN 2 UNITS: 100 INJECTION, SOLUTION PARENTERAL at 12:03

## 2022-12-27 RX ADMIN — ACETAMINOPHEN 650 MG: 325 TABLET, FILM COATED ORAL at 14:40

## 2022-12-27 RX ADMIN — INSULIN HUMAN 2 UNITS: 100 INJECTION, SOLUTION PARENTERAL at 20:36

## 2022-12-27 RX ADMIN — BUDESONIDE AND FORMOTEROL FUMARATE DIHYDRATE 2 PUFF: 160; 4.5 AEROSOL RESPIRATORY (INHALATION) at 18:26

## 2022-12-27 RX ADMIN — SENNOSIDES AND DOCUSATE SODIUM 2 TABLET: 50; 8.6 TABLET ORAL at 18:25

## 2022-12-27 RX ADMIN — SENNOSIDES AND DOCUSATE SODIUM 2 TABLET: 50; 8.6 TABLET ORAL at 06:37

## 2022-12-27 RX ADMIN — INSULIN HUMAN 2 UNITS: 100 INJECTION, SOLUTION PARENTERAL at 16:43

## 2022-12-27 RX ADMIN — OMEPRAZOLE 40 MG: 20 CAPSULE, DELAYED RELEASE ORAL at 06:35

## 2022-12-27 RX ADMIN — CEFTRIAXONE SODIUM 1000 MG: 10 INJECTION, POWDER, FOR SOLUTION INTRAVENOUS at 06:35

## 2022-12-27 RX ADMIN — ACETAMINOPHEN 650 MG: 325 TABLET, FILM COATED ORAL at 07:40

## 2022-12-27 RX ADMIN — BUDESONIDE AND FORMOTEROL FUMARATE DIHYDRATE 2 PUFF: 160; 4.5 AEROSOL RESPIRATORY (INHALATION) at 06:35

## 2022-12-27 RX ADMIN — GABAPENTIN 300 MG: 300 CAPSULE ORAL at 06:37

## 2022-12-27 RX ADMIN — LEVOTHYROXINE SODIUM 75 MCG: 0.07 TABLET ORAL at 06:36

## 2022-12-27 RX ADMIN — GABAPENTIN 300 MG: 300 CAPSULE ORAL at 18:24

## 2022-12-27 RX ADMIN — INSULIN HUMAN 1 UNITS: 100 INJECTION, SOLUTION PARENTERAL at 06:30

## 2022-12-27 RX ADMIN — PRAVASTATIN SODIUM 40 MG: 20 TABLET ORAL at 18:25

## 2022-12-27 RX ADMIN — DILTIAZEM HYDROCHLORIDE 240 MG: 240 CAPSULE, COATED, EXTENDED RELEASE ORAL at 06:36

## 2022-12-27 RX ADMIN — APIXABAN 2.5 MG: 2.5 TABLET, FILM COATED ORAL at 18:25

## 2022-12-27 RX ADMIN — LISINOPRIL 20 MG: 20 TABLET ORAL at 06:37

## 2022-12-27 ASSESSMENT — ENCOUNTER SYMPTOMS
SHORTNESS OF BREATH: 0
BRUISES/BLEEDS EASILY: 0
FEVER: 0
COUGH: 0
DIZZINESS: 0
NAUSEA: 0
MYALGIAS: 0
VOMITING: 0
BLURRED VISION: 0

## 2022-12-27 ASSESSMENT — PAIN DESCRIPTION - PAIN TYPE: TYPE: ACUTE PAIN;CHRONIC PAIN

## 2022-12-27 ASSESSMENT — FIBROSIS 4 INDEX: FIB4 SCORE: 1.818181818181818182

## 2022-12-27 NOTE — PROGRESS NOTES
[facility] Medicine Daily Progress Note    Date of Service  12/27/2022    Chief Complaint  Jessica Plaza is a 82 y.o. female admitted 12/25/2022 with chills    Hospital Course    Patient is an 82 year old female with history of atrial fibrillation on xarelto, hypertension, COPD, and diabetes. She presented to [facility] on 12/2522 with malaise and right hand shaking along with generalized tiredness and unwell feeling for two weeks. She denied any other focal symptoms including chest pain, dyspnea, cough, abdominal pain, nausea, vomiting, fever, chills, sweats, dysuria, urinary frequency, diarrhea, leg pain or swelling. Patient describes resting tremor of right hand. Denies memory deficits, extremity stiffness, difficulty ambulating or falls.  In the ED, she was found to be febrile with a T 102.2, tachycardiac with a , RR 20, /74, on 4L oxygen NC. WBC 19.8, glucose 293, UA with signs of infection, procal normal. CXR shows mild blunting left costophrenic angle. CT abdomen pelvis with no acute abnormalities. She was admitted for treatment of sepsis due to suspected UTI.    Interval Problem Update  12/27/2022  Vitals are stable  Currently intubated oxygen.  On home 4 L oxygen baseline  Labs reviewed.  White count 14.6  Sodium 129, worsening renal function creatinine 185  Urine culture growing Klebsiella pneumonia  Blood culture 1 / 2 growing staph species.  Likely contaminated  Repeated blood culture negative so far    Continue Rocephin as inpatient.  De-escalate to oral antibiotic on discharge and complete 7 days course.    I have discussed this patient's plan of care and discharge plan at IDT rounds today with Case Management, Nursing, Nursing leadership, and other members of the IDT team.    Consultants/Specialty  N/A    Code Status  Full Code    Disposition  Patient is not medically cleared for discharge.   Anticipate discharge to to home with close outpatient follow-up.  I have placed the appropriate  orders for post-discharge needs.    Review of Systems  Review of Systems   Constitutional:  Positive for malaise/fatigue. Negative for fever.   HENT:  Negative for hearing loss.    Eyes:  Negative for blurred vision.   Respiratory:  Negative for cough and shortness of breath.    Cardiovascular:  Negative for chest pain.   Gastrointestinal:  Negative for nausea and vomiting.   Genitourinary:  Negative for dysuria.   Musculoskeletal:  Negative for myalgias.   Skin:  Negative for rash.   Neurological:  Negative for dizziness.   Endo/Heme/Allergies:  Does not bruise/bleed easily.      Physical Exam  Temp:  [36.5 °C (97.7 °F)-36.8 °C (98.2 °F)] 36.6 °C (97.9 °F)  Pulse:  [] 62  Resp:  [16-18] 16  BP: ()/(53-62) 100/62  SpO2:  [92 %-96 %] 94 %    Physical Exam  Constitutional:       General: She is not in acute distress.  HENT:      Head: Normocephalic and atraumatic.      Right Ear: Tympanic membrane normal.      Left Ear: Tympanic membrane normal.      Nose: Nose normal.      Mouth/Throat:      Mouth: Mucous membranes are moist.   Eyes:      Extraocular Movements: Extraocular movements intact.      Pupils: Pupils are equal, round, and reactive to light.   Cardiovascular:      Rate and Rhythm: Normal rate and regular rhythm.      Pulses: Normal pulses.   Pulmonary:      Effort: Pulmonary effort is normal. No respiratory distress.   Abdominal:      General: Abdomen is flat. There is no distension.   Musculoskeletal:      Cervical back: Normal range of motion.      Right lower leg: No edema.      Left lower leg: No edema.   Skin:     General: Skin is warm.   Neurological:      General: No focal deficit present.      Mental Status: She is alert and oriented to person, place, and time. Mental status is at baseline.   Psychiatric:         Mood and Affect: Mood normal.       Fluids  No intake or output data in the 24 hours ending 12/27/22 1341    Laboratory  Recent Labs     12/25/22  1240 12/26/22  0120  12/27/22  0439   WBC 19.8* 19.5* 14.6*   RBC 4.05* 3.62* 3.49*   HEMOGLOBIN 13.2 11.8* 11.1*   HEMATOCRIT 39.6 36.1* 35.2*   MCV 97.8 99.7* 100.9*   MCH 32.6 32.6 31.8   MCHC 33.3* 32.7* 31.5*   RDW 50.9* 52.8* 54.6*   PLATELETCT 184 178 176   MPV 10.3 10.3 10.6     Recent Labs     12/25/22  1240 12/26/22  0120 12/27/22  0439   SODIUM 132* 130* 129*   POTASSIUM 4.4 4.1 4.0   CHLORIDE 97 96 98   CO2 24 27 21   GLUCOSE 293* 271* 197*   BUN 15 17 28*   CREATININE 0.87 1.16 1.85*   CALCIUM 8.8 8.6 8.4*     Recent Labs     12/25/22  1240   INR 1.09               Imaging  US-RENAL   Final Result      1.  No significant hydronephrosis.      EC-ECHOCARDIOGRAM COMPLETE W/O CONT   Final Result      CT-ABDOMEN-PELVIS WITH   Final Result      1.  Cholelithiasis appears to be present.      2.  Otherwise no acute abnormalities are identified in the abdomen or pelvis.      DX-CHEST-PORTABLE (1 VIEW)   Final Result         Mild blunting of left costophrenic angle could be due to small left pleural fluid collection versus opacification or atelectasis in the region.      No other infiltrates or consolidations identified.           Assessment/Plan  * Sepsis (HCC)- (present on admission)  Assessment & Plan  This is Sepsis Present on admission  SIRS criteria identified on my evaluation include: Fever, with temperature greater than 101 deg F  Source is urinary  Sepsis protocol initiated  Fluid resuscitation ordered per protocol  Crystalloid Fluid Administration: Fluid resuscitation ordered per standard protocol - 30 mL/kg per current or ideal body weight  IV antibiotics as appropriate for source of sepsis  Reassessment: I have reassessed the patient's hemodynamic status  Preliminary UA growing lactose fermenting gm neg kristian - b/c so far negative - following  Continue ceftriaxone  Cbc remains elevated at 19  following    BAYRON (acute kidney injury) (HCC)  Assessment & Plan  Mostly due to  dehydration   Intravenous fluids  Avoid nephrotoxins,  monitor inputs and outputs  Renal ultrasound  Bladder scan  Nephrology evaluation if renal function continue to worsens      Leukocytosis  Assessment & Plan  Due to infection  Remains high   following    UTI (urinary tract infection)  Assessment & Plan  Patient with history of UTI's  Currently denies any urinary symptoms  UA is positive for infection  Patient with fever 102.2 in ER, tachycardia- improving  CT abdomen pelvis negative for acute abnormalities  Urine culture - prelim lactose fermenting gm neg rods - following  B/c so far negative  Continue iv ceftriaxone  following    Chronic anticoagulation- (present on admission)  Assessment & Plan  States she is normally not on it, only 81 mg asa    Atrial fibrillation (HCC)  Assessment & Plan  She reports she is not on anticoagulation normally   Rate controlled on dilt and BB  following    Hypothyroidism due to acquired atrophy of thyroid- (present on admission)  Assessment & Plan  Continue home thyroid medicine      Chronic obstructive pulmonary disease (HCC)- (present on admission)  Assessment & Plan  No evidence of exacerbation  At baseline she is on 4L  On 4L oxygen in ER  following  No wheezing or dyspnea  Goal oxygen saturation 88-92%  Inhalers, duonebs prn    Type 2 diabetes mellitus with hyperglycemia, without long-term current use of insulin ([facility])- (present on admission)  Assessment & Plan  No dka  Will change to diabetic diet  Will start SSI  following         VTE prophylaxis: SCDs/TEDs and Xarelto 10 mg daily as prophylaxis    I have performed a physical exam and reviewed and updated ROS and Plan today (12/27/2022). In review of yesterday's note (12/26/2022), there are no changes except as documented above.

## 2022-12-27 NOTE — RESPIRATORY CARE
"COPD EDUCATION by COPD CLINICAL EDUCATOR  12/26/2022  at  4:52 PM by Janel Kapadia, RRT     Patient interviewed by COPD education team.  Patient unable to participate in full program.  A short intervention has been conducted.  A comprehensive packet including information about COPD, types of treatments to manage their disease and safe home Oxygen usage was provided and reviewed with patient at the bedside.      COPD Screen       COPD Assessment  COPD Clinical Specialists ONLY  COPD Education Initiated: Yes--Short Intervention  DME Company: [employer]  DME Equipment Type: oxygen 4L noc and prn  Physician Name: IRIS SINGLETARY M.D.  Pulmonologist Name: Saw [facility] in the past. last visit 2019  Is this a COPD exacerbation patient?: No  $ Demo/Eval of SVN's, MDI's and Aerosols:  (denied any respiratory medication)  (OP) Pulmonary Function Testing:  (03/07/2013 FEV1 68, Stage II...pt stated she did it another time but cant recll when or where)    PFT Results    No results found for: PFT    Meds to Beds  Would the patient like to opt in for Bedside Medication Delivery at Discharge?: No     MY COPD ACTION PLAN     It is recommended that patients and physicians /healthcare providers complete this action plan together. This plan should be discussed at each physician visit and updated as needed.    The green, yellow and red zones show groups of symptoms of COPD. This list of symptoms is not comprehensive, and you may experience other symptoms. In the \"Actions\" column, your healthcare provider has recommended actions for you to take based on your symptoms.    Patient Name: Jessica Plaza   YOB: 1940   Last Updated on: 12/26/2022  4:51 PM   Green Zone:  I am doing well today Actions     Usual activitiy and exercise level   Take daily medications     Usual amounts of cough and phlegm/mucus   Use oxygen as prescribed     Sleep well at night   Continue regular exercise/diet plan     Appetite is good   " "At all times avoid cigarette smoke, inhaled irritants     Daily Medications (these medications are taken every day):   Albuterol (Accuneb, Proair, Proventil, Ventolin)            Yellow Zone:  I am having a bad day or a COPD flare Actions     More breathless than usual   Continue daily medications     I have less energy for my daily activities   Use quick relief inhaler as ordered     Increased or thicker phlegm/mucus   Use oxygen as prescribed     Using quick relief inhaler/nebulizer more often   Get plenty of rest     Swelling of ankles more than usual   Use pursed lip breathing     More coughing than usual   At all times avoid cigarette smoke, inhaled irritants     I feel like I have a \"chest cold\"     Poor sleep and my symptoms woke me up     My appetite is not good     My medicine is not helping      Call provider immediately if symptoms don’t improve     Continue daily medications, add rescue medications:               Medications to be used during a flare up, (as Discussed with Provider):              Red Zone:  I need urgent medical care Actions     Severe shortness of breath even at rest   Call 911 or seek medical care immediately     Not able to do any activity because of breathing      Fever or shaking chills      Feeling confused or very drowsy       Chest pains      Coughing up blood                  "

## 2022-12-27 NOTE — CARE PLAN
The patient is Stable - Low risk of patient condition declining or worsening    Shift Goals  Clinical Goals: Monitor heart rate and for s/s of worsening infection  Patient Goals: Go home tomorrow  Family Goals: n/a    Progress made toward(s) clinical / shift goals: Pt remains on 2L NC, no SOB reported    Patient is not progressing towards the following goals: Continue abx treatment - positive blood cultures       Problem: Fluid Volume  Goal: Fluid volume balance will be maintained  Outcome: Progressing     Problem: Urinary - Renal Perfusion  Goal: Ability to achieve and maintain adequate renal perfusion and functioning will improve  Outcome: Progressing     Problem: Respiratory  Goal: Patient will achieve/maintain optimum respiratory ventilation and gas exchange  Outcome: Progressing     Problem: Knowledge Deficit - Standard  Goal: Patient and family/care givers will demonstrate understanding of plan of care, disease process/condition, diagnostic tests and medications  Outcome: Progressing     Problem: Pain - Standard  Goal: Alleviation of pain or a reduction in pain to the patient’s comfort goal  Outcome: Progressing     Problem: Fall Risk  Goal: Patient will remain free from falls  Outcome: Progressing

## 2022-12-27 NOTE — PROGRESS NOTES
Notified Rito Greene about positive blood cultures for gram positive cocci in clusters, negative for MRSA and staph aureus. Pt on Rocephin Q24. No new orders

## 2022-12-27 NOTE — ASSESSMENT & PLAN NOTE
Mostly due to  dehydration   Intravenous fluids  Avoid nephrotoxins, monitor inputs and outputs  Renal ultrasound unremarkable  Bladder scan unremarkable  Nephrology consulted

## 2022-12-27 NOTE — PROGRESS NOTES
Monitor Summary:   Rhythm: A-FLUTTER  Rate:   Measurement: ./.08/.  Ectopy: O PVC, R COUP

## 2022-12-28 LAB
ANION GAP SERPL CALC-SCNC: 12 MMOL/L (ref 7–16)
BACTERIA BLD CULT: ABNORMAL
BACTERIA BLD CULT: ABNORMAL
BASOPHILS # BLD AUTO: 0.3 % (ref 0–1.8)
BASOPHILS # BLD: 0.03 K/UL (ref 0–0.12)
BUN SERPL-MCNC: 33 MG/DL (ref 8–22)
CALCIUM SERPL-MCNC: 8.7 MG/DL (ref 8.5–10.5)
CHLORIDE SERPL-SCNC: 100 MMOL/L (ref 96–112)
CHLORIDE UR-SCNC: 40 MMOL/L
CO2 SERPL-SCNC: 23 MMOL/L (ref 20–33)
CREAT SERPL-MCNC: 1.91 MG/DL (ref 0.5–1.4)
CREAT UR-MCNC: 90.17 MG/DL
EOSINOPHIL # BLD AUTO: 0.11 K/UL (ref 0–0.51)
EOSINOPHIL NFR BLD: 0.9 % (ref 0–6.9)
ERYTHROCYTE [DISTWIDTH] IN BLOOD BY AUTOMATED COUNT: 53.1 FL (ref 35.9–50)
GFR SERPLBLD CREATININE-BSD FMLA CKD-EPI: 26 ML/MIN/1.73 M 2
GLUCOSE BLD STRIP.AUTO-MCNC: 189 MG/DL (ref 65–99)
GLUCOSE BLD STRIP.AUTO-MCNC: 198 MG/DL (ref 65–99)
GLUCOSE BLD STRIP.AUTO-MCNC: 213 MG/DL (ref 65–99)
GLUCOSE BLD STRIP.AUTO-MCNC: 227 MG/DL (ref 65–99)
GLUCOSE SERPL-MCNC: 213 MG/DL (ref 65–99)
HCT VFR BLD AUTO: 34.2 % (ref 37–47)
HGB BLD-MCNC: 11.2 G/DL (ref 12–16)
IMM GRANULOCYTES # BLD AUTO: 0.11 K/UL (ref 0–0.11)
IMM GRANULOCYTES NFR BLD AUTO: 0.9 % (ref 0–0.9)
LYMPHOCYTES # BLD AUTO: 0.74 K/UL (ref 1–4.8)
LYMPHOCYTES NFR BLD: 6.2 % (ref 22–41)
MCH RBC QN AUTO: 32.6 PG (ref 27–33)
MCHC RBC AUTO-ENTMCNC: 32.7 G/DL (ref 33.6–35)
MCV RBC AUTO: 99.4 FL (ref 81.4–97.8)
MONOCYTES # BLD AUTO: 0.74 K/UL (ref 0–0.85)
MONOCYTES NFR BLD AUTO: 6.2 % (ref 0–13.4)
NEUTROPHILS # BLD AUTO: 10.18 K/UL (ref 2–7.15)
NEUTROPHILS NFR BLD: 85.5 % (ref 44–72)
NRBC # BLD AUTO: 0 K/UL
NRBC BLD-RTO: 0 /100 WBC
PLATELET # BLD AUTO: 194 K/UL (ref 164–446)
PMV BLD AUTO: 10.3 FL (ref 9–12.9)
POTASSIUM SERPL-SCNC: 4.2 MMOL/L (ref 3.6–5.5)
POTASSIUM UR-SCNC: 24 MMOL/L
PROT UR-MCNC: 49 MG/DL (ref 0–15)
RBC # BLD AUTO: 3.44 M/UL (ref 4.2–5.4)
SIGNIFICANT IND 70042: ABNORMAL
SITE SITE: ABNORMAL
SODIUM SERPL-SCNC: 135 MMOL/L (ref 135–145)
SODIUM UR-SCNC: 40 MMOL/L
SOURCE SOURCE: ABNORMAL
WBC # BLD AUTO: 11.9 K/UL (ref 4.8–10.8)

## 2022-12-28 PROCEDURE — 700102 HCHG RX REV CODE 250 W/ 637 OVERRIDE(OP): Performed by: STUDENT IN AN ORGANIZED HEALTH CARE EDUCATION/TRAINING PROGRAM

## 2022-12-28 PROCEDURE — 84133 ASSAY OF URINE POTASSIUM: CPT

## 2022-12-28 PROCEDURE — 82570 ASSAY OF URINE CREATININE: CPT

## 2022-12-28 PROCEDURE — 84300 ASSAY OF URINE SODIUM: CPT

## 2022-12-28 PROCEDURE — 82436 ASSAY OF URINE CHLORIDE: CPT

## 2022-12-28 PROCEDURE — 770020 HCHG ROOM/CARE - TELE (206)

## 2022-12-28 PROCEDURE — 80048 BASIC METABOLIC PNL TOTAL CA: CPT

## 2022-12-28 PROCEDURE — 85025 COMPLETE CBC W/AUTO DIFF WBC: CPT

## 2022-12-28 PROCEDURE — 84156 ASSAY OF PROTEIN URINE: CPT

## 2022-12-28 PROCEDURE — 700105 HCHG RX REV CODE 258: Performed by: STUDENT IN AN ORGANIZED HEALTH CARE EDUCATION/TRAINING PROGRAM

## 2022-12-28 PROCEDURE — 99233 SBSQ HOSP IP/OBS HIGH 50: CPT | Performed by: STUDENT IN AN ORGANIZED HEALTH CARE EDUCATION/TRAINING PROGRAM

## 2022-12-28 PROCEDURE — A9270 NON-COVERED ITEM OR SERVICE: HCPCS | Performed by: STUDENT IN AN ORGANIZED HEALTH CARE EDUCATION/TRAINING PROGRAM

## 2022-12-28 PROCEDURE — 82962 GLUCOSE BLOOD TEST: CPT

## 2022-12-28 RX ORDER — SODIUM CHLORIDE 9 MG/ML
INJECTION, SOLUTION INTRAVENOUS CONTINUOUS
Status: ACTIVE | OUTPATIENT
Start: 2022-12-28 | End: 2022-12-29

## 2022-12-28 RX ADMIN — PRAVASTATIN SODIUM 40 MG: 20 TABLET ORAL at 17:37

## 2022-12-28 RX ADMIN — APIXABAN 2.5 MG: 2.5 TABLET, FILM COATED ORAL at 05:39

## 2022-12-28 RX ADMIN — SENNOSIDES AND DOCUSATE SODIUM 2 TABLET: 50; 8.6 TABLET ORAL at 05:39

## 2022-12-28 RX ADMIN — INSULIN HUMAN 2 UNITS: 100 INJECTION, SOLUTION PARENTERAL at 12:03

## 2022-12-28 RX ADMIN — SODIUM CHLORIDE: 9 INJECTION, SOLUTION INTRAVENOUS at 10:11

## 2022-12-28 RX ADMIN — SENNOSIDES AND DOCUSATE SODIUM 2 TABLET: 50; 8.6 TABLET ORAL at 17:37

## 2022-12-28 RX ADMIN — INSULIN HUMAN 1 UNITS: 100 INJECTION, SOLUTION PARENTERAL at 06:15

## 2022-12-28 RX ADMIN — INSULIN HUMAN 1 UNITS: 100 INJECTION, SOLUTION PARENTERAL at 17:36

## 2022-12-28 RX ADMIN — APIXABAN 2.5 MG: 2.5 TABLET, FILM COATED ORAL at 17:38

## 2022-12-28 RX ADMIN — ACETAMINOPHEN 650 MG: 325 TABLET, FILM COATED ORAL at 20:06

## 2022-12-28 RX ADMIN — BUDESONIDE AND FORMOTEROL FUMARATE DIHYDRATE 2 PUFF: 160; 4.5 AEROSOL RESPIRATORY (INHALATION) at 05:39

## 2022-12-28 RX ADMIN — ACETAMINOPHEN 650 MG: 325 TABLET, FILM COATED ORAL at 13:05

## 2022-12-28 RX ADMIN — GABAPENTIN 300 MG: 300 CAPSULE ORAL at 17:38

## 2022-12-28 RX ADMIN — SODIUM CHLORIDE: 9 INJECTION, SOLUTION INTRAVENOUS at 20:07

## 2022-12-28 RX ADMIN — DILTIAZEM HYDROCHLORIDE 240 MG: 240 CAPSULE, COATED, EXTENDED RELEASE ORAL at 05:38

## 2022-12-28 RX ADMIN — INSULIN HUMAN 2 UNITS: 100 INJECTION, SOLUTION PARENTERAL at 20:11

## 2022-12-28 RX ADMIN — OMEPRAZOLE 40 MG: 20 CAPSULE, DELAYED RELEASE ORAL at 05:39

## 2022-12-28 RX ADMIN — BUDESONIDE AND FORMOTEROL FUMARATE DIHYDRATE 2 PUFF: 160; 4.5 AEROSOL RESPIRATORY (INHALATION) at 17:38

## 2022-12-28 RX ADMIN — LEVOTHYROXINE SODIUM 75 MCG: 0.07 TABLET ORAL at 05:39

## 2022-12-28 RX ADMIN — CEFDINIR 300 MG: 300 CAPSULE ORAL at 05:38

## 2022-12-28 RX ADMIN — GABAPENTIN 300 MG: 300 CAPSULE ORAL at 05:38

## 2022-12-28 ASSESSMENT — ENCOUNTER SYMPTOMS
SHORTNESS OF BREATH: 0
DIZZINESS: 0
BACK PAIN: 0
TREMORS: 1
CHILLS: 0
CONSTIPATION: 0
BLURRED VISION: 0
INSOMNIA: 0
NAUSEA: 0
MYALGIAS: 0
VOMITING: 0
BRUISES/BLEEDS EASILY: 0
HEADACHES: 0
DIARRHEA: 0
DOUBLE VISION: 0
COUGH: 0
FEVER: 0
WEAKNESS: 0
ABDOMINAL PAIN: 0

## 2022-12-28 ASSESSMENT — PAIN DESCRIPTION - PAIN TYPE
TYPE: CHRONIC PAIN
TYPE: ACUTE PAIN

## 2022-12-28 ASSESSMENT — FIBROSIS 4 INDEX: FIB4 SCORE: 1.65

## 2022-12-28 NOTE — CARE PLAN
The patient is Stable - Low risk of patient condition declining or worsening    Shift Goals  Clinical Goals: monitor oxygenation  Patient Goals: discharge  Family Goals: n/a    Progress made toward(s) clinical / shift goals:    Problem: Respiratory  Goal: Patient will achieve/maintain optimum respiratory ventilation and gas exchange  Outcome: Progressing  Note: Patient was educated on proper use of home oxygen and patient is sustaining well on baseline 2L.        Patient is not progressing towards the following goals:

## 2022-12-28 NOTE — PROGRESS NOTES
Monitor Summary:    Rhythm: A-flutter  Rate:   Ectopy: (O) PVC, (R) bigem  Measurement : -/.09/-

## 2022-12-28 NOTE — PROGRESS NOTES
Assumed care of patient, received bedside report from Garima ANTUNEZ. Patient is A&O X 4. Pain 0/10. Vital signs stable during day shift, on 2 L of oxygen NC. On tele monitor, Afib 98. POC discussed with patient. Patient verbalized understanding. All questions answered. Call light within reach and fall precautions in place. Bed alarm on, bed locked and in lowest position.

## 2022-12-28 NOTE — PROGRESS NOTES
Bedside report received from night shift RN. Assumed care at 0700. Pt is A&Ox4. Pt is in bed. Pt denies pain at this time. Pt was updated on plan of care. Pt has call light, personal belongings, and bedside table within reach. Bed is in the lowest position and bed alarm is on. Will continue to monitor

## 2022-12-28 NOTE — PROGRESS NOTES
[facility] Medicine Daily Progress Note    Date of Service  12/28/2022    Chief Complaint  Jessica Plaza is a 82 y.o. female admitted 12/25/2022 with chills    Hospital Course    Patient is an 82 year old female with history of atrial fibrillation on xarelto, hypertension, COPD, and diabetes. She presented to [facility] on 12/2522 with malaise and right hand shaking along with generalized tiredness and unwell feeling for two weeks. She denied any other focal symptoms including chest pain, dyspnea, cough, abdominal pain, nausea, vomiting, fever, chills, sweats, dysuria, urinary frequency, diarrhea, leg pain or swelling. Patient describes resting tremor of right hand. Denies memory deficits, extremity stiffness, difficulty ambulating or falls.  In the ED, she was found to be febrile with a T 102.2, tachycardiac with a , RR 20, /74, on 4L oxygen NC. WBC 19.8, glucose 293, UA with signs of infection, procal normal. CXR shows mild blunting left costophrenic angle. CT abdomen pelvis with no acute abnormalities. She was admitted for treatment of sepsis due to suspected UTI.    Interval Problem Update  12/27/2022  Vitals are stable  .  On home 4 L oxygen baseline  Labs reviewed.  White count 14.6  Sodium 129, worsening renal function creatinine 185  Urine culture growing Klebsiella pneumonia  Blood culture 1 / 2 growing staph species.  Likely contaminated  Repeated blood culture negative so far    Continue Rocephin as inpatient.  De-escalate to oral antibiotic on discharge and complete 7 days course.    12/28/2022  On 2 L oxygen saturating over 90  Labs reviewed.  Leukocytosis improving  Worsening renal function  Repeated blood cultures negative so far  Continue on IV fluid  Nephrology Dr. Mason consulted    Updated patient's clinical condition treatment plan to patient's spouse who is at the bedside.  I have discussed this patient's plan of care and discharge plan at IDT rounds today with Case Management,  Nursing, Nursing leadership, and other members of the IDT team.    Consultants/Specialty  N/A    Code Status  Full Code    Disposition  Patient is not medically cleared for discharge.   Anticipate discharge to to home with close outpatient follow-up.  I have placed the appropriate orders for post-discharge needs.    Review of Systems  Review of Systems   Constitutional:  Positive for malaise/fatigue. Negative for fever.   HENT:  Negative for hearing loss.    Eyes:  Negative for blurred vision.   Respiratory:  Negative for cough and shortness of breath.    Cardiovascular:  Negative for chest pain.   Gastrointestinal:  Negative for nausea and vomiting.   Genitourinary:  Negative for dysuria.   Musculoskeletal:  Negative for myalgias.   Skin:  Negative for rash.   Neurological:  Negative for dizziness.   Endo/Heme/Allergies:  Does not bruise/bleed easily.      Physical Exam  Temp:  [36.6 °C (97.9 °F)-36.9 °C (98.4 °F)] 36.7 °C (98.1 °F)  Pulse:  [] 89  Resp:  [16-18] 18  BP: (102-122)/(47-62) 122/62  SpO2:  [91 %-94 %] 93 %    Physical Exam  Constitutional:       General: She is not in acute distress.  HENT:      Head: Normocephalic and atraumatic.      Right Ear: Tympanic membrane normal.      Left Ear: Tympanic membrane normal.      Nose: Nose normal.      Mouth/Throat:      Mouth: Mucous membranes are moist.   Eyes:      Extraocular Movements: Extraocular movements intact.      Pupils: Pupils are equal, round, and reactive to light.   Cardiovascular:      Rate and Rhythm: Normal rate and regular rhythm.      Pulses: Normal pulses.   Pulmonary:      Effort: Pulmonary effort is normal. No respiratory distress.   Abdominal:      General: Abdomen is flat. There is no distension.   Musculoskeletal:      Cervical back: Normal range of motion.      Right lower leg: No edema.      Left lower leg: No edema.   Skin:     General: Skin is warm.   Neurological:      General: No focal deficit present.      Mental Status:  She is alert and oriented to person, place, and time. Mental status is at baseline.   Psychiatric:         Mood and Affect: Mood normal.       Fluids    Intake/Output Summary (Last 24 hours) at 12/28/2022 1344  Last data filed at 12/28/2022 1227  Gross per 24 hour   Intake --   Output 1300 ml   Net -1300 ml       Laboratory  Recent Labs     12/26/22  0120 12/27/22  0439 12/28/22  0415   WBC 19.5* 14.6* 11.9*   RBC 3.62* 3.49* 3.44*   HEMOGLOBIN 11.8* 11.1* 11.2*   HEMATOCRIT 36.1* 35.2* 34.2*   MCV 99.7* 100.9* 99.4*   MCH 32.6 31.8 32.6   MCHC 32.7* 31.5* 32.7*   RDW 52.8* 54.6* 53.1*   PLATELETCT 178 176 194   MPV 10.3 10.6 10.3       Recent Labs     12/26/22  0120 12/27/22  0439 12/28/22  0415   SODIUM 130* 129* 135   POTASSIUM 4.1 4.0 4.2   CHLORIDE 96 98 100   CO2 27 21 23   GLUCOSE 271* 197* 213*   BUN 17 28* 33*   CREATININE 1.16 1.85* 1.91*   CALCIUM 8.6 8.4* 8.7                       Imaging  US-RENAL   Final Result      1.  No significant hydronephrosis.      EC-ECHOCARDIOGRAM COMPLETE W/O CONT   Final Result      CT-ABDOMEN-PELVIS WITH   Final Result      1.  Cholelithiasis appears to be present.      2.  Otherwise no acute abnormalities are identified in the abdomen or pelvis.      DX-CHEST-PORTABLE (1 VIEW)   Final Result         Mild blunting of left costophrenic angle could be due to small left pleural fluid collection versus opacification or atelectasis in the region.      No other infiltrates or consolidations identified.             Assessment/Plan  * Sepsis (HCC)- (present on admission)  Assessment & Plan  This is Sepsis Present on admission  SIRS criteria identified on my evaluation include: Fever, with temperature greater than 101 deg F  Source is urinary  Sepsis protocol initiated  Fluid resuscitation ordered per protocol  Crystalloid Fluid Administration: Fluid resuscitation ordered per standard protocol - 30 mL/kg per current or ideal body weight  IV antibiotics as appropriate for source of  sepsis  Reassessment: I have reassessed the patient's hemodynamic status  Preliminary UA growing lactose fermenting gm neg kristian - b/c so far negative - following  Continue ceftriaxone  Cbc remains elevated at 19  following    BAYRON (acute kidney injury) (HCC)  Assessment & Plan  Mostly due to  dehydration   Intravenous fluids  Avoid nephrotoxins, monitor inputs and outputs  Renal ultrasound unremarkable  Bladder scan unremarkable  Nephrology consulted    Leukocytosis  Assessment & Plan  Due to infection  Remains high   following    UTI (urinary tract infection)  Assessment & Plan  Patient with history of UTI's  Currently denies any urinary symptoms  UA is positive for infection  Patient with fever 102.2 in ER, tachycardia- improving  CT abdomen pelvis negative for acute abnormalities  Urine culture - prelim lactose fermenting gm neg rods - following  B/c so far negative  Continue iv ceftriaxone  following    Chronic anticoagulation- (present on admission)  Assessment & Plan  States she is normally not on it, only 81 mg asa    Atrial fibrillation ([facility])  Assessment & Plan  She reports she is not on anticoagulation normally   Rate controlled on dilt and BB  following    Hypothyroidism due to acquired atrophy of thyroid- (present on admission)  Assessment & Plan  Continue home thyroid medicine      Chronic obstructive pulmonary disease ([facility])- (present on admission)  Assessment & Plan  No evidence of exacerbation  At baseline she is on 4L  On 4L oxygen in ER  following  No wheezing or dyspnea  Goal oxygen saturation 88-92%  Inhalers, duonebs prn    Type 2 diabetes mellitus with hyperglycemia, without long-term current use of insulin ([facility])- (present on admission)  Assessment & Plan  No dka  Will change to diabetic diet  Will start SSI  following         VTE prophylaxis: SCDs/TEDs and Xarelto 10 mg daily as prophylaxis    I have performed a physical exam and reviewed and updated ROS and Plan today (12/28/2022). In review of  yesterday's note (12/27/2022), there are no changes except as documented above.

## 2022-12-28 NOTE — CONSULTS
"NEPHROLOGY CONSULTATION NOTE               Author: Thomas Rabago M.D.    Consulted By: Dr. Olivier Mcfarland    REASON FOR CONSULTATION:   Acute Renal Failure    CHIEF COMPLAINT:   \"Tired and hand shakiness\"    HISTORY OF PRESENT ILLNESS:    Jessica Plaza is a 82-year-old female with past medical history significant for COPD (on 4 L at home),hypertension, type 2 diabetes mellitus, A. fib on Xarelto who was admitted on 12/25/2022 for underlying infection and sepsis.    Patient reports feeling symptomatically improved since time of admission.  Patient denied any decrease in urine volume and states that she ambulates frequently in order to use the restroom.  Patient denied any blood in the urine or dysuria.  Patient also denied any emesis, fevers, chills, sweats, diarrhea, leg pain or swelling.  Patient denied that she has a history of kidney disease or family history of kidney disease.    Patient underwent CT abdomen and pelvis scan with contrast on 12/25/2022 that demonstrated possible cholelithiasis however no other acute abnormalities were noted.  Most recent labs are significant for hemoglobin of 11.2, BUN of 33, creatinine of 1.91, calcium of 8.7 and glucose of 213.    ROS:   Review of Systems   Constitutional:  Negative for chills, fever and malaise/fatigue.   HENT:  Negative for hearing loss.    Eyes:  Negative for blurred vision and double vision.   Respiratory:  Negative for cough and shortness of breath.    Cardiovascular:  Negative for chest pain and leg swelling.   Gastrointestinal:  Negative for abdominal pain, constipation, diarrhea, nausea and vomiting.   Genitourinary:  Negative for dysuria and hematuria.   Musculoskeletal:  Negative for back pain.   Skin:  Negative for rash.   Neurological:  Positive for tremors. Negative for weakness and headaches.   Psychiatric/Behavioral:  The patient does not have insomnia.       PAST MEDICAL HISTORY:   Past Medical History:   Diagnosis Date    Anemia     Arthritis     " Asthma     Inhaler use daily and PRN    Atrial fibrillation ([facility])     Castro esophagus     Bronchitis     Chickenpox     Chronic anticoagulation 4/29/2019    COPD     COPD (chronic obstructive pulmonary disease) ([facility])     Dental disorder     dentures upper and lower    Diabetes     oral meds    Disorder of thyroid     Emphysema of lung ([facility])     Uses oxygen PRN.  Patient not sure of liters/minute.    Encounter for medication refill 2/5/2018    GERD (gastroesophageal reflux disease)     [place] measles     Heart burn     Hemorrhagic disorder     hx internal bleeding    High cholesterol     Hypertension     Indigestion     Pain     left shoulder    Pneumonia 2013.    Stroke ([facility]) 2013    Tonsillitis     UTI (urinary tract infection)        PAST SURGICAL HISTORY:      Past Surgical History:   Procedure Laterality Date    FUSION, SPINE, LUMBAR, PLIF N/A 8/28/2019    Procedure: FUSION, SPINE, LUMBAR, PLIF - L4-5, L5-S1 TLIF;  Surgeon: Grupo Cabrales M.D.;  Location: [facility];  Service: Neurosurgery    LUMBAR DECOMPRESSION N/A 8/28/2019    Procedure: DECOMPRESSION, SPINE, LUMBAR;  Surgeon: Grupo Cabrales M.D.;  Location: [facility];  Service: Neurosurgery    SHOULDER DECOMPRESSION ARTHROSCOPIC Left 10/11/2016    Procedure: SHOULDER DECOMPRESSION ARTHROSCOPIC  with   debridment of the labrum and biceps tendon stump;  Surgeon: Bill Ponce M.D.;  Location: [facility];  Service:     CLAVICLE RESECTION Left 10/11/2016    Procedure: CLAVICLE RESECTION distal open  ;  Surgeon: Bill Ponce M.D.;  Location: [facility];  Service:     CHONDROPLASTY Left 10/11/2016    Procedure: Arthroscopic CHONDROPLASTY of the joint  ;  Surgeon: Bill Ponce M.D.;  Location: [facility];  Service:     SHOULDER DECOMPRESSION ARTHROSCOPIC Left 2/18/2016    Procedure: SHOULDER DECOMPRESSION ARTHROSCOPIC SUBACROMIAL and biceps tenotomy;  Surgeon: Johan Roberts M.D.;   Location: SURGERY [facility];  Service:     GASTROSCOPY-ENDO  2014    Performed by Yonas Grimes Jr., M.D. at ENDOSCOPY [facility]    TONSILLECTOMY      LAMINOTOMY      PB ANESTH, DELIVERY ONLY      X 2       FAMILY HISTORY:   No family history of renal disease.  Patient reports that father had underlying heart disease but unsure which type.  Patient reports that mother had breast cancer and a sister had pancreatic cancer and denied any other history of cancer.    SOCIAL HISTORY:   - Current alcohol drinker, about 8 oz of vodka and tonic every day  - Last smoked tobacco 6 years and 1/2ppd for 40+ years  - Denied any recreational drug usage ever  - Lives in [place]      HOME MEDICATIONS:   No current facility-administered medications on file prior to encounter.     Current Outpatient Medications on File Prior to Encounter   Medication Sig Dispense Refill    lisinopril (PRINIVIL) 20 MG Tab Take 1 Tablet by mouth every day. PLEASE CALL [phone] AND SCHEDULE A FOLLOW UP APPOINTMENT FOR FURTHER REFILLS. THANK YOU! 90 Tablet 0    pravastatin (PRAVACHOL) 40 MG tablet Take 1 tablet by mouth every day. Please call to schedule follow up appointment for further refills. Please call [phone]. Thank you. 14 tablet 0    MYRBETRIQ 25 MG TABLET SR 24 HR       metoprolol (LOPRESSOR) 25 MG Tab TAKE 1 TABLET BY MOUTH TWICE A DAY      traZODone (DESYREL) 100 MG Tab Take 1 Tab by mouth every bedtime. 90 Tab 3    budesonide-formoterol (SYMBICORT) 160-4.5 MCG/ACT Aerosol Inhale 2 Puffs by mouth 2 Times a Day. 3 Each 3    metformin (GLUCOPHAGE) 1000 MG tablet Take 1 Tab by mouth 2 times a day. 180 Tab 2    Dulaglutide (TRULICITY) 1.5 MG/0.5ML Solution Pen-injector Inject 0.5 mL as instructed every 7 days. 12 Each 3    gabapentin (NEURONTIN) 300 MG Cap Take 1 Cap by mouth 2 Times a Day. 180 Cap 3    fluticasone (FLONASE) 50 MCG/ACT nasal spray Spray 2 Sprays in nose every day. 16 g 0    omeprazole  "(PRILOSEC) 40 MG delayed-release capsule Take 40 mg by mouth every day.         ALLERGIES:  Bactrim, Demerol, Pcn [penicillins], Sulfa drugs, Ferrous sulfate, Tramadol, and Latex    PHYSICAL EXAM:  VS:  /62   Pulse 89   Temp 36.7 °C (98.1 °F) (Temporal)   Resp 18   Ht 1.727 m (5' 8\")   Wt 91.3 kg (201 lb 4.5 oz)   SpO2 93%   BMI 30.60 kg/m²   Physical Exam  Vitals and nursing note reviewed.   Constitutional:       General: She is not in acute distress.     Appearance: Normal appearance. She is obese. She is not ill-appearing or toxic-appearing.   HENT:      Right Ear: External ear normal.      Left Ear: External ear normal.      Mouth/Throat:      Mouth: Mucous membranes are moist.   Eyes:      Extraocular Movements: Extraocular movements intact.      Conjunctiva/sclera: Conjunctivae normal.   Cardiovascular:      Rate and Rhythm: Normal rate. Rhythm irregular.      Heart sounds: No murmur heard.    No gallop.   Pulmonary:      Breath sounds: Normal breath sounds. No stridor. No wheezing or rales.   Abdominal:      General: There is no distension.   Musculoskeletal:         General: No swelling.      Cervical back: Neck supple.      Right lower leg: No edema.      Left lower leg: No edema.   Skin:     Findings: Bruising present.   Neurological:      Mental Status: She is alert and oriented to person, place, and time.   Psychiatric:         Mood and Affect: Mood normal.         Behavior: Behavior normal.       LABS:  Recent Results (from the past 24 hour(s))   POCT glucose device results    Collection Time: 12/27/22  4:40 PM   Result Value Ref Range    POC Glucose, Blood 240 (H) 65 - 99 mg/dL   POCT glucose device results    Collection Time: 12/27/22  8:35 PM   Result Value Ref Range    POC Glucose, Blood 245 (H) 65 - 99 mg/dL   CBC WITH DIFFERENTIAL    Collection Time: 12/28/22  4:15 AM   Result Value Ref Range    WBC 11.9 (H) 4.8 - 10.8 K/uL    RBC 3.44 (L) 4.20 - 5.40 M/uL    Hemoglobin 11.2 (L) 12.0 " - 16.0 g/dL    Hematocrit 34.2 (L) 37.0 - 47.0 %    MCV 99.4 (H) 81.4 - 97.8 fL    MCH 32.6 27.0 - 33.0 pg    MCHC 32.7 (L) 33.6 - 35.0 g/dL    RDW 53.1 (H) 35.9 - 50.0 fL    Platelet Count 194 164 - 446 K/uL    MPV 10.3 9.0 - 12.9 fL    Neutrophils-Polys 85.50 (H) 44.00 - 72.00 %    Lymphocytes 6.20 (L) 22.00 - 41.00 %    Monocytes 6.20 0.00 - 13.40 %    Eosinophils 0.90 0.00 - 6.90 %    Basophils 0.30 0.00 - 1.80 %    Immature Granulocytes 0.90 0.00 - 0.90 %    Nucleated RBC 0.00 /100 WBC    Neutrophils (Absolute) 10.18 (H) 2.00 - 7.15 K/uL    Lymphs (Absolute) 0.74 (L) 1.00 - 4.80 K/uL    Monos (Absolute) 0.74 0.00 - 0.85 K/uL    Eos (Absolute) 0.11 0.00 - 0.51 K/uL    Baso (Absolute) 0.03 0.00 - 0.12 K/uL    Immature Granulocytes (abs) 0.11 0.00 - 0.11 K/uL    NRBC (Absolute) 0.00 K/uL   Basic Metabolic Panel    Collection Time: 12/28/22  4:15 AM   Result Value Ref Range    Sodium 135 135 - 145 mmol/L    Potassium 4.2 3.6 - 5.5 mmol/L    Chloride 100 96 - 112 mmol/L    Co2 23 20 - 33 mmol/L    Glucose 213 (H) 65 - 99 mg/dL    Bun 33 (H) 8 - 22 mg/dL    Creatinine 1.91 (H) 0.50 - 1.40 mg/dL    Calcium 8.7 8.5 - 10.5 mg/dL    Anion Gap 12.0 7.0 - 16.0   ESTIMATED GFR    Collection Time: 12/28/22  4:15 AM   Result Value Ref Range    GFR (CKD-EPI) 26 (A) >60 mL/min/1.73 m 2   POCT glucose device results    Collection Time: 12/28/22  6:14 AM   Result Value Ref Range    POC Glucose, Blood 189 (H) 65 - 99 mg/dL   URINE SODIUM RANDOM    Collection Time: 12/28/22 10:15 AM   Result Value Ref Range    Sodium, Urine -per volume 40 mmol/L   URINE POTASSIUM RANDOM    Collection Time: 12/28/22 10:15 AM   Result Value Ref Range    Potassium 24.0 mmol/L   URINE CHLORIDE RANDOM    Collection Time: 12/28/22 10:15 AM   Result Value Ref Range    Chloride, Urine-per volume 40 mmol/L   URINE CREATININE RANDOM    Collection Time: 12/28/22 10:15 AM   Result Value Ref Range    Creatinine, Random Urine 90.17 mg/dL   URINE PROTEIN RANDOM     Collection Time: 12/28/22 10:15 AM   Result Value Ref Range    Total Protein, Urine 49.0 (H) 0.0 - 15.0 mg/dL   POCT glucose device results    Collection Time: 12/28/22 12:02 PM   Result Value Ref Range    POC Glucose, Blood 227 (H) 65 - 99 mg/dL       (click the triangle to expand results)    IMAGING:  US-RENAL   Final Result      1.  No significant hydronephrosis.      EC-ECHOCARDIOGRAM COMPLETE W/O CONT   Final Result      CT-ABDOMEN-PELVIS WITH   Final Result      1.  Cholelithiasis appears to be present.      2.  Otherwise no acute abnormalities are identified in the abdomen or pelvis.      DX-CHEST-PORTABLE (1 VIEW)   Final Result         Mild blunting of left costophrenic angle could be due to small left pleural fluid collection versus opacification or atelectasis in the region.      No other infiltrates or consolidations identified.          PMH/FH/SH/meds/labs/images reviewed     ASSESSMENT:  # BAYRON:   - Baseline Cr 0.8-1, increased to 1.91  - Likely multifactorial 2/2 to SILVERIO as patient had CT scan with contrast on 12/25 also contributing factor could be underlying sepsis  # Sepsis  # Atrial fibrillation  # Type 2 diabetes mellitus  # COPD  # Chronic hypoxic respiratory failure  - Patient reports that she is on 4 L at home, currently on 2 L  # Hypothyroidism    PLAN:  - No current indication for dialysis at this point.  Will evaluate daily  - Continue to monitor labs, anticipate plateau of creatinine in coming day(s) especially if insult is related to contrast  - Can continue IV fluids, encouraged oral intake  - Limit nephrotoxins   - Strict I/Os  - Dose all meds per eGFR < 15    Thank you for this consult, we will continue to follow.    Discussed plan with patient and nursing staff.  Staffed with Dr. Mason.

## 2022-12-29 ENCOUNTER — PHARMACY VISIT (OUTPATIENT)
Dept: PHARMACY | Facility: MEDICAL CENTER | Age: 82
End: 2022-12-29
Payer: COMMERCIAL

## 2022-12-29 VITALS
TEMPERATURE: 97.9 F | DIASTOLIC BLOOD PRESSURE: 68 MMHG | SYSTOLIC BLOOD PRESSURE: 120 MMHG | RESPIRATION RATE: 17 BRPM | HEART RATE: 103 BPM | BODY MASS INDEX: 30.61 KG/M2 | HEIGHT: 68 IN | OXYGEN SATURATION: 92 % | WEIGHT: 201.94 LBS

## 2022-12-29 LAB
ANION GAP SERPL CALC-SCNC: 11 MMOL/L (ref 7–16)
BASOPHILS # BLD AUTO: 0.6 % (ref 0–1.8)
BASOPHILS # BLD: 0.05 K/UL (ref 0–0.12)
BUN SERPL-MCNC: 28 MG/DL (ref 8–22)
CALCIUM SERPL-MCNC: 8.7 MG/DL (ref 8.5–10.5)
CHLORIDE SERPL-SCNC: 104 MMOL/L (ref 96–112)
CO2 SERPL-SCNC: 22 MMOL/L (ref 20–33)
CREAT SERPL-MCNC: 1.38 MG/DL (ref 0.5–1.4)
EKG IMPRESSION: NORMAL
EKG IMPRESSION: NORMAL
EOSINOPHIL # BLD AUTO: 0.09 K/UL (ref 0–0.51)
EOSINOPHIL NFR BLD: 1 % (ref 0–6.9)
ERYTHROCYTE [DISTWIDTH] IN BLOOD BY AUTOMATED COUNT: 52 FL (ref 35.9–50)
GFR SERPLBLD CREATININE-BSD FMLA CKD-EPI: 39 ML/MIN/1.73 M 2
GLUCOSE BLD STRIP.AUTO-MCNC: 170 MG/DL (ref 65–99)
GLUCOSE SERPL-MCNC: 193 MG/DL (ref 65–99)
HCT VFR BLD AUTO: 33.7 % (ref 37–47)
HGB BLD-MCNC: 10.8 G/DL (ref 12–16)
IMM GRANULOCYTES # BLD AUTO: 0.04 K/UL (ref 0–0.11)
IMM GRANULOCYTES NFR BLD AUTO: 0.5 % (ref 0–0.9)
LYMPHOCYTES # BLD AUTO: 0.81 K/UL (ref 1–4.8)
LYMPHOCYTES NFR BLD: 9.4 % (ref 22–41)
MCH RBC QN AUTO: 32 PG (ref 27–33)
MCHC RBC AUTO-ENTMCNC: 32 G/DL (ref 33.6–35)
MCV RBC AUTO: 100 FL (ref 81.4–97.8)
MONOCYTES # BLD AUTO: 0.66 K/UL (ref 0–0.85)
MONOCYTES NFR BLD AUTO: 7.7 % (ref 0–13.4)
NEUTROPHILS # BLD AUTO: 6.93 K/UL (ref 2–7.15)
NEUTROPHILS NFR BLD: 80.8 % (ref 44–72)
NRBC # BLD AUTO: 0 K/UL
NRBC BLD-RTO: 0 /100 WBC
PLATELET # BLD AUTO: 217 K/UL (ref 164–446)
PMV BLD AUTO: 10.6 FL (ref 9–12.9)
POTASSIUM SERPL-SCNC: 4.2 MMOL/L (ref 3.6–5.5)
RBC # BLD AUTO: 3.37 M/UL (ref 4.2–5.4)
SODIUM SERPL-SCNC: 137 MMOL/L (ref 135–145)
WBC # BLD AUTO: 8.6 K/UL (ref 4.8–10.8)

## 2022-12-29 PROCEDURE — A9270 NON-COVERED ITEM OR SERVICE: HCPCS | Performed by: STUDENT IN AN ORGANIZED HEALTH CARE EDUCATION/TRAINING PROGRAM

## 2022-12-29 PROCEDURE — RXMED WILLOW AMBULATORY MEDICATION CHARGE: Performed by: STUDENT IN AN ORGANIZED HEALTH CARE EDUCATION/TRAINING PROGRAM

## 2022-12-29 PROCEDURE — 82962 GLUCOSE BLOOD TEST: CPT

## 2022-12-29 PROCEDURE — 93010 ELECTROCARDIOGRAM REPORT: CPT | Performed by: INTERNAL MEDICINE

## 2022-12-29 PROCEDURE — 700102 HCHG RX REV CODE 250 W/ 637 OVERRIDE(OP)

## 2022-12-29 PROCEDURE — 99239 HOSP IP/OBS DSCHRG MGMT >30: CPT | Performed by: STUDENT IN AN ORGANIZED HEALTH CARE EDUCATION/TRAINING PROGRAM

## 2022-12-29 PROCEDURE — 80048 BASIC METABOLIC PNL TOTAL CA: CPT

## 2022-12-29 PROCEDURE — A9270 NON-COVERED ITEM OR SERVICE: HCPCS

## 2022-12-29 PROCEDURE — 93005 ELECTROCARDIOGRAM TRACING: CPT | Performed by: STUDENT IN AN ORGANIZED HEALTH CARE EDUCATION/TRAINING PROGRAM

## 2022-12-29 PROCEDURE — 700105 HCHG RX REV CODE 258: Performed by: STUDENT IN AN ORGANIZED HEALTH CARE EDUCATION/TRAINING PROGRAM

## 2022-12-29 PROCEDURE — 700102 HCHG RX REV CODE 250 W/ 637 OVERRIDE(OP): Performed by: STUDENT IN AN ORGANIZED HEALTH CARE EDUCATION/TRAINING PROGRAM

## 2022-12-29 PROCEDURE — 85025 COMPLETE CBC W/AUTO DIFF WBC: CPT

## 2022-12-29 RX ORDER — METRONIDAZOLE 500 MG/1
500 TABLET ORAL EVERY 8 HOURS
Qty: 30 TABLET | Refills: 0 | Status: SHIPPED | OUTPATIENT
Start: 2022-12-29 | End: 2023-01-08

## 2022-12-29 RX ORDER — CHOLECALCIFEROL (VITAMIN D3) 125 MCG
5 CAPSULE ORAL NIGHTLY PRN
Status: DISCONTINUED | OUTPATIENT
Start: 2022-12-29 | End: 2022-12-29 | Stop reason: HOSPADM

## 2022-12-29 RX ORDER — LEVOTHYROXINE SODIUM 0.07 MG/1
75 TABLET ORAL
Qty: 90 TABLET | Refills: 3 | Status: SHIPPED | OUTPATIENT
Start: 2022-12-29

## 2022-12-29 RX ORDER — DILTIAZEM HYDROCHLORIDE 240 MG/1
240 CAPSULE, COATED, EXTENDED RELEASE ORAL DAILY
Qty: 90 CAPSULE | Refills: 3 | Status: SHIPPED | OUTPATIENT
Start: 2022-12-29 | End: 2023-06-18

## 2022-12-29 RX ORDER — CEFDINIR 300 MG/1
300 CAPSULE ORAL EVERY 12 HOURS
Status: DISCONTINUED | OUTPATIENT
Start: 2022-12-29 | End: 2022-12-29 | Stop reason: HOSPADM

## 2022-12-29 RX ORDER — CEFDINIR 300 MG/1
300 CAPSULE ORAL EVERY 12 HOURS
Qty: 10 CAPSULE | Refills: 0 | Status: SHIPPED | OUTPATIENT
Start: 2022-12-29 | End: 2023-01-03

## 2022-12-29 RX ADMIN — BUDESONIDE AND FORMOTEROL FUMARATE DIHYDRATE 2 PUFF: 160; 4.5 AEROSOL RESPIRATORY (INHALATION) at 05:10

## 2022-12-29 RX ADMIN — APIXABAN 2.5 MG: 2.5 TABLET, FILM COATED ORAL at 05:08

## 2022-12-29 RX ADMIN — OMEPRAZOLE 40 MG: 20 CAPSULE, DELAYED RELEASE ORAL at 05:07

## 2022-12-29 RX ADMIN — DILTIAZEM HYDROCHLORIDE 240 MG: 240 CAPSULE, COATED, EXTENDED RELEASE ORAL at 05:08

## 2022-12-29 RX ADMIN — Medication 5 MG: at 00:58

## 2022-12-29 RX ADMIN — CEFDINIR 300 MG: 300 CAPSULE ORAL at 05:07

## 2022-12-29 RX ADMIN — LEVOTHYROXINE SODIUM 75 MCG: 0.07 TABLET ORAL at 05:08

## 2022-12-29 RX ADMIN — SODIUM CHLORIDE: 9 INJECTION, SOLUTION INTRAVENOUS at 06:10

## 2022-12-29 RX ADMIN — GABAPENTIN 300 MG: 300 CAPSULE ORAL at 05:07

## 2022-12-29 RX ADMIN — INSULIN HUMAN 1 UNITS: 100 INJECTION, SOLUTION PARENTERAL at 06:18

## 2022-12-29 ASSESSMENT — ENCOUNTER SYMPTOMS
INSOMNIA: 0
WEAKNESS: 0
VOMITING: 0
HEADACHES: 0
DOUBLE VISION: 0
COUGH: 0
NAUSEA: 0
BLURRED VISION: 0
BACK PAIN: 0
FEVER: 0
SHORTNESS OF BREATH: 0
TREMORS: 1
CHILLS: 0

## 2022-12-29 NOTE — PROGRESS NOTES
Monitor Summary:    Rhythm: A-fib/A-flutter  Rate:   Ectopy: (F) PVC  Measurement : -/.08/-

## 2022-12-29 NOTE — PROGRESS NOTES
Assumed care of patient, received bedside report from Stefania ANTUNEZ. Patient is A&O X 4. Pain 3/10, medicated per MAR. Vital signs stable during day shift, on 2 L of oxygen NC. On tele monitor, Afib 73. POC discussed with patient. Patient verbalized understanding. All questions answered. Call light within reach and fall precautions in place. Bed alarm on, bed locked and in lowest position.

## 2022-12-29 NOTE — PROGRESS NOTES
Monitor room notified this RN of ST changes. A stat 12 lead EKG was ordered. It showed no new changes from previous EKGs. On call hospitalist, Ivania PETERSON notified.

## 2022-12-29 NOTE — DISCHARGE INSTRUCTIONS
Chronic Obstructive Pulmonary Disease (COPD) is a long-term, progressive lung disease that makes it harder to breathe. It includes chronic bronchitis, emphysema, and refractory (non-reversible) asthma. With COPD, the airways in your lungs become inflamed and thicken, and the tissue where oxygen is exchanged is destroyed. The flow of air in and out of your lungs decreases. When that happens, less oxygen gets into your body tissues, and it becomes harder to get rid of the waste gas carbon dioxide. As the disease gets worse, shortness of breath makes it harder to remain active. There is no cure for COPD, but it is preventable and treatable.    COPD Patient Discharge Instructions    Diet  Follow a low fat, low cholesterol, low salt diet unless instructed otherwise by your Doctor. Read the labels on the back of food products and track your intake of fat, cholesterol and salt.  No smoking  Discontinuing smoking will have the biggest impact on preventing progression of disease.  To participate in [employer]’s Quit Tobacco Program, call [phone] or visit [email or web address]  Oxygen  If your doctor has order that you wear oxygen at home, it is important to wear it as ordered.  Do not smoke, vape, or use e-cigarettes with oxygen on.  Store in an appropriate location: upright in its teixeira or laid flat down, away from open flames and stoves.   Do not use oil-based creams and moisturizers (ie: petroleum products, oil-based lip moisturizers) or aerosol sprays (ie: hair sprays or deodorants) when using your oxygen equipment.  Be careful with tubing placement to prevent yourself and others from tripping.  Medications  Refer to your personalized Action Plan to manage your symptoms.  Warning signs of an exacerbation  Breathing fast and shallow, worsening shortness of breath (like you just finished exercising)  Chest tightness  Increases in sputum production  Changes in sputum color  Lower oxygen levels than baseline  When  to call your doctor  If the warning signs of an exacerbation do not improve  Refer to your personalized Action Plan   Pulmonary Rehab  Your doctor has ordered you a referral to Pulmonary Rehab. Call [phone] to schedule an appointment  Attend your follow-up appointment with your PCP and/or Pulmonologist  Remote Monitoring: At the direction of the remote monitoring on-call provider, you may increase your oxygen by 2 liters above your baseline.     See the educational handout provided by your COPD Navigator for more information. This also explains more about COPD, symptoms of an exacerbation, and some of the tests that you have undergone.

## 2022-12-29 NOTE — DISCHARGE PLANNING
Case Management Discharge Planning    Admission Date: 12/25/2022  GMLOS: 3.5  ALOS: 4    6-Clicks ADL Score: 24  6-Clicks Mobility Score: 18      Anticipated Discharge Dispo: Discharge Disposition: D/T to home under [facility] care in anticipation of covered skilled care (06)    DME Needed: No    Action(s) Taken: Updated Provider/Nurse on Discharge Plan, no needs    Escalations Completed: None    Medically Clear: Yes    Next Steps: dc to home, no needs    Barriers to Discharge: None

## 2022-12-29 NOTE — CARE PLAN
The patient is Stable - Low risk of patient condition declining or worsening    Shift Goals  Clinical Goals: monitor kidney function  Patient Goals: discharge  Family Goals: na    Progress made toward(s) clinical / shift goals:      Patient is not progressing towards the following goals:      Problem: Urinary - Renal Perfusion  Goal: Ability to achieve and maintain adequate renal perfusion and functioning will improve  Outcome: Not Progressing  Note: Kidney function labs continue to worsen, IV fluids started.

## 2022-12-29 NOTE — PROGRESS NOTES
1/2 blood culture growing paptoniphilus [facility] [employer]   Repeated blood culture negative     Added metronidazole to cover paptoniphilus [employer] .    Unfortunately patient left hospital before I can discuss antibiotic regimen and consult ID.    I discussed case with patient over phone in details regarding bacteremia.  I recommended her to come back to the hospital for further work-up.  Risks and benefits discussed including worsening sepsis. reports that she is doing good and she would like to continue antibiotics for now and refused to come to ED.  Antibiotic was sent to her preferred pharmacy.    I recommend her to come to the ED if she have any febrile episode and worsening symptoms..

## 2022-12-29 NOTE — PROGRESS NOTES
"NEPHROLOGY PROGRESS NOTE               Author: Thomas Rabago M.D.    Consulted By: Dr. Olivier Mcfarland    REASON FOR CONSULTATION:   Acute Renal Failure    CHIEF COMPLAINT:   \"Tired and hand shakiness\"    HISTORY OF PRESENT ILLNESS:    Jessica Plaza is a 82-year-old female with past medical history significant for COPD (on 4 L at home),hypertension, type 2 diabetes mellitus, A. fib on Xarelto who was admitted on 12/25/2022 for underlying infection and sepsis.    Patient reports feeling symptomatically improved since time of admission.  Patient denied any decrease in urine volume and states that she ambulates frequently in order to use the restroom.  Patient denied any blood in the urine or dysuria.  Patient also denied any emesis, fevers, chills, sweats, diarrhea, leg pain or swelling.  Patient denied that she has a history of kidney disease or family history of kidney disease.    Patient underwent CT abdomen and pelvis scan with contrast on 12/25/2022 that demonstrated possible cholelithiasis however no other acute abnormalities were noted.  Most recent labs are significant for hemoglobin of 11.2, BUN of 33, creatinine of 1.91, calcium of 8.7 and glucose of 213.    INTERVAL PROBLEM UPDATE:  12/29: Improvement of kidney function, Cr decreased to 1.38 from 1.91. Plan for discharge today. Patient denied any acute questions or concerns. Discussed with patient about remaining well hydrated and avoid nephrotoxins on discharge.     ROS:   Review of Systems   Constitutional:  Negative for chills, fever and malaise/fatigue.   HENT:  Negative for hearing loss.    Eyes:  Negative for blurred vision and double vision.   Respiratory:  Negative for cough and shortness of breath.    Cardiovascular:  Negative for chest pain and leg swelling.   Gastrointestinal:  Negative for nausea and vomiting.   Genitourinary:  Negative for dysuria and hematuria.   Musculoskeletal:  Negative for back pain.   Skin:  Negative for rash.   Neurological:  " Positive for tremors. Negative for weakness and headaches.   Psychiatric/Behavioral:  The patient does not have insomnia.       PAST MEDICAL HISTORY:   Past Medical History:   Diagnosis Date    Anemia     Arthritis     Asthma     Inhaler use daily and PRN    Atrial fibrillation ([facility])     Castro esophagus     Bronchitis     Chickenpox     Chronic anticoagulation 4/29/2019    COPD     COPD (chronic obstructive pulmonary disease) ([facility])     Dental disorder     dentures upper and lower    Diabetes     oral meds    Disorder of thyroid     Emphysema of lung ([facility])     Uses oxygen PRN.  Patient not sure of liters/minute.    Encounter for medication refill 2/5/2018    GERD (gastroesophageal reflux disease)     [place] measles     Heart burn     Hemorrhagic disorder     hx internal bleeding    High cholesterol     Hypertension     Indigestion     Pain     left shoulder    Pneumonia 2013.    Stroke ([facility]) 2013    Tonsillitis     UTI (urinary tract infection)        PAST SURGICAL HISTORY:      Past Surgical History:   Procedure Laterality Date    FUSION, SPINE, LUMBAR, PLIF N/A 8/28/2019    Procedure: FUSION, SPINE, LUMBAR, PLIF - L4-5, L5-S1 TLIF;  Surgeon: Grupo Cabrales M.D.;  Location: [facility];  Service: Neurosurgery    LUMBAR DECOMPRESSION N/A 8/28/2019    Procedure: DECOMPRESSION, SPINE, LUMBAR;  Surgeon: Grupo Cabrales M.D.;  Location: [facility];  Service: Neurosurgery    SHOULDER DECOMPRESSION ARTHROSCOPIC Left 10/11/2016    Procedure: SHOULDER DECOMPRESSION ARTHROSCOPIC  with   debridment of the labrum and biceps tendon stump;  Surgeon: Bill Ponce M.D.;  Location: [facility];  Service:     CLAVICLE RESECTION Left 10/11/2016    Procedure: CLAVICLE RESECTION distal open  ;  Surgeon: Bill Ponce M.D.;  Location: [facility];  Service:     CHONDROPLASTY Left 10/11/2016    Procedure: Arthroscopic CHONDROPLASTY of the joint  ;  Surgeon: Bill Ponce M.D.;  Location:  SURGERY [facility];  Service:     SHOULDER DECOMPRESSION ARTHROSCOPIC Left 2016    Procedure: SHOULDER DECOMPRESSION ARTHROSCOPIC SUBACROMIAL and biceps tenotomy;  Surgeon: Johan Roberts M.D.;  Location: SURGERY [facility];  Service:     GASTROSCOPY-ENDO  2014    Performed by Yonas Grimes Jr., M.D. at ENDOSCOPY [facility]    TONSILLECTOMY      LAMINOTOMY      PB ANESTH, DELIVERY ONLY      X 2       FAMILY HISTORY:   No family history of renal disease.  Patient reports that father had underlying heart disease but unsure which type.  Patient reports that mother had breast cancer and a sister had pancreatic cancer and denied any other history of cancer.    SOCIAL HISTORY:   - Current alcohol drinker, about 8 oz of vodka and tonic every day  - Last smoked tobacco 6 years and 1/2ppd for 40+ years  - Denied any recreational drug usage ever  - Lives in [place]      HOME MEDICATIONS:   No current facility-administered medications on file prior to encounter.     Current Outpatient Medications on File Prior to Encounter   Medication Sig Dispense Refill    pravastatin (PRAVACHOL) 40 MG tablet Take 1 tablet by mouth every day. Please call to schedule follow up appointment for further refills. Please call [phone]. Thank you. 14 tablet 0    MYRBETRIQ 25 MG TABLET SR 24 HR       metoprolol (LOPRESSOR) 25 MG Tab TAKE 1 TABLET BY MOUTH TWICE A DAY      traZODone (DESYREL) 100 MG Tab Take 1 Tab by mouth every bedtime. 90 Tab 3    budesonide-formoterol (SYMBICORT) 160-4.5 MCG/ACT Aerosol Inhale 2 Puffs by mouth 2 Times a Day. 3 Each 3    metformin (GLUCOPHAGE) 1000 MG tablet Take 1 Tab by mouth 2 times a day. 180 Tab 2    Dulaglutide (TRULICITY) 1.5 MG/0.5ML Solution Pen-injector Inject 0.5 mL as instructed every 7 days. 12 Each 3    gabapentin (NEURONTIN) 300 MG Cap Take 1 Cap by mouth 2 Times a Day. 180 Cap 3    fluticasone (FLONASE) 50 MCG/ACT nasal spray Spray 2 Sprays in nose  "every day. 16 g 0       ALLERGIES:  Bactrim, Demerol, Pcn [penicillins], Sulfa drugs, Ferrous sulfate, Tramadol, and Latex    PHYSICAL EXAM:  VS:  /68   Pulse (!) 103   Temp 36.6 °C (97.9 °F) (Temporal)   Resp 17   Ht 1.727 m (5' 8\")   Wt 91.6 kg (201 lb 15.1 oz)   SpO2 92%   BMI 30.71 kg/m²   Physical Exam  Vitals and nursing note reviewed.   Constitutional:       General: She is not in acute distress.     Appearance: Normal appearance. She is obese. She is not ill-appearing or toxic-appearing.   HENT:      Right Ear: External ear normal.      Left Ear: External ear normal.      Mouth/Throat:      Mouth: Mucous membranes are moist.   Eyes:      Extraocular Movements: Extraocular movements intact.      Conjunctiva/sclera: Conjunctivae normal.   Cardiovascular:      Rate and Rhythm: Normal rate. Rhythm irregular.      Heart sounds: No murmur heard.    No gallop.   Pulmonary:      Breath sounds: Normal breath sounds. No stridor. No wheezing or rales.   Abdominal:      General: There is no distension.   Musculoskeletal:         General: No swelling.      Cervical back: Neck supple.      Right lower leg: No edema.      Left lower leg: No edema.   Skin:     Findings: Bruising present.   Neurological:      Mental Status: She is alert and oriented to person, place, and time.   Psychiatric:         Mood and Affect: Mood normal.         Behavior: Behavior normal.       LABS:  Recent Results (from the past 24 hour(s))   URINE SODIUM RANDOM    Collection Time: 12/28/22 10:15 AM   Result Value Ref Range    Sodium, Urine -per volume 40 mmol/L   URINE POTASSIUM RANDOM    Collection Time: 12/28/22 10:15 AM   Result Value Ref Range    Potassium 24.0 mmol/L   URINE CHLORIDE RANDOM    Collection Time: 12/28/22 10:15 AM   Result Value Ref Range    Chloride, Urine-per volume 40 mmol/L   URINE CREATININE RANDOM    Collection Time: 12/28/22 10:15 AM   Result Value Ref Range    Creatinine, Random Urine 90.17 mg/dL   URINE " PROTEIN RANDOM    Collection Time: 22 10:15 AM   Result Value Ref Range    Total Protein, Urine 49.0 (H) 0.0 - 15.0 mg/dL   POCT glucose device results    Collection Time: 22 12:02 PM   Result Value Ref Range    POC Glucose, Blood 227 (H) 65 - 99 mg/dL   POCT glucose device results    Collection Time: 22  5:35 PM   Result Value Ref Range    POC Glucose, Blood 198 (H) 65 - 99 mg/dL   POCT glucose device results    Collection Time: 22  8:10 PM   Result Value Ref Range    POC Glucose, Blood 213 (H) 65 - 99 mg/dL   EKG    Collection Time: 22  2:13 AM   Result Value Ref Range    Report       [facility]    Test Date:  2022  Pt Name:    MARILIN MCKEON                   Department: 183  MRN:        [id number]                      Room:       [facility]  Gender:     Female                       Technician: BETI  :        [id number]                   Requested By:SARA FLORES  Order #:    [id number]                    Reading MD: Aliyah Merrill    Measurements  Intervals                                Axis  Rate:       78                           P:  CT:                                      QRS:        36  QRSD:       103                          T:          47  QT:         418  QTc:        477    Interpretive Statements  Atrial flutter, 78 bpm  Low voltage, extremity and precordial leads  Compared to ECG 2022 12:27:30  No significant changes  Electronically Signed On 2022 4:15:46 PST by Aliyah Merrill     CBC WITH DIFFERENTIAL    Collection Time: 22  6:06 AM   Result Value Ref Range    WBC 8.6 4.8 - 10.8 K/uL    RBC 3.37 (L) 4.20 - 5.40 M/uL    Hemoglobin 10.8 (L) 12.0 - 16.0 g/dL    Hematocrit 33.7 (L) 37.0 - 47.0 %    .0 (H) 81.4 - 97.8 fL    MCH 32.0 27.0 - 33.0 pg    MCHC 32.0 (L) 33.6 - 35.0 g/dL    RDW 52.0 (H) 35.9 - 50.0 fL    Platelet Count 217 164 - 446 K/uL    MPV 10.6 9.0 - 12.9 fL    Neutrophils-Polys 80.80 (H) 44.00 - 72.00 %    Lymphocytes 9.40 (L) 22.00 -  41.00 %    Monocytes 7.70 0.00 - 13.40 %    Eosinophils 1.00 0.00 - 6.90 %    Basophils 0.60 0.00 - 1.80 %    Immature Granulocytes 0.50 0.00 - 0.90 %    Nucleated RBC 0.00 /100 WBC    Neutrophils (Absolute) 6.93 2.00 - 7.15 K/uL    Lymphs (Absolute) 0.81 (L) 1.00 - 4.80 K/uL    Monos (Absolute) 0.66 0.00 - 0.85 K/uL    Eos (Absolute) 0.09 0.00 - 0.51 K/uL    Baso (Absolute) 0.05 0.00 - 0.12 K/uL    Immature Granulocytes (abs) 0.04 0.00 - 0.11 K/uL    NRBC (Absolute) 0.00 K/uL   Basic Metabolic Panel    Collection Time: 22  6:06 AM   Result Value Ref Range    Sodium 137 135 - 145 mmol/L    Potassium 4.2 3.6 - 5.5 mmol/L    Chloride 104 96 - 112 mmol/L    Co2 22 20 - 33 mmol/L    Glucose 193 (H) 65 - 99 mg/dL    Bun 28 (H) 8 - 22 mg/dL    Creatinine 1.38 0.50 - 1.40 mg/dL    Calcium 8.7 8.5 - 10.5 mg/dL    Anion Gap 11.0 7.0 - 16.0   ESTIMATED GFR    Collection Time: 22  6:06 AM   Result Value Ref Range    GFR (CKD-EPI) 39 (A) >60 mL/min/1.73 m 2   POCT glucose device results    Collection Time: 22  6:17 AM   Result Value Ref Range    POC Glucose, Blood 170 (H) 65 - 99 mg/dL   EKG    Collection Time: 22  8:32 AM   Result Value Ref Range    Report       [facility]    Test Date:  2022  Pt Name:    MARLIIN MCKEON                   Department: 183  MRN:        [id number]                      Room:       T823  Gender:     Female                       Technician: JASON  :        [id number]                   Requested By:SARA FLORES  Order #:    [id number]                    Reading MD:    Measurements  Intervals                                Axis  Rate:       103                          P:          52  ME:         166                          QRS:        69  QRSD:       102                          T:          70  QT:         346  QTc:        453    Interpretive Statements  Sinus tachycardia  Low voltage, extremity and precordial leads  Abnormal R-wave progression, late  transition  Borderline ST depression, anterior leads  ST elevation, consider inferior injury  ST depression V1-V3, suggest recording posterior leads  Compared to ECG 12/29/2022 02:13:12  Myocardial infarct finding now present  ST (T wave) deviation now present  Atri al flutter no longer present         (click the triangle to expand results)    IMAGING:  US-RENAL   Final Result      1.  No significant hydronephrosis.      EC-ECHOCARDIOGRAM COMPLETE W/O CONT   Final Result      CT-ABDOMEN-PELVIS WITH   Final Result      1.  Cholelithiasis appears to be present.      2.  Otherwise no acute abnormalities are identified in the abdomen or pelvis.      DX-CHEST-PORTABLE (1 VIEW)   Final Result         Mild blunting of left costophrenic angle could be due to small left pleural fluid collection versus opacification or atelectasis in the region.      No other infiltrates or consolidations identified.          PMH/FH/SH/meds/labs/images reviewed     ASSESSMENT:  # [place]:   - Baseline Cr 0.8-1, increased to 1.91 and improved to 1.38  - Likely multifactorial 2/2 to SILVERIO as patient had CT scan with contrast on 12/25 also contributing factor could be underlying sepsis  # Sepsis  # Atrial fibrillation  # Type 2 diabetes mellitus  # COPD  # Chronic hypoxic respiratory failure  - Patient reports that she is on 4 L at home, currently on 2 L  # Hypothyroidism    PLAN:  - No indication for dialysis. Plan for discharge today by primary team  - Discussed good oral hydration and avoidance of NSAIDs with patient on discharge  - Limit nephrotoxins   - Strict I/Os  - Dose all meds per eGFR < 15    Thank you for this consult.    Discussed plan with patient and nursing staff.  Staffed with Dr. Mason.

## 2022-12-30 LAB
BACTERIA BLD CULT: NORMAL
SIGNIFICANT IND 70042: NORMAL
SITE SITE: NORMAL
SOURCE SOURCE: NORMAL

## 2023-01-01 LAB
BACTERIA BLD CULT: NORMAL
BACTERIA BLD CULT: NORMAL
SIGNIFICANT IND 70042: NORMAL
SIGNIFICANT IND 70042: NORMAL
SITE SITE: NORMAL
SITE SITE: NORMAL
SOURCE SOURCE: NORMAL
SOURCE SOURCE: NORMAL

## 2023-02-19 NOTE — DISCHARGE SUMMARY
@CT 0494   Discharge Summary    CHIEF COMPLAINT ON ADMISSION  Chief Complaint   Patient presents with    Dizziness     Pt BIBA for dizzy spells as well as generalized weakness over the past few weeks. Pt's  also reports pt has episodes of right hand shaking that last approx. 1 min and pt was seen by her PCP who recommended she come to the ER for evaluation of her hand shaking. EMS also reports pt was in afib for them and patient does not recall ever being diagnosed with afib.         Reason for Admission  ems     Admission Date  12/25/2022    CODE STATUS  Full Code    HPI & HOSPITAL COURSE    Patient is an 82 year old female with history of atrial fibrillation on xarelto, hypertension, COPD, and diabetes. She presented to [facility] 12/[facility] with malaise and right hand shaking along with generalized tiredness and unwell feeling for two weeks. She denied any other focal symptoms including chest pain, dyspnea, cough, abdominal pain, nausea, vomiting, fever, chills, sweats, dysuria, urinary frequency, diarrhea, leg pain or swelling. Patient describes resting tremor of right hand. Denies memory deficits, extremity stiffness, difficulty ambulating or falls.  In the ED, she was found to be febrile with a T 102.2, tachycardiac with a , RR 20, /74, on 4L oxygen NC. WBC 19.8, glucose 293, UA with signs of infection, procal normal. CXR shows mild blunting left costophrenic angle. CT abdomen pelvis with no acute abnormalities. She was admitted for treatment of sepsis due to suspected UTI.    Urine culture grew Klebsiella pneumonia.  1/2 blood culture growing peptoniphilus. repeat blood culture remain negative.  Patient completed course of IV Rocephin.  She will be discharged home on Omnicef to complete antibiotic course.      Nephrology evaluated for worsening renal function.  At the time of discharge her renal function improved.  I recommended her to follow-up with PCP and repeat labs in 1 to 2 weeks and hold lisinopril  for now.    During hospital course patient remain  hemodynamically stable ,asymptomatic ,labs remain unremarkable .  Patient will be discharged with close follow up with PCP .  Advised for medicine compliance.Discharge plan was discussed with patient in details .  Patient agreed with discharge plan  and  all questions answered.    Added metronidazole to cover paptoniphilus herei [employer] .    Unfortunately patient  left hospital before I can discuss antibiotic regimen and consult ID.    I discussed case with patient over phone in details regarding bacteremia.  I recommended her to come back to the hospital for further work-up.  Risks and benefits discussed including worsening sepsis. reports that she is doing good and she would like to continue antibiotics for now and refused to come to ED.  Antibiotic was sent to her preferred pharmacy.  I recommend her to come to the ED if she have any febrile episode and worsening symptoms..      Therefore, she is discharged in good and stable condition to home with close outpatient follow-up.    The patient met 2-midnight criteria for an inpatient stay at the time of discharge.    Discharge Date  12/29/2022        DISCHARGE DIAGNOSES  Principal Problem:    Sepsis (HCC) POA: Yes  Active Problems:    Type 2 diabetes mellitus with hyperglycemia, without long-term current use of insulin (HCC) (Chronic) POA: Yes    Chronic obstructive pulmonary disease (HCC) (Chronic) POA: Yes    Hypothyroidism due to acquired atrophy of thyroid POA: Yes    Atrial fibrillation (HCC) POA: Unknown    Chronic anticoagulation (Chronic) POA: Yes    UTI (urinary tract infection) POA: Unknown    Leukocytosis POA: Unknown    BAYRON (acute kidney injury) (HCC) POA: Unknown  Resolved Problems:    * No resolved hospital problems. *      FOLLOW UP  No future appointments.  Anurag Moreno M.D.  [address] [place] [id number] [phone]    Schedule an appointment as soon as possible for a visit  in 2 week(s)  Follow-up after hospital stay      MEDICATIONS ON DISCHARGE     Medication List        START taking these medications        Instructions   apixaban 2.5mg Tabs  Commonly known as: ELIQUIS   Take 1 Tablet by mouth 2 times a day for 30 days. Indications: Thromboembolism secondary to Atrial Fibrillation  Dose: 2.5 mg     cefdinir 300 MG Caps  Commonly known as: OMNICEF   Take 1 Capsule by mouth every 12 hours for 5 days.  Dose: 300 mg            CONTINUE taking these medications        Instructions   budesonide-formoterol 160-4.5 MCG/ACT Aero  Commonly known as: Symbicort   Inhale 2 Puffs by mouth 2 Times a Day.  Dose: 2 Puff     dilTIAZem  MG Cp24  Commonly known as: Cardizem CD   Take 1 Capsule by mouth every day.  Dose: 240 mg     fluticasone 50 MCG/ACT nasal spray  Commonly known as: FLONASE   Spray 2 Sprays in nose every day.  Dose: 2 Spray     gabapentin 300 MG Caps  Commonly known as: NEURONTIN   Take 1 Cap by mouth 2 Times a Day.  Dose: 300 mg     levothyroxine 75 MCG Tabs  Commonly known as: SYNTHROID   Take 1 Tablet by mouth every morning on an empty stomach.  Dose: 75 mcg     metformin 1000 MG tablet  Commonly known as: GLUCOPHAGE   Take 1 Tab by mouth 2 times a day.  Dose: 1,000 mg     metoprolol tartrate 25 MG Tabs  Commonly known as: LOPRESSOR   TAKE 1 TABLET BY MOUTH TWICE A DAY     Myrbetriq 25 MG Tb24  Generic drug: Mirabegron ER      pravastatin 40 MG tablet  Commonly known as: PRAVACHOL   Doctor's comments: Patient needs to be seen for future refills.  Take 1 tablet by mouth every day. Please call to schedule follow up appointment for further refills. Please call [phone]. Thank you.  Dose: 40 mg     traZODone 100 MG Tabs  Commonly known as: DESYREL   Take 1 Tab by mouth every bedtime.  Dose: 100 mg     Trulicity 1.5 MG/0.5ML Sopn  Generic drug: Dulaglutide   Inject 0.5 mL as instructed every 7 days.  Dose: 0.5 mL            STOP taking these medications      lisinopril 20 MG  Tabs  Commonly known as: PRINIVIL     omeprazole 40 MG delayed-release capsule  Commonly known as: PRILOSEC              Allergies  Allergies   Allergen Reactions    Bactrim Hives    Demerol Shortness of Breath and Swelling    Pcn [Penicillins] Hives     Tolerates ceftriaxone and cefazolin    Sulfa Drugs Hives    Ferrous Sulfate Rash     itching    Tramadol Itching     rash    Latex Contact Dermatitis       DIET  Orders Placed This Encounter   Procedures    Diet Order Diet: Consistent CHO (Diabetic)     Standing Status:   Standing     Number of Occurrences:   1     Order Specific Question:   Diet:     Answer:   Consistent CHO (Diabetic) [4]       ACTIVITY  As tolerated.  Weight bearing as tolerated    CONSULTATIONS  Nephrology     PROCEDURES  US-RENAL   Final Result      1.  No significant hydronephrosis.      EC-ECHOCARDIOGRAM COMPLETE W/O CONT   Final Result      CT-ABDOMEN-PELVIS WITH   Final Result      1.  Cholelithiasis appears to be present.      2.  Otherwise no acute abnormalities are identified in the abdomen or pelvis.      DX-CHEST-PORTABLE (1 VIEW)   Final Result         Mild blunting of left costophrenic angle could be due to small left pleural fluid collection versus opacification or atelectasis in the region.      No other infiltrates or consolidations identified.           LABORATORY  Lab Results   Component Value Date    SODIUM 137 12/29/2022    POTASSIUM 4.2 12/29/2022    CHLORIDE 104 12/29/2022    CO2 22 12/29/2022    GLUCOSE 193 (H) 12/29/2022    BUN 28 (H) 12/29/2022    CREATININE 1.38 12/29/2022        Lab Results   Component Value Date    WBC 8.6 12/29/2022    HEMOGLOBIN 10.8 (L) 12/29/2022    HEMATOCRIT 33.7 (L) 12/29/2022    PLATELETCT 217 12/29/2022        Total time of the discharge process exceeds 37 minutes.

## 2023-02-28 ENCOUNTER — HOSPITAL ENCOUNTER (OUTPATIENT)
Dept: RADIOLOGY | Facility: MEDICAL CENTER | Age: 83
End: 2023-02-28
Attending: PSYCHIATRY & NEUROLOGY
Payer: MEDICARE

## 2023-02-28 DIAGNOSIS — G45.1 CAROTID ARTERY SYNDROME: ICD-10-CM

## 2023-02-28 DIAGNOSIS — R26.81 UNSTEADINESS ON FEET: ICD-10-CM

## 2023-02-28 DIAGNOSIS — F03.B0 MODERATE DEMENTIA WITHOUT BEHAVIORAL DISTURBANCE, PSYCHOTIC DISTURBANCE, MOOD DISTURBANCE, OR ANXIETY, UNSPECIFIED DEMENTIA TYPE (HCC): ICD-10-CM

## 2023-02-28 DIAGNOSIS — R25.1 TREMOR: ICD-10-CM

## 2023-06-18 ENCOUNTER — APPOINTMENT (OUTPATIENT)
Dept: RADIOLOGY | Facility: MEDICAL CENTER | Age: 83
DRG: 871 | End: 2023-06-18
Attending: EMERGENCY MEDICINE
Payer: MEDICARE

## 2023-06-18 ENCOUNTER — APPOINTMENT (OUTPATIENT)
Dept: RADIOLOGY | Facility: MEDICAL CENTER | Age: 83
DRG: 871 | End: 2023-06-18
Payer: MEDICARE

## 2023-06-18 ENCOUNTER — HOSPITAL ENCOUNTER (INPATIENT)
Facility: MEDICAL CENTER | Age: 83
LOS: 2 days | DRG: 871 | End: 2023-06-20
Attending: EMERGENCY MEDICINE | Admitting: STUDENT IN AN ORGANIZED HEALTH CARE EDUCATION/TRAINING PROGRAM
Payer: MEDICARE

## 2023-06-18 DIAGNOSIS — E86.0 DEHYDRATION: ICD-10-CM

## 2023-06-18 DIAGNOSIS — R00.0 SINUS TACHYCARDIA: ICD-10-CM

## 2023-06-18 DIAGNOSIS — R65.20 SEPSIS WITH ACUTE RENAL FAILURE WITHOUT SEPTIC SHOCK, DUE TO UNSPECIFIED ORGANISM, UNSPECIFIED ACUTE RENAL FAILURE TYPE (HCC): ICD-10-CM

## 2023-06-18 DIAGNOSIS — N39.0 ACUTE UTI: ICD-10-CM

## 2023-06-18 DIAGNOSIS — N17.9 SEPSIS WITH ACUTE RENAL FAILURE WITHOUT SEPTIC SHOCK, DUE TO UNSPECIFIED ORGANISM, UNSPECIFIED ACUTE RENAL FAILURE TYPE (HCC): ICD-10-CM

## 2023-06-18 DIAGNOSIS — N39.0 URINARY TRACT INFECTION WITHOUT HEMATURIA, SITE UNSPECIFIED: ICD-10-CM

## 2023-06-18 DIAGNOSIS — A41.9 SEPSIS WITH ACUTE RENAL FAILURE WITHOUT SEPTIC SHOCK, DUE TO UNSPECIFIED ORGANISM, UNSPECIFIED ACUTE RENAL FAILURE TYPE (HCC): ICD-10-CM

## 2023-06-18 DIAGNOSIS — N17.9 AKI (ACUTE KIDNEY INJURY) (HCC): ICD-10-CM
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
## 2023-06-18 DIAGNOSIS — M25.552 PAIN OF LEFT HIP: Primary | ICD-10-CM

## 2023-06-18 DIAGNOSIS — D72.829 LEUKOCYTOSIS, UNSPECIFIED TYPE: ICD-10-CM

## 2023-06-18 PROBLEM — J96.20 ACUTE ON CHRONIC RESPIRATORY FAILURE (HCC): Status: ACTIVE | Noted: 2023-06-18

## 2023-06-18 LAB
ALBUMIN SERPL BCP-MCNC: 3.8 G/DL (ref 3.2–4.9)
ALBUMIN/GLOB SERPL: 1.3 G/DL
ALP SERPL-CCNC: 88 U/L (ref 30–99)
ALT SERPL-CCNC: 26 U/L (ref 2–50)
ANION GAP SERPL CALC-SCNC: 13 MMOL/L (ref 7–16)
APPEARANCE UR: ABNORMAL
AST SERPL-CCNC: 26 U/L (ref 12–45)
BACTERIA #/AREA URNS HPF: ABNORMAL /HPF
BASOPHILS # BLD AUTO: 0.2 % (ref 0–1.8)
BASOPHILS # BLD: 0.03 K/UL (ref 0–0.12)
BILIRUB SERPL-MCNC: 1 MG/DL (ref 0.1–1.5)
BILIRUB UR QL STRIP.AUTO: NEGATIVE
BUN SERPL-MCNC: 20 MG/DL (ref 8–22)
CALCIUM ALBUM COR SERPL-MCNC: 9.4 MG/DL (ref 8.5–10.5)
CALCIUM SERPL-MCNC: 9.2 MG/DL (ref 8.5–10.5)
CHLORIDE SERPL-SCNC: 93 MMOL/L (ref 96–112)
CO2 SERPL-SCNC: 24 MMOL/L (ref 20–33)
COLOR UR: YELLOW
CREAT SERPL-MCNC: 1.41 MG/DL (ref 0.5–1.4)
D DIMER PPP IA.FEU-MCNC: 1.49 UG/ML (FEU) (ref 0–0.5)
EKG IMPRESSION: NORMAL
EOSINOPHIL # BLD AUTO: 0.02 K/UL (ref 0–0.51)
EOSINOPHIL NFR BLD: 0.2 % (ref 0–6.9)
EPI CELLS #/AREA URNS HPF: NEGATIVE /HPF
ERYTHROCYTE [DISTWIDTH] IN BLOOD BY AUTOMATED COUNT: 51.9 FL (ref 35.9–50)
GFR SERPLBLD CREATININE-BSD FMLA CKD-EPI: 37 ML/MIN/1.73 M 2
GLOBULIN SER CALC-MCNC: 3 G/DL (ref 1.9–3.5)
GLUCOSE BLD STRIP.AUTO-MCNC: 211 MG/DL (ref 65–99)
GLUCOSE BLD STRIP.AUTO-MCNC: 213 MG/DL (ref 65–99)
GLUCOSE BLD STRIP.AUTO-MCNC: 228 MG/DL (ref 65–99)
GLUCOSE SERPL-MCNC: 340 MG/DL (ref 65–99)
GLUCOSE UR STRIP.AUTO-MCNC: NEGATIVE MG/DL
HCT VFR BLD AUTO: 40.8 % (ref 37–47)
HGB BLD-MCNC: 13.8 G/DL (ref 12–16)
HYALINE CASTS #/AREA URNS LPF: ABNORMAL /LPF
IMM GRANULOCYTES # BLD AUTO: 0.07 K/UL (ref 0–0.11)
IMM GRANULOCYTES NFR BLD AUTO: 0.6 % (ref 0–0.9)
INR PPP: 1.28 (ref 0.87–1.13)
KETONES UR STRIP.AUTO-MCNC: NEGATIVE MG/DL
LACTATE SERPL-SCNC: 0.7 MMOL/L (ref 0.5–2)
LACTATE SERPL-SCNC: 0.8 MMOL/L (ref 0.5–2)
LACTATE SERPL-SCNC: 1.1 MMOL/L (ref 0.5–2)
LACTATE SERPL-SCNC: 1.3 MMOL/L (ref 0.5–2)
LEUKOCYTE ESTERASE UR QL STRIP.AUTO: ABNORMAL
LYMPHOCYTES # BLD AUTO: 0.58 K/UL (ref 1–4.8)
LYMPHOCYTES NFR BLD: 4.8 % (ref 22–41)
MCH RBC QN AUTO: 32 PG (ref 27–33)
MCHC RBC AUTO-ENTMCNC: 33.8 G/DL (ref 32.2–35.5)
MCV RBC AUTO: 94.7 FL (ref 81.4–97.8)
MICRO URNS: ABNORMAL
MONOCYTES # BLD AUTO: 0.82 K/UL (ref 0–0.85)
MONOCYTES NFR BLD AUTO: 6.7 % (ref 0–13.4)
NEUTROPHILS # BLD AUTO: 10.69 K/UL (ref 1.82–7.42)
NEUTROPHILS NFR BLD: 87.5 % (ref 44–72)
NITRITE UR QL STRIP.AUTO: NEGATIVE
NRBC # BLD AUTO: 0 K/UL
NRBC BLD-RTO: 0 /100 WBC (ref 0–0.2)
NT-PROBNP SERPL IA-MCNC: 3068 PG/ML (ref 0–125)
PH UR STRIP.AUTO: 6 [PH] (ref 5–8)
PLATELET # BLD AUTO: 162 K/UL (ref 164–446)
PMV BLD AUTO: 10.8 FL (ref 9–12.9)
POTASSIUM SERPL-SCNC: 4.1 MMOL/L (ref 3.6–5.5)
PROT SERPL-MCNC: 6.8 G/DL (ref 6–8.2)
PROT UR QL STRIP: 100 MG/DL
PROTHROMBIN TIME: 15.8 SEC (ref 12–14.6)
RBC # BLD AUTO: 4.31 M/UL (ref 4.2–5.4)
RBC # URNS HPF: ABNORMAL /HPF
RBC UR QL AUTO: ABNORMAL
SODIUM SERPL-SCNC: 130 MMOL/L (ref 135–145)
SP GR UR STRIP.AUTO: 1.02
TROPONIN T SERPL-MCNC: 36 NG/L (ref 6–19)
UROBILINOGEN UR STRIP.AUTO-MCNC: 0.2 MG/DL
WBC # BLD AUTO: 12.2 K/UL (ref 4.8–10.8)
WBC #/AREA URNS HPF: ABNORMAL /HPF

## 2023-06-18 PROCEDURE — 73552 X-RAY EXAM OF FEMUR 2/>: CPT | Mod: LT

## 2023-06-18 PROCEDURE — 36415 COLL VENOUS BLD VENIPUNCTURE: CPT

## 2023-06-18 PROCEDURE — 87186 SC STD MICRODIL/AGAR DIL: CPT

## 2023-06-18 PROCEDURE — A9270 NON-COVERED ITEM OR SERVICE: HCPCS | Performed by: STUDENT IN AN ORGANIZED HEALTH CARE EDUCATION/TRAINING PROGRAM

## 2023-06-18 PROCEDURE — 83880 ASSAY OF NATRIURETIC PEPTIDE: CPT

## 2023-06-18 PROCEDURE — 99223 1ST HOSP IP/OBS HIGH 75: CPT | Mod: AI | Performed by: STUDENT IN AN ORGANIZED HEALTH CARE EDUCATION/TRAINING PROGRAM

## 2023-06-18 PROCEDURE — 700117 HCHG RX CONTRAST REV CODE 255

## 2023-06-18 PROCEDURE — 82962 GLUCOSE BLOOD TEST: CPT | Mod: 91

## 2023-06-18 PROCEDURE — 81001 URINALYSIS AUTO W/SCOPE: CPT

## 2023-06-18 PROCEDURE — 71275 CT ANGIOGRAPHY CHEST: CPT

## 2023-06-18 PROCEDURE — 80053 COMPREHEN METABOLIC PANEL: CPT

## 2023-06-18 PROCEDURE — 72170 X-RAY EXAM OF PELVIS: CPT

## 2023-06-18 PROCEDURE — 96374 THER/PROPH/DIAG INJ IV PUSH: CPT

## 2023-06-18 PROCEDURE — 770020 HCHG ROOM/CARE - TELE (206)

## 2023-06-18 PROCEDURE — 87086 URINE CULTURE/COLONY COUNT: CPT

## 2023-06-18 PROCEDURE — 700101 HCHG RX REV CODE 250

## 2023-06-18 PROCEDURE — 93005 ELECTROCARDIOGRAM TRACING: CPT | Performed by: EMERGENCY MEDICINE

## 2023-06-18 PROCEDURE — 700102 HCHG RX REV CODE 250 W/ 637 OVERRIDE(OP): Performed by: STUDENT IN AN ORGANIZED HEALTH CARE EDUCATION/TRAINING PROGRAM

## 2023-06-18 PROCEDURE — 85025 COMPLETE CBC W/AUTO DIFF WBC: CPT

## 2023-06-18 PROCEDURE — 700101 HCHG RX REV CODE 250: Performed by: EMERGENCY MEDICINE

## 2023-06-18 PROCEDURE — 99285 EMERGENCY DEPT VISIT HI MDM: CPT

## 2023-06-18 PROCEDURE — 96375 TX/PRO/DX INJ NEW DRUG ADDON: CPT

## 2023-06-18 PROCEDURE — 94760 N-INVAS EAR/PLS OXIMETRY 1: CPT

## 2023-06-18 PROCEDURE — 700105 HCHG RX REV CODE 258: Performed by: STUDENT IN AN ORGANIZED HEALTH CARE EDUCATION/TRAINING PROGRAM

## 2023-06-18 PROCEDURE — 71045 X-RAY EXAM CHEST 1 VIEW: CPT

## 2023-06-18 PROCEDURE — 700111 HCHG RX REV CODE 636 W/ 250 OVERRIDE (IP): Performed by: EMERGENCY MEDICINE

## 2023-06-18 PROCEDURE — 83605 ASSAY OF LACTIC ACID: CPT | Mod: 91

## 2023-06-18 PROCEDURE — 700105 HCHG RX REV CODE 258: Performed by: EMERGENCY MEDICINE

## 2023-06-18 PROCEDURE — 84484 ASSAY OF TROPONIN QUANT: CPT

## 2023-06-18 PROCEDURE — 700111 HCHG RX REV CODE 636 W/ 250 OVERRIDE (IP): Performed by: STUDENT IN AN ORGANIZED HEALTH CARE EDUCATION/TRAINING PROGRAM

## 2023-06-18 PROCEDURE — 87077 CULTURE AEROBIC IDENTIFY: CPT

## 2023-06-18 PROCEDURE — 85610 PROTHROMBIN TIME: CPT

## 2023-06-18 PROCEDURE — 93971 EXTREMITY STUDY: CPT | Mod: LT

## 2023-06-18 PROCEDURE — 87040 BLOOD CULTURE FOR BACTERIA: CPT | Mod: 91

## 2023-06-18 PROCEDURE — 85379 FIBRIN DEGRADATION QUANT: CPT

## 2023-06-18 RX ORDER — OXYCODONE HYDROCHLORIDE 5 MG/1
5 TABLET ORAL
Status: DISCONTINUED | OUTPATIENT
Start: 2023-06-18 | End: 2023-06-20 | Stop reason: HOSPADM

## 2023-06-18 RX ORDER — MORPHINE SULFATE 4 MG/ML
4 INJECTION INTRAVENOUS ONCE
Status: COMPLETED | OUTPATIENT
Start: 2023-06-18 | End: 2023-06-18

## 2023-06-18 RX ORDER — LIDOCAINE 50 MG/G
1 PATCH TOPICAL
Status: DISCONTINUED | OUTPATIENT
Start: 2023-06-18 | End: 2023-06-20 | Stop reason: HOSPADM

## 2023-06-18 RX ORDER — SODIUM CHLORIDE, SODIUM LACTATE, POTASSIUM CHLORIDE, CALCIUM CHLORIDE 600; 310; 30; 20 MG/100ML; MG/100ML; MG/100ML; MG/100ML
INJECTION, SOLUTION INTRAVENOUS CONTINUOUS
Status: ACTIVE | OUTPATIENT
Start: 2023-06-18 | End: 2023-06-18

## 2023-06-18 RX ORDER — ACETAMINOPHEN 500 MG
1000 TABLET ORAL EVERY 6 HOURS
Status: DISCONTINUED | OUTPATIENT
Start: 2023-06-18 | End: 2023-06-20 | Stop reason: HOSPADM

## 2023-06-18 RX ORDER — PRAVASTATIN SODIUM 20 MG
40 TABLET ORAL
Status: DISCONTINUED | OUTPATIENT
Start: 2023-06-18 | End: 2023-06-20 | Stop reason: HOSPADM

## 2023-06-18 RX ORDER — GABAPENTIN 300 MG/1
300 CAPSULE ORAL 2 TIMES DAILY
Status: DISCONTINUED | OUTPATIENT
Start: 2023-06-18 | End: 2023-06-20 | Stop reason: HOSPADM

## 2023-06-18 RX ORDER — OXYCODONE HYDROCHLORIDE 5 MG/1
2.5 TABLET ORAL
Status: DISCONTINUED | OUTPATIENT
Start: 2023-06-18 | End: 2023-06-20 | Stop reason: HOSPADM

## 2023-06-18 RX ORDER — BUPIVACAINE HYDROCHLORIDE AND EPINEPHRINE 5; 5 MG/ML; UG/ML
30 INJECTION, SOLUTION EPIDURAL; INTRACAUDAL; PERINEURAL ONCE
Status: DISPENSED | OUTPATIENT
Start: 2023-06-18 | End: 2023-06-19

## 2023-06-18 RX ORDER — INSULIN LISPRO 100 [IU]/ML
1-6 INJECTION, SOLUTION INTRAVENOUS; SUBCUTANEOUS
Status: DISCONTINUED | OUTPATIENT
Start: 2023-06-18 | End: 2023-06-20 | Stop reason: HOSPADM

## 2023-06-18 RX ORDER — ACETAMINOPHEN 500 MG
1000 TABLET ORAL EVERY 6 HOURS PRN
Status: DISCONTINUED | OUTPATIENT
Start: 2023-06-23 | End: 2023-06-20 | Stop reason: HOSPADM

## 2023-06-18 RX ORDER — SODIUM CHLORIDE 9 MG/ML
1000 INJECTION, SOLUTION INTRAVENOUS ONCE
Status: COMPLETED | OUTPATIENT
Start: 2023-06-18 | End: 2023-06-18

## 2023-06-18 RX ORDER — FLUTICASONE PROPIONATE 50 MCG
2 SPRAY, SUSPENSION (ML) NASAL DAILY
Status: DISCONTINUED | OUTPATIENT
Start: 2023-06-18 | End: 2023-06-18

## 2023-06-18 RX ORDER — ENOXAPARIN SODIUM 100 MG/ML
40 INJECTION SUBCUTANEOUS DAILY
Status: DISCONTINUED | OUTPATIENT
Start: 2023-06-18 | End: 2023-06-20 | Stop reason: HOSPADM

## 2023-06-18 RX ORDER — DILTIAZEM HYDROCHLORIDE 240 MG/1
240 CAPSULE, COATED, EXTENDED RELEASE ORAL DAILY
Status: DISCONTINUED | OUTPATIENT
Start: 2023-06-18 | End: 2023-06-18

## 2023-06-18 RX ORDER — HYDROMORPHONE HYDROCHLORIDE 1 MG/ML
0.25 INJECTION, SOLUTION INTRAMUSCULAR; INTRAVENOUS; SUBCUTANEOUS
Status: DISCONTINUED | OUTPATIENT
Start: 2023-06-18 | End: 2023-06-20 | Stop reason: HOSPADM

## 2023-06-18 RX ORDER — LEVOTHYROXINE SODIUM 0.07 MG/1
75 TABLET ORAL
Status: DISCONTINUED | OUTPATIENT
Start: 2023-06-18 | End: 2023-06-20 | Stop reason: HOSPADM

## 2023-06-18 RX ORDER — BUDESONIDE AND FORMOTEROL FUMARATE DIHYDRATE 160; 4.5 UG/1; UG/1
2 AEROSOL RESPIRATORY (INHALATION) 2 TIMES DAILY
Status: DISCONTINUED | OUTPATIENT
Start: 2023-06-18 | End: 2023-06-18

## 2023-06-18 RX ADMIN — OXYCODONE 5 MG: 5 TABLET ORAL at 22:13

## 2023-06-18 RX ADMIN — INSULIN LISPRO 3 UNITS: 100 INJECTION, SOLUTION INTRAVENOUS; SUBCUTANEOUS at 18:36

## 2023-06-18 RX ADMIN — ACETAMINOPHEN 1000 MG: 500 TABLET, FILM COATED ORAL at 12:37

## 2023-06-18 RX ADMIN — SODIUM CHLORIDE, POTASSIUM CHLORIDE, SODIUM LACTATE AND CALCIUM CHLORIDE: 600; 310; 30; 20 INJECTION, SOLUTION INTRAVENOUS at 06:20

## 2023-06-18 RX ADMIN — INSULIN GLARGINE-YFGN 10 UNITS: 100 INJECTION, SOLUTION SUBCUTANEOUS at 18:36

## 2023-06-18 RX ADMIN — SODIUM CHLORIDE 1000 ML: 9 INJECTION, SOLUTION INTRAVENOUS at 01:48

## 2023-06-18 RX ADMIN — MORPHINE SULFATE 4 MG: 4 INJECTION, SOLUTION INTRAMUSCULAR; INTRAVENOUS at 04:21

## 2023-06-18 RX ADMIN — LIDOCAINE 1 PATCH: 50 PATCH TOPICAL at 16:06

## 2023-06-18 RX ADMIN — GABAPENTIN 300 MG: 300 CAPSULE ORAL at 18:41

## 2023-06-18 RX ADMIN — CEFTRIAXONE SODIUM 2000 MG: 10 INJECTION, POWDER, FOR SOLUTION INTRAVENOUS at 03:53

## 2023-06-18 RX ADMIN — PRAVASTATIN SODIUM 40 MG: 20 TABLET ORAL at 06:19

## 2023-06-18 RX ADMIN — INSULIN LISPRO 2 UNITS: 100 INJECTION, SOLUTION INTRAVENOUS; SUBCUTANEOUS at 12:34

## 2023-06-18 RX ADMIN — IOHEXOL 60 ML: 350 INJECTION, SOLUTION INTRAVENOUS at 12:08

## 2023-06-18 RX ADMIN — ENOXAPARIN SODIUM 40 MG: 100 INJECTION SUBCUTANEOUS at 18:44

## 2023-06-18 RX ADMIN — LEVOTHYROXINE SODIUM 75 MCG: 0.07 TABLET ORAL at 06:17

## 2023-06-18 RX ADMIN — SODIUM CHLORIDE 1000 ML: 9 INJECTION, SOLUTION INTRAVENOUS at 04:28

## 2023-06-18 RX ADMIN — ACETAMINOPHEN 1000 MG: 500 TABLET, FILM COATED ORAL at 18:40

## 2023-06-18 RX ADMIN — GABAPENTIN 300 MG: 300 CAPSULE ORAL at 06:18

## 2023-06-18 ASSESSMENT — COGNITIVE AND FUNCTIONAL STATUS - GENERAL
PERSONAL GROOMING: A LITTLE
MOVING FROM LYING ON BACK TO SITTING ON SIDE OF FLAT BED: A LOT
SUGGESTED CMS G CODE MODIFIER DAILY ACTIVITY: CK
STANDING UP FROM CHAIR USING ARMS: A LOT
DRESSING REGULAR LOWER BODY CLOTHING: A LITTLE
TOILETING: A LITTLE
CLIMB 3 TO 5 STEPS WITH RAILING: TOTAL
SUGGESTED CMS G CODE MODIFIER MOBILITY: CL
MOBILITY SCORE: 14
HELP NEEDED FOR BATHING: A LITTLE
DRESSING REGULAR UPPER BODY CLOTHING: A LITTLE
WALKING IN HOSPITAL ROOM: A LOT
DAILY ACTIVITIY SCORE: 19
MOVING TO AND FROM BED TO CHAIR: A LITTLE

## 2023-06-18 ASSESSMENT — ENCOUNTER SYMPTOMS
DIZZINESS: 0
DOUBLE VISION: 0
PALPITATIONS: 0
NAUSEA: 0
FALLS: 1
MYALGIAS: 0
NECK PAIN: 0
SHORTNESS OF BREATH: 0
ABDOMINAL PAIN: 0
CHILLS: 0
FEVER: 0
HEADACHES: 0
SORE THROAT: 0
COUGH: 0
BLURRED VISION: 0
VOMITING: 0
HEARTBURN: 0

## 2023-06-18 ASSESSMENT — PAIN DESCRIPTION - PAIN TYPE
TYPE: ACUTE PAIN

## 2023-06-18 ASSESSMENT — FIBROSIS 4 INDEX
FIB4 SCORE: 2.55
FIB4 SCORE: 1.49

## 2023-06-18 ASSESSMENT — PATIENT HEALTH QUESTIONNAIRE - PHQ9
2. FEELING DOWN, DEPRESSED, IRRITABLE, OR HOPELESS: NOT AT ALL
1. LITTLE INTEREST OR PLEASURE IN DOING THINGS: NOT AT ALL
SUM OF ALL RESPONSES TO PHQ9 QUESTIONS 1 AND 2: 0

## 2023-06-18 NOTE — ASSESSMENT & PLAN NOTE
After ground-level fall getting out of bed  Pain management  Physical therapy  X-rays negative for fracture, if persistent pain or debility out of proportion will consider CT/MRI to further evaluate

## 2023-06-18 NOTE — ED NOTES
Report received from prior RN. Pt resting. Resp normal/unlabored. Bed side rails up/in low position.

## 2023-06-18 NOTE — ED PROVIDER NOTES
ED Provider Note    Scribed for Arnaldo Izquierdo by Jaxon Harris. 6/18/2023  12:53 AM    Primary care provider: Anurag Moreno M.D.  Means of arrival: EMS  History obtained from: Patient  History limited by: None    CHIEF COMPLAINT  Chief Complaint   Patient presents with    Fall     PT WAS SITTING ON EDGE OF BED AND FELL TO THE GROUND LANDING ON HER LEFT HIP. PT WITH PAIN TO LEFT HIP, - SHORTENING - ROTATION. -LOC; - THINNERS - HEAD STRIKE. GCS 15.      EXTERNAL RECORDS REVIEWED  Inpatient Notes Admitted December 2022 for dizziness, history of Afib on Xarelto    HPI/ROS    LIMITATION TO HISTORY   Select: : None  OUTSIDE HISTORIAN(S):  Family granddaughter at bedside    HPI  Jessica Plaza is a 83 y.o. female who presents to the Emergency Department via EMS for evaluation of injuries following a fall onset earlier tonight. She states that she fell of the bed and landed on her left hip, she is complaining of pain to that region. She denies any chest pain or abdominal pain. She has a history of diabetes and COPD, wears 3-4 L of supplemental oxygen. She used to smoke cigarettes but no longer does.    REVIEW OF SYSTEMS  As above, all other systems reviewed and are negative.   See HPI for further details.     PAST MEDICAL HISTORY   has a past medical history of Anemia, Arthritis, Asthma, Atrial fibrillation ([facility]), Castro esophagus, Bronchitis, Chickenpox, Chronic anticoagulation (4/29/2019), COPD, COPD (chronic obstructive pulmonary disease) ([facility]), Dental disorder, Diabetes, Disorder of thyroid, Emphysema of lung ([facility]), Encounter for medication refill (2/5/2018), GERD (gastroesophageal reflux disease), Upper sorbian measles, Heart burn, Hemorrhagic disorder, High cholesterol, Hypertension, Indigestion, Pain, Pneumonia (2013.), Stroke (HCC) (2013), Tonsillitis, and UTI (urinary tract infection).    SURGICAL HISTORY   has a past surgical history that includes gastroscopy-endo (4/30/2014); tonsillectomy (1957);  anesth, delivery only; shoulder decompression arthroscopic (Left, 2016); shoulder decompression arthroscopic (Left, 10/11/2016); clavicle resection (Left, 10/11/2016); chondroplasty (Left, 10/11/2016); fusion, spine, lumbar, plif (N/A, 2019); lumbar decompression (N/A, 2019); and laminotomy.    SOCIAL HISTORY  Social History     Tobacco Use    Smoking status: Former     Packs/day: 0.50     Years: 52.00     Pack years: 26.00     Types: Cigarettes     Quit date: 2013     Years since quitting: 10.4    Smokeless tobacco: Never    Tobacco comments:     started smoking at age 21   Vaping Use    Vaping Use: Never used   Substance Use Topics    Alcohol use: Yes     Alcohol/week: 8.4 oz     Types: 7 Shots of liquor, 7 Standard drinks or equivalent per week     Comment: Once or twice a week.    Drug use: No      Social History     Substance and Sexual Activity   Drug Use No     FAMILY HISTORY  Family History   Problem Relation Age of Onset    Cancer Mother         breast    Lung Disease Mother         COPD/Emphysema/Smoker    Stroke Father     Hypertension Father     Hyperlipidemia Father     Heart Attack Father     Cancer Sister         Pancriatic    Arrythmia Sister     Lung Disease Sister         COPD/Emphysema/Smoker    Bipolar disorder Son     Bipolar disorder Son     Bipolar disorder Son     Cancer Maternal Aunt         stomach     CURRENT MEDICATIONS  Current Outpatient Medications   Medication Instructions    budesonide-formoterol (SYMBICORT) 160-4.5 MCG/ACT Aerosol 2 Puffs, Inhalation, 2 TIMES DAILY    dilTIAZem CD (CARDIZEM CD) 240 mg, Oral, DAILY    Dulaglutide (TRULICITY) 1.5 MG/0.5ML Solution Pen-injector 0.5 mL, Subcutaneous, EVERY 7 DAYS    fluticasone (FLONASE) 50 MCG/ACT nasal spray 2 Sprays, Nasal, DAILY    gabapentin (NEURONTIN) 300 mg, Oral, 2 TIMES DAILY    levothyroxine (SYNTHROID) 75 mcg, Oral, EACH MORNING ON EMPTY STOMACH    metformin (GLUCOPHAGE) 1,000 mg, Oral, 2 TIMES  DAILY    metoprolol (LOPRESSOR) 25 MG Tab TAKE 1 TABLET BY MOUTH TWICE A DAY    MYRBETRIQ 25 MG TABLET SR 24 HR No dose, route, or frequency recorded.    pravastatin (PRAVACHOL) 40 mg, Oral, EVERY DAY, Please call to schedule follow up appointment for further refills. Please call [phone]. Thank you.    traZODone (DESYREL) 100 mg, Oral, EVERY BEDTIME      ALLERGIES  Allergies   Allergen Reactions    Bactrim Hives    Demerol Shortness of Breath and Swelling    Pcn [Penicillins] Hives     Tolerates ceftriaxone and cefazolin    Sulfa Drugs Hives    Ferrous Sulfate Rash     itching    Tramadol Itching     rash    Latex Contact Dermatitis       PHYSICAL EXAM    VITAL SIGNS:   Vitals:    06/18/23 0333 06/18/23 0434 06/18/23 0536 06/18/23 0610   BP: 128/60 122/59 114/54 110/57   Pulse: (!) 112 (!) 110 (!) 107 (!) 104   Resp:  16  15   Temp:       TempSrc:       SpO2: 95% 94% 93% 95%   Weight:       Height:         Vitals: My interpretation: normotensive, tachycardic, afebrile, not hypoxic    Reinterpretation of vitals: Improving tachycardia and otherwise normal vital signs    Cardiac Monitor Interpretation: The cardiac monitor revealed normal Sinus Rhythm as interpreted by me. The cardiac monitor was ordered to monitor for dysrhythmia and/or tachycardia.     PE:   Gen: sitting comfortably, speaking clearly, appears in no acute distress   ENT: Mucous membranes moist, posterior pharynx clear, uvula midline, nares patent bilaterally   Neck: Supple, FROM  Pulmonary: Lungs are clear to auscultation bilaterally. No tachypnea  CV:  RRR, no murmur appreciated, pulses 2+ in both upper and lower extremities  Abdomen: soft, NT/ND; no rebound/guarding  : no CVA or suprapubic tenderness   Musco: Obvious tenderness in the left hip, decreased ROM secondary to pain. NVI. Strong pedal pulse and posterior tibialis. Normal capillary refill.   Neuro: A&Ox4 (person, place, time, situation), speech fluent, gait steady, no focal deficits  appreciated  Skin: No rash or lesions.  No pallor or jaundice.  No cyanosis.  Warm and dry.     DIAGNOSTIC STUDIES / PROCEDURES    LABS  Results for orders placed or performed during the hospital encounter of 06/18/23   CBC w/ Differential   Result Value Ref Range    WBC 12.2 (H) 4.8 - 10.8 K/uL    RBC 4.31 4.20 - 5.40 M/uL    Hemoglobin 13.8 12.0 - 16.0 g/dL    Hematocrit 40.8 37.0 - 47.0 %    MCV 94.7 81.4 - 97.8 fL    MCH 32.0 27.0 - 33.0 pg    MCHC 33.8 32.2 - 35.5 g/dL    RDW 51.9 (H) 35.9 - 50.0 fL    Platelet Count 162 (L) 164 - 446 K/uL    MPV 10.8 9.0 - 12.9 fL    Neutrophils-Polys 87.50 (H) 44.00 - 72.00 %    Lymphocytes 4.80 (L) 22.00 - 41.00 %    Monocytes 6.70 0.00 - 13.40 %    Eosinophils 0.20 0.00 - 6.90 %    Basophils 0.20 0.00 - 1.80 %    Immature Granulocytes 0.60 0.00 - 0.90 %    Nucleated RBC 0.00 0.00 - 0.20 /100 WBC    Neutrophils (Absolute) 10.69 (H) 1.82 - 7.42 K/uL    Lymphs (Absolute) 0.58 (L) 1.00 - 4.80 K/uL    Monos (Absolute) 0.82 0.00 - 0.85 K/uL    Eos (Absolute) 0.02 0.00 - 0.51 K/uL    Baso (Absolute) 0.03 0.00 - 0.12 K/uL    Immature Granulocytes (abs) 0.07 0.00 - 0.11 K/uL    NRBC (Absolute) 0.00 K/uL   Complete Metabolic Panel (CMP)   Result Value Ref Range    Sodium 130 (L) 135 - 145 mmol/L    Potassium 4.1 3.6 - 5.5 mmol/L    Chloride 93 (L) 96 - 112 mmol/L    Co2 24 20 - 33 mmol/L    Anion Gap 13.0 7.0 - 16.0    Glucose 340 (H) 65 - 99 mg/dL    Bun 20 8 - 22 mg/dL    Creatinine 1.41 (H) 0.50 - 1.40 mg/dL    Calcium 9.2 8.5 - 10.5 mg/dL    AST(SGOT) 26 12 - 45 U/L    ALT(SGPT) 26 2 - 50 U/L    Alkaline Phosphatase 88 30 - 99 U/L    Total Bilirubin 1.0 0.1 - 1.5 mg/dL    Albumin 3.8 3.2 - 4.9 g/dL    Total Protein 6.8 6.0 - 8.2 g/dL    Globulin 3.0 1.9 - 3.5 g/dL    A-G Ratio 1.3 g/dL   ESTIMATED GFR   Result Value Ref Range    GFR (CKD-EPI) 37 (A) >60 mL/min/1.73 m 2   CORRECTED CALCIUM   Result Value Ref Range    Correct Calcium 9.4 8.5 - 10.5 mg/dL   proBrain Natriuretic  Peptide, NT   Result Value Ref Range    NT-proBNP 3068 (H) 0 - 125 pg/mL   TROPONIN   Result Value Ref Range    Troponin T 36 (H) 6 - 19 ng/L   URINALYSIS CULTURE, IF INDICATED    Specimen: Urine, Clean Catch   Result Value Ref Range    Color Yellow     Character Turbid (A)     Specific Gravity 1.025 <1.035    Ph 6.0 5.0 - 8.0    Glucose Negative Negative mg/dL    Ketones Negative Negative mg/dL    Protein 100 (A) Negative mg/dL    Bilirubin Negative Negative    Urobilinogen, Urine 0.2 Negative    Nitrite Negative Negative    Leukocyte Esterase Moderate (A) Negative    Occult Blood Small (A) Negative    Micro Urine Req Microscopic    URINE MICROSCOPIC (W/UA)   Result Value Ref Range    WBC Packed (A) /hpf    RBC 2-5 (A) /hpf    Bacteria Many (A) None /hpf    Epithelial Cells Negative /hpf    Hyaline Cast 3-5 (A) /lpf   Lactic Acid -STAT Once   Result Value Ref Range    Lactic Acid 0.8 0.5 - 2.0 mmol/L   Prothrombin time (INR)   Result Value Ref Range    PT 15.8 (H) 12.0 - 14.6 sec    INR 1.28 (H) 0.87 - 1.13   EKG (NOW)   Result Value Ref Range    Report       [facility] Emergency Dept.    Test Date:  2023  Pt Name:    MARILIN MCKEON                   Department: ER  MRN:        [id number]                      Room:       [facility]  Gender:     Female                       Technician: brendan platt  :        [id number]                   Requested By:ALANNAH IZQUIERDO  Order #:    [id number]                    Reading MD: Alannah Izquierdo    Measurements  Intervals                                Axis  Rate:       126                          P:  AZ:                                      QRS:        22  QRSD:       94                           T:          82  QT:         336  QTc:        487    Interpretive Statements  SINUS TACHYCARDIA  Abnormal R-wave progression, late transition  Inferior infarct, old  Compared to ECG 2022 08:32:38  Sinus tachycardia no longer present  ST (T wave) deviation  no longer present  Myocardial infarct finding still present  Electronically Signed On 6- 1:18:39 PDT by Arnaldo Izquierdo         All labs reviewed by me. Labs were compared to prior labs if they were available. Significant for leukocytosis of 12, no significant anemia, electrolytes normal, mildly hyperglycemic with glucose of 340, mild BAYRON with creatinine 1.41, normal liver enzymes, normal bilirubin, her BNP is slightly elevated at 3000, troponin minimally elevated at 36, urinalysis positive for infection, lactic acid is normal.  PT/INR normal.    RADIOLOGY  I have independently interpreted the diagnostic imaging associated with this visit and am waiting the final reading from the radiologist.   My preliminary interpretation is a follows: No fracture of the hip  Radiologist interpretation is as follows:  DX-CHEST-LIMITED (1 VIEW)   Final Result      1.  Persistently enlarged cardiac silhouette   2.  Atherosclerosis   3.  Mild interstitial pulmonary edema      DX-FEMUR-2+ LEFT   Final Result      No radiographic evidence of acute traumatic injury.      DX-PELVIS-1 OR 2 VIEWS   Final Result      No evidence of pelvic fracture   Osteoarthritis        COURSE & MEDICAL DECISION MAKING  Nursing notes, VS, PMSFHx, labs, imaging, EKG reviewed in chart.    ED Observation Status? Yes; I am placing the patient in to an observation status due to a diagnostic uncertainty as well as therapeutic intensity. Patient placed in observation status at 12:59 AM, 6/18/2023.     Observation plan is as follows: Monitor for symptom management and diagnostic results     Upon Reevaluation, the patient's condition has: not improved; and will be escalated to hospitalization.    Patient discharged from ED Observation status at 2:42 AM (Time) 6/18/2023 (Date).     Ddx: strain vs sprain vs fracture vs dislocation    MDM: 12:53 AM Jessica Plaza is a 83 y.o. female who presented with evaluation after a fall.  Patient was getting up to  use the bathroom, sitting on side of bed and slipped off, falling onto her left hip.  EMS was called for evaluation of possible hip fracture.  On my evaluation she is some mild tenderness but no shortening or internal rotation of the left hip, however she is tachycardic and has a low-grade fever concerning for possible underlying sepsis although patient has no complaints.  X-rays of the hip and chest are negative on my independent interpretation.  Labs ordered.  IV fluids started for tachycardia.  All labs reviewed by me. Labs were compared to prior labs if they were available. Significant for leukocytosis of 12, no significant anemia, electrolytes normal, mildly hyperglycemic with glucose of 340, mild BAYRON with creatinine 1.41, normal liver enzymes, normal bilirubin, her BNP is slightly elevated at 3000, troponin minimally elevated at 36, urinalysis positive for infection, lactic acid is normal.  PT/INR normal.  EKG without ischemic changes other than sinus tachycardia.  Patient started on antibiotics for her UTI and admitted considering her status of sepsis considering leukocytosis, tachycardia, source of infection and UTI.  Discussed with patient and her daughter, daughter at bedside is helpful providing collateral formation, and they verbalized understand plan for admission and are amenable.    HYDRATION: Based on the patient's presentation of Tachycardia the patient was given IV fluids. IV Hydration was used because oral hydration was not adequate alone. Upon recheck following hydration, the patient was improved.     ADDITIONAL PROBLEM LIST AND DISPOSITION    I have discussed management of the patient with the following physicians and OTILIA's:  Dr. Arnold (Hospitalist)    Discussion of management with other QHP or appropriate source(s): None     Barriers to care at this time, including but not limited to: None    Decision tools and prescription drugs considered including, but not limited to: Antibiotics sepsis  antibiotics given .    FINAL IMPRESSION  1. Pain of left hip Acute   2. Fall, initial encounter Acute   3. Dehydration Acute   4. BAYRON (acute kidney injury) (HCC) Acute   5. Sepsis with acute renal failure without septic shock, due to unspecified organism, unspecified acute renal failure type (HCC) Acute   6. Acute UTI Acute   7. Sinus tachycardia Acute   8. Leukocytosis, unspecified type Acute      Jaxon HUNT (Scribe), am scribing for, and in the presence of, Arnaldo Izquierdo.    Electronically signed by: Jaxon Harris (Scribe), 6/18/2023    I, Arnaldo Izquierdo personally performed the services described in this documentation, as scribed by Jaxon Harris in my presence, and it is both accurate and complete.    The note accurately reflects work and decisions made by me.  Arnaldo Izquierdo  6/18/2023  6:55 AM

## 2023-06-18 NOTE — PROGRESS NOTES
4 Eyes Skin Assessment Completed by Rafael PEREZ RN and Ivone PEREZ RN.    Head WDL  Ears WDL  Nose WDL  Mouth WDL  Neck WDL  Breast/Chest WDL  Shoulder Blades WDL  Spine WDL  (R) Arm/Elbow/Hand WDL  (L) Arm/Elbow/Hand WDL  Abdomen WDL  Groin Redness  Scrotum/Coccyx/Buttocks Discoloration  (R) Leg WDL  (L) Leg WDL  (R) Heel/Foot/Toe WDL  (L) Heel/Foot/Toe Bruising          Devices In Places Tele Box, Blood Pressure Cuff, and Pulse Ox      Interventions In Place Gray Ear Foams, Q2 Turns, and Low Air Loss Mattress    Possible Skin Injury No    Pictures Uploaded Into Epic N/A  Wound Consult Placed N/A  RN Wound Prevention Protocol Ordered Yes

## 2023-06-18 NOTE — H&P
Hospital Medicine History & Physical Note    Date of Service  6/18/2023    Primary Care Physician  Anurag Moreno M.D.    Code Status  Full Code    Chief Complaint  Chief Complaint   Patient presents with    Fall     PT WAS SITTING ON EDGE OF BED AND FELL TO THE GROUND LANDING ON HER LEFT HIP. PT WITH PAIN TO LEFT HIP, - SHORTENING - ROTATION. -LOC; - THINNERS - HEAD STRIKE. GCS 15.        History of Presenting Illness  Jessica Plaza is a 83 y.o. female who presented 6/18/2023 with ground-level fall.  This is a very pleasant 83-year-old woman with history of A-fib/flutter previously on Xarelto though currently does not appear to be on any anticoagulation, hypertension, COPD and diabetes.  She was brought in by EMS for evaluation of injuries after a fall yesterday evening.  She reports that she has a very high bed and as she was trying to get out of bed she came down evenly and fell onto her left hip.  She has continued pain in that region.  X-rays of the hip were negative for fracture which she and her family were relieved to hear.  She did however have leukocytosis of 12, kidney function appears at baseline.  She did report urinary frequency and had a urinalysis suggestive of UTI and was given ceftriaxone.  Admission was requested for further treatment of sepsis secondary UTI and increased oxygen demand, pain management and PT.      I discussed the plan of care with patient and family.    Review of Systems  Review of Systems   Constitutional:  Negative for chills and fever.   HENT:  Negative for congestion and sore throat.    Eyes:  Negative for blurred vision and double vision.   Respiratory:  Negative for cough and shortness of breath.    Cardiovascular:  Negative for chest pain and palpitations.   Gastrointestinal:  Negative for abdominal pain, heartburn, nausea and vomiting.   Genitourinary:  Positive for frequency and urgency. Negative for dysuria.   Musculoskeletal:  Positive for falls and joint  pain. Negative for myalgias and neck pain.   Neurological:  Negative for dizziness and headaches.       Past Medical History   has a past medical history of Anemia, Arthritis, Asthma, Atrial fibrillation ([facility]), Castro esophagus, Bronchitis, Chickenpox, Chronic anticoagulation (2019), COPD, COPD (chronic obstructive pulmonary disease) ([facility]), Dental disorder, Diabetes, Disorder of thyroid, Emphysema of lung ([facility]), Encounter for medication refill (2018), GERD (gastroesophageal reflux disease), Serbian measles, Heart burn, Hemorrhagic disorder, High cholesterol, Hypertension, Indigestion, Pain, Pneumonia (.), Stroke ([facility]) (), Tonsillitis, and UTI (urinary tract infection).    Surgical History   has a past surgical history that includes gastroscopy-endo (2014); tonsillectomy (); pr anesth, delivery only; shoulder decompression arthroscopic (Left, 2016); shoulder decompression arthroscopic (Left, 10/11/2016); clavicle resection (Left, 10/11/2016); chondroplasty (Left, 10/11/2016); fusion, spine, lumbar, plif (N/A, 2019); lumbar decompression (N/A, 2019); and laminotomy.     Family History  family history includes Arrythmia in her sister; Bipolar disorder in her son, son, and son; Cancer in her maternal aunt, mother, and sister; Heart Attack in her father; Hyperlipidemia in her father; Hypertension in her father; Lung Disease in her mother and sister; Stroke in her father.   Family history reviewed with patient. There is family history that is pertinent to the chief complaint.     Social History   reports that she quit smoking about 10 years ago. Her smoking use included cigarettes. She has a 26.00 pack-year smoking history. She has never used smokeless tobacco. She reports current alcohol use of about 8.4 oz of alcohol per week. She reports that she does not use drugs.    Allergies  Allergies   Allergen Reactions    Bactrim Hives    Demerol Shortness of Breath and Swelling     Pcn [Penicillins] Hives     Tolerates ceftriaxone and cefazolin    Sulfa Drugs Hives    Ferrous Sulfate Rash     itching    Tramadol Itching     rash    Latex Contact Dermatitis       Medications  Prior to Admission Medications   Prescriptions Last Dose Informant Patient Reported? Taking?   Dulaglutide (TRULICITY) 1.5 MG/0.5ML Solution Pen-injector 6/12/2023 at [facility] Patient No No   Sig: Inject 0.5 mL as instructed every 7 days.   MYRBETRIQ 25 MG TABLET SR 24 HR 6/17/2023 at AM Patient Yes No   Sig: Take 25 mg by mouth every day.   fluticasone (FLONASE) 50 MCG/ACT nasal spray 6/17/2023 at AM Patient Yes No   Sig: Spray 2 Sprays in nose every day.   gabapentin (NEURONTIN) 300 MG Cap 6/17/2023 at AM Patient No No   Sig: Take 1 Cap by mouth 2 Times a Day.   levothyroxine (SYNTHROID) 75 MCG Tab 6/17/2023 at AM Patient No No   Sig: Take 1 Tablet by mouth every morning on an empty stomach.   metformin (GLUCOPHAGE) 1000 MG tablet 6/17/2023 at AM Patient No No   Sig: Take 1 Tab by mouth 2 times a day.   pravastatin (PRAVACHOL) 40 MG tablet 6/17/2023 at AM Patient No No   Sig: Take 1 tablet by mouth every day. Please call to schedule follow up appointment for further refills. Please call [phone]. Thank you.   traZODone (DESYREL) 100 MG Tab 6/16/2023 at PM Patient No No   Sig: Take 1 Tab by mouth every bedtime.      Facility-Administered Medications: None       Physical Exam  Temp:  [37.4 °C (99.3 °F)] 37.4 °C (99.3 °F)  Pulse:  [104-138] 104  Resp:  [15-31] 15  BP: (110-131)/(54-85) 110/57  SpO2:  [93 %-97 %] 95 %  Blood Pressure : 110/57   Temperature: 37.4 °C (99.3 °F)   Pulse: (!) 104   Respiration: 15   Pulse Oximetry: 95 %       Physical Exam  Constitutional:       Appearance: Normal appearance. She is not ill-appearing.   HENT:      Head: Normocephalic and atraumatic.      Nose: Nose normal.      Mouth/Throat:      Mouth: Mucous membranes are moist.      Pharynx: Oropharynx is clear.   Eyes:      Extraocular  Movements: Extraocular movements intact.      Conjunctiva/sclera: Conjunctivae normal.   Cardiovascular:      Rate and Rhythm: Regular rhythm. Tachycardia present.      Pulses: Normal pulses.      Heart sounds: Normal heart sounds.   Pulmonary:      Effort: Pulmonary effort is normal.      Breath sounds: Normal breath sounds.   Abdominal:      General: Abdomen is flat.      Palpations: Abdomen is soft.   Musculoskeletal:      Cervical back: Normal range of motion and neck supple.      Comments: Muscular tenderness in left posterior hip area   Skin:     General: Skin is warm and dry.      Capillary Refill: Capillary refill takes less than 2 seconds.   Neurological:      General: No focal deficit present.      Mental Status: She is alert and oriented to person, place, and time.         Laboratory:  Recent Labs     06/18/23  0105   WBC 12.2*   RBC 4.31   HEMOGLOBIN 13.8   HEMATOCRIT 40.8   MCV 94.7   MCH 32.0   MCHC 33.8   RDW 51.9*   PLATELETCT 162*   MPV 10.8     Recent Labs     06/18/23  0105   SODIUM 130*   POTASSIUM 4.1   CHLORIDE 93*   CO2 24   GLUCOSE 340*   BUN 20   CREATININE 1.41*   CALCIUM 9.2     Recent Labs     06/18/23  0105   ALTSGPT 26   ASTSGOT 26   ALKPHOSPHAT 88   TBILIRUBIN 1.0   GLUCOSE 340*     Recent Labs     06/18/23  0105   INR 1.28*     Recent Labs     06/18/23  0105   NTPROBNP 3068*         Recent Labs     06/18/23  0105   TROPONINT 36*       Imaging:  DX-CHEST-LIMITED (1 VIEW)   Final Result      1.  Persistently enlarged cardiac silhouette   2.  Atherosclerosis   3.  Mild interstitial pulmonary edema      DX-FEMUR-2+ LEFT   Final Result      No radiographic evidence of acute traumatic injury.      DX-PELVIS-1 OR 2 VIEWS   Final Result      No evidence of pelvic fracture   Osteoarthritis          EKG:  I have personally reviewed the images and compared with prior images. and My impression is: Sinus tachycardia with ventricular rate 126, QTc 487, no ST  elevation    Assessment/Plan:  Justification for Admission Status  I anticipate this patient will require at least two midnights for appropriate medical management, necessitating inpatient admission because UTI, hip pain, need for antibiotics and PT    Patient will need a Telemetry bed on MEDICAL service .  The need is secondary to above.    * Sepsis (HCC)- (present on admission)  Assessment & Plan  This is Severe Sepsis Present on admission  SIRS criteria identified on my evaluation include: Tachycardia, with heart rate greater than 90 BPM, Tachypnea, with respirations greater than 20 per minute and Leukocytosis, with WBC greater than 12,000  Clinical indicators of end organ dysfunction include Acute respiratory failure as evidenced by a new need for invasive or non-invasive mechanical ventilation (Ventilator or BiPap)   Source is urine  Sepsis protocol initiated  Crystalloid Fluid Administration: Fluid resuscitation ordered per standard protocol - 30 mL/kg per current or ideal body weight  IV antibiotics as appropriate for source of sepsis  Reassessment: I have reassessed the patient's hemodynamic status    Continue ceftriaxone  Follow cultures    Left hip pain  Assessment & Plan  After ground-level fall getting out of bed  Pain management  Physical therapy  X-ray negative for fracture, if persistent pain or debility out of proportion consider CT/MRI to further evaluate    Acute on chronic respiratory failure (HCC)  Assessment & Plan  Normally on 3 L at home, requiring 5 to maintain SPO2 at 88 to 92%  In setting of sepsis  RT protocol  Titrate oxygen to 88 to 92%  If persistent consider work-up for PE      UTI (urinary tract infection)- (present on admission)  Assessment & Plan  Continue ceftriaxone  Follow cultures    Atrial fibrillation (HCC)- (present on admission)  Assessment & Plan  A flutter on previous EKGs  Chart review shows she was previously on Xarelto however does not appear to be currently on any  anticoagulation  Further reviewed with patient and family on continuing anticoagulation  Currently in sinus rhythm/sinus tachycardia    Hypothyroidism due to acquired atrophy of thyroid- (present on admission)  Assessment & Plan  Continue levothyroxine    Type 2 diabetes mellitus with hyperglycemia, without long-term current use of insulin (HCC)- (present on admission)  Assessment & Plan  Insulin basal bolus plus mealtime sliding scale  Holding metformin at this time in case of need for contrast studies, restart as appropriate        VTE prophylaxis: SCDs/TEDs and enoxaparin ppx

## 2023-06-18 NOTE — ED NOTES
Pt medicated per MAR. Pt resting comfortably with even chest rise and fall, reports no needs at this time, call light available and in reach. Additonal blanket provided.

## 2023-06-18 NOTE — ASSESSMENT & PLAN NOTE
This is Severe Sepsis Present on admission  SIRS criteria identified on my evaluation include: Tachycardia, with heart rate greater than 90 BPM, Tachypnea, with respirations greater than 20 per minute and Leukocytosis, with WBC greater than 12,000  Clinical indicators of end organ dysfunction include Acute respiratory failure as evidenced by a new need for invasive or non-invasive mechanical ventilation (Ventilator or BiPap)   Source is urine  Sepsis protocol initiated  Crystalloid Fluid Administration: Fluid resuscitation ordered per standard protocol - 30 mL/kg per current or ideal body weight  IV antibiotics as appropriate for source of sepsis  Reassessment: I have reassessed the patient's hemodynamic status. Tachycardia remains. Resp status at reported baseline.    Continue ceftriaxone  Follow cultures

## 2023-06-18 NOTE — ASSESSMENT & PLAN NOTE
Insulin basal bolus plus mealtime sliding scale  Holding metformin at this time in case of need for contrast studies, restart as appropriate

## 2023-06-18 NOTE — ASSESSMENT & PLAN NOTE
Baseline is 3-4L O2 at home  RT protocol  Titrate oxygen to 88 to 92%  CTA: negative for PE, does have patchy bibasilar opacities, likely atelectasis.

## 2023-06-18 NOTE — ED TRIAGE NOTES
"Chief Complaint   Patient presents with    Fall     PT WAS SITTING ON EDGE OF BED AND FELL TO THE GROUND LANDING ON HER LEFT HIP. PT WITH PAIN TO LEFT HIP, - SHORTENING - ROTATION. -LOC; - THINNERS - HEAD STRIKE. GCS 15.      PT ARRIVED VIA EMS. EMS STATES PT ALSO NOT FEELING WELL FOR 2 DAYS WITH GI ISSUES.     BLOOD GLUCOSE 373    WEARS 3-4L NC AT HOME.       Ht 1.651 m (5' 5\")   Wt 83.9 kg (185 lb)   BMI 30.79 kg/m²     "

## 2023-06-18 NOTE — ASSESSMENT & PLAN NOTE
Intermittent episodes of afib  She was previously on Xarelto, but has since stopped.  Discussed risk vs benefit of AC - she does not want AC at this time.   [id number]: 8. >10% annual stroke risk.

## 2023-06-18 NOTE — ED NOTES
Assumed care of patient, bedside report received from off coming Silvana ANTUNEZ .  Introduced self as pt RN, POC discussed, call light in reach.

## 2023-06-19 LAB
ANION GAP SERPL CALC-SCNC: 12 MMOL/L (ref 7–16)
BASOPHILS # BLD AUTO: 0.4 % (ref 0–1.8)
BASOPHILS # BLD: 0.03 K/UL (ref 0–0.12)
BUN SERPL-MCNC: 21 MG/DL (ref 8–22)
CALCIUM SERPL-MCNC: 8.6 MG/DL (ref 8.5–10.5)
CHLORIDE SERPL-SCNC: 100 MMOL/L (ref 96–112)
CO2 SERPL-SCNC: 25 MMOL/L (ref 20–33)
CREAT SERPL-MCNC: 1.21 MG/DL (ref 0.5–1.4)
EOSINOPHIL # BLD AUTO: 0.24 K/UL (ref 0–0.51)
EOSINOPHIL NFR BLD: 3 % (ref 0–6.9)
ERYTHROCYTE [DISTWIDTH] IN BLOOD BY AUTOMATED COUNT: 55.5 FL (ref 35.9–50)
GFR SERPLBLD CREATININE-BSD FMLA CKD-EPI: 45 ML/MIN/1.73 M 2
GLUCOSE BLD STRIP.AUTO-MCNC: 180 MG/DL (ref 65–99)
GLUCOSE BLD STRIP.AUTO-MCNC: 188 MG/DL (ref 65–99)
GLUCOSE BLD STRIP.AUTO-MCNC: 232 MG/DL (ref 65–99)
GLUCOSE SERPL-MCNC: 182 MG/DL (ref 65–99)
HCT VFR BLD AUTO: 40 % (ref 37–47)
HGB BLD-MCNC: 12.6 G/DL (ref 12–16)
IMM GRANULOCYTES # BLD AUTO: 0.04 K/UL (ref 0–0.11)
IMM GRANULOCYTES NFR BLD AUTO: 0.5 % (ref 0–0.9)
LYMPHOCYTES # BLD AUTO: 0.84 K/UL (ref 1–4.8)
LYMPHOCYTES NFR BLD: 10.4 % (ref 22–41)
MCH RBC QN AUTO: 30.9 PG (ref 27–33)
MCHC RBC AUTO-ENTMCNC: 31.5 G/DL (ref 32.2–35.5)
MCV RBC AUTO: 98 FL (ref 81.4–97.8)
MONOCYTES # BLD AUTO: 0.79 K/UL (ref 0–0.85)
MONOCYTES NFR BLD AUTO: 9.8 % (ref 0–13.4)
NEUTROPHILS # BLD AUTO: 6.1 K/UL (ref 1.82–7.42)
NEUTROPHILS NFR BLD: 75.9 % (ref 44–72)
NRBC # BLD AUTO: 0 K/UL
NRBC BLD-RTO: 0 /100 WBC (ref 0–0.2)
PLATELET # BLD AUTO: 136 K/UL (ref 164–446)
PMV BLD AUTO: 10.8 FL (ref 9–12.9)
POTASSIUM SERPL-SCNC: 4.1 MMOL/L (ref 3.6–5.5)
RBC # BLD AUTO: 4.08 M/UL (ref 4.2–5.4)
SODIUM SERPL-SCNC: 137 MMOL/L (ref 135–145)
WBC # BLD AUTO: 8 K/UL (ref 4.8–10.8)

## 2023-06-19 PROCEDURE — 82962 GLUCOSE BLOOD TEST: CPT | Mod: 91

## 2023-06-19 PROCEDURE — 700111 HCHG RX REV CODE 636 W/ 250 OVERRIDE (IP)

## 2023-06-19 PROCEDURE — 700101 HCHG RX REV CODE 250

## 2023-06-19 PROCEDURE — 700102 HCHG RX REV CODE 250 W/ 637 OVERRIDE(OP): Performed by: STUDENT IN AN ORGANIZED HEALTH CARE EDUCATION/TRAINING PROGRAM

## 2023-06-19 PROCEDURE — 700102 HCHG RX REV CODE 250 W/ 637 OVERRIDE(OP): Performed by: NURSE PRACTITIONER

## 2023-06-19 PROCEDURE — A9270 NON-COVERED ITEM OR SERVICE: HCPCS | Performed by: STUDENT IN AN ORGANIZED HEALTH CARE EDUCATION/TRAINING PROGRAM

## 2023-06-19 PROCEDURE — 80048 BASIC METABOLIC PNL TOTAL CA: CPT

## 2023-06-19 PROCEDURE — A9270 NON-COVERED ITEM OR SERVICE: HCPCS | Performed by: NURSE PRACTITIONER

## 2023-06-19 PROCEDURE — 94664 DEMO&/EVAL PT USE INHALER: CPT

## 2023-06-19 PROCEDURE — 97535 SELF CARE MNGMENT TRAINING: CPT

## 2023-06-19 PROCEDURE — 99233 SBSQ HOSP IP/OBS HIGH 50: CPT | Performed by: NURSE PRACTITIONER

## 2023-06-19 PROCEDURE — 700111 HCHG RX REV CODE 636 W/ 250 OVERRIDE (IP): Performed by: STUDENT IN AN ORGANIZED HEALTH CARE EDUCATION/TRAINING PROGRAM

## 2023-06-19 PROCEDURE — 97162 PT EVAL MOD COMPLEX 30 MIN: CPT

## 2023-06-19 PROCEDURE — 87040 BLOOD CULTURE FOR BACTERIA: CPT | Mod: 91

## 2023-06-19 PROCEDURE — 85025 COMPLETE CBC W/AUTO DIFF WBC: CPT

## 2023-06-19 PROCEDURE — 770020 HCHG ROOM/CARE - TELE (206)

## 2023-06-19 RX ORDER — TRAZODONE HYDROCHLORIDE 50 MG/1
50 TABLET ORAL
Status: DISCONTINUED | OUTPATIENT
Start: 2023-06-19 | End: 2023-06-20 | Stop reason: HOSPADM

## 2023-06-19 RX ADMIN — OXYCODONE 5 MG: 5 TABLET ORAL at 08:01

## 2023-06-19 RX ADMIN — PRAVASTATIN SODIUM 40 MG: 20 TABLET ORAL at 05:12

## 2023-06-19 RX ADMIN — INSULIN LISPRO 1 UNITS: 100 INJECTION, SOLUTION INTRAVENOUS; SUBCUTANEOUS at 17:29

## 2023-06-19 RX ADMIN — OXYCODONE 5 MG: 5 TABLET ORAL at 18:35

## 2023-06-19 RX ADMIN — OXYCODONE 5 MG: 5 TABLET ORAL at 13:45

## 2023-06-19 RX ADMIN — TRAZODONE HYDROCHLORIDE 50 MG: 50 TABLET ORAL at 21:17

## 2023-06-19 RX ADMIN — ACETAMINOPHEN 1000 MG: 500 TABLET, FILM COATED ORAL at 11:43

## 2023-06-19 RX ADMIN — INSULIN GLARGINE-YFGN 10 UNITS: 100 INJECTION, SOLUTION SUBCUTANEOUS at 17:30

## 2023-06-19 RX ADMIN — INSULIN LISPRO 1 UNITS: 100 INJECTION, SOLUTION INTRAVENOUS; SUBCUTANEOUS at 07:53

## 2023-06-19 RX ADMIN — ACETAMINOPHEN 1000 MG: 500 TABLET, FILM COATED ORAL at 17:33

## 2023-06-19 RX ADMIN — GABAPENTIN 300 MG: 300 CAPSULE ORAL at 17:34

## 2023-06-19 RX ADMIN — LEVOTHYROXINE SODIUM 75 MCG: 0.07 TABLET ORAL at 05:12

## 2023-06-19 RX ADMIN — ENOXAPARIN SODIUM 40 MG: 100 INJECTION SUBCUTANEOUS at 17:34

## 2023-06-19 RX ADMIN — LIDOCAINE 1 PATCH: 50 PATCH TOPICAL at 13:45

## 2023-06-19 RX ADMIN — ACETAMINOPHEN 1000 MG: 500 TABLET, FILM COATED ORAL at 05:12

## 2023-06-19 RX ADMIN — OXYCODONE 5 MG: 5 TABLET ORAL at 22:05

## 2023-06-19 RX ADMIN — GABAPENTIN 300 MG: 300 CAPSULE ORAL at 05:12

## 2023-06-19 RX ADMIN — CEFTRIAXONE SODIUM 1000 MG: 10 INJECTION, POWDER, FOR SOLUTION INTRAVENOUS at 05:12

## 2023-06-19 RX ADMIN — INSULIN LISPRO 2 UNITS: 100 INJECTION, SOLUTION INTRAVENOUS; SUBCUTANEOUS at 11:41

## 2023-06-19 ASSESSMENT — COGNITIVE AND FUNCTIONAL STATUS - GENERAL
MOBILITY SCORE: 18
MOVING TO AND FROM BED TO CHAIR: A LITTLE
WALKING IN HOSPITAL ROOM: A LITTLE
MOVING FROM LYING ON BACK TO SITTING ON SIDE OF FLAT BED: A LITTLE
SUGGESTED CMS G CODE MODIFIER MOBILITY: CK
CLIMB 3 TO 5 STEPS WITH RAILING: A LOT
STANDING UP FROM CHAIR USING ARMS: A LITTLE

## 2023-06-19 ASSESSMENT — FIBROSIS 4 INDEX: FIB4 SCORE: 3.04

## 2023-06-19 ASSESSMENT — PAIN DESCRIPTION - PAIN TYPE
TYPE: ACUTE PAIN

## 2023-06-19 ASSESSMENT — GAIT ASSESSMENTS
GAIT LEVEL OF ASSIST: CONTACT GUARD ASSIST
DEVIATION: ANTALGIC
ASSISTIVE DEVICE: FRONT WHEEL WALKER
DISTANCE (FEET): 40

## 2023-06-19 NOTE — CARE PLAN
The patient is Stable - Low risk of patient condition declining or worsening    Shift Goals  Clinical Goals: STABLE V/S MAINTAIN SAFETY  Patient Goals: REST    Progress made toward(s) clinical / shift goals:      Problem: Pain - Standard  Goal: Alleviation of pain or a reduction in pain to the patient’s comfort goal  Description: Target End Date:  Prior to discharge or change in level of care    Document on Vitals flowsheet    1.  Document pain using the appropriate pain scale per order or unit policy  2.  Educate and implement non-pharmacologic comfort measures (i.e. relaxation, distraction, massage, cold/heat therapy, etc.)  3.  Pain management medications as ordered  4.  Reassess pain after pain med administration per policy  5.  If opiods administered assess patient's response to pain medication is appropriate per POSS sedation scale  6.  Follow pain management plan developed in collaboration with patient and interdisciplinary team (including palliative care or pain specialists if applicable)  Outcome: Progressing       Patient is not progressing towards the following goals:

## 2023-06-19 NOTE — PROGRESS NOTES
[facility] Medicine Daily Progress Note    Date of Service  6/19/2023    Chief Complaint  Jessica Plaza is a 83 y.o. female admitted 6/18/2023 with fall onto left hip    I have seen/examined the patient and reviewed the case.  I have also reviewed the patient's history, ROS, physical exam, laboratory and radiology findings. I agree with the general assessments, plans, and discussions as outlined in the H&P.    Interval Problem Update  6/19  Pt seen and examined, no acute events overnight  Vital stable, labs reviewed  Patient here for sepsis with urinary source  Continue ceftriaxone  Follow-up blood cultures ordered  She has been tachycardic since admission  Continue monitoring  Discharge next 1 to 2 days assuming she remains afebrile and hemodynamically stable        6/18/2023: Mild shortness of breath and urinary frequency. Not overtly toxic appearing. Primary complaint is left hip pain. Pain is in left buttock and dull, aching in quality. She did successfully ambulate with assistance to bathroom. Family members at bedside. They are making arrangements to stay in town for the week in case she needs assistance on discharge. Discussed plan to treat sepsis, manage pain, eval with physical therapy.      I have discussed this patient's plan of care and discharge plan at IDT rounds today with Case Management, Nursing, Nursing leadership, and other members of the IDT team.    Consultants/Specialty  none    Code Status  Full Code    Disposition  The patient is not medically cleared for discharge to home or a post-acute facility.  Anticipate discharge to: home with close outpatient follow-up    I have placed the appropriate orders for post-discharge needs.    Physical Exam  Temp:  [36.2 °C (97.2 °F)-36.8 °C (98.2 °F)] 36.7 °C (98.1 °F)  Pulse:  [] 78  Resp:  [16-20] 16  BP: ()/(66-72) 108/68  SpO2:  [93 %-96 %] 95 %    Fluids  No intake or output data in the 24 hours ending 06/19/23  1414      Laboratory  Recent Labs     06/18/23  0105 06/19/23  0201   WBC 12.2* 8.0   RBC 4.31 4.08*   HEMOGLOBIN 13.8 12.6   HEMATOCRIT 40.8 40.0   MCV 94.7 98.0*   MCH 32.0 30.9   MCHC 33.8 31.5*   RDW 51.9* 55.5*   PLATELETCT 162* 136*   MPV 10.8 10.8     Recent Labs     06/18/23  0105 06/19/23  0201   SODIUM 130* 137   POTASSIUM 4.1 4.1   CHLORIDE 93* 100   CO2 24 25   GLUCOSE 340* 182*   BUN 20 21   CREATININE 1.41* 1.21   CALCIUM 9.2 8.6     Recent Labs     06/18/23  0105   INR 1.28*       Imaging  US-EXTREMITY VENOUS LOWER UNILAT LEFT   Final Result      CT-CTA CHEST PULMONARY ARTERY W/ RECONS   Final Result         1. No CT evidence of pulmonary embolism.      2. Patchy bibasilar opacities, likely atelectasis.         DX-CHEST-LIMITED (1 VIEW)   Final Result      1.  Persistently enlarged cardiac silhouette   2.  Atherosclerosis   3.  Mild interstitial pulmonary edema      DX-FEMUR-2+ LEFT   Final Result      No radiographic evidence of acute traumatic injury.      DX-PELVIS-1 OR 2 VIEWS   Final Result      No evidence of pelvic fracture   Osteoarthritis           Assessment/Plan  * Sepsis (HCC)- (present on admission)  Assessment & Plan  This is Severe Sepsis Present on admission  SIRS criteria identified on my evaluation include: Tachycardia, with heart rate greater than 90 BPM, Tachypnea, with respirations greater than 20 per minute and Leukocytosis, with WBC greater than 12,000  Clinical indicators of end organ dysfunction include Acute respiratory failure as evidenced by a new need for invasive or non-invasive mechanical ventilation (Ventilator or BiPap)   Source is urine  Sepsis protocol initiated  Crystalloid Fluid Administration: Fluid resuscitation ordered per standard protocol - 30 mL/kg per current or ideal body weight  IV antibiotics as appropriate for source of sepsis  Reassessment: I have reassessed the patient's hemodynamic status. Tachycardia remains. Resp status at reported  baseline.    Continue ceftriaxone  Follow cultures    Left hip pain  Assessment & Plan  After ground-level fall getting out of bed  Pain management  Physical therapy  X-rays negative for fracture, if persistent pain or debility out of proportion will consider CT/MRI to further evaluate    Acute on chronic respiratory failure (HCC)  Assessment & Plan  Baseline is 3-4L O2 at home  RT protocol  Titrate oxygen to 88 to 92%  CTA: negative for PE, does have patchy bibasilar opacities, likely atelectasis.    UTI (urinary tract infection)- (present on admission)  Assessment & Plan  Continue ceftriaxone  Follow cultures    Atrial fibrillation (HCC)- (present on admission)  Assessment & Plan  Intermittent episodes of afib  She was previously on Xarelto, but has since stopped.  Discussed risk vs benefit of AC - she does not want AC at this time.   [id number]: 8. >10% annual stroke risk.       Hypothyroidism due to acquired atrophy of thyroid- (present on admission)  Assessment & Plan  Continue levothyroxine    Type 2 diabetes mellitus with hyperglycemia, without long-term current use of insulin (HCC)- (present on admission)  Assessment & Plan  Insulin basal bolus plus mealtime sliding scale  Holding metformin at this time in case of need for contrast studies, restart as appropriate         VTE prophylaxis: enoxaparin ppx    I have performed a physical exam and reviewed and updated ROS and Plan today (6/19/2023). In review of yesterday's note (6/18/2023), there are no changes except as documented above.

## 2023-06-19 NOTE — THERAPY
Physical Therapy   Initial Evaluation     Patient Name: Jessica Plaza  Age:  83 y.o., Sex:  female  Medical Record #: [id number]  Today's Date: 6/19/2023     Precautions  Precautions: Fall Risk    Assessment  Patient is 83 y.o. female with a diagnosis of GLF while trying to get out of her tall bed, L hip negative for fracture.  Additional factors influencing patient status / progress : today, pt reports 6/10 pain, but able to get up to walk to toilet using FWW with cg assist. Pt is most limited by endurance issues with ambulation, needed back to bed due to hip pain and fatigue. Pt lives with spouse who is recovering from hip fracture, but has other family visiting to assist. PT to follow.     Plan    Physical Therapy Initial Treatment Plan   Treatment Plan : Bed Mobility, Gait Training, Neuro Re-Education / Balance, Therapeutic Activities, Therapeutic Exercise  Treatment Frequency: 3 Times per Week  Duration: Until Therapy Goals Met    DC Equipment Recommendations: None  Discharge Recommendations: Recommend home health for continued physical therapy services       Objective       06/19/23 1106   Charge Group   PT Evaluation PT Evaluation Mod   PT Self Care / Home Evaluation (Units) 1   Total Time Spent   PT Total Time Yes   PT Evaluation Time Spent (Mins) 20   PT Self Care/Home Evaluation Time Spent (Mins) 10   PT Total Time Spent (Calculated) 30   Initial Contact Note    Initial Contact Note Order Received and Verified, Physical Therapy Evaluation in Progress with Full Report to Follow.   Precautions   Precautions Fall Risk   Vitals   O2 (LPM) 4   O2 Delivery Device Nasal Cannula   Pain 0 - 10 Group   Therapist Pain Assessment During Activity;Nurse Notified;6   Prior Living Situation   Prior Services Home-Independent   Housing / Facility 1 Story Apartment / Condo   Steps Into Home 0  (ramp)   Steps In Home 0   Equipment Owned Front-Wheel Walker   Lives with - Patient's Self Care Capacity Spouse   Comments DIL  and gr daughter are visiting from out of town to assist her spouse who just got out of hospital from hip fracture when he fell getting out of same high bed.   Prior Level of Functional Mobility   Bed Mobility Independent   Transfer Status Independent   Ambulation Independent   Ambulation Distance household, does not leave home often   Assistive Devices Used None  (only uses FWW occasionally.)   History of Falls   History of Falls Yes   Date of Last Fall   (cause of admit)   Cognition    Cognition / Consciousness WDL   Level of Consciousness Alert   Active ROM Lower Body    Active ROM Lower Body  WDL   Strength Lower Body   Lower Body Strength  X   Comments functional for transfers, ambulation, but lacks endurance due to fatigue, pain at L hip.   Sensation Lower Body   Lower Extremity Sensation   WDL   Lower Body Muscle Tone   Lower Body Muscle Tone  WDL   Balance Assessment   Sitting Balance (Static) Fair   Sitting Balance (Dynamic) Fair   Standing Balance (Static) Fair -   Standing Balance (Dynamic) Fair -   Weight Shift Sitting Fair   Weight Shift Standing Fair   Comments withFWW   Bed Mobility    Supine to Sit Standby Assist   Sit to Supine Minimal Assist  (to lift legs back to mattress.)   Scooting Supervised   Rolling   (pivoted instead to come to EOB)   Gait Analysis   Gait Level Of Assist Contact Guard Assist   Assistive Device Front Wheel Walker   Distance (Feet) 40   # of Times Distance was Traveled 1   Deviation Antalgic   # of Stairs Climbed 0   Weight Bearing Status no restrictions   Functional Mobility   Sit to Stand Contact Guard Assist   Bed, Chair, Wheelchair Transfer Contact Guard Assist   Toilet Transfers Contact Guard Assist  (pt asked to use toilet, commode placed over top)   Transfer Method Stand Pivot   How much difficulty does the patient currently have...   Turning over in bed (including adjusting bedclothes, sheets and blankets)? 4   Sitting down on and standing up from a chair with arms  (e.g., wheelchair, bedside commode, etc.) 3   Moving from lying on back to sitting on the side of the bed? 3   How much help from another person does the patient currently need...   Moving to and from a bed to a chair (including a wheelchair)? 3   Need to walk in a hospital room? 3   Climbing 3-5 steps with a railing? 2   6 clicks Mobility Score 18   Activity Tolerance   Standing 8 min   Short Term Goals    Short Term Goal # 1 Pt will perform OOB, BTB with supervision in 6 visits to improve functional indep.   Short Term Goal # 2 Pt will transfer with supervision in 6 visits to improve functional indep.   Short Term Goal # 3 Pt will ambulate x 100 feet using FWW with supervision in 6 visits to improve functional indep.   Education Group   Education Provided Role of Physical Therapist   Role of Physical Therapist Patient Response Patient;Acceptance;Explanation;Verbal Demonstration   Additional Comments Pt was educated in need for consistent ambulation to maintain her baseline endurance. Pt agreeable to ambulate in room any time she gets up to use toilet (Purewick removed and nsg updated)   Physical Therapy Initial Treatment Plan    Treatment Plan  Bed Mobility;Gait Training;Neuro Re-Education / Balance;Therapeutic Activities;Therapeutic Exercise   Treatment Frequency 3 Times per Week   Duration Until Therapy Goals Met   Problem List    Problems Pain;Impaired Bed Mobility;Impaired Transfers;Impaired Ambulation;Impaired Balance;Decreased Activity Tolerance   Anticipated Discharge Equipment and Recommendations   DC Equipment Recommendations None   Discharge Recommendations Recommend home health for continued physical therapy services   Interdisciplinary Plan of Care Collaboration   IDT Collaboration with  Nursing   Patient Position at End of Therapy In Bed;Call Light within Reach;Tray Table within Reach;Phone within Reach;Family / Friend in Room;Bed Alarm On   Collaboration Comments nsg updated   Session Information    Date / Session Number  6/19-1 (1/3, 6/25)

## 2023-06-19 NOTE — CARE PLAN
The patient is Stable - Low risk of patient condition declining or worsening    Shift Goals  Clinical Goals: pain mgmt; stable vitals  Patient Goals: pain mgmt; rest      Problem: Pain - Standard  Goal: Alleviation of pain or a reduction in pain to the patient’s comfort goal  Outcome: Progressing     Problem: Knowledge Deficit - Standard  Goal: Patient and family/care givers will demonstrate understanding of plan of care, disease process/condition, diagnostic tests and medications  Outcome: Progressing     Problem: Hemodynamics  Goal: Patient's hemodynamics, fluid balance and neurologic status will be stable or improve  Outcome: Progressing     Problem: Fall Risk  Goal: Patient will remain free from falls  Outcome: Progressing     Problem: Skin Integrity  Goal: Skin integrity is maintained or improved  Outcome: Progressing       Progress made toward(s) clinical / shift goals: pain is adequately managed with PRN and scheduled regimens; patient engages in the discussion of diagnosis and treatment plan; fall prevention measures are in place; skin integrity measures are in place    Patient is not progressing towards the following goals:

## 2023-06-19 NOTE — DISCHARGE PLANNING
Care Transition Team Assessment    RN STEFANY spoke with patient at bedside. Role of CM explained. Demographic info verified. Patient lives with  in [place] and was independent with ADL's prior to admission. Utilizes continuous O2 @ 3-4L/min via n/c. O2 supplied by [facility]. Patient also has a FWW, shower chair, nebulizer, & glucose monitor.Currently pending therapy recommendations. CM will continue to follow for appropriate discharge needs/disposition.    Information Source  Orientation Level: Oriented to place, Oriented to situation, Oriented to person  Information Given By: Patient  Who is responsible for making decisions for patient? : Patient    Elopement Risk  Legal Hold: No  Ambulatory or Self Mobile in Wheelchair: No-Not an Elopement Risk  Elopement Risk: Not at Risk for Elopement    Interdisciplinary Discharge Planning  Lives with - Patient's Self Care Capacity: Spouse  Support Systems: Spouse / Significant Other, Children  Do You Take your Prescribed Medications Regularly: Yes  Mobility Issues: No  Prior Services: Home-Independent  Assistance Needed: No  Durable Medical Equipment: Home Oxygen, Walker, Other - Specify (nebulizer, shower chair, glucose monitor)  DME Provider / Phone: [facility]    Discharge Preparedness  What is your plan after discharge?: Uncertain - pending medical team collaboration  Prior Functional Level: Independent with Activities of Daily Living  Difficulity with ADLs: None  Difficulity with IADLs: None    Functional Assesment  Prior Functional Level: Independent with Activities of Daily Living    Vision / Hearing Impairment  Right Eye Vision: Wears Glasses  Left Eye Vision: Wears Glasses    Domestic Abuse  Have you ever been the victim of abuse or violence?: No    Anticipated Discharge Information  Discharge Disposition: D/T to home under HHA care in anticipation of covered skilled care (06)

## 2023-06-19 NOTE — PROGRESS NOTES
ASSUMED CARE ON PATIENT, EYES CLOSED BUT EASILY AROUSABLE, RESPIRATION EVEN AND UNLABORED, ON 4LNC, FELT PAIN AT 2/10 TO LLE WHEN MOVED BUT RESOLVES AT REST, REPOSITIONED FOR COMFORT, MONITOR ON, HEART RATE AND RHYTHM VERIFIED, SAFETY PRECAUTIONS MAINTAINED, BED TO LOWEST POSITION WITH SIDE RAILS UP X 3, BED ALARM ON, CALL PLACED ON TABLE TO USE FOR ASSISTANCE, WILL MONITOR.

## 2023-06-19 NOTE — RESPIRATORY CARE
"  COPD EDUCATION by COPD CLINICAL EDUCATOR  6/19/2023  at  9:49 AM by Brandt Vallejo, RRT     Patient interviewed by COPD education team.  Patient unable to participate in full program.  A short intervention has been conducted.  A comprehensive packet including information about COPD, types of treatments to manage their disease and safe home Oxygen usage was provided and reviewed with patient at the bedside.  Reviewed RT medication, spacer and flutter. Updated action plan.       COPD Assessment  COPD Clinical Specialists ONLY  COPD Education Initiated: Yes--Short Intervention  Is this a COPD exacerbation patient?: No (Sepsis)  DME Company: [employer]  DME Equipment Type: oxygen 3-4 lpm  Physician Name: Anurag Moreno M.D.  Pulmonologist Name: no interest in Pulm appointment at this time  $ Demo/Eval of SVN's, MDI's and Aerosols: Yes (Reviewed RT medication, spacer and flutter)  (OP) Pulmonary Function Testing: Yes ()  Interdisciplinary Rounds: Attendance at Rounds (30 Min)    PFT Results    2013 PFT FEV1 68%; FEV1/FVC 62.    Meds to Beds  Would the patient like to opt in for Bedside Medication Delivery at Discharge?: No     MY COPD ACTION PLAN     It is recommended that patients and physicians /healthcare providers complete this action plan together. This plan should be discussed at each physician visit and updated as needed.    The green, yellow and red zones show groups of symptoms of COPD. This list of symptoms is not comprehensive, and you may experience other symptoms. In the \"Actions\" column, your healthcare provider has recommended actions for you to take based on your symptoms.    Patient Name: Jessica Plaza   YOB: 1940   Last Updated on: 6/19/2023  9:48 AM   Green Zone:  I am doing well today Actions     Usual activitiy and exercise level   Take daily medications     Usual amounts of cough and phlegm/mucus   Use oxygen as prescribed     Sleep well at night   Continue regular exercise/diet " "plan     Appetite is good   At all times avoid cigarette smoke, inhaled irritants     Daily Medications (these medications are taken every day):   Budesonide-Formoterol Fumarate (Symbicort) 2 Puffs Twice daily     Additional Information:  Use spacer with this inhaler    Yellow Zone:  I am having a bad day or a COPD flare Actions     More breathless than usual   Continue daily medications     I have less energy for my daily activities   Use quick relief inhaler as ordered     Increased or thicker phlegm/mucus   Use oxygen as prescribed     Using quick relief inhaler/nebulizer more often   Get plenty of rest     Swelling of ankles more than usual   Use pursed lip breathing     More coughing than usual   At all times avoid cigarette smoke, inhaled irritants     I feel like I have a \"chest cold\"     Poor sleep and my symptoms woke me up     My appetite is not good     My medicine is not helping      Call provider immediately if symptoms don’t improve     Continue daily medications, add rescue medications:               Medications to be used during a flare up, (as Discussed with Provider):              Red Zone:  I need urgent medical care Actions     Severe shortness of breath even at rest   Call 911 or seek medical care immediately     Not able to do any activity because of breathing      Fever or shaking chills      Feeling confused or very drowsy       Chest pains      Coughing up blood                  "

## 2023-06-19 NOTE — PROGRESS NOTES
Hospital Medicine Daily Progress Note    Date of Service  6/18/2023    Chief Complaint  Jessica Plaza is a 83 y.o. female admitted 6/18/2023 with fall onto left hip    I have seen/examined the patient and reviewed the case.  I have also reviewed the patient's history, ROS, physical exam, laboratory and radiology findings. I agree with the general assessments, plans, and discussions as outlined in the H&P.    Interval Problem Update  6/18/2023: Mild shortness of breath and urinary frequency. Not overtly toxic appearing. Primary complaint is left hip pain. Pain is in left buttock and dull, aching in quality. She did successfully ambulate with assistance to bathroom. Family members at bedside. They are making arrangements to stay in town for the week in case she needs assistance on discharge. Discussed plan to treat sepsis, manage pain, eval with physical therapy.      I have discussed this patient's plan of care and discharge plan at IDT rounds today with Case Management, Nursing, Nursing leadership, and other members of the IDT team.    Consultants/Specialty  none    Code Status  Full Code    Disposition  The patient is not medically cleared for discharge to home or a post-acute facility.  Anticipate discharge to: home with close outpatient follow-up    I have placed the appropriate orders for post-discharge needs.    Physical Exam  Temp:  [36.2 °C (97.2 °F)-37.4 °C (99.3 °F)] 36.2 °C (97.2 °F)  Pulse:  [104-138] 107  Resp:  [15-31] 18  BP: ()/(54-85) 93/67  SpO2:  [93 %-97 %] 93 %    Fluids    Intake/Output Summary (Last 24 hours) at 6/18/2023 2019  Last data filed at 6/18/2023 0622  Gross per 24 hour   Intake 2000 ml   Output --   Net 2000 ml       Laboratory  Recent Labs     06/18/23  0105   WBC 12.2*   RBC 4.31   HEMOGLOBIN 13.8   HEMATOCRIT 40.8   MCV 94.7   MCH 32.0   MCHC 33.8   RDW 51.9*   PLATELETCT 162*   MPV 10.8     Recent Labs     06/18/23  0105   SODIUM 130*   POTASSIUM 4.1   CHLORIDE 93*    CO2 24   GLUCOSE 340*   BUN 20   CREATININE 1.41*   CALCIUM 9.2     Recent Labs     06/18/23  0105   INR 1.28*       Imaging  US-EXTREMITY VENOUS LOWER UNILAT LEFT   Final Result      CT-CTA CHEST PULMONARY ARTERY W/ RECONS   Final Result         1. No CT evidence of pulmonary embolism.      2. Patchy bibasilar opacities, likely atelectasis.         DX-CHEST-LIMITED (1 VIEW)   Final Result      1.  Persistently enlarged cardiac silhouette   2.  Atherosclerosis   3.  Mild interstitial pulmonary edema      DX-FEMUR-2+ LEFT   Final Result      No radiographic evidence of acute traumatic injury.      DX-PELVIS-1 OR 2 VIEWS   Final Result      No evidence of pelvic fracture   Osteoarthritis           Assessment/Plan  * Sepsis (HCC)- (present on admission)  Assessment & Plan  This is Severe Sepsis Present on admission  SIRS criteria identified on my evaluation include: Tachycardia, with heart rate greater than 90 BPM, Tachypnea, with respirations greater than 20 per minute and Leukocytosis, with WBC greater than 12,000  Clinical indicators of end organ dysfunction include Acute respiratory failure as evidenced by a new need for invasive or non-invasive mechanical ventilation (Ventilator or BiPap)   Source is urine  Sepsis protocol initiated  Crystalloid Fluid Administration: Fluid resuscitation ordered per standard protocol - 30 mL/kg per current or ideal body weight  IV antibiotics as appropriate for source of sepsis  Reassessment: I have reassessed the patient's hemodynamic status. Tachycardia remains. Resp status at reported baseline.    Continue ceftriaxone  Follow cultures    Left hip pain  Assessment & Plan  After ground-level fall getting out of bed  Pain management  Physical therapy  X-rays negative for fracture, if persistent pain or debility out of proportion will consider CT/MRI to further evaluate    Acute on chronic respiratory failure (HCC)  Assessment & Plan  Baseline is 3-4L O2 at home  RT  protocol  Titrate oxygen to 88 to 92%  CTA: negative for PE, does have patchy bibasilar opacities, likely atelectasis.    UTI (urinary tract infection)- (present on admission)  Assessment & Plan  Continue ceftriaxone  Follow cultures    Atrial fibrillation (HCC)- (present on admission)  Assessment & Plan  Intermittent episodes of afib  She was previously on Xarelto, but has since stopped.  Discussed risk vs benefit of AC - she does not want AC at this time.   [id number]: 8. >10% annual stroke risk.       Hypothyroidism due to acquired atrophy of thyroid- (present on admission)  Assessment & Plan  Continue levothyroxine    Type 2 diabetes mellitus with hyperglycemia, without long-term current use of insulin (HCC)- (present on admission)  Assessment & Plan  Insulin basal bolus plus mealtime sliding scale  Holding metformin at this time in case of need for contrast studies, restart as appropriate         VTE prophylaxis: enoxaparin ppx    I have performed a physical exam and reviewed and updated ROS and Plan today (6/18/2023). In review of yesterday's note (6/17/2023), there are no changes except as documented above.

## 2023-06-20 ENCOUNTER — PHARMACY VISIT (OUTPATIENT)
Dept: PHARMACY | Facility: MEDICAL CENTER | Age: 83
End: 2023-06-20
Payer: COMMERCIAL

## 2023-06-20 VITALS
TEMPERATURE: 98.1 F | OXYGEN SATURATION: 90 % | SYSTOLIC BLOOD PRESSURE: 106 MMHG | DIASTOLIC BLOOD PRESSURE: 55 MMHG | HEIGHT: 65 IN | WEIGHT: 118.17 LBS | BODY MASS INDEX: 19.69 KG/M2 | RESPIRATION RATE: 18 BRPM | HEART RATE: 100 BPM

## 2023-06-20 LAB
BACTERIA UR CULT: ABNORMAL
BACTERIA UR CULT: ABNORMAL
GLUCOSE BLD STRIP.AUTO-MCNC: 185 MG/DL (ref 65–99)
SIGNIFICANT IND 70042: ABNORMAL
SITE SITE: ABNORMAL
SOURCE SOURCE: ABNORMAL

## 2023-06-20 PROCEDURE — 700102 HCHG RX REV CODE 250 W/ 637 OVERRIDE(OP): Performed by: STUDENT IN AN ORGANIZED HEALTH CARE EDUCATION/TRAINING PROGRAM

## 2023-06-20 PROCEDURE — 700111 HCHG RX REV CODE 636 W/ 250 OVERRIDE (IP)

## 2023-06-20 PROCEDURE — RXMED WILLOW AMBULATORY MEDICATION CHARGE: Performed by: INTERNAL MEDICINE

## 2023-06-20 PROCEDURE — 82962 GLUCOSE BLOOD TEST: CPT

## 2023-06-20 PROCEDURE — A9270 NON-COVERED ITEM OR SERVICE: HCPCS | Performed by: STUDENT IN AN ORGANIZED HEALTH CARE EDUCATION/TRAINING PROGRAM

## 2023-06-20 PROCEDURE — 99239 HOSP IP/OBS DSCHRG MGMT >30: CPT | Performed by: INTERNAL MEDICINE

## 2023-06-20 RX ORDER — CIPROFLOXACIN 500 MG/1
500 TABLET, FILM COATED ORAL 2 TIMES DAILY
Qty: 6 TABLET | Refills: 0 | Status: ACTIVE | OUTPATIENT
Start: 2023-06-20 | End: 2023-06-23

## 2023-06-20 RX ADMIN — OXYCODONE 5 MG: 5 TABLET ORAL at 05:15

## 2023-06-20 RX ADMIN — ACETAMINOPHEN 1000 MG: 500 TABLET, FILM COATED ORAL at 05:15

## 2023-06-20 RX ADMIN — GABAPENTIN 300 MG: 300 CAPSULE ORAL at 05:16

## 2023-06-20 RX ADMIN — OXYCODONE 5 MG: 5 TABLET ORAL at 01:27

## 2023-06-20 RX ADMIN — PRAVASTATIN SODIUM 40 MG: 20 TABLET ORAL at 05:16

## 2023-06-20 RX ADMIN — CEFTRIAXONE SODIUM 1000 MG: 10 INJECTION, POWDER, FOR SOLUTION INTRAVENOUS at 05:21

## 2023-06-20 RX ADMIN — LEVOTHYROXINE SODIUM 75 MCG: 0.07 TABLET ORAL at 05:15

## 2023-06-20 ASSESSMENT — PAIN DESCRIPTION - PAIN TYPE
TYPE: ACUTE PAIN
TYPE: ACUTE PAIN

## 2023-06-20 NOTE — DISCHARGE SUMMARY
Discharge Summary    CHIEF COMPLAINT ON ADMISSION  Chief Complaint   Patient presents with    Fall     PT WAS SITTING ON EDGE OF BED AND FELL TO THE GROUND LANDING ON HER LEFT HIP. PT WITH PAIN TO LEFT HIP, - SHORTENING - ROTATION. -LOC; - THINNERS - HEAD STRIKE. GCS 15.        Reason for Admission  ems     Admission Date  6/18/2023    CODE STATUS  Prior    HPI & HOSPITAL COURSE  This is a 83 y.o. female who presented 6/18/2023 with ground-level fall.  This is a very pleasant 83-year-old woman with history of A-fib/flutter previously on Xarelto though currently does not appear to be on any anticoagulation, hypertension, COPD and diabetes.  She was brought in by EMS for evaluation of injuries after a fall yesterday evening.  She reports that she has a very high bed and as she was trying to get out of bed she came down evenly and fell onto her left hip.  She has continued pain in that region.  X-rays of the hip were negative for fracture which she and her family were relieved to hear.  She did however have leukocytosis of 12, kidney function appears at baseline.  She did report urinary frequency and had a urinalysis suggestive of UTI and was given ceftriaxone.  Admission was requested for further treatment of sepsis secondary UTI and increased oxygen demand. Patient was continued on abx and her symptoms improved. She is doing better, patient is now on her baseline requirement of oxygen. Will switch her abx to oral cipro  Will discharge patient home today      The patient met 2-midnight criteria for an inpatient stay at the time of discharge.    Discharge Date  6/20/2023    FOLLOW UP ITEMS POST DISCHARGE  Pcp     DISCHARGE DIAGNOSES  Principal Problem:    Sepsis (HCC) (POA: Yes)  Active Problems:    Type 2 diabetes mellitus with hyperglycemia, without long-term current use of insulin (HCC) (Chronic) (POA: Yes)    Hypothyroidism due to acquired atrophy of thyroid (POA: Yes)    Atrial fibrillation (HCC) (POA: Yes)    UTI  (urinary tract infection) (POA: Yes)    Acute on chronic respiratory failure (HCC) (POA: Unknown)    Left hip pain (POA: Unknown)  Resolved Problems:    * No resolved hospital problems. *      FOLLOW UP  No future appointments.  Anurag Moreno M.D.  [address] [place] [address]  [place] [id number] [phone]    Schedule an appointment as soon as possible for a visit  For hospital follow-up      MEDICATIONS ON DISCHARGE     Medication List        START taking these medications        Instructions   ciprofloxacin 500 MG Tabs  Commonly known as: CIPRO   Take 1 Tablet by mouth 2 times a day for 3 days.  Dose: 500 mg            CONTINUE taking these medications        Instructions   fluticasone 50 MCG/ACT nasal spray  Commonly known as: FLONASE   Spray 2 Sprays in nose every day.  Dose: 2 Spray     gabapentin 300 MG Caps  Commonly known as: NEURONTIN   Take 1 Cap by mouth 2 Times a Day.  Dose: 300 mg     levothyroxine 75 MCG Tabs  Commonly known as: SYNTHROID   Take 1 Tablet by mouth every morning on an empty stomach.  Dose: 75 mcg     metformin 1000 MG tablet  Commonly known as: GLUCOPHAGE   Take 1 Tab by mouth 2 times a day.  Dose: 1,000 mg     Myrbetriq 25 MG Tb24  Generic drug: mirabegron ER   Take 25 mg by mouth every day.  Dose: 25 mg     pravastatin 40 MG tablet  Commonly known as: PRAVACHOL   Doctor's comments: Patient needs to be seen for future refills.  Take 1 tablet by mouth every day. Please call to schedule follow up appointment for further refills. Please call [phone]. Thank you.  Dose: 40 mg     traZODone 100 MG Tabs  Commonly known as: DESYREL   Take 1 Tab by mouth every bedtime.  Dose: 100 mg     Trulicity 1.5 MG/0.5ML Sopn  Generic drug: Dulaglutide   Inject 0.5 mL as instructed every 7 days.  Dose: 0.5 mL              Allergies  Allergies   Allergen Reactions    Bactrim Hives    Demerol Shortness of Breath and Swelling    Pcn [Penicillins] Hives     Tolerates ceftriaxone and cefazolin    Sulfa  Drugs Hives    Ferrous Sulfate Rash     itching    Tramadol Itching     rash    Latex Contact Dermatitis       DIET  No orders of the defined types were placed in this encounter.      ACTIVITY  As tolerated.  Weight bearing as tolerated    CONSULTATIONS  None     PROCEDURES  None     LABORATORY  Lab Results   Component Value Date    SODIUM 137 06/19/2023    POTASSIUM 4.1 06/19/2023    CHLORIDE 100 06/19/2023    CO2 25 06/19/2023    GLUCOSE 182 (H) 06/19/2023    BUN 21 06/19/2023    CREATININE 1.21 06/19/2023        Lab Results   Component Value Date    WBC 8.0 06/19/2023    HEMOGLOBIN 12.6 06/19/2023    HEMATOCRIT 40.0 06/19/2023    PLATELETCT 136 (L) 06/19/2023        Total time of the discharge process exceeds 42 minutes.

## 2023-06-20 NOTE — PROGRESS NOTES
Discharge instructions reviewed and signed, all questions answered, PIV removed, Meds to Beds delivered.

## 2023-06-20 NOTE — CARE PLAN
The patient is Stable - Low risk of patient condition declining or worsening    Shift Goals  Clinical Goals: stable v/s, manage pain  Patient Goals: sleep and less pain    Progress made toward(s) clinical / shift goals:      Problem: Pain - Standard  Goal: Alleviation of pain or a reduction in pain to the patient’s comfort goal  Description: Target End Date:  Prior to discharge or change in level of care    Document on Vitals flowsheet    1.  Document pain using the appropriate pain scale per order or unit policy  2.  Educate and implement non-pharmacologic comfort measures (i.e. relaxation, distraction, massage, cold/heat therapy, etc.)  3.  Pain management medications as ordered  4.  Reassess pain after pain med administration per policy  5.  If opiods administered assess patient's response to pain medication is appropriate per POSS sedation scale  6.  Follow pain management plan developed in collaboration with patient and interdisciplinary team (including palliative care or pain specialists if applicable)  Outcome: Progressing       Patient is not progressing towards the following goals:

## 2023-06-20 NOTE — PROGRESS NOTES
-Received report from nightshift RN  -Assumed patients care.  -Assessment performed and done  -VSS  -Patient is alert and oriented x 4  -In no apparent distress  -Denies chest pain. SOB and N/V  -Reports 3/10 hip pain   -Telemetry box on. Rate and rhythm verified.   -Patient and visitors educated on the use of call light, communicating with the staff on patient needs and concerns.   -Provided time to answer pt questions and address concerns.    -Proper hand hygiene before and after patient care to ensure patient will remain free from infection.     -Patient educated on POC for the day, upcoming tests, and medication information.   -Patient educated on safety precautions and safety equipment. Bed in low position, call light within reach, and hourly rounding conducted.    -Will continue to answer questions and educate patient and visitors as necessary  -No further needs at this time, will continue to monitor.

## 2023-06-20 NOTE — PROGRESS NOTES
Assumed care on patient, alert and oriented x 4, respiration unlabored and on 4LNC, verbalized pain to LLE at 9/10, patient requesting for pain med if available, repositioned for comfort, monitor on and heart rate and rhythm verified, safety precautions maintained, bed to lowest position, side rails up x 3, call light in hand for assistance, will monitor.

## 2024-03-17 NOTE — PROGRESS NOTES
Neurosurgery Progress Note    Subjective:  Transferred to floor, hyptnesion appears imrpved  Not OOB, graham      Exam:  BLE str intact- CDI  A&O x3, GCS 15  PERRL, EOMI   Face symm, tongue midline  TALAMANTES with FS, no drift  C/d/i    BP  Min: 129/56  Max: 148/74  Pulse  Av.7  Min: 97  Max: 120  Resp  Av.5  Min: 12  Max: 48  Temp  Av.8 °C (98.3 °F)  Min: 36.3 °C (97.4 °F)  Max: 37 °C (98.6 °F)  SpO2  Av.6 %  Min: 91 %  Max: 94 %    No data recorded    Recent Labs     19   WBC 10.0  --   --  8.9 7.3   RBC 2.91*  --   --  3.12* 3.29*   HEMOGLOBIN 8.3*   < > 8.1* 9.1* 9.3*   HEMATOCRIT 28.2*  --   --  29.4* 30.7*   MCV 96.9  --   --  94.2 93.3   MCH 28.5  --   --  29.2 28.3   MCHC 29.4*  --   --  31.0* 30.3*   RDW 63.2*  --   --  60.4* 59.5*   PLATELETCT 165  --   --  159* 195   MPV 10.7  --   --  10.7 10.0    < > = values in this interval not displayed.     Recent Labs     19   SODIUM 133* 135 137   POTASSIUM 5.6* 5.0 3.9   CHLORIDE 101 102 104   CO2 24 24 27   GLUCOSE 155* 195* 160*   BUN 37* 23* 22   CREATININE 1.69* 0.95 0.91   CALCIUM 8.4* 8.6 8.9               Intake/Output       19 - 19 - 19 0659      1900-0659 Total  Total       Intake    P.O.  --  240 240  --  -- --    P.O. -- 240 240 -- -- --    Total Intake -- 240 240 -- -- --       Output    Urine  660  -- 660  --  -- --    Output (mL) (Urethral Catheter Straight-tip) 660 -- 660 -- -- --    Stool  --  -- --  --  -- --    Number of Times Stooled 3 x 1 x 4 x -- -- --    Total Output 660 -- 660 -- -- --       Net I/O     -660 240 -420 -- -- --            Intake/Output Summary (Last 24 hours) at 2019 1154  Last data filed at 2019 1900  Gross per 24 hour   Intake 240 ml   Output 425 ml   Net -185 ml            • oxyCODONE immediate-release  5-10 mg Q3HRS PRN   •  acetaminophen  650 mg Q6HRS   • acetaminophen  650 mg Q4HRS PRN   • cyclobenzaprine  10 mg TID PRN   • tramadol  50 mg Q6HRS PRN   • morphine injection  2 mg Q4HRS PRN   • cefTRIAXone (ROCEPHIN) IV  2 g Q24HRS   • benzocaine-menthol  1 Lozenge Q2HRS PRN   • doxycycline monohydrate  100 mg Q12HRS   • lactobacillus rhamnosus  1 Cap QDAY with Breakfast   • insulin regular  1-6 Units 4X/DAY ACHS    And   • glucose  16 g Q15 MIN PRN    And   • dextrose 10% bolus  250 mL Q15 MIN PRN   • diphenhydrAMINE  25 mg Q6HRS PRN    Or   • diphenhydrAMINE  25 mg Q6HRS PRN   • albuterol  1 Puff Q6HRS PRN   • budesonide-formoterol  2 Puff BID   • dronedarone  400 mg BID WITH MEALS   • gabapentin  300 mg BID   • levothyroxine  75 mcg AM ES   • omeprazole  40 mg DAILY   • simvastatin  40 mg DAILY   • traZODone  100 mg Nightly   • MD ALERT...DO NOT ADMINISTER NSAIDS or ASPIRIN unless ORDERED By Neurosurgery  1 Each PRN   • docusate sodium  100 mg BID   • senna-docusate  1 Tab Nightly   • senna-docusate  1 Tab Q24HRS PRN   • polyethylene glycol/lytes  1 Packet BID PRN   • magnesium hydroxide  30 mL QDAY PRN   • bisacodyl  10 mg Q24HRS PRN   • Respiratory Care per Protocol   Continuous RT   • ondansetron  4 mg Q4HRS PRN   • ondansetron  4 mg Q4HRS PRN   • hydrALAZINE  10 mg Q HOUR PRN   • benzocaine-menthol  1 Lozenge Q2HRS PRN   • calcium carbonate  500 mg BID       Assessment and Plan:  POD #5 L4-S1 TLIF  Prophylactic anticoagulation: no         Start date/time: no need    Appreciate med hypotension  Attempt dc graham if able to ambulate- replaced for retention 8/30  Continue pain control  PTOT  DC planning after PTOT        Xray Chest 1 View- PORTABLE-Urgent

## (undated) DEVICE — TUBING CLEARLINK DUO-VENT - C-FLO (48EA/CA)

## (undated) DEVICE — DRAPE STRLE REG TOWEL 18X24 - (10/BX 4BX/CA)"

## (undated) DEVICE — SUTURE GENERAL

## (undated) DEVICE — ELECTRODE 850 FOAM ADHESIVE - HYDROGEL RADIOTRNSPRNT (50/PK)

## (undated) DEVICE — GLOVE BIOGEL SZ 8.5 SURGICAL PF LTX - (50PR/BX 4BX/CA)

## (undated) DEVICE — GOWN SURGEONS X-LARGE - DISP. (30/CA)

## (undated) DEVICE — CHLORAPREP 26 ML APPLICATOR - ORANGE TINT(25/CA)

## (undated) DEVICE — SET EXTENSION WITH 2 PORTS (48EA/CA) ***PART #2C8610 IS A SUBSTITUTE*****

## (undated) DEVICE — GLOVE SZ 8 BIOGEL PI MICRO - PF LF (50PR/BX)

## (undated) DEVICE — NEEDLE SPINAL NON-SAFETY 18 GA X 3 IN (25EA/BX)

## (undated) DEVICE — SENSOR SPO2 NEO LNCS ADHESIVE (20/BX) SEE USER NOTES

## (undated) DEVICE — SYRINGE SAFETY 10 ML 18 GA X 1 1/2 BLUNT LL (100/BX 4BX/CA)

## (undated) DEVICE — PEDIGUARD CURVED (1EA)

## (undated) DEVICE — BONE PRESS SPINAL EDITION HENSLER (10EA/CA)

## (undated) DEVICE — SPONGE GAUZESTER 4 X 4 4PLY - (128PK/CA)

## (undated) DEVICE — SUTURE 2-0 VICRYL PLUS CT-1 - 8 X 18 INCH(12/BX)

## (undated) DEVICE — PACK NEURO - (2EA/CA)

## (undated) DEVICE — GLOVE BIOGEL PI INDICATOR SZ 6.5 SURGICAL PF LF - (50/BX 4BX/CA)

## (undated) DEVICE — MIDAS LUBRICATOR DIFFUSER PACK (4EA/CA)

## (undated) DEVICE — PACK JACKSON TABLE KIT W/OUT - HR (6EA/CA)

## (undated) DEVICE — BONE MILL BM210

## (undated) DEVICE — TUBING C&T SET FLYING LEADS DRAIN TUBING (10EA/BX)

## (undated) DEVICE — KIT GEL-FLOW NT ABORBABLE GELATIN (6EA/BX)

## (undated) DEVICE — NEEDLE NON SAFETY HYPO 22 GA X 1 1/2 IN (100/BX)

## (undated) DEVICE — SUTURE 1 VICRYL PLUS CTX - 8 X 18 INCH (12/BX)

## (undated) DEVICE — SLEEVE, VASO, THIGH, MED

## (undated) DEVICE — TUBE CONNECT SUCTION CLEAR 120 X 1/4" (50EA/CA)"

## (undated) DEVICE — PROTECTOR ULNA NERVE - (36PR/CA)

## (undated) DEVICE — LIGHT SOURCE MIS 12FT

## (undated) DEVICE — GLOVE BIOGEL PI ORTHO SZ 7.5 PF LF (40PR/BX)

## (undated) DEVICE — WATER IRRIG. STER. 1500 ML - (9/CA)

## (undated) DEVICE — DRAPE LARGE 3 QUARTER - (20/CA)

## (undated) DEVICE — SODIUM CHL IRRIGATION 0.9% 1000ML (12EA/CA)

## (undated) DEVICE — TRAY CATHETER FOLEY URINE METER W/STATLOCK 350ML (10EA/CA)

## (undated) DEVICE — GLOVE SZ 6.5 BIOGEL PI MICRO - PF LF (50PR/BX)

## (undated) DEVICE — KIT ROOM DECONTAMINATION

## (undated) DEVICE — SYRINGE SAFETY 3 ML 18 GA X 1 1/2 BLUNT LL (100/BX 8BX/CA)

## (undated) DEVICE — FOLEY SLIPPERY 5CC 16 FR LF ALL SILICONE (12EA/CA)

## (undated) DEVICE — GLOVE BIOGEL SZ 6.5 SURGICAL PF LTX (50PR/BX 4BX/CA)

## (undated) DEVICE — GLOVE BIOGEL PI INDICATOR SZ 8.0 SURGICAL PF LF -(50/BX 4BX/CA)

## (undated) DEVICE — GOWN WARMING STANDARD FLEX - (30/CA)

## (undated) DEVICE — KIT EVACUATER 3 SPRING PVC LF 1/8 DRAIN SIZE (10EA/CA)"

## (undated) DEVICE — GLOVESZ 8.5 BIOGEL PI MICRO - PF LF (50PR/BX)

## (undated) DEVICE — ARMREST CRADLE FOAM - (2PR/PK 12PR/CA)

## (undated) DEVICE — GOWN SURGICAL X-LARGE ULTRA - FILM-REINFORCED (20/CA)

## (undated) DEVICE — TUBE E-T HI-LO CUFF 7.0MM (10EA/PK)

## (undated) DEVICE — CANISTER SUCTION 3000ML MECHANICAL FILTER AUTO SHUTOFF MEDI-VAC NONSTERILE LF DISP  (40EA/CA)

## (undated) DEVICE — NEEDLE NON-SAFETY HYPO 21 GA X 1 1/2 IN HYPO (100/BX)

## (undated) DEVICE — DECANTER FLD BLS - (50/CA)

## (undated) DEVICE — INTRAOP NEURO IN OR 1:1 PER 15 MIN

## (undated) DEVICE — GLOVE BIOGEL PI INDICATOR SZ 8.5 SURGICAL PF LF - (50PR/BX 4BX/CA)

## (undated) DEVICE — KIT ANESTHESIA W/CIRCUIT & 3/LT BAG W/FILTER (20EA/CA)

## (undated) DEVICE — TOOL DISSECT MATCH HEAD

## (undated) DEVICE — LACTATED RINGERS INJ 1000 ML - (14EA/CA 60CA/PF)

## (undated) DEVICE — HEADREST PRONEVIEW LARGE - (10/CA)

## (undated) DEVICE — LACTATED RINGERS INJ. 500 ML - (24EA/CA)

## (undated) DEVICE — NEPTUNE 4 PORT MANIFOLD - (20/PK)

## (undated) DEVICE — DRAPE LAPAROTOMY T SHEET - (12EA/CA)

## (undated) DEVICE — SET LEADWIRE 5 LEAD BEDSIDE DISPOSABLE ECG (1SET OF 5/EA)

## (undated) DEVICE — CELLSAVER STAT

## (undated) DEVICE — GLOVE BIOGEL PI INDICATOR SZ 7.0 SURGICAL PF LF - (50/BX 4BX/CA)

## (undated) DEVICE — MASK ANESTHESIA ADULT  - (100/CA)

## (undated) DEVICE — TOWELS CLOTH SURGICAL - (4/PK 20PK/CA)

## (undated) DEVICE — ELECTRODE DUAL RETURN W/ CORD - (50/PK)

## (undated) DEVICE — CELLSAVER PACK

## (undated) DEVICE — SUTURE 3-0 VICRYL PLUS RB-1 - 8 X 18 INCH (12/BX)

## (undated) DEVICE — SEALER BIPOLAR 2.3 AQUAMANTYS

## (undated) DEVICE — SUCTION INSTRUMENT YANKAUER BULBOUS TIP W/O VENT (50EA/CA)

## (undated) DEVICE — GLOVE BIOGEL INDICATOR SZ 7SURGICAL PF LTX - (50/BX 4BX/CA)